# Patient Record
Sex: FEMALE | Race: WHITE | Employment: FULL TIME | URBAN - NONMETROPOLITAN AREA
[De-identification: names, ages, dates, MRNs, and addresses within clinical notes are randomized per-mention and may not be internally consistent; named-entity substitution may affect disease eponyms.]

---

## 2018-05-22 ENCOUNTER — HOSPITAL ENCOUNTER (EMERGENCY)
Age: 48
Discharge: HOME | End: 2018-05-22
Payer: COMMERCIAL

## 2018-05-22 VITALS
DIASTOLIC BLOOD PRESSURE: 79 MMHG | TEMPERATURE: 98.9 F | WEIGHT: 258 LBS | HEART RATE: 86 BPM | RESPIRATION RATE: 16 BRPM | HEIGHT: 64 IN | OXYGEN SATURATION: 100 % | SYSTOLIC BLOOD PRESSURE: 139 MMHG | BODY MASS INDEX: 44.05 KG/M2

## 2018-05-22 DIAGNOSIS — R42 DIZZINESS: ICD-10-CM

## 2018-05-22 DIAGNOSIS — H61.21 HEARING LOSS OF RIGHT EAR DUE TO CERUMEN IMPACTION: Primary | ICD-10-CM

## 2018-05-22 PROCEDURE — 69209 REMOVE IMPACTED EAR WAX UNI: CPT

## 2018-05-22 PROCEDURE — 99213 OFFICE O/P EST LOW 20 MIN: CPT | Performed by: NURSE PRACTITIONER

## 2018-05-22 PROCEDURE — 99213 OFFICE O/P EST LOW 20 MIN: CPT

## 2018-05-22 RX ORDER — MECLIZINE HCL 12.5 MG/1
12.5 TABLET ORAL 3 TIMES DAILY PRN
Qty: 12 TABLET | Refills: 0 | Status: SHIPPED | OUTPATIENT
Start: 2018-05-22 | End: 2018-06-01

## 2018-05-22 ASSESSMENT — ENCOUNTER SYMPTOMS
COLOR CHANGE: 0
SINUS PRESSURE: 0
SINUS PAIN: 0
SORE THROAT: 0

## 2018-05-22 ASSESSMENT — PAIN SCALES - GENERAL: PAINLEVEL_OUTOF10: 4

## 2018-05-22 ASSESSMENT — PAIN DESCRIPTION - LOCATION: LOCATION: EAR

## 2018-05-22 ASSESSMENT — PAIN DESCRIPTION - ORIENTATION: ORIENTATION: RIGHT

## 2018-05-22 ASSESSMENT — PAIN DESCRIPTION - DESCRIPTORS: DESCRIPTORS: DISCOMFORT

## 2018-05-22 ASSESSMENT — PAIN DESCRIPTION - PAIN TYPE: TYPE: ACUTE PAIN

## 2021-01-03 ENCOUNTER — HOSPITAL ENCOUNTER (INPATIENT)
Age: 51
LOS: 1 days | Discharge: HOME | DRG: 392 | End: 2021-01-09
Attending: EMERGENCY MEDICINE | Admitting: INTERNAL MEDICINE
Payer: COMMERCIAL

## 2021-01-03 ENCOUNTER — APPOINTMENT (OUTPATIENT)
Dept: CT IMAGING | Age: 51
DRG: 392 | End: 2021-01-03
Payer: COMMERCIAL

## 2021-01-03 DIAGNOSIS — E87.6 HYPOKALEMIA: ICD-10-CM

## 2021-01-03 DIAGNOSIS — E83.42 HYPOMAGNESEMIA: ICD-10-CM

## 2021-01-03 DIAGNOSIS — R19.7 DIARRHEA, UNSPECIFIED TYPE: Primary | ICD-10-CM

## 2021-01-03 LAB
ALBUMIN SERPL-MCNC: 4.3 G/DL (ref 3.5–5.1)
ALP BLD-CCNC: 85 U/L (ref 38–126)
ALT SERPL-CCNC: 25 U/L (ref 11–66)
ANION GAP SERPL CALCULATED.3IONS-SCNC: 14 MEQ/L (ref 8–16)
AST SERPL-CCNC: 20 U/L (ref 5–40)
BASOPHILS # BLD: 0.2 %
BASOPHILS ABSOLUTE: 0 THOU/MM3 (ref 0–0.1)
BILIRUB SERPL-MCNC: 0.4 MG/DL (ref 0.3–1.2)
BILIRUBIN URINE: NEGATIVE
BLOOD, URINE: NEGATIVE
BUN BLDV-MCNC: 32 MG/DL (ref 7–22)
C-REACTIVE PROTEIN: 0.65 MG/DL (ref 0–1)
CALCIUM SERPL-MCNC: 11 MG/DL (ref 8.5–10.5)
CHARACTER, URINE: CLEAR
CHLORIDE BLD-SCNC: 98 MEQ/L (ref 98–111)
CO2: 28 MEQ/L (ref 23–33)
COLOR: YELLOW
CREAT SERPL-MCNC: 1.5 MG/DL (ref 0.4–1.2)
EKG ATRIAL RATE: 94 BPM
EKG P AXIS: 45 DEGREES
EKG P-R INTERVAL: 166 MS
EKG Q-T INTERVAL: 376 MS
EKG QRS DURATION: 90 MS
EKG QTC CALCULATION (BAZETT): 470 MS
EKG R AXIS: 4 DEGREES
EKG T AXIS: 7 DEGREES
EKG VENTRICULAR RATE: 94 BPM
EOSINOPHIL # BLD: 0.2 %
EOSINOPHILS ABSOLUTE: 0 THOU/MM3 (ref 0–0.4)
ERYTHROCYTE [DISTWIDTH] IN BLOOD BY AUTOMATED COUNT: 13.2 % (ref 11.5–14.5)
ERYTHROCYTE [DISTWIDTH] IN BLOOD BY AUTOMATED COUNT: 41.3 FL (ref 35–45)
GFR SERPL CREATININE-BSD FRML MDRD: 37 ML/MIN/1.73M2
GLUCOSE BLD-MCNC: 121 MG/DL (ref 70–108)
GLUCOSE URINE: NEGATIVE MG/DL
HCT VFR BLD CALC: 45.6 % (ref 37–47)
HEMOGLOBIN: 15.5 GM/DL (ref 12–16)
IMMATURE GRANS (ABS): 0.06 THOU/MM3 (ref 0–0.07)
IMMATURE GRANULOCYTES: 0.4 %
INR BLD: 1.01 (ref 0.85–1.13)
KETONES, URINE: NEGATIVE
LEUKOCYTE ESTERASE, URINE: NEGATIVE
LIPASE: 60.7 U/L (ref 5.6–51.3)
LYMPHOCYTES # BLD: 10.4 %
LYMPHOCYTES ABSOLUTE: 1.7 THOU/MM3 (ref 1–4.8)
MAGNESIUM: 1.4 MG/DL (ref 1.6–2.4)
MCH RBC QN AUTO: 29.5 PG (ref 26–33)
MCHC RBC AUTO-ENTMCNC: 34 GM/DL (ref 32.2–35.5)
MCV RBC AUTO: 86.9 FL (ref 81–99)
MONOCYTES # BLD: 8.3 %
MONOCYTES ABSOLUTE: 1.3 THOU/MM3 (ref 0.4–1.3)
NITRITE, URINE: NEGATIVE
NUCLEATED RED BLOOD CELLS: 0 /100 WBC
OSMOLALITY CALCULATION: 287.6 MOSMOL/KG (ref 275–300)
PH UA: 5.5 (ref 5–9)
PLATELET # BLD: 429 THOU/MM3 (ref 130–400)
PMV BLD AUTO: 10.5 FL (ref 9.4–12.4)
POTASSIUM REFLEX MAGNESIUM: 2.5 MEQ/L (ref 3.5–5.2)
PROTEIN UA: NEGATIVE
RBC # BLD: 5.25 MILL/MM3 (ref 4.2–5.4)
SARS-COV-2, NAAT: NOT DETECTED
SEG NEUTROPHILS: 80.5 %
SEGMENTED NEUTROPHILS ABSOLUTE COUNT: 13 THOU/MM3 (ref 1.8–7.7)
SODIUM BLD-SCNC: 140 MEQ/L (ref 135–145)
SPECIFIC GRAVITY, URINE: > 1.03 (ref 1–1.03)
TOTAL PROTEIN: 7.5 G/DL (ref 6.1–8)
UROBILINOGEN, URINE: 0.2 EU/DL (ref 0–1)
WBC # BLD: 16.2 THOU/MM3 (ref 4.8–10.8)

## 2021-01-03 PROCEDURE — 2500000003 HC RX 250 WO HCPCS: Performed by: EMERGENCY MEDICINE

## 2021-01-03 PROCEDURE — 85610 PROTHROMBIN TIME: CPT

## 2021-01-03 PROCEDURE — 86140 C-REACTIVE PROTEIN: CPT

## 2021-01-03 PROCEDURE — 96376 TX/PRO/DX INJ SAME DRUG ADON: CPT

## 2021-01-03 PROCEDURE — 81003 URINALYSIS AUTO W/O SCOPE: CPT

## 2021-01-03 PROCEDURE — 2580000003 HC RX 258: Performed by: EMERGENCY MEDICINE

## 2021-01-03 PROCEDURE — 36415 COLL VENOUS BLD VENIPUNCTURE: CPT

## 2021-01-03 PROCEDURE — 6360000004 HC RX CONTRAST MEDICATION: Performed by: EMERGENCY MEDICINE

## 2021-01-03 PROCEDURE — 99220 PR INITIAL OBSERVATION CARE/DAY 70 MINUTES: CPT | Performed by: HOSPITALIST

## 2021-01-03 PROCEDURE — 6360000002 HC RX W HCPCS: Performed by: EMERGENCY MEDICINE

## 2021-01-03 PROCEDURE — 74177 CT ABD & PELVIS W/CONTRAST: CPT

## 2021-01-03 PROCEDURE — 96375 TX/PRO/DX INJ NEW DRUG ADDON: CPT

## 2021-01-03 PROCEDURE — G0378 HOSPITAL OBSERVATION PER HR: HCPCS

## 2021-01-03 PROCEDURE — 96366 THER/PROPH/DIAG IV INF ADDON: CPT

## 2021-01-03 PROCEDURE — 96365 THER/PROPH/DIAG IV INF INIT: CPT

## 2021-01-03 PROCEDURE — 96361 HYDRATE IV INFUSION ADD-ON: CPT

## 2021-01-03 PROCEDURE — 85025 COMPLETE CBC W/AUTO DIFF WBC: CPT

## 2021-01-03 PROCEDURE — 99284 EMERGENCY DEPT VISIT MOD MDM: CPT

## 2021-01-03 PROCEDURE — 83735 ASSAY OF MAGNESIUM: CPT

## 2021-01-03 PROCEDURE — U0002 COVID-19 LAB TEST NON-CDC: HCPCS

## 2021-01-03 PROCEDURE — 83690 ASSAY OF LIPASE: CPT

## 2021-01-03 PROCEDURE — 6370000000 HC RX 637 (ALT 250 FOR IP): Performed by: EMERGENCY MEDICINE

## 2021-01-03 PROCEDURE — 93005 ELECTROCARDIOGRAM TRACING: CPT | Performed by: EMERGENCY MEDICINE

## 2021-01-03 PROCEDURE — 80053 COMPREHEN METABOLIC PANEL: CPT

## 2021-01-03 RX ORDER — METRONIDAZOLE 500 MG/1
500 TABLET ORAL 2 TIMES DAILY
Status: ON HOLD | COMMUNITY
End: 2021-01-09 | Stop reason: HOSPADM

## 2021-01-03 RX ORDER — 0.9 % SODIUM CHLORIDE 0.9 %
1000 INTRAVENOUS SOLUTION INTRAVENOUS ONCE
Status: COMPLETED | OUTPATIENT
Start: 2021-01-03 | End: 2021-01-04

## 2021-01-03 RX ORDER — POTASSIUM CHLORIDE 7.45 MG/ML
10 INJECTION INTRAVENOUS
Status: DISPENSED | OUTPATIENT
Start: 2021-01-03 | End: 2021-01-03

## 2021-01-03 RX ORDER — MORPHINE SULFATE 4 MG/ML
4 INJECTION, SOLUTION INTRAMUSCULAR; INTRAVENOUS ONCE
Status: COMPLETED | OUTPATIENT
Start: 2021-01-03 | End: 2021-01-03

## 2021-01-03 RX ORDER — CIPROFLOXACIN 500 MG/1
500 TABLET, FILM COATED ORAL 2 TIMES DAILY
Status: ON HOLD | COMMUNITY
End: 2021-01-09 | Stop reason: HOSPADM

## 2021-01-03 RX ORDER — ONDANSETRON 2 MG/ML
4 INJECTION INTRAMUSCULAR; INTRAVENOUS ONCE
Status: COMPLETED | OUTPATIENT
Start: 2021-01-03 | End: 2021-01-03

## 2021-01-03 RX ORDER — POTASSIUM CHLORIDE 20 MEQ/1
40 TABLET, EXTENDED RELEASE ORAL 2 TIMES DAILY WITH MEALS
Status: DISCONTINUED | OUTPATIENT
Start: 2021-01-03 | End: 2021-01-09 | Stop reason: HOSPADM

## 2021-01-03 RX ORDER — MAGNESIUM SULFATE IN WATER 40 MG/ML
4 INJECTION, SOLUTION INTRAVENOUS ONCE
Status: COMPLETED | OUTPATIENT
Start: 2021-01-03 | End: 2021-01-03

## 2021-01-03 RX ADMIN — ONDANSETRON 4 MG: 2 INJECTION INTRAMUSCULAR; INTRAVENOUS at 16:52

## 2021-01-03 RX ADMIN — MORPHINE SULFATE 4 MG: 4 INJECTION, SOLUTION INTRAMUSCULAR; INTRAVENOUS at 19:54

## 2021-01-03 RX ADMIN — MAGNESIUM SULFATE HEPTAHYDRATE 4 G: 40 INJECTION, SOLUTION INTRAVENOUS at 19:15

## 2021-01-03 RX ADMIN — MORPHINE SULFATE 4 MG: 4 INJECTION, SOLUTION INTRAMUSCULAR; INTRAVENOUS at 16:52

## 2021-01-03 RX ADMIN — FAMOTIDINE 20 MG: 10 INJECTION INTRAVENOUS at 16:52

## 2021-01-03 RX ADMIN — POTASSIUM CHLORIDE 40 MEQ: 1500 TABLET, EXTENDED RELEASE ORAL at 17:48

## 2021-01-03 RX ADMIN — SODIUM CHLORIDE 1000 ML: 9 INJECTION, SOLUTION INTRAVENOUS at 16:53

## 2021-01-03 RX ADMIN — IOPAMIDOL 80 ML: 755 INJECTION, SOLUTION INTRAVENOUS at 20:17

## 2021-01-03 ASSESSMENT — PAIN DESCRIPTION - DESCRIPTORS: DESCRIPTORS: DISCOMFORT

## 2021-01-03 ASSESSMENT — ENCOUNTER SYMPTOMS
VOMITING: 0
EYE REDNESS: 0
NAUSEA: 0
ABDOMINAL PAIN: 1
COUGH: 0
BACK PAIN: 0
SHORTNESS OF BREATH: 0
TROUBLE SWALLOWING: 0
DIARRHEA: 1

## 2021-01-03 ASSESSMENT — PAIN DESCRIPTION - FREQUENCY: FREQUENCY: CONTINUOUS

## 2021-01-03 ASSESSMENT — PAIN DESCRIPTION - ONSET: ONSET: GRADUAL

## 2021-01-03 ASSESSMENT — PAIN SCALES - GENERAL: PAINLEVEL_OUTOF10: 8

## 2021-01-03 ASSESSMENT — PAIN DESCRIPTION - LOCATION: LOCATION: ABDOMEN

## 2021-01-03 NOTE — ED NOTES
Pt up in bed with blankets over bottom half. Patient updated on plan of care at this time and expresses no concerns regarding treatment. Patient VSS. Respirations are regular and unlabored, patient is alert and oriented x4. Patient bed rails up x2 and call light within reach. Will continue to monitor.        Usama Newton RN  01/03/21 [id number]

## 2021-01-03 NOTE — ED PROVIDER NOTES
[address] [facility]     Pt Name: Misha Gallegos  MRN: [id number]  [place] 1970  Date of evaluation: 1/3/2021  Provider: Lovely Torres MD,     [address] [facility]       Chief Complaint   Patient presents with    Abdominal Pain    Diarrhea       HISTORY OF PRESENT Leigh Sheppard is a pleasant 48 y.o. female who presents to the emergency department from home for diarrhea and abdominal pain. She states that this has been going on for 3 weeks. She was seen by her primary care doctor who did a stool culture was negative. They gave her Cipro and Flagyl for presumed intra-abdominal infection however she is continued to have diarrhea and abdominal pain. She describes as a discomfort in the epigastric and right upper quadrant. She denies any fever, nausea, or vomiting. She is not had any cough, congestion or chest pain. She has a previous surgical history of cholecystectomy,  section, hysterectomy, and ovarian cysts. Triage notes and Nursing notes were reviewed by myself. Any discrepancies are addressed above.     PAST MEDICAL HISTORY     Past Medical History:   Diagnosis Date    Hx of blood clots     during preg    Hypertension     Kidney stone     Nausea & vomiting     Thyroid disease        SURGICAL HISTORY       Past Surgical History:   Procedure Laterality Date    BACK SURGERY  ???    fusion and disk removed     SECTION       SECTION      CHOLECYSTECTOMY  's    FOOT SURGERY      bilateral feet    FOOT SURGERY Left 10/30/14    synovectomy peroneal tendon, repair peroneal tendon, exc ganglion cyst ankle    HYSTERECTOMY      TONSILLECTOMY      as a child       CURRENT MEDICATIONS       Current Discharge Medication List      CONTINUE these medications which have NOT CHANGED    Details   metroNIDAZOLE (FLAGYL) 500 MG tablet Take 500 mg by mouth 2 times daily      ciprofloxacin (CIPRO) 500 MG tablet Take 500 mg by mouth 2 times daily      levothyroxine (SYNTHROID) 150 MCG tablet Take 150 mcg by mouth Daily. olmesartan-hydrochlorothiazide (BENICAR HCT) 40-25 MG per tablet Take 1 tablet by mouth daily. acetaminophen (TYLENOL) 500 MG tablet Take 500 mg by mouth every 6 hours as needed for Pain      ibuprofen (ADVIL;MOTRIN) 200 MG tablet Take 600 mg by mouth every 6 hours as needed for Pain             ALLERGIES     Pcn [penicillins]    FAMILY HISTORY       Family History   Problem Relation Age of Onset    Cancer Mother         SOCIAL HISTORY       Social History     Socioeconomic History    Marital status:      Spouse name: None    Number of children: None    Years of education: None    Highest education level: None   Occupational History    None   Social Needs    Financial resource strain: None    Food insecurity     Worry: None     Inability: None    Transportation needs     Medical: None     Non-medical: None   Tobacco Use    Smoking status: Never Smoker    Smokeless tobacco: Never Used   Substance and Sexual Activity    Alcohol use: Yes     Comment: occasionally     Drug use: No    Sexual activity: None   Lifestyle    Physical activity     Days per week: None     Minutes per session: None    Stress: None   Relationships    Social connections     Talks on phone: None     Gets together: None     Attends Congregational service: None     Active member of club or organization: None     Attends meetings of clubs or organizations: None     Relationship status: None    Intimate partner violence     Fear of current or ex partner: None     Emotionally abused: None     Physically abused: None     Forced sexual activity: None   Other Topics Concern    None   Social History Narrative    None       REVIEW OF SYSTEMS     Review of Systems   Constitutional: Negative for fatigue and fever. HENT: Negative for congestion and trouble swallowing. Eyes: Negative for redness.    Respiratory: Negative for cough and shortness of breath. Cardiovascular: Negative for chest pain. Gastrointestinal: Positive for abdominal pain and diarrhea. Negative for nausea and vomiting. Genitourinary: Positive for decreased urine volume. Negative for dysuria. Musculoskeletal: Negative for back pain. Skin: Negative for rash. Allergic/Immunologic: Negative for immunocompromised state. Neurological: Negative for light-headedness. Hematological: Does not bruise/bleed easily. Except as noted above the remainder of the review of systems was reviewed and is. PHYSICAL EXAM    (up to 7 for level 4, 8 or more for level 5)     ED Triage Vitals [01/03/21 1531]   BP Temp Temp Source Pulse Resp SpO2 Height Weight   114/71 98.3 °F (36.8 °C) Oral 93 18 96 % 5' 4\" (1.626 m) 260 lb (117.9 kg)       Physical Exam  Vitals signs and nursing note reviewed. Exam conducted with a chaperone present. Constitutional:       General: She is not in acute distress. Appearance: She is obese. She is not ill-appearing. HENT:      Head: Normocephalic and atraumatic. Nose: Nose normal. No congestion. Mouth/Throat:      Mouth: Mucous membranes are moist.      Pharynx: Oropharynx is clear. No oropharyngeal exudate or posterior oropharyngeal erythema. Eyes:      Extraocular Movements: Extraocular movements intact. Pupils: Pupils are equal, round, and reactive to light. Cardiovascular:      Rate and Rhythm: Normal rate and regular rhythm. Pulses: Normal pulses. Heart sounds: Normal heart sounds. No murmur. No friction rub. Pulmonary:      Effort: Pulmonary effort is normal. No respiratory distress. Breath sounds: Normal breath sounds. No wheezing. Abdominal:      General: Abdomen is flat and protuberant. A surgical scar is present. There is no distension. Palpations: Abdomen is soft. Tenderness: There is abdominal tenderness in the right upper quadrant and epigastric area.  There is no guarding or rebound. Positive signs include 's sign. Negative signs include Rovsing's sign and McBurney's sign. Musculoskeletal: Normal range of motion. Skin:     General: Skin is warm and dry. Capillary Refill: Capillary refill takes less than 2 seconds. Neurological:      General: No focal deficit present. Mental Status: She is alert and oriented to person, place, and time. DIAGNOSTIC RESULTS     EKG:(none if blank)  All EKG's are [facility] who either signs or Co-signs this chart in the absence of a cardiologist.        RADIOLOGY: (none if blank)   Interpretation per the Radiologistbelow, if available at the time of this note:    CT ABDOMEN PELVIS W IV CONTRAST Additional Contrast? None   Final Result   Impression:      No significant abnormalities. This document has been electronically signed by: Sadiq Hunt MD on    01/03/2021 09:00 PM      All CT scans at this facility use dose modulation, iterative    reconstruction, and/or weight-based   dosing when appropriate to reduce radiation dose to as low as reasonably    achievable.              LABS:  Labs Reviewed   LIPASE - Abnormal; Notable for the following components:       Result Value    Lipase 60.7 (*)     All other components within normal limits   COMPREHENSIVE METABOLIC PANEL W/ REFLEX TO MG FOR LOW K - Abnormal; Notable for the following components:    Glucose 121 (*)     CREATININE 1.5 (*)     BUN 32 (*)     Potassium reflex Magnesium 2.5 (*)     Calcium 11.0 (*)     All other components within normal limits   CBC WITH AUTO DIFFERENTIAL - Abnormal; Notable for the following components:    WBC 16.2 (*)     Platelets 758 (*)     Segs Absolute 13.0 (*)     All other components within normal limits   URINE RT REFLEX TO CULTURE - Abnormal; Notable for the following components:    Specific Gravity, Urine > 1.030 (*)     All other components within normal limits   MAGNESIUM - Abnormal; Notable for the following components:    Magnesium 1.4 (*)     All other components within normal limits   GLOMERULAR FILTRATION RATE, ESTIMATED - Abnormal; Notable for the following components:    Est, Glom Filt Rate 37 (*)     All other components within normal limits   BASIC METABOLIC PANEL W/ REFLEX TO MG FOR LOW K - Abnormal; Notable for the following components:    Potassium reflex Magnesium 3.1 (*)     Glucose 119 (*)     BUN 24 (*)     All other components within normal limits   GLOMERULAR FILTRATION RATE, ESTIMATED - Abnormal; Notable for the following components:    Est, Glom Filt Rate 59 (*)     All other components within normal limits   GLOMERULAR FILTRATION RATE, ESTIMATED - Abnormal; Notable for the following components:    Est, Glom Filt Rate 76 (*)     All other components within normal limits   FECAL LEUKOCYTES    Narrative:     Source: feces       Site:           Current Antibiotics:   GASTROINTESTINAL PANEL, MOLECULAR   OVA AND PARASITE SCREEN   PROTIME-INR   C-REACTIVE PROTEIN   ANION GAP   OSMOLALITY   COVID-19   HEPATIC FUNCTION PANEL   LACTIC ACID, PLASMA   BLOOD OCCULT STOOL SCREEN #1   BASIC METABOLIC PANEL   ANION GAP   MAGNESIUM   OSMOLALITY   ANION GAP   OSMOLALITY   FECAL LACTOFERRIN   BASIC METABOLIC PANEL       All other labs were within normal range or not returned as of this dictation. Please note, any cultures that may have been sent were not resulted at the time of this patient visit. EMERGENCY DEPARTMENT COURSE andMedical Decision Making:     MDM  Number of Diagnoses or Management Options  Diarrhea, unspecified type  Hypokalemia  Hypomagnesemia  Diagnosis management comments: 51-year-old female presents emergency room for 3-week history of diarrhea. Will evaluate with CBC, CMP, lipase, UA, CRP, PT/INR and CT abdomen pelvis. She did have stool cultures done by her primary care physician which were negative.   She denies any history of foul-smelling diarrhea, recent antibiotic use, or Intravenous Stopped 1/4/21 0104)   morphine injection 4 mg (4 mg Intravenous Given 1/3/21 1652)   ondansetron (ZOFRAN) injection 4 mg (4 mg Intravenous Given 1/3/21 1652)   famotidine (PEPCID) injection 20 mg (20 mg Intravenous Given 1/3/21 1652)   magnesium sulfate 4 g in 100 mL IVPB premix (0 g Intravenous Stopped 1/3/21 2344)   morphine injection 4 mg (4 mg Intravenous Given 1/3/21 1954)   iopamidol (ISOVUE-370) 76 % injection 80 mL (80 mLs Intravenous Given 1/3/21 2017)         Procedures: (None if blank)       CLINICAL       1. Diarrhea, unspecified type    2. Hypokalemia    3. Hypomagnesemia          DISPOSITION/PLAN   DISPOSITION Admitted 01/03/2021 11:24:44 PM      PATIENT REFERRED TO:  No follow-up provider specified.     DISCHARGE MEDICATIONS:  Current Discharge Medication List                 (Please note that portions of this note were completed with a voice recognition program.  Efforts were made to edit the dictations but occasionallywords are mis-transcribed.)      Electronically signed by Natty Rod MD on 1/3/21 at 6:06 PM EST    Attending Physician, Emergency Department       Margaret Hills MD  01/04/21 [id number] [phone]

## 2021-01-03 NOTE — ED NOTES
Patient lying in bed with blankets watching tv at this time. Patient respirations are regular and unlabored. Patient appears to be in no current distress. Patient VSS. Call light is within reach. Patient expresses no needs at this time.        Mal Zacarias RN  01/03/21 9094

## 2021-01-04 LAB
ADENOVIRUS F 40 41 PCR: NOT DETECTED
ALBUMIN SERPL-MCNC: 3.6 G/DL (ref 3.5–5.1)
ALP BLD-CCNC: 68 U/L (ref 38–126)
ALT SERPL-CCNC: 20 U/L (ref 11–66)
ANION GAP SERPL CALCULATED.3IONS-SCNC: 10 MEQ/L (ref 8–16)
ANION GAP SERPL CALCULATED.3IONS-SCNC: 8 MEQ/L (ref 8–16)
AST SERPL-CCNC: 17 U/L (ref 5–40)
ASTROVIRUS PCR: NOT DETECTED
BILIRUB SERPL-MCNC: 0.4 MG/DL (ref 0.3–1.2)
BILIRUBIN DIRECT: < 0.2 MG/DL (ref 0–0.3)
BUN BLDV-MCNC: 17 MG/DL (ref 7–22)
BUN BLDV-MCNC: 24 MG/DL (ref 7–22)
CALCIUM SERPL-MCNC: 8.8 MG/DL (ref 8.5–10.5)
CALCIUM SERPL-MCNC: 9.2 MG/DL (ref 8.5–10.5)
CAMPYLOBACTER PCR: NOT DETECTED
CHLORIDE BLD-SCNC: 102 MEQ/L (ref 98–111)
CHLORIDE BLD-SCNC: 104 MEQ/L (ref 98–111)
CLOSTRIDIUM DIFFICILE, PCR: NOT DETECTED
CO2: 25 MEQ/L (ref 23–33)
CO2: 26 MEQ/L (ref 23–33)
CREAT SERPL-MCNC: 0.8 MG/DL (ref 0.4–1.2)
CREAT SERPL-MCNC: 1 MG/DL (ref 0.4–1.2)
CRYPTOSPORIDIUM PCR: NOT DETECTED
CYCLOSPORA CAYETANENSIS PCR: NOT DETECTED
E COLI 0157 PCR: NORMAL
E COLI ENTEROAGGREGATIVE PCR: NOT DETECTED
E COLI ENTEROPATHOGENIC PCR: NOT DETECTED
E COLI ENTEROTOXIGENIC PCR: NOT DETECTED
E COLI SHIGA LIKE TOXIN PCR: NOT DETECTED
E COLI SHIGELLA/ENTEROINVASIVE PCR: NOT DETECTED
E HISTOLYTICA GI FILM ARRAY: NOT DETECTED
FECAL LEUKOCYTES: NORMAL
GFR SERPL CREATININE-BSD FRML MDRD: 59 ML/MIN/1.73M2
GFR SERPL CREATININE-BSD FRML MDRD: 76 ML/MIN/1.73M2
GIARDIA LAMBLIA PCR: NOT DETECTED
GLUCOSE BLD-MCNC: 100 MG/DL (ref 70–108)
GLUCOSE BLD-MCNC: 119 MG/DL (ref 70–108)
HEMOCCULT STL QL: POSITIVE
LACTIC ACID: 1.9 MMOL/L (ref 0.5–2.2)
MAGNESIUM: 2.3 MG/DL (ref 1.6–2.4)
NOROVIRUS GI GII PCR: NOT DETECTED
OSMOLALITY CALCULATION: 277.3 MOSMOL/KG (ref 275–300)
OSMOLALITY CALCULATION: 279 MOSMOL/KG (ref 275–300)
PLESIOMONAS SHIGELLOIDES PCR: NOT DETECTED
POTASSIUM REFLEX MAGNESIUM: 3.1 MEQ/L (ref 3.5–5.2)
POTASSIUM SERPL-SCNC: 3.5 MEQ/L (ref 3.5–5.2)
REASON FOR REJECTION: NORMAL
REJECTED TEST: NORMAL
ROTAVIRUS A PCR: NOT DETECTED
SALMONELLA PCR: NOT DETECTED
SAPOVIRUS PCR: NOT DETECTED
SODIUM BLD-SCNC: 137 MEQ/L (ref 135–145)
SODIUM BLD-SCNC: 138 MEQ/L (ref 135–145)
TOTAL PROTEIN: 6.3 G/DL (ref 6.1–8)
VIBRIO CHOLERAE PCR: NOT DETECTED
VIBRIO PCR: NOT DETECTED
YERSINIA ENTEROCOLITICA PCR: NOT DETECTED

## 2021-01-04 PROCEDURE — 6360000002 HC RX W HCPCS: Performed by: PHYSICIAN ASSISTANT

## 2021-01-04 PROCEDURE — 82248 BILIRUBIN DIRECT: CPT

## 2021-01-04 PROCEDURE — 6370000000 HC RX 637 (ALT 250 FOR IP): Performed by: EMERGENCY MEDICINE

## 2021-01-04 PROCEDURE — 96367 TX/PROPH/DG ADDL SEQ IV INF: CPT

## 2021-01-04 PROCEDURE — G0378 HOSPITAL OBSERVATION PER HR: HCPCS

## 2021-01-04 PROCEDURE — 6360000002 HC RX W HCPCS: Performed by: HOSPITALIST

## 2021-01-04 PROCEDURE — 2580000003 HC RX 258: Performed by: HOSPITALIST

## 2021-01-04 PROCEDURE — 96366 THER/PROPH/DIAG IV INF ADDON: CPT

## 2021-01-04 PROCEDURE — 36415 COLL VENOUS BLD VENIPUNCTURE: CPT

## 2021-01-04 PROCEDURE — 99225 PR SBSQ OBSERVATION CARE/DAY 25 MINUTES: CPT | Performed by: INTERNAL MEDICINE

## 2021-01-04 PROCEDURE — 89055 LEUKOCYTE ASSESSMENT FECAL: CPT

## 2021-01-04 PROCEDURE — 80053 COMPREHEN METABOLIC PANEL: CPT

## 2021-01-04 PROCEDURE — 0097U HC GI PTHGN MULT REV TRANS & AMP PRB TECH 22 TRGT: CPT

## 2021-01-04 PROCEDURE — 83735 ASSAY OF MAGNESIUM: CPT

## 2021-01-04 PROCEDURE — 82272 OCCULT BLD FECES 1-3 TESTS: CPT

## 2021-01-04 PROCEDURE — 6370000000 HC RX 637 (ALT 250 FOR IP): Performed by: INTERNAL MEDICINE

## 2021-01-04 PROCEDURE — 83605 ASSAY OF LACTIC ACID: CPT

## 2021-01-04 PROCEDURE — 96376 TX/PRO/DX INJ SAME DRUG ADON: CPT

## 2021-01-04 PROCEDURE — 6360000002 HC RX W HCPCS: Performed by: EMERGENCY MEDICINE

## 2021-01-04 PROCEDURE — 83630 LACTOFERRIN FECAL (QUAL): CPT

## 2021-01-04 RX ORDER — 0.9 % SODIUM CHLORIDE 0.9 %
30 INTRAVENOUS SOLUTION INTRAVENOUS ONCE
Status: DISCONTINUED | OUTPATIENT
Start: 2021-01-04 | End: 2021-01-09 | Stop reason: HOSPADM

## 2021-01-04 RX ORDER — SODIUM CHLORIDE 0.9 % (FLUSH) 0.9 %
3 SYRINGE (ML) INJECTION PRN
Status: DISCONTINUED | OUTPATIENT
Start: 2021-01-04 | End: 2021-01-09 | Stop reason: HOSPADM

## 2021-01-04 RX ORDER — POTASSIUM CHLORIDE 20 MEQ/1
40 TABLET, EXTENDED RELEASE ORAL ONCE
Status: DISCONTINUED | OUTPATIENT
Start: 2021-01-04 | End: 2021-01-09 | Stop reason: HOSPADM

## 2021-01-04 RX ORDER — FENTANYL CITRATE 50 UG/ML
25 INJECTION, SOLUTION INTRAMUSCULAR; INTRAVENOUS EVERY 4 HOURS PRN
Status: DISCONTINUED | OUTPATIENT
Start: 2021-01-04 | End: 2021-01-09 | Stop reason: HOSPADM

## 2021-01-04 RX ORDER — SODIUM CHLORIDE 9 MG/ML
INJECTION, SOLUTION INTRAVENOUS CONTINUOUS
Status: DISCONTINUED | OUTPATIENT
Start: 2021-01-04 | End: 2021-01-06

## 2021-01-04 RX ORDER — ONDANSETRON 2 MG/ML
4 INJECTION INTRAMUSCULAR; INTRAVENOUS EVERY 6 HOURS PRN
Status: DISCONTINUED | OUTPATIENT
Start: 2021-01-04 | End: 2021-01-09 | Stop reason: HOSPADM

## 2021-01-04 RX ORDER — LOPERAMIDE HYDROCHLORIDE 2 MG/1
2 CAPSULE ORAL 4 TIMES DAILY PRN
Status: DISCONTINUED | OUTPATIENT
Start: 2021-01-04 | End: 2021-01-08

## 2021-01-04 RX ORDER — POTASSIUM CHLORIDE 7.45 MG/ML
10 INJECTION INTRAVENOUS
Status: DISPENSED | OUTPATIENT
Start: 2021-01-04 | End: 2021-01-04

## 2021-01-04 RX ORDER — ACETAMINOPHEN 325 MG/1
650 TABLET ORAL EVERY 4 HOURS PRN
Status: DISCONTINUED | OUTPATIENT
Start: 2021-01-04 | End: 2021-01-09 | Stop reason: HOSPADM

## 2021-01-04 RX ORDER — LIDOCAINE 40 MG/G
CREAM TOPICAL EVERY 30 MIN PRN
Status: DISCONTINUED | OUTPATIENT
Start: 2021-01-04 | End: 2021-01-09 | Stop reason: HOSPADM

## 2021-01-04 RX ADMIN — POTASSIUM CHLORIDE 10 MEQ: 7.46 INJECTION, SOLUTION INTRAVENOUS at 00:28

## 2021-01-04 RX ADMIN — LOPERAMIDE HYDROCHLORIDE 2 MG: 2 CAPSULE ORAL at 16:51

## 2021-01-04 RX ADMIN — POTASSIUM CHLORIDE 40 MEQ: 1500 TABLET, EXTENDED RELEASE ORAL at 16:51

## 2021-01-04 RX ADMIN — POTASSIUM CHLORIDE 10 MEQ: 7.46 INJECTION, SOLUTION INTRAVENOUS at 01:32

## 2021-01-04 RX ADMIN — SODIUM CHLORIDE: 9 INJECTION, SOLUTION INTRAVENOUS at 19:23

## 2021-01-04 RX ADMIN — SODIUM CHLORIDE: 9 INJECTION, SOLUTION INTRAVENOUS at 05:56

## 2021-01-04 RX ADMIN — SODIUM CHLORIDE: 9 INJECTION, SOLUTION INTRAVENOUS at 03:31

## 2021-01-04 RX ADMIN — ONDANSETRON 4 MG: 2 INJECTION INTRAMUSCULAR; INTRAVENOUS at 03:42

## 2021-01-04 RX ADMIN — POTASSIUM CHLORIDE 10 MEQ: 7.46 INJECTION, SOLUTION INTRAVENOUS at 03:32

## 2021-01-04 RX ADMIN — ONDANSETRON 4 MG: 2 INJECTION INTRAMUSCULAR; INTRAVENOUS at 20:06

## 2021-01-04 RX ADMIN — ONDANSETRON 4 MG: 2 INJECTION INTRAMUSCULAR; INTRAVENOUS at 14:08

## 2021-01-04 RX ADMIN — POTASSIUM CHLORIDE 40 MEQ: 1500 TABLET, EXTENDED RELEASE ORAL at 08:57

## 2021-01-04 ASSESSMENT — PAIN DESCRIPTION - PAIN TYPE: TYPE: ACUTE PAIN

## 2021-01-04 ASSESSMENT — PAIN DESCRIPTION - FREQUENCY: FREQUENCY: CONTINUOUS

## 2021-01-04 ASSESSMENT — PAIN DESCRIPTION - DESCRIPTORS: DESCRIPTORS: DISCOMFORT

## 2021-01-04 ASSESSMENT — PAIN DESCRIPTION - ORIENTATION: ORIENTATION: RIGHT;LOWER

## 2021-01-04 ASSESSMENT — PAIN SCALES - GENERAL: PAINLEVEL_OUTOF10: 6

## 2021-01-04 ASSESSMENT — PAIN DESCRIPTION - LOCATION: LOCATION: ABDOMEN

## 2021-01-04 NOTE — PROGRESS NOTES
Hospitalist Progress Note      Patient:  Watson Raymundo    Unit/Bed:6E-66/066-A  YOB: 1970  MRN: [id number]   Acct: [de-identified]   PCP: Fatuma Hernandez MD  Date of Admission: 1/3/2021    Assessment/Plan:    1. Diarrhea: testing does not demonstrate infectious etiology  1/4/21: start imodium prn diarrhea since not infectious; if not improved in the AM consider GI consultation  2. SAJI: due to dehydration from above  1/4/21: C/w IVF and recheck BMP  3. Hypokalemia: due to GI losses  1/4/21: replace and recheck  4. Hypomagnesemia: due to GI losses  1/4/21: replace and recheck        Expected discharge date:  1/5-6? Disposition:    [x] Home       [] TCU       [] Rehab       [] Psych       [] SNF       [] [place]       [] Other-    Chief Complaint: Diarrhea    Hospital Treatment Course: (Prior to today) \"female with history of HTN and hypothyroid is admitted for diarrhea. She has diarrhea for the past few weeks. However, in the past few days, she noted worsening of her diarrhea. Nothing improved or worsened her diarrhea. She also has worsening nausea in the past 24-48 hours. She has decrease PO intake. She has no abdominal pain but she has abdominal discomfort. Her discomfort is constant and mild. She has 5-6 loose bowel movements / day. No fever or chill. Her PCP started her on cipro and flagyl. Her stool cultures were also negative. \"    1/4/21: Testing not indicative of infectious etiology, start imodium and monitor     Subjective (past 24 hours): continues to have diarrhea, yellow to green in color, with some abdominal cramping       Past medical history, family history, social history and allergies reviewed again and is unchanged since admission. ROS (12 point review of systems completed. Pertinent positives noted.  Otherwise ROS is negative)     Medications:  Reviewed    Infusion Medications    sodium chloride 150 mL/hr at 01/04/21 0556     Scheduled Medications    sodium chloride  30 mL/kg Intravenous Once    potassium chloride  40 mEq Oral Once    potassium chloride  40 mEq Oral BID WC     PRN Meds: lidocaine, sodium chloride flush, acetaminophen, fentanNYL, ondansetron, loperamide      Intake/Output Summary (Last 24 hours) at 1/4/2021 1745  Last data filed at 1/4/2021 0445  Gross per 24 hour   Intake 200 ml   Output --   Net 200 ml       Diet:  DIET PEDS GASTROENTERITIS; Low Sodium (2 GM)    Exam:  /65   Pulse 65   Temp 98.5 °F (36.9 °C) (Oral)   Resp 16   Ht 5' 4\" (1.626 m)   Wt 260 lb (117.9 kg)   SpO2 98%   BMI 44.63 kg/m²   General appearance:  No apparent distress, appears stated age and cooperative. HEENT: Pupils equal, round, and reactive to light. Conjunctivae/corneas clear. Neck: Supple, with full range of motion. No jugular venous distention. Trachea midline. Respiratory:  Normal respiratory effort. Clear to auscultation, bilaterally without Rales/Wheezes/Rhonchi. Cardiovascular: Regular rate and rhythm with normal S1/S2 without murmurs, rubs or gallops. Abdomen: Soft, non-tender, non-distended with normal bowel sounds. Musculoskeletal: passive and active ROM x 4 extremities. Skin: Skin color, texture, turgor normal.  No rashes or lesions. Neurologic:  Neurovascularly intact without any focal sensory/motor deficits.  Cranial nerves: II-XII intact, grossly non-focal.  Psychiatric: Alert and oriented, thought content appropriate, normal insight  Capillary Refill: Brisk,< 3 seconds   Peripheral Pulses: +2 palpable, equal bilaterally     Labs:   Recent Labs     01/03/21  1601   WBC 16.2*   HGB 15.5   HCT 45.6   *     Recent Labs     01/03/21  1601 01/04/21  0516 01/04/21  1241    137 138   K 2.5* 3.1* 3.5   CL 98 102 104   CO2 28 25 26   BUN 32* 24* 17   CREATININE 1.5* 1.0 0.8   CALCIUM 11.0* 9.2 8.8     Recent Labs     01/03/21  1601 01/04/21  0516   AST 20 17   ALT 25 20   BILIDIR  -- <0. 2   BILITOT 0.4 0.4   ALKPHOS 85 68     Recent Labs     01/03/21  1601   INR 1.01     No results for input(s): CKTOTAL, TROPONINI in the last 72 hours. Microbiology:    Blood culture #1: No results found for: [facility]    Blood culture #2:No results found for: Rima Hubert    Organism:No results found for: [facility]      Lab Results   Component Value Date    LABGRAM  07/29/2014     No segmented neutrophils observed. Moderate epithelial cells observed. Many large gram positive bacilli. Few gram negative bacilli. Smear consistent with Normal Vaginal Anastasia. MRSA culture only:No results found for: [address]     Urine culture: No results found for: LABURIN    Respiratory culture: No results found for: CULTRESP    Aerobic and Anaerobic :  No results found for: LABAERO  No results found for: LABANAE    Urinalysis:      Lab Results   Component Value Date    NITRU NEGATIVE 01/03/2021    BLOODU NEGATIVE 01/03/2021    SPECGRAV <=1.005 10/02/2015    GLUCOSEU NEGATIVE 01/03/2021       Radiology:  CT ABDOMEN PELVIS W IV CONTRAST Additional Contrast? None   Final Result   Impression:      No significant abnormalities. This document has been electronically signed by: Lory Jimenez MD on    01/03/2021 09:00 PM      All CT scans at this facility use dose modulation, iterative    reconstruction, and/or weight-based   dosing when appropriate to reduce radiation dose to as low as reasonably    achievable. Ct Abdomen Pelvis W Iv Contrast Additional Contrast? None    Result Date: 1/3/2021  CT abdomen and pelvis with contrast Comparison: 10/2/2015 Findings: Lung bases are clear. No acute bony abnormalities. Posterior fusion L5-S1 with laminectomy. Liver and spleen are unremarkable. Cholecystectomy. Pancreas, adrenal glands and kidneys demonstrate no acute process. Nonobstructing renal stones. Left cyst.  No hydronephrosis. No aortic aneurysm or dissection noted.  No significant free fluid or adenopathy noted in the pelvis. No evidence for diverticulitis. Appendix is unremarkable. Impression: No significant abnormalities. This document has been electronically signed by: Lory Jimenez MD on 01/03/2021 09:00 PM All CT scans at this facility use dose modulation, iterative reconstruction, and/or weight-based dosing when appropriate to reduce radiation dose to as low as reasonably achievable.        Support Devices (date placed):  [] ETT []Oral / [] Nasal  [] Gastric Tube [] OG / [] NG    [] Central Venous Line (Specify Site):  [] Urinary Catheter  [] Arterial Line (Specify Site)  [] Peripheral IV access  [] Other:    DVT prophylaxis: [] Lovenox                                 [] SCDs                                 [] SQ Heparin                                 [] Encourage ambulation           [] Already on Anticoagulation     Code Status: Full Code    Tele:   [] yes             [] no    Electronically signed by Forrest Flores DO on 1/4/2021 at 5:45 PM

## 2021-01-04 NOTE — ED NOTES
ED nurse-to-nurse bedside report    Chief Complaint   Patient presents with    Abdominal Pain    Diarrhea      LOC: alert and orientated to name, place, date  Vital signs   Vitals:    01/03/21 2156 01/03/21 2309 01/04/21 0000 01/04/21 0100   BP: 104/67 107/60 110/61 (!) 107/59   Pulse: 63 62 61 59   Resp: 14 11 15 15   Temp:       TempSrc:       SpO2: 97% 95% 99% 96%   Weight:       Height:          Pain:    Pain Interventions: Morphine  Pain Goal: 3  Oxygen: No    Current needs required RA   Telemetry: Yes  LDAs:   Peripheral IV 01/03/21 Right Forearm (Active)   Site Assessment Clean;Dry; Intact 01/03/21 1540   Line Status Blood return noted;Normal saline locked; Flushed 01/03/21 1540   Dressing Status Clean;Dry; Intact 01/03/21 1540     Continuous Infusions:    sodium chloride       Mobility: Independent  Aldana Fall Risk Score: No flowsheet data found.   Fall Interventions: Call light and side rails  Report given to: Michelle Baeza RN  01/04/21 6143

## 2021-01-04 NOTE — H&P
History & Physical        Patient:  Ya Joseph  YOB: 1970    MRN: [id number]     Acct: [de-identified]    PCP: Manuel Lopez MD    Date of Admission: 1/3/2021      Chief Complaint:  Diarrhea      History Of Present Illness:    28year old female with history of HTN and hypothyroid is admitted for diarrhea. She has diarrhea for the past few weeks. However, in the past few days, she noted worsening of her diarrhea. Nothing improved or worsened her diarrhea. She also has worsening nausea in the past 24-48 hours. She has decrease PO intake. She has no abdominal pain but she has abdominal discomfort. Her discomfort is constant and mild. She has 5-6 loose bowel movements / day. No fever or chill. Her PCP started her on cipro and flagyl. Her stool cultures were also negative. Past Medical History:          Diagnosis Date    Hx of blood clots     during preg    Hypertension     Kidney stone     Nausea & vomiting     Thyroid disease        Past Surgical History:          Procedure Laterality Date    BACK SURGERY  ???    fusion and disk removed     SECTION       SECTION      CHOLECYSTECTOMY  's    FOOT SURGERY      bilateral feet    FOOT SURGERY Left 10/30/14    synovectomy peroneal tendon, repair peroneal tendon, exc ganglion cyst ankle    HYSTERECTOMY      TONSILLECTOMY      as a child       Medications Prior to Admission:      Prior to Admission medications    Medication Sig Start Date End Date Taking?  Authorizing Provider   metroNIDAZOLE (FLAGYL) 500 MG tablet Take 500 mg by mouth 2 times daily   Yes Historical Provider, MD   ciprofloxacin (CIPRO) 500 MG tablet Take 500 mg by mouth 2 times daily   Yes Historical Provider, MD   acetaminophen (TYLENOL) 500 MG tablet Take 500 mg by mouth every 6 hours as needed for Pain    Historical Provider, MD   ibuprofen (ADVIL;MOTRIN) 200 MG tablet Take 600 mg by mouth every 6 hours as needed for Pain

## 2021-01-04 NOTE — ED NOTES
Pt resting in cot at this time on her left side. Pt reporting pain. This RN offers pain medications, but pt states she is \"going to tough it out\". Pt states pain does not go away. Pt was able to drink approx 4 oz of ginger ale and tolerate eating a few crackers. VS updated. New bag of continuous fluids hung at this time. Infusing without complications. Will continue to monitor.       Rose Munoz RN  01/04/21 0600

## 2021-01-04 NOTE — ED NOTES
Patient lying in bed with blankets watching tv at this time. Patient respirations are regular and unlabored. Patient appears to be in no current distress. Patient VSS. Call light is within reach. Patient expresses no needs at this time.        Dawna Mora RN  01/04/21 0691

## 2021-01-04 NOTE — ED NOTES
Patient lying in bed with blankets watching tv at this time. Patient respirations are regular and unlabored. Patient appears to be in no current distress. Patient VSS. Call light is within reach. Patient expresses no needs at this time.        Edmund Mckeon RN  01/03/21 8953

## 2021-01-04 NOTE — ED PROVIDER NOTES
[facility]   Emergency Department  Emergency Medicine Attending Sign-out     Care of Stephanie Monroy was assumed from Dr. Maninder Huff  and is being seen for Abdominal Pain and Diarrhea  . The patient's initial evaluation and plan have been discussed with the prior provider who initially evaluated the patient. BRIEF PATIENT SUMMARY/MDM COURSE PER INITIAL PROVIDER:   RECENT VITALS:     Temp: 98.3 °F (36.8 °C),  Pulse: 74, Resp: 12, BP: 132/68, SpO2: 97 %    This patient is a 48 y.o. Female with diarrhea for 3 weeks. Hypokalemia and hypomagnesemia. Awaiting CT abdomen pelvis. Plan for likely admission.     DIAGNOSTICS/MEDICATIONS:     MEDICATIONS GIVEN:  ED Medication Orders (From admission, onward)    Start Ordered     Status Ordering Provider    01/03/21 1945 01/03/21 1920  morphine injection 4 mg  ONCE      Acknowledged Ricco CALLAWAY    01/03/21 1800 01/03/21 1739  magnesium sulfate 4 g in 100 mL IVPB premix  ONCE      Last MAR action: New Bag - by Sandhya Sites on 01/03/21 at 1650 [facility]    01/03/21 1800 01/03/21 1739  potassium chloride 10 mEq/100 mL IVPB (Peripheral Line)  EVERY HOUR      Last MAR action: New Bag - by Sandhya Sites on 01/03/21 at 1843 Ricco CALLAWAY    01/03/21 1800 01/03/21 1739  potassium chloride (KLOR-CON M) extended release tablet 40 mEq  2 TIMES DAILY WITH MEALS      Last MAR action: Given - by Sandhya Sites on 01/03/21 at 1748 Ricco CALLAWAY    01/03/21 1715 01/03/21 1649  0.9 % sodium chloride bolus  ONCE      Last MAR action: New Bag - [facility]    01/03/21 1715 01/03/21 1649  morphine injection 4 mg  ONCE      Last MAR action: Given - by Sandhya Sites on 01/03/21 at 332 [facility]    01/03/21 1715 01/03/21 1649  ondansetron (ZOFRAN) injection 4 mg  ONCE      Last MAR action: Given - by Sandhya Sites on 01/03/21 at [address] [place] [facility]    01/03/21 1715 01/03/21 1649  famotidine (PEPCID) Admission -     []  Against Medical Advice   []  Desiree Gaines MD  Emergency Medicine Attending       Yenifer Villanueva MD  01/04/21 4288

## 2021-01-04 NOTE — CARE COORDINATION
DISASTER CHARTING    1/4/21, 3:43 PM EST    DISCHARGE ONGOING EVALUATION:     DYANA Goodmanira 1 day: 0  Location: [address] [place] [address]-A Reason for admit: Diarrhea [R19.7]   Barriers to Discharge: Patient presents with abdominal pain and diarrhea. IV fluids, BMP q 12 hr., up as tolerated. PCP: Manuel Lopez MD  Patient Goals/Plan/Treatment Preferences: Met with Nikki and her  present at bedside. Nikki verifies her insurance and PCP. She can afford her medications and denies a need for DME. Her  will transport her home at discharge. Nikki is independent managing her health care needs and declines HH.

## 2021-01-04 NOTE — ED NOTES
ED to inpatient nurses report    Chief Complaint   Patient presents with    Abdominal Pain    Diarrhea      Present to ED from home  LOC: alert and orientated to name, place, date  Vital signs   Vitals:    01/04/21 0348 01/04/21 0517 01/04/21 0557 01/04/21 0659   BP: 108/67 101/63 97/71 89/64   Pulse: 64 58 61 58   Resp: 16 16 18 18   Temp:       TempSrc:       SpO2: 95% 97% 96% 96%   Weight:       Height:          Oxygen Baseline RA    Current needs required none Bipap/Cpap No  LDAs:   Peripheral IV 01/04/21 Left Hand (Active)   Site Assessment Clean;Dry; Intact 01/04/21 0700   Line Status Normal saline locked 01/04/21 0700   Dressing Status Clean;Dry; Intact 01/04/21 0700   Dressing Intervention New 01/04/21 0255       Peripheral IV 01/04/21 Right Forearm (Active)   Site Assessment Clean;Dry; Intact 01/04/21 0700   Line Status Normal saline locked; Infusing 01/04/21 0700   Dressing Status Clean;Dry; Intact 01/04/21 0700   Dressing Intervention New 01/04/21 0513     Mobility: Independent  Pending ED orders: None  Present condition: pt resting in cot. Pt able to have Fentanyl for pain. Pt denies pain medication. Pt independent. Pt alert and oriented. Pt breathing easy and unlabored.    Person of contract, phone number   Our promise was given to patient    Electronically signed by Selene Tolentino RN on 1/4/2021 at 7:10 AM       Selene Tolentino RN  01/04/21 3985

## 2021-01-04 NOTE — ED NOTES
Pt ambulates to restroom without complications. Pt breathing easy and unlabored. Pt denies further needs.       Aixa Santamaria RN  01/04/21 0611

## 2021-01-04 NOTE — ED NOTES
Patient lying in bed with blankets watching tv at this time. Patient respirations are regular and unlabored. Patient appears to be in no current distress. Patient VSS. Call light is within reach. Patient expresses no needs at this time.        Sebastian Salinas RN  01/03/21 2044

## 2021-01-04 NOTE — ED NOTES
ED to inpatient nurses report    Chief Complaint   Patient presents with    Abdominal Pain    Diarrhea      Present to ED from home  LOC: alert and orientated to name, place, date  Vital signs   Vitals:    01/04/21 0659 01/04/21 1032 01/04/21 1210 01/04/21 1357   BP: 89/64 107/66 106/70 112/72   Pulse: 58 62 68 70   Resp: 18 12 14 14   Temp:       TempSrc:       SpO2: 96% 98% 97% 98%   Weight:       Height:          Oxygen Baseline Room Air    Current needs required Room Air  LDAs:   Peripheral IV 01/04/21 Left Hand (Active)   Site Assessment Clean;Dry; Intact 01/04/21 0700   Line Status Normal saline locked 01/04/21 0700   Dressing Status Clean;Dry; Intact 01/04/21 0700   Dressing Intervention New 01/04/21 0255       Peripheral IV 01/04/21 Right Forearm (Active)   Site Assessment Clean;Dry; Intact 01/04/21 0700   Line Status Normal saline locked; Infusing 01/04/21 0700   Dressing Status Clean;Dry; Intact 01/04/21 0700   Dressing Intervention New 01/04/21 0513     Mobility: Independent  Pending ED orders: None  Present condition: Stable      Electronically signed by Wilfrid Biswas RN on 1/4/2021 at 2:11 PM     Wilfrid Biswas RN  01/04/21 8406

## 2021-01-04 NOTE — ED NOTES
Patient lying in bed with blankets watching tv at this time. Patient respirations are regular and unlabored. Patient appears to be in no current distress. Patient VSS. Call light is within reach. Patient expresses no needs at this time.        Sebastian Salinas RN  01/03/21 8415

## 2021-01-04 NOTE — ED NOTES
Patient lying in bed with blankets watching tv at this time. Patient respirations are regular and unlabored. Patient appears to be in no current distress. Patient VSS. Call light is within reach. Patient expresses no needs at this time.        Dawna Mora RN  01/03/21 0007

## 2021-01-04 NOTE — ED NOTES
This RN called Ismael Louder at this time in regards to pt care. This RN was given verbal order with readback to administer only 4 potassium IV as well as only continuous NS and to DC bolus. This RN discussed pt pain level with provider as well. This RN was given verbal order with readback to administer fentanyl 25mcg IV Q4 PRN for abdominal pain as well as zofran 4mg IV Q6 fPRN for nausea.       Sherren Aw, RN  01/04/21 0619

## 2021-01-04 NOTE — ED NOTES
Final bag of potassium infusing at this time without complications. Pt provided crackers and gingerale per request. Will continue to monitor pt. Pt ambulated to restroom without complications and provided stool sample. Sent to lab. VS updated.       Sharif Treviño RN  01/04/21 4188

## 2021-01-04 NOTE — ED NOTES
Upon first contact with patient this RN receives bedside shift report Adam Martinez RN.        Hadley Chávez RN  01/04/21 9830

## 2021-01-05 LAB
ANION GAP SERPL CALCULATED.3IONS-SCNC: 10 MEQ/L (ref 8–16)
ANION GAP SERPL CALCULATED.3IONS-SCNC: 10 MEQ/L (ref 8–16)
ANION GAP SERPL CALCULATED.3IONS-SCNC: 6 MEQ/L (ref 8–16)
BUN BLDV-MCNC: 11 MG/DL (ref 7–22)
BUN BLDV-MCNC: 13 MG/DL (ref 7–22)
BUN BLDV-MCNC: 8 MG/DL (ref 7–22)
CALCIUM SERPL-MCNC: 8.7 MG/DL (ref 8.5–10.5)
CALCIUM SERPL-MCNC: 8.8 MG/DL (ref 8.5–10.5)
CALCIUM SERPL-MCNC: 8.8 MG/DL (ref 8.5–10.5)
CHLORIDE BLD-SCNC: 107 MEQ/L (ref 98–111)
CHLORIDE BLD-SCNC: 110 MEQ/L (ref 98–111)
CHLORIDE BLD-SCNC: 113 MEQ/L (ref 98–111)
CO2: 19 MEQ/L (ref 23–33)
CO2: 23 MEQ/L (ref 23–33)
CO2: 27 MEQ/L (ref 23–33)
CREAT SERPL-MCNC: 0.5 MG/DL (ref 0.4–1.2)
CREAT SERPL-MCNC: 0.7 MG/DL (ref 0.4–1.2)
CREAT SERPL-MCNC: 0.7 MG/DL (ref 0.4–1.2)
ERYTHROCYTE [DISTWIDTH] IN BLOOD BY AUTOMATED COUNT: 13.2 % (ref 11.5–14.5)
ERYTHROCYTE [DISTWIDTH] IN BLOOD BY AUTOMATED COUNT: 45.3 FL (ref 35–45)
GFR SERPL CREATININE-BSD FRML MDRD: 89 ML/MIN/1.73M2
GFR SERPL CREATININE-BSD FRML MDRD: 89 ML/MIN/1.73M2
GFR SERPL CREATININE-BSD FRML MDRD: > 90 ML/MIN/1.73M2
GLUCOSE BLD-MCNC: 115 MG/DL (ref 70–108)
GLUCOSE BLD-MCNC: 154 MG/DL (ref 70–108)
GLUCOSE BLD-MCNC: 98 MG/DL (ref 70–108)
HCT VFR BLD CALC: 36.7 % (ref 37–47)
HEMOGLOBIN: 11.8 GM/DL (ref 12–16)
MCH RBC QN AUTO: 29.9 PG (ref 26–33)
MCHC RBC AUTO-ENTMCNC: 32.2 GM/DL (ref 32.2–35.5)
MCV RBC AUTO: 93.1 FL (ref 81–99)
PLATELET # BLD: 288 THOU/MM3 (ref 130–400)
PMV BLD AUTO: 10.4 FL (ref 9.4–12.4)
POTASSIUM SERPL-SCNC: 3.5 MEQ/L (ref 3.5–5.2)
POTASSIUM SERPL-SCNC: 4.6 MEQ/L (ref 3.5–5.2)
POTASSIUM SERPL-SCNC: 5.7 MEQ/L (ref 3.5–5.2)
RBC # BLD: 3.94 MILL/MM3 (ref 4.2–5.4)
SODIUM BLD-SCNC: 139 MEQ/L (ref 135–145)
SODIUM BLD-SCNC: 140 MEQ/L (ref 135–145)
SODIUM BLD-SCNC: 146 MEQ/L (ref 135–145)
WBC # BLD: 8.1 THOU/MM3 (ref 4.8–10.8)

## 2021-01-05 PROCEDURE — 6370000000 HC RX 637 (ALT 250 FOR IP): Performed by: INTERNAL MEDICINE

## 2021-01-05 PROCEDURE — 2580000003 HC RX 258: Performed by: INTERNAL MEDICINE

## 2021-01-05 PROCEDURE — 80048 BASIC METABOLIC PNL TOTAL CA: CPT

## 2021-01-05 PROCEDURE — G0378 HOSPITAL OBSERVATION PER HR: HCPCS

## 2021-01-05 PROCEDURE — 36415 COLL VENOUS BLD VENIPUNCTURE: CPT

## 2021-01-05 PROCEDURE — 99225 PR SBSQ OBSERVATION CARE/DAY 25 MINUTES: CPT | Performed by: INTERNAL MEDICINE

## 2021-01-05 PROCEDURE — 85027 COMPLETE CBC AUTOMATED: CPT

## 2021-01-05 PROCEDURE — 6370000000 HC RX 637 (ALT 250 FOR IP): Performed by: EMERGENCY MEDICINE

## 2021-01-05 PROCEDURE — 2580000003 HC RX 258: Performed by: HOSPITALIST

## 2021-01-05 PROCEDURE — 96376 TX/PRO/DX INJ SAME DRUG ADON: CPT

## 2021-01-05 PROCEDURE — 6360000002 HC RX W HCPCS: Performed by: PHYSICIAN ASSISTANT

## 2021-01-05 RX ORDER — SIMETHICONE 80 MG
80 TABLET,CHEWABLE ORAL 4 TIMES DAILY
Status: DISCONTINUED | OUTPATIENT
Start: 2021-01-05 | End: 2021-01-09 | Stop reason: HOSPADM

## 2021-01-05 RX ADMIN — ONDANSETRON 4 MG: 2 INJECTION INTRAMUSCULAR; INTRAVENOUS at 09:11

## 2021-01-05 RX ADMIN — SODIUM CHLORIDE: 9 INJECTION, SOLUTION INTRAVENOUS at 23:28

## 2021-01-05 RX ADMIN — SIMETHICONE 80 MG: 80 TABLET, CHEWABLE ORAL at 20:36

## 2021-01-05 RX ADMIN — SODIUM CHLORIDE: 9 INJECTION, SOLUTION INTRAVENOUS at 07:35

## 2021-01-05 RX ADMIN — SODIUM CHLORIDE: 9 INJECTION, SOLUTION INTRAVENOUS at 14:22

## 2021-01-05 RX ADMIN — LOPERAMIDE HYDROCHLORIDE 2 MG: 2 CAPSULE ORAL at 09:11

## 2021-01-05 RX ADMIN — SIMETHICONE 80 MG: 80 TABLET, CHEWABLE ORAL at 23:28

## 2021-01-05 RX ADMIN — SODIUM CHLORIDE: 9 INJECTION, SOLUTION INTRAVENOUS at 02:04

## 2021-01-05 RX ADMIN — POTASSIUM CHLORIDE 40 MEQ: 1500 TABLET, EXTENDED RELEASE ORAL at 07:35

## 2021-01-05 RX ADMIN — POTASSIUM CHLORIDE 40 MEQ: 1500 TABLET, EXTENDED RELEASE ORAL at 16:41

## 2021-01-05 NOTE — PROGRESS NOTES
[place] [address] PROGRESS NOTE      Patient: Dwaine Tobias  Room #: 6E-66/066-A            YOB: 1970  Age: 48 y.o. Gender: female            Admit Date & Time: 1/3/2021  3:22 PM    Assessment:  Pt, a 54year old female was in bed as her  was visiting. She was dealing with diarrhea uut was in good spirits. She will be seeing the GI doctor some time today. She and her  faced time her children for me to speak to them. Interventions: Active listenng, compassion, and the anointing of the sick. Outcomes:  Pt expressed satisfaction and gratitude     Plan:  Continued spiritual support.      Electronically signed by Kathy Ramírez, on 1/5/2021 at 2:56 PM.  [address] [facility]  [phone]

## 2021-01-05 NOTE — PROGRESS NOTES
since admission. ROS (12 point review of systems completed. Pertinent positives noted. Otherwise ROS is negative)     Medications:  Reviewed    Infusion Medications    sodium chloride 150 mL/hr at 01/05/21 1422     Scheduled Medications    simethicone  80 mg Oral 4x Daily    sodium chloride  30 mL/kg Intravenous Once    potassium chloride  40 mEq Oral Once    potassium chloride  40 mEq Oral BID WC     PRN Meds: lidocaine, sodium chloride flush, acetaminophen, fentanNYL, ondansetron, loperamide      Intake/Output Summary (Last 24 hours) at 1/5/2021 1728  Last data filed at 1/5/2021 1542  Gross per 24 hour   Intake 4258.14 ml   Output 1150 ml   Net 3108.14 ml       Diet:  DIET PEDS GASTROENTERITIS; Low Sodium (2 GM)    Exam:  /77   Pulse 70   Temp 97.8 °F (36.6 °C) (Oral)   Resp 18   Ht 5' 4\" (1.626 m)   Wt 260 lb (117.9 kg)   SpO2 99%   BMI 44.63 kg/m²   General appearance:  No apparent distress, appears stated age and cooperative. HEENT: Pupils equal, round, and reactive to light. Conjunctivae/corneas clear. Neck: Supple, with full range of motion. No jugular venous distention. Trachea midline. Respiratory:  Normal respiratory effort. Clear to auscultation, bilaterally without Rales/Wheezes/Rhonchi. Cardiovascular: Regular rate and rhythm with normal S1/S2 without murmurs, rubs or gallops. Abdomen: Soft, non-tender, non-distended with normal bowel sounds. Musculoskeletal: passive and active ROM x 4 extremities. Skin: Skin color, texture, turgor normal.  No rashes or lesions. Neurologic:  Neurovascularly intact without any focal sensory/motor deficits.  Cranial nerves: II-XII intact, grossly non-focal.  Psychiatric: Alert and oriented, thought content appropriate, normal insight  Capillary Refill: Brisk,< 3 seconds   Peripheral Pulses: +2 palpable, equal bilaterally     Labs:   Recent Labs     01/03/21  1601   WBC 16.2*   HGB 15.5   HCT 45.6   *     Recent Labs     01/04/21  1241 01/05/21  0028 01/05/21  1100    140 146*   K 3.5 3.5 5.7*    107 113*   CO2 26 27 23   BUN 17 13 11   CREATININE 0.8 0.7 0.5   CALCIUM 8.8 8.8 8.7     Recent Labs     01/03/21  1601 01/04/21  0516   AST 20 17   ALT 25 20   BILIDIR  --  <0.2   BILITOT 0.4 0.4   ALKPHOS 85 68     Recent Labs     01/03/21  1601   INR 1.01     No results for input(s): Govind Sawant in the last 72 hours. Microbiology:    Blood culture #1: No results found for: [facility]    Blood culture #2:No results found for: Catalina Duke    Organism:No results found for: [facility]      Lab Results   Component Value Date    LABGRAM  07/29/2014     No segmented neutrophils observed. Moderate epithelial cells observed. Many large gram positive bacilli. Few gram negative bacilli. Smear consistent with Normal Vaginal Anastasia. MRSA culture only:No results found for: [facility]    Urine culture: No results found for: LABURIN    Respiratory culture: No results found for: CULTRESP    Aerobic and Anaerobic :  No results found for: LABAERO  No results found for: LABANAE    Urinalysis:      Lab Results   Component Value Date    NITRU NEGATIVE 01/03/2021    BLOODU NEGATIVE 01/03/2021    SPECGRAV <=1.005 10/02/2015    GLUCOSEU NEGATIVE 01/03/2021       Radiology:  CT ABDOMEN PELVIS W IV CONTRAST Additional Contrast? None   Final Result   Impression:      No significant abnormalities. This document has been electronically signed by: Carlos Presley MD on    01/03/2021 09:00 PM      All CT scans at this facility use dose modulation, iterative    reconstruction, and/or weight-based   dosing when appropriate to reduce radiation dose to as low as reasonably    achievable. Ct Abdomen Pelvis W Iv Contrast Additional Contrast? None    Result Date: 1/3/2021  CT abdomen and pelvis with contrast Comparison: 10/2/2015 Findings: Lung bases are clear. No acute bony abnormalities. Posterior fusion L5-S1 with laminectomy.  Liver and spleen are

## 2021-01-05 NOTE — CARE COORDINATION
DISASTER CHARTING    1/5/21, 10:10 AM EST    DISCHARGE ONGOING EVALUATION:     DYANA Luke 1 day: 0  Location: [facility] Reason for admit: Diarrhea [R19.7]   Barriers to Discharge: IVF. Imodium prn. Zofran prn. Nursing reports that pt still very nauseous and having diarrhea. Consulting GI. PCP: Mitchell Beck MD  Patient Goals/Plan/Treatment Preferences: Plans home with spouse. No needs.

## 2021-01-06 ENCOUNTER — ANESTHESIA EVENT (OUTPATIENT)
Dept: ENDOSCOPY | Age: 51
DRG: 392 | End: 2021-01-06
Payer: COMMERCIAL

## 2021-01-06 ENCOUNTER — ANESTHESIA (OUTPATIENT)
Dept: ENDOSCOPY | Age: 51
DRG: 392 | End: 2021-01-06
Payer: COMMERCIAL

## 2021-01-06 VITALS
SYSTOLIC BLOOD PRESSURE: 135 MMHG | RESPIRATION RATE: 10 BRPM | OXYGEN SATURATION: 97 % | DIASTOLIC BLOOD PRESSURE: 75 MMHG

## 2021-01-06 LAB
ANION GAP SERPL CALCULATED.3IONS-SCNC: 9 MEQ/L (ref 8–16)
BUN BLDV-MCNC: 7 MG/DL (ref 7–22)
CALCIUM SERPL-MCNC: 8.9 MG/DL (ref 8.5–10.5)
CHLORIDE BLD-SCNC: 109 MEQ/L (ref 98–111)
CO2: 24 MEQ/L (ref 23–33)
CREAT SERPL-MCNC: 0.5 MG/DL (ref 0.4–1.2)
GFR SERPL CREATININE-BSD FRML MDRD: > 90 ML/MIN/1.73M2
GLUCOSE BLD-MCNC: 99 MG/DL (ref 70–108)
LACTOFERRIN, FECAL: NORMAL
POTASSIUM SERPL-SCNC: 4 MEQ/L (ref 3.5–5.2)
SARS-COV-2, NAAT: NOT DETECTED
SODIUM BLD-SCNC: 142 MEQ/L (ref 135–145)

## 2021-01-06 PROCEDURE — 2500000003 HC RX 250 WO HCPCS: Performed by: NURSE ANESTHETIST, CERTIFIED REGISTERED

## 2021-01-06 PROCEDURE — 6370000000 HC RX 637 (ALT 250 FOR IP): Performed by: EMERGENCY MEDICINE

## 2021-01-06 PROCEDURE — 6370000000 HC RX 637 (ALT 250 FOR IP): Performed by: INTERNAL MEDICINE

## 2021-01-06 PROCEDURE — 2580000003 HC RX 258: Performed by: NURSE ANESTHETIST, CERTIFIED REGISTERED

## 2021-01-06 PROCEDURE — 88305 TISSUE EXAM BY PATHOLOGIST: CPT

## 2021-01-06 PROCEDURE — 3700000000 HC ANESTHESIA ATTENDED CARE: Performed by: INTERNAL MEDICINE

## 2021-01-06 PROCEDURE — 3609012400 HC EGD TRANSORAL BIOPSY SINGLE/MULTIPLE: Performed by: INTERNAL MEDICINE

## 2021-01-06 PROCEDURE — 99225 PR SBSQ OBSERVATION CARE/DAY 25 MINUTES: CPT | Performed by: INTERNAL MEDICINE

## 2021-01-06 PROCEDURE — 80048 BASIC METABOLIC PNL TOTAL CA: CPT

## 2021-01-06 PROCEDURE — 0DB78ZX EXCISION OF STOMACH, PYLORUS, VIA NATURAL OR ARTIFICIAL OPENING ENDOSCOPIC, DIAGNOSTIC: ICD-10-PCS | Performed by: INTERNAL MEDICINE

## 2021-01-06 PROCEDURE — 36415 COLL VENOUS BLD VENIPUNCTURE: CPT

## 2021-01-06 PROCEDURE — G0378 HOSPITAL OBSERVATION PER HR: HCPCS

## 2021-01-06 PROCEDURE — 2709999900 HC NON-CHARGEABLE SUPPLY: Performed by: INTERNAL MEDICINE

## 2021-01-06 PROCEDURE — 6360000002 HC RX W HCPCS: Performed by: NURSE ANESTHETIST, CERTIFIED REGISTERED

## 2021-01-06 PROCEDURE — 7100000010 HC PHASE II RECOVERY - FIRST 15 MIN: Performed by: INTERNAL MEDICINE

## 2021-01-06 PROCEDURE — U0002 COVID-19 LAB TEST NON-CDC: HCPCS

## 2021-01-06 PROCEDURE — 3700000001 HC ADD 15 MINUTES (ANESTHESIA): Performed by: INTERNAL MEDICINE

## 2021-01-06 RX ORDER — HYDROCHLOROTHIAZIDE 25 MG/1
25 TABLET ORAL DAILY
Status: DISCONTINUED | OUTPATIENT
Start: 2021-01-06 | End: 2021-01-07 | Stop reason: CLARIF

## 2021-01-06 RX ORDER — SENNA PLUS 8.6 MG/1
15 TABLET ORAL ONCE
Status: COMPLETED | OUTPATIENT
Start: 2021-01-06 | End: 2021-01-06

## 2021-01-06 RX ORDER — PROPOFOL 10 MG/ML
INJECTION, EMULSION INTRAVENOUS PRN
Status: DISCONTINUED | OUTPATIENT
Start: 2021-01-06 | End: 2021-01-06 | Stop reason: SDUPTHER

## 2021-01-06 RX ORDER — LIDOCAINE HYDROCHLORIDE 20 MG/ML
INJECTION, SOLUTION INFILTRATION; PERINEURAL PRN
Status: DISCONTINUED | OUTPATIENT
Start: 2021-01-06 | End: 2021-01-06 | Stop reason: SDUPTHER

## 2021-01-06 RX ORDER — LOSARTAN POTASSIUM 100 MG/1
100 TABLET ORAL DAILY
Status: DISCONTINUED | OUTPATIENT
Start: 2021-01-06 | End: 2021-01-07 | Stop reason: CLARIF

## 2021-01-06 RX ORDER — POLYETHYLENE GLYCOL 3350 17 G/17G
238 POWDER, FOR SOLUTION ORAL ONCE
Status: COMPLETED | OUTPATIENT
Start: 2021-01-06 | End: 2021-01-06

## 2021-01-06 RX ORDER — SODIUM CHLORIDE 9 MG/ML
INJECTION, SOLUTION INTRAVENOUS CONTINUOUS PRN
Status: DISCONTINUED | OUTPATIENT
Start: 2021-01-06 | End: 2021-01-06 | Stop reason: SDUPTHER

## 2021-01-06 RX ADMIN — PROPOFOL 50 MG: 10 INJECTION, EMULSION INTRAVENOUS at 08:27

## 2021-01-06 RX ADMIN — SODIUM CHLORIDE: 9 INJECTION, SOLUTION INTRAVENOUS at 08:24

## 2021-01-06 RX ADMIN — SIMETHICONE 80 MG: 80 TABLET, CHEWABLE ORAL at 11:42

## 2021-01-06 RX ADMIN — SENNOSIDES 129 MG: 8.6 TABLET, FILM COATED ORAL at 17:24

## 2021-01-06 RX ADMIN — PROPOFOL 50 MG: 10 INJECTION, EMULSION INTRAVENOUS at 08:30

## 2021-01-06 RX ADMIN — POLYETHYLENE GLYCOL 3350 238 G: 17 POWDER, FOR SOLUTION ORAL at 17:27

## 2021-01-06 RX ADMIN — LIDOCAINE HYDROCHLORIDE 140 MG: 20 INJECTION, SOLUTION INFILTRATION; PERINEURAL at 08:25

## 2021-01-06 RX ADMIN — POTASSIUM CHLORIDE 40 MEQ: 1500 TABLET, EXTENDED RELEASE ORAL at 09:50

## 2021-01-06 RX ADMIN — HYDROCHLOROTHIAZIDE 25 MG: 25 TABLET ORAL at 11:41

## 2021-01-06 RX ADMIN — SIMETHICONE 80 MG: 80 TABLET, CHEWABLE ORAL at 17:23

## 2021-01-06 RX ADMIN — POTASSIUM CHLORIDE 40 MEQ: 1500 TABLET, EXTENDED RELEASE ORAL at 17:23

## 2021-01-06 RX ADMIN — LOSARTAN POTASSIUM 100 MG: 100 TABLET, FILM COATED ORAL at 11:41

## 2021-01-06 ASSESSMENT — PAIN SCALES - GENERAL
PAINLEVEL_OUTOF10: 0
PAINLEVEL_OUTOF10: 2
PAINLEVEL_OUTOF10: 0

## 2021-01-06 ASSESSMENT — PAIN DESCRIPTION - ONSET: ONSET: ON-GOING

## 2021-01-06 ASSESSMENT — PAIN DESCRIPTION - PROGRESSION
CLINICAL_PROGRESSION: NOT CHANGED

## 2021-01-06 ASSESSMENT — PAIN DESCRIPTION - LOCATION
LOCATION: ABDOMEN
LOCATION: ABDOMEN

## 2021-01-06 ASSESSMENT — PAIN DESCRIPTION - PAIN TYPE: TYPE: ACUTE PAIN

## 2021-01-06 ASSESSMENT — PAIN DESCRIPTION - FREQUENCY
FREQUENCY: INTERMITTENT
FREQUENCY: INTERMITTENT

## 2021-01-06 ASSESSMENT — PAIN DESCRIPTION - ORIENTATION: ORIENTATION: MID

## 2021-01-06 ASSESSMENT — PAIN DESCRIPTION - DESCRIPTORS: DESCRIPTORS: DISCOMFORT

## 2021-01-06 NOTE — ANESTHESIA POSTPROCEDURE EVALUATION
Department of Anesthesiology  Postprocedure Note    Patient: Calvin Uribe  MRN: [id number]  YOB: 1970  Date of evaluation: 1/6/2021  Time:  8:40 AM     Procedure Summary     Date: 01/06/21 Room / Location: [address] [place] [address]    Anesthesia Start: 9667 Anesthesia Stop: 0840    Procedure: EGD BIOPSY (N/A ) Diagnosis: (nausea)    Surgeons: Lawanda Bills MD Responsible Provider: Allison Concepcion MD    Anesthesia Type: MAC ASA Status: 2          Anesthesia Type: MAC    Brant Phase I: Brant Score: 10    Brant Phase II:      Last vitals: Reviewed and per EMR flowsheets.        Anesthesia Post Evaluation    Patient location during evaluation: bedside  Patient participation: complete - patient participated  Level of consciousness: awake and alert  Pain score: 0  Airway patency: patent  Nausea & Vomiting: no nausea  Complications: no  Cardiovascular status: hemodynamically stable  Respiratory status: acceptable  Hydration status: stable

## 2021-01-06 NOTE — CARE COORDINATION
DISASTER CHARTING    1/6/21, 3:26 PM EST    DISCHARGE ONGOING EVALUATION:     DYANA Luke 1 day: 0  Location: [facility] Reason for admit: Diarrhea [R19.7]   Barriers to Discharge: for EGD today with Bx by Dr Lisa Arroyo, showed gastritis, c/o abdominal cramping today, IVF, Mylicon, on clear liquids, I/O.  PCP: Gabriella Lucia MD  Patient Goals/Plan/Treatment Preferences: planning home with spouse; no needs.

## 2021-01-06 NOTE — BRIEF OP NOTE
Brief Postoperative Note      Patient: Robel Stanford  YOB: 1970  MRN: [id number]    Date of Procedure: 1/6/2021    Pre-Op Diagnosis: nausea, GERD and diarrhea     Post-Op Diagnosis: Gastritis , duodenal Bx due to diarrhea        Procedure(s):  EGD BIOPSY    Surgeon(s):  Dahiana Ward MD    Assistant:  * No surgical staff found *    Anesthesia: Monitor Anesthesia Care    Estimated Blood Loss (mL): none    Complications: None    Specimens:   ID Type Source Tests Collected by Time Destination   A : duodenal biopsy for hx of diarrhea  Tissue Duodenum SURGICAL PATHOLOGY Dahiana Ward MD 1/6/2021 3437    B : gastric antrum for gastritis Tissue Stomach SURGICAL PATHOLOGY Dahiana Ward MD 1/6/2021 0830        Implants:  * No implants in log *      Drains: * No LDAs found *    Findings:    Gastritis , duodenal Bx due to diarrhea     Electronically signed by Dahiana Ward MD on 1/6/2021 at 8:36 AM

## 2021-01-06 NOTE — OP NOTE
[address] [place] [address]                                OPERATIVE REPORT    PATIENT NAME: JEFE Nice                    :        1970  MED REC NO:   [id number]                           ROOM:       5350  ACCOUNT NO:   [de-identified]                           ADMIT DATE: 2021  PROVIDER:     PHOENIX Sahaent Cody OF PROCEDURE:  2021    INDICATIONS:  The patient with nausea, vomit, abdominal distention,  gastric reflux, as well as chronic diarrhea. Plan today for EGD to  evaluate. SURGEON:  Yazan Rodriguez MD    ASA CLASSIFICATION:  II.    ESTIMATED BLOOD LOSS:  None. DESCRIPTION OF PROCEDURE:  The patient was brought to the GI lab. Consent was obtained. Risks involved with the procedure were explained  to the patient. Informed consent was obtained. The patient was  monitored during the procedure with pulse oximetry, blood pressure  monitoring, and oxygen by nasal cannula. Sedation by incremental doses  of IV propofol given by the Anesthesia service to achieve total IV  anesthesia. For ASA classification and medication given during the  procedure, please see anesthesia note. PROCEDURE PERFORMED:  EGD with biopsy. A standard video upper scope advanced under direct vision from the oral  cavity up to the duodenum. Esophagus appears normal.  No erosion or  ulcerations seen. The gastroesophageal junction was at 40 cm from the  incisors. Scope was advanced into the stomach. Retroflex examination  of the cardia revealed normal cardia with no evidence of hiatus hernia. Patchy mild gastritis in the antrum. Biopsies done to evaluate. The  duodenum appears normal.  I elected to take a biopsy of the duodenum due  to history of diarrhea, to evaluate. The procedure was terminated with  no immediate complication. IMPRESSION:  1. Gastritis. 2.  Duodenal biopsy obtained due to history of diarrhea. PLAN:  1. Clear liquid diet for today. 2.  Colonoscopy is scheduled tomorrow to complete GI evaluation.         Marlo Giron M.D.    D: 01/06/2021 8:53:30       T: 01/06/2021 9:52:56     [email or web address]  Job#: [id number]     Doc#: [id number]    CC:  Goldy Guthrie M.D.

## 2021-01-06 NOTE — ANESTHESIA PRE PROCEDURE
Department of Anesthesiology  Preprocedure Note       Name:  Helen Barajas   Age:  48 y.o.  :  1970                                          MRN:  [id number]         Date:  2021      Surgeon: Jarocho Albert):  Vibha Bills MD    Procedure: Procedure(s):  EGD DIAGNOSTIC ONLY    Medications prior to admission:   Prior to Admission medications    Medication Sig Start Date End Date Taking? Authorizing Provider   metroNIDAZOLE (FLAGYL) 500 MG tablet Take 500 mg by mouth 2 times daily   Yes Historical Provider, MD   ciprofloxacin (CIPRO) 500 MG tablet Take 500 mg by mouth 2 times daily   Yes Historical Provider, MD   levothyroxine (SYNTHROID) 150 MCG tablet Take 150 mcg by mouth Daily. Yes Historical Provider, MD   olmesartan-hydrochlorothiazide (BENICAR HCT) 40-25 MG per tablet Take 1 tablet by mouth daily.    Yes Historical Provider, MD   acetaminophen (TYLENOL) 500 MG tablet Take 500 mg by mouth every 6 hours as needed for Pain    Historical Provider, MD   ibuprofen (ADVIL;MOTRIN) 200 MG tablet Take 600 mg by mouth every 6 hours as needed for Pain    Historical Provider, MD       Current medications:    Current Facility-Administered Medications   Medication Dose Route Frequency Provider Last Rate Last Admin    simethicone (MYLICON) chewable tablet 80 mg  80 mg Oral 4x Daily Buzz Spittle, DO   80 mg at 21 2328    lidocaine (LMX) 4 % cream   Topical Q30 Min PRN Rohan Poole MD        sodium chloride flush 0.9 % injection 3 mL  3 mL Intravenous PRN Rohan Poole MD        acetaminophen (TYLENOL) tablet 650 mg  650 mg Oral Q4H PRN Rohan Poole MD        0.9 % sodium chloride infusion   Intravenous Continuous Buzz Spittle, DO 75 mL/hr at 21 2328 New Bag at 21 2328    0.9 % sodium chloride bolus  30 mL/kg Intravenous Once Cricket Kelley MD   Stopped at 21 1421    fentaNYL (SUBLIMAZE) injection 25 mcg  25 mcg Intravenous Q4H PRN Isma Goel PA-C  ondansetron (ZOFRAN) injection 4 mg  4 mg Intravenous Q6H PRN Radha Kruse PA-C   4 mg at 21 0735    loperamide (IMODIUM) capsule 2 mg  2 mg Oral 4x Daily PRN Eligirish Coppersmith, DO   2 mg at 21 2736    potassium chloride (KLOR-CON M) extended release tablet 40 mEq  40 mEq Oral Once Elihu Coppersmith, DO        potassium chloride (KLOR-CON M) extended release tablet 40 mEq  40 mEq Oral BID  Elberta Bence, MD   40 mEq at 21 1641       Allergies:     Allergies   Allergen Reactions    Pcn [Penicillins] Rash       Problem List:    Patient Active Problem List   Diagnosis Code    Abdominal pain, right lower quadrant R10.31    Puncture wound of heel without complication S96.449G    Diarrhea R19.7       Past Medical History:        Diagnosis Date    Hx of blood clots     during preg    Hypertension     Kidney stone     Nausea & vomiting     Thyroid disease        Past Surgical History:        Procedure Laterality Date   [address]    fusion and disk removed     SECTION       SECTION      CHOLECYSTECTOMY  's    FOOT SURGERY      bilateral feet    FOOT SURGERY Left 10/30/14    synovectomy peroneal tendon, repair peroneal tendon, exc ganglion cyst ankle    HYSTERECTOMY      TONSILLECTOMY      as a child       Social History:    Social History     Tobacco Use    Smoking status: Never Smoker    Smokeless tobacco: Never Used   Substance Use Topics    Alcohol use: Yes     Comment: occasionally                                 Counseling given: Not Answered      Vital Signs (Current):   Vitals:    21 1149 21 1542 21 1944 21 0324   BP: 117/68 121/77 (!) 148/87 115/65   Pulse: 69 70 69 69   Resp: 18 18 18 18   Temp: 36.8 °C (98.2 °F) 36.6 °C (97.8 °F) 36.7 °C (98 °F) 36.7 °C (98 °F)   TempSrc: Oral Oral Oral Oral   SpO2: 98% 99% 98% 94%   Weight:       Height: BP Readings from Last 3 Encounters:   01/06/21 115/65   05/22/18 139/79   03/02/16 124/84       NPO Status:                                                                                 BMI:   Wt Readings from Last 3 Encounters:   01/03/21 260 lb (117.9 kg)   05/22/18 258 lb (117 kg)   10/30/14 259 lb 6.4 oz (117.7 kg)     Body mass index is 44.63 kg/m². CBC:   Lab Results   Component Value Date    WBC 8.1 01/05/2021    RBC 3.94 01/05/2021    RBC 4.77 10/24/2011    HGB 11.8 01/05/2021    HCT 36.7 01/05/2021    MCV 93.1 01/05/2021    RDW 13.4 07/29/2014     01/05/2021       CMP:   Lab Results   Component Value Date     01/05/2021    K 4.6 01/05/2021    K 3.1 01/04/2021     01/05/2021    CO2 19 01/05/2021    BUN 8 01/05/2021    CREATININE 0.7 01/05/2021    LABGLOM 89 01/05/2021    GLUCOSE 154 01/05/2021    GLUCOSE 97 10/24/2011    PROT 6.3 01/04/2021    CALCIUM 8.8 01/05/2021    BILITOT 0.4 01/04/2021    ALKPHOS 68 01/04/2021    AST 17 01/04/2021    ALT 20 01/04/2021       POC Tests: No results for input(s): POCGLU, POCNA, POCK, POCCL, POCBUN, POCHEMO, POCHCT in the last 72 hours.     Coags:   Lab Results   Component Value Date    INR 1.01 01/03/2021       HCG (If Applicable):   Lab Results   Component Value Date    PREGSERUM NEGATIVE 07/03/2013        ABGs: No results found for: PHART, PO2ART, GVH6QVK, RGV2RWK, BEART, T1FTGSEZ     Type & Screen (If Applicable):  No results found for: LABABO, LABRH    Drug/Infectious Status (If Applicable):  No results found for: HIV, HEPCAB    COVID-19 Screening (If Applicable):   Lab Results   Component Value Date    COVID19 NOT DETECTED 01/06/2021         Anesthesia Evaluation  Patient summary reviewed  Airway: Mallampati: II  TM distance: <3 FB   Neck ROM: full  Mouth opening: > = 3 FB Dental: normal exam         Pulmonary:Negative Pulmonary ROS breath sounds clear to auscultation                             Cardiovascular: Exercise tolerance: good (>4 METS),   (+) hypertension: moderate,         Rhythm: regular  Rate: normal                    Neuro/Psych:   Negative Neuro/Psych ROS              GI/Hepatic/Renal:   (+) GERD: poorly controlled,           Endo/Other: Negative Endo/Other ROS                    Abdominal:   (+) obese,     Abdomen: soft. Vascular: negative vascular ROS. Anesthesia Plan      MAC     ASA 2       Induction: intravenous. Anesthetic plan and risks discussed with patient. Plan discussed with CRNA.                   Juan F Jiménez, APRN - CRNA   1/6/2021

## 2021-01-06 NOTE — PROGRESS NOTES
Hospitalist Progress Note      Patient:  Rosanna Moraes    Unit/Bed:6E-66/066-A  YOB: 1970  MRN: [id number]   Acct: [de-identified]   PCP: Karime Schneider MD  Date of Admission: 1/3/2021    Assessment/Plan:    1. Diarrhea: testing does not demonstrate infectious etiology  1/4/21: start imodium prn diarrhea since not infectious; if not improved in the AM consider GI consultation  1/5/21: Abdominal cramping is worse today, consult to GI; pt cites sensation of early satiety  1/6/21: EGD today showing gastritis. No other findings. Plan for colonoscopy tomorrow. 2. SAJI: due to dehydration from above  1/4/21: C/w IVF and recheck BMP  1/5/21: resolved  3. Hypokalemia: due to GI losses  1/4/21: replace and recheck  1/5/21: recheck shows hyPERkalemia, suspect this is lab error, recheck BMP  4. Hypomagnesemia: due to GI losses  1/4/21: replace and recheck        Expected discharge date: Tomorrow? Disposition:    [x] Home       [] TCU       [] Rehab       [] Psych       [] SNF       [] [facility]       [] Other-    Chief Complaint: Diarrhea    Hospital Treatment Course: (Prior to today) \"female with history of HTN and hypothyroid is admitted for diarrhea. She has diarrhea for the past few weeks. However, in the past few days, she noted worsening of her diarrhea. Nothing improved or worsened her diarrhea. She also has worsening nausea in the past 24-48 hours. She has decrease PO intake. She has no abdominal pain but she has abdominal discomfort. Her discomfort is constant and mild. She has 5-6 loose bowel movements / day. No fever or chill. Her PCP started her on cipro and flagyl. Her stool cultures were also negative.  \"    1/4/21: Testing not indicative of infectious etiology, start imodium and monitor   1/5/21: symptoms persist, consult to GI  1/6/21: complains of abdominal cramping and early satiety sensation    Subjective (past 24 hours): no acute events overnight. Improvement in abdominal pain cramping. Tolerating CLD. No nausea or vomiting. Had two diarrhea episodes today. Plan for scope tomorrow. Past medical history, family history, social history and allergies reviewed again and is unchanged since admission. ROS (12 point review of systems completed. Pertinent positives noted. Otherwise ROS is negative)     Medications:  Reviewed    Infusion Medications     Scheduled Medications    senna  15 tablet Oral Once    polyethylene glycol  238 g Oral Once    losartan  100 mg Oral Daily    hydroCHLOROthiazide  25 mg Oral Daily    simethicone  80 mg Oral 4x Daily    sodium chloride  30 mL/kg Intravenous Once    potassium chloride  40 mEq Oral Once    potassium chloride  40 mEq Oral BID      PRN Meds: lidocaine, sodium chloride flush, acetaminophen, fentanNYL, ondansetron, loperamide      Intake/Output Summary (Last 24 hours) at 1/6/2021 1249  Last data filed at 1/6/2021 0930  Gross per 24 hour   Intake 4336 ml   Output 1800 ml   Net 2536 ml       Diet:  DIET CLEAR LIQUID; Low Sodium (2 GM)  Diet NPO, After Midnight    Exam:  BP (!) 142/81   Pulse 68   Temp 98.3 °F (36.8 °C) (Oral)   Resp 16   Ht 5' 4\" (1.626 m)   Wt 260 lb (117.9 kg)   SpO2 96%   BMI 44.63 kg/m²   General appearance:  No apparent distress, appears stated age and cooperative. HEENT: Pupils equal, round, and reactive to light. Conjunctivae/corneas clear. Neck: Supple, with full range of motion. No jugular venous distention. Trachea midline. Respiratory:  Normal respiratory effort. Clear to auscultation, bilaterally without Rales/Wheezes/Rhonchi. Cardiovascular: Regular rate and rhythm with normal S1/S2 without murmurs, rubs or gallops. Abdomen: Soft, non-tender, non-distended with normal bowel sounds. Musculoskeletal: passive and active ROM x 4 extremities. Skin: Skin color, texture, turgor normal.  No rashes or lesions.   Neurologic:  Neurovascularly intact iterative    reconstruction, and/or weight-based   dosing when appropriate to reduce radiation dose to as low as reasonably    achievable. Ct Abdomen Pelvis W Iv Contrast Additional Contrast? None    Result Date: 1/3/2021  CT abdomen and pelvis with contrast Comparison: 10/2/2015 Findings: Lung bases are clear. No acute bony abnormalities. Posterior fusion L5-S1 with laminectomy. Liver and spleen are unremarkable. Cholecystectomy. Pancreas, adrenal glands and kidneys demonstrate no acute process. Nonobstructing renal stones. Left cyst.  No hydronephrosis. No aortic aneurysm or dissection noted. No significant free fluid or adenopathy noted in the pelvis. No evidence for diverticulitis. Appendix is unremarkable. Impression: No significant abnormalities. This document has been electronically signed by: Wali Green MD on 01/03/2021 09:00 PM All CT scans at this facility use dose modulation, iterative reconstruction, and/or weight-based dosing when appropriate to reduce radiation dose to as low as reasonably achievable.        Support Devices (date placed):  [] ETT []Oral / [] Nasal  [] Gastric Tube [] OG / [] NG    [] Central Venous Line (Specify Site):  [] Urinary Catheter  [] Arterial Line (Specify Site)  [] Peripheral IV access  [] Other:    DVT prophylaxis: [] Lovenox                                 [] SCDs                                 [] SQ Heparin                                 [] Encourage ambulation           [] Already on Anticoagulation     Code Status: Full Code    Tele:   [] yes             [] no    Electronically signed by Jesse Neely MD on 1/6/2021 at 12:49 PM

## 2021-01-06 NOTE — CONSULTS
[address] [place] [address]                                  CONSULTATION    PATIENT NAME: JEFE Babb                    :        1970  MED REC NO:   [id number]                           ROOM:       8861  ACCOUNT NO:   [de-identified]                           ADMIT DATE: 2021  PROVIDER:     PHOENIX Mendoza DATE:  2021    HISTORY OF PRESENT ILLNESS:  The patient is a 59-year-old female,  admitted with nausea, vomit, abdominal discomfort as well as diarrhea. Became sick for over the last two weeks. Now with abdominal discomfort. She said she used to pass a lot of gas, which  became more severe. She  said her diarrhea was dark yellow. She was taking tumeric before that  started. She is not on any antibiotic before that. No exposure to any  patient with gastroenteritis. No one sick at home. Since admission,  the stool is still liquidy; however, dark green like [place] tree  according to her now. She has nausea, vomit. She has no dysphagia  or odynophagia. She has no regurgitation. She has early satiety. She  Seen her family physician prescribed antibiotic . She states symptoms  exacerbated with spicy food, fatty food. She has no fever or chills. She is not on any antibiotics. No jaundice. No change in urine color. Stool color appeared to be very yellow to very green. PAST MEDICAL HISTORY:  Significant for nausea in the past, hypertension,  kidney stone, thyroid disease. PAST SURGICAL HISTORY:  Significant for back surgery,  x1,  cholecystectomy, foot surgery. MEDICATIONS:  Cipro and Flagyl before admission, Tylenol, Motrin as  needed, Synthroid, and Benicar. ALLERGIES:  PENICILLIN. SOCIAL HISTORY:  No smoking, no drug abuse. FAMILY HISTORY:  Mother with history of cancer. Diabetes, CVA, CAD in  family. REVIEW OF SYSTEMS:  She is having no fever or chills. No dysuria or  polyuria. Not short of breath. PHYSICAL EXAMINATION:  GENERAL:  Healthy, pleasant, comfortable, very pleasant. VITAL SIGNS:  Her weight is 260. Her temperature 98, blood pressure is  148/87. HEENT:  Her head is atraumatic. Sclerae anicteric. Her pupils are  round, reactive to light and accommodation with normal extraocular  muscular movement. Conjunctivae normal.  Oral cavity, no lesions seen. NECK:  Supple. CHEST:  Normal.  CARDIOVASCULAR:  Normal.  ABDOMEN:  Slight tenderness. Not very severe. Nonsignificant. No  organomegaly. No stigmata of liver disease. EXTREMITIES:  No clubbing, no cyanosis. MUSCULOSKELETAL:  Grossly normal.  PSYCHOLOGICAL:  Grossly normal.    LABORATORY DATA:  She has a stool study done before she came, outside  facility in [facility] which was negative for bacteria. Her white blood cell is 8, her H and H are 11.8 and 32.7. Her sodium  and potassium are normal.  Her BUN and creatinine are normal.  ALT and  AST are normal.    A stool study done at [facility]. [facility] showed negative for bacteria; however,  stool positive for blood. Blood was observed in the stool. IMAGING STUDIES:  CT scan of the abdomen and pelvis was not diagnostic. ASSESSMENT:  1.  R/o peptic ulcer disease. 2.  Rule out acute gastroenteritis with diarrhea. 3.  GI bleeding and diarrhea  . PLAN:  1. Upper endoscopy to be done tomorrow, duodenal biopsy will be  obtained. 2.  Colonoscopy to be done a day after. 3.  NSAIDs. 4.  Continue antibiotic. 5.  More recommendation after endoscopy.         Tory Doty M.D.    D: 01/06/2021 1:25:45       T: 01/06/2021 3:28:16     [email or web address]  Job#: [id number]     Doc#: [id number]

## 2021-01-07 ENCOUNTER — ANESTHESIA (OUTPATIENT)
Dept: ENDOSCOPY | Age: 51
DRG: 392 | End: 2021-01-07
Payer: COMMERCIAL

## 2021-01-07 ENCOUNTER — ANESTHESIA EVENT (OUTPATIENT)
Dept: ENDOSCOPY | Age: 51
DRG: 392 | End: 2021-01-07
Payer: COMMERCIAL

## 2021-01-07 VITALS
RESPIRATION RATE: 15 BRPM | SYSTOLIC BLOOD PRESSURE: 122 MMHG | DIASTOLIC BLOOD PRESSURE: 79 MMHG | TEMPERATURE: 97.9 F | OXYGEN SATURATION: 96 %

## 2021-01-07 LAB
ANION GAP SERPL CALCULATED.3IONS-SCNC: 10 MEQ/L (ref 8–16)
BASOPHILS # BLD: 0.4 %
BASOPHILS ABSOLUTE: 0 THOU/MM3 (ref 0–0.1)
BUN BLDV-MCNC: 5 MG/DL (ref 7–22)
CALCIUM SERPL-MCNC: 9.1 MG/DL (ref 8.5–10.5)
CHLORIDE BLD-SCNC: 107 MEQ/L (ref 98–111)
CO2: 25 MEQ/L (ref 23–33)
CREAT SERPL-MCNC: 0.5 MG/DL (ref 0.4–1.2)
EOSINOPHIL # BLD: 1.6 %
EOSINOPHILS ABSOLUTE: 0.1 THOU/MM3 (ref 0–0.4)
ERYTHROCYTE [DISTWIDTH] IN BLOOD BY AUTOMATED COUNT: 13.2 % (ref 11.5–14.5)
ERYTHROCYTE [DISTWIDTH] IN BLOOD BY AUTOMATED COUNT: 45.8 FL (ref 35–45)
GFR SERPL CREATININE-BSD FRML MDRD: > 90 ML/MIN/1.73M2
GLUCOSE BLD-MCNC: 100 MG/DL (ref 70–108)
HCT VFR BLD CALC: 38.6 % (ref 37–47)
HEMOGLOBIN: 12.1 GM/DL (ref 12–16)
IMMATURE GRANS (ABS): 0.03 THOU/MM3 (ref 0–0.07)
IMMATURE GRANULOCYTES: 0.4 %
LYMPHOCYTES # BLD: 26 %
LYMPHOCYTES ABSOLUTE: 2.2 THOU/MM3 (ref 1–4.8)
MAGNESIUM: 1.3 MG/DL (ref 1.6–2.4)
MCH RBC QN AUTO: 29.7 PG (ref 26–33)
MCHC RBC AUTO-ENTMCNC: 31.3 GM/DL (ref 32.2–35.5)
MCV RBC AUTO: 94.6 FL (ref 81–99)
MONOCYTES # BLD: 6 %
MONOCYTES ABSOLUTE: 0.5 THOU/MM3 (ref 0.4–1.3)
NUCLEATED RED BLOOD CELLS: 0 /100 WBC
PHOSPHORUS: 2.9 MG/DL (ref 2.4–4.7)
PLATELET # BLD: 271 THOU/MM3 (ref 130–400)
PMV BLD AUTO: 10.2 FL (ref 9.4–12.4)
POTASSIUM SERPL-SCNC: 3.9 MEQ/L (ref 3.5–5.2)
RBC # BLD: 4.08 MILL/MM3 (ref 4.2–5.4)
SEG NEUTROPHILS: 65.6 %
SEGMENTED NEUTROPHILS ABSOLUTE COUNT: 5.6 THOU/MM3 (ref 1.8–7.7)
SODIUM BLD-SCNC: 142 MEQ/L (ref 135–145)
WBC # BLD: 8.5 THOU/MM3 (ref 4.8–10.8)

## 2021-01-07 PROCEDURE — 2580000003 HC RX 258: Performed by: NURSE ANESTHETIST, CERTIFIED REGISTERED

## 2021-01-07 PROCEDURE — 2580000003 HC RX 258: Performed by: INTERNAL MEDICINE

## 2021-01-07 PROCEDURE — 99225 PR SBSQ OBSERVATION CARE/DAY 25 MINUTES: CPT | Performed by: INTERNAL MEDICINE

## 2021-01-07 PROCEDURE — 84100 ASSAY OF PHOSPHORUS: CPT

## 2021-01-07 PROCEDURE — 6360000002 HC RX W HCPCS: Performed by: PHYSICIAN ASSISTANT

## 2021-01-07 PROCEDURE — 83735 ASSAY OF MAGNESIUM: CPT

## 2021-01-07 PROCEDURE — 0DBE8ZZ EXCISION OF LARGE INTESTINE, VIA NATURAL OR ARTIFICIAL OPENING ENDOSCOPIC: ICD-10-PCS | Performed by: INTERNAL MEDICINE

## 2021-01-07 PROCEDURE — 6370000000 HC RX 637 (ALT 250 FOR IP): Performed by: INTERNAL MEDICINE

## 2021-01-07 PROCEDURE — 2709999900 HC NON-CHARGEABLE SUPPLY: Performed by: INTERNAL MEDICINE

## 2021-01-07 PROCEDURE — 6360000002 HC RX W HCPCS: Performed by: NURSE ANESTHETIST, CERTIFIED REGISTERED

## 2021-01-07 PROCEDURE — 6370000000 HC RX 637 (ALT 250 FOR IP): Performed by: EMERGENCY MEDICINE

## 2021-01-07 PROCEDURE — 80048 BASIC METABOLIC PNL TOTAL CA: CPT

## 2021-01-07 PROCEDURE — G0378 HOSPITAL OBSERVATION PER HR: HCPCS

## 2021-01-07 PROCEDURE — 7100000010 HC PHASE II RECOVERY - FIRST 15 MIN: Performed by: INTERNAL MEDICINE

## 2021-01-07 PROCEDURE — 2580000003 HC RX 258: Performed by: HOSPITALIST

## 2021-01-07 PROCEDURE — 3609010300 HC COLONOSCOPY W/BIOPSY SINGLE/MULTIPLE: Performed by: INTERNAL MEDICINE

## 2021-01-07 PROCEDURE — 3609010400 HC COLONOSCOPY POLYPECTOMY HOT BIOPSY: Performed by: INTERNAL MEDICINE

## 2021-01-07 PROCEDURE — 85025 COMPLETE CBC W/AUTO DIFF WBC: CPT

## 2021-01-07 PROCEDURE — 6360000002 HC RX W HCPCS: Performed by: INTERNAL MEDICINE

## 2021-01-07 PROCEDURE — 2500000003 HC RX 250 WO HCPCS: Performed by: NURSE ANESTHETIST, CERTIFIED REGISTERED

## 2021-01-07 PROCEDURE — 3700000001 HC ADD 15 MINUTES (ANESTHESIA): Performed by: INTERNAL MEDICINE

## 2021-01-07 PROCEDURE — 36415 COLL VENOUS BLD VENIPUNCTURE: CPT

## 2021-01-07 PROCEDURE — 3700000000 HC ANESTHESIA ATTENDED CARE: Performed by: INTERNAL MEDICINE

## 2021-01-07 PROCEDURE — 88305 TISSUE EXAM BY PATHOLOGIST: CPT

## 2021-01-07 RX ORDER — OLMESARTAN MEDOXOMIL AND HYDROCHLOROTHIAZIDE 40/25 40; 25 MG/1; MG/1
1 TABLET ORAL DAILY
Status: DISCONTINUED | OUTPATIENT
Start: 2021-01-07 | End: 2021-01-09 | Stop reason: HOSPADM

## 2021-01-07 RX ORDER — LIDOCAINE HYDROCHLORIDE 20 MG/ML
INJECTION, SOLUTION EPIDURAL; INFILTRATION; INTRACAUDAL; PERINEURAL PRN
Status: DISCONTINUED | OUTPATIENT
Start: 2021-01-07 | End: 2021-01-07 | Stop reason: SDUPTHER

## 2021-01-07 RX ORDER — SODIUM CHLORIDE 450 MG/100ML
INJECTION, SOLUTION INTRAVENOUS CONTINUOUS PRN
Status: DISCONTINUED | OUTPATIENT
Start: 2021-01-07 | End: 2021-01-07 | Stop reason: SDUPTHER

## 2021-01-07 RX ORDER — MAGNESIUM SULFATE IN WATER 40 MG/ML
4 INJECTION, SOLUTION INTRAVENOUS ONCE
Status: COMPLETED | OUTPATIENT
Start: 2021-01-07 | End: 2021-01-07

## 2021-01-07 RX ORDER — SODIUM CHLORIDE 9 MG/ML
INJECTION, SOLUTION INTRAVENOUS CONTINUOUS
Status: DISCONTINUED | OUTPATIENT
Start: 2021-01-07 | End: 2021-01-09

## 2021-01-07 RX ORDER — PROPOFOL 10 MG/ML
INJECTION, EMULSION INTRAVENOUS PRN
Status: DISCONTINUED | OUTPATIENT
Start: 2021-01-07 | End: 2021-01-07 | Stop reason: SDUPTHER

## 2021-01-07 RX ADMIN — MAGNESIUM SULFATE HEPTAHYDRATE 4 G: 40 INJECTION, SOLUTION INTRAVENOUS at 10:56

## 2021-01-07 RX ADMIN — PROPOFOL 50 MG: 10 INJECTION, EMULSION INTRAVENOUS at 09:00

## 2021-01-07 RX ADMIN — PROPOFOL 75 MG: 10 INJECTION, EMULSION INTRAVENOUS at 09:09

## 2021-01-07 RX ADMIN — PROPOFOL 100 MG: 10 INJECTION, EMULSION INTRAVENOUS at 08:54

## 2021-01-07 RX ADMIN — LOPERAMIDE HYDROCHLORIDE 2 MG: 2 CAPSULE ORAL at 14:58

## 2021-01-07 RX ADMIN — PROPOFOL 75 MG: 10 INJECTION, EMULSION INTRAVENOUS at 09:10

## 2021-01-07 RX ADMIN — LIDOCAINE HYDROCHLORIDE 1 MG: 20 INJECTION, SOLUTION EPIDURAL; INFILTRATION; INTRACAUDAL; PERINEURAL at 09:06

## 2021-01-07 RX ADMIN — Medication 3 ML: at 07:45

## 2021-01-07 RX ADMIN — LIDOCAINE HYDROCHLORIDE 99 MG: 20 INJECTION, SOLUTION EPIDURAL; INFILTRATION; INTRACAUDAL; PERINEURAL at 08:54

## 2021-01-07 RX ADMIN — SODIUM CHLORIDE: 9 INJECTION, SOLUTION INTRAVENOUS at 22:24

## 2021-01-07 RX ADMIN — PROPOFOL 75 MG: 10 INJECTION, EMULSION INTRAVENOUS at 08:59

## 2021-01-07 RX ADMIN — OLMESARTAN MEDOXOMIL AND HYDROCHLOROTHIAZIDE 40/25 1 TABLET: 40; 25 TABLET ORAL at 10:10

## 2021-01-07 RX ADMIN — SIMETHICONE 80 MG: 80 TABLET, CHEWABLE ORAL at 17:56

## 2021-01-07 RX ADMIN — POTASSIUM CHLORIDE 40 MEQ: 1500 TABLET, EXTENDED RELEASE ORAL at 09:58

## 2021-01-07 RX ADMIN — SIMETHICONE 80 MG: 80 TABLET, CHEWABLE ORAL at 12:13

## 2021-01-07 RX ADMIN — PROPOFOL 75 MG: 10 INJECTION, EMULSION INTRAVENOUS at 09:05

## 2021-01-07 RX ADMIN — ONDANSETRON 4 MG: 2 INJECTION INTRAMUSCULAR; INTRAVENOUS at 12:13

## 2021-01-07 RX ADMIN — POTASSIUM CHLORIDE 40 MEQ: 1500 TABLET, EXTENDED RELEASE ORAL at 17:55

## 2021-01-07 RX ADMIN — SODIUM CHLORIDE: 4.5 INJECTION, SOLUTION INTRAVENOUS at 08:48

## 2021-01-07 ASSESSMENT — PAIN DESCRIPTION - PROGRESSION: CLINICAL_PROGRESSION: NOT CHANGED

## 2021-01-07 ASSESSMENT — PAIN SCALES - GENERAL
PAINLEVEL_OUTOF10: 0

## 2021-01-07 ASSESSMENT — PAIN - FUNCTIONAL ASSESSMENT: PAIN_FUNCTIONAL_ASSESSMENT: 0-10

## 2021-01-07 NOTE — PROGRESS NOTES
Patient returned to unit following colonoscopy. Awake alert and orienting and ambulating in room. Denies pain. Provided with ice water. New orders reviewed.

## 2021-01-07 NOTE — PROGRESS NOTES
Hospitalist Progress Note      Patient:  Tristan Castano    Unit/Bed:6E-66/066-A  YOB: 1970  MRN: [id number]   Acct: [de-identified]   PCP: Natalee Abarca MD  Date of Admission: 1/3/2021    Assessment/Plan:    1. Nausea, Vomiting and Diarrhea: testing does not demonstrate infectious etiology  1/4/21: start imodium prn diarrhea since not infectious; if not improved in the AM consider GI consultation  1/5/21: Abdominal cramping is worse today, consult to GI; pt cites sensation of early satiety  1/6/21: EGD today showing gastritis. No other findings. Plan for colonoscopy tomorrow. 1/7/21: Colonoscopy today showing diverticulosis only. Still having diarrhea episodes today (had 4). Pt had large episode of nausea after eating today. Will change to full liquid diet. Let GI know. 2. SAJI: due to dehydration from above  1/4/21: C/w IVF and recheck BMP  1/5/21: resolved  3. Hypokalemia: due to GI losses  1/4/21: replace and recheck  1/5/21: recheck shows hyPERkalemia, suspect this is lab error, recheck BMP  4. Hypomagnesemia: due to GI losses  1/4/21: replace and recheck   1/7/21: low today, will replace         Expected discharge date: Tomorrow? Disposition:    [x] Home       [] TCU       [] Rehab       [] Psych       [] SNF       [] [facility]       [] Other-    Chief Complaint: Diarrhea    Hospital Treatment Course: (Prior to today) \"female with history of HTN and hypothyroid is admitted for diarrhea. She has diarrhea for the past few weeks. However, in the past few days, she noted worsening of her diarrhea. Nothing improved or worsened her diarrhea. She also has worsening nausea in the past 24-48 hours. She has decrease PO intake. She has no abdominal pain but she has abdominal discomfort. Her discomfort is constant and mild. She has 5-6 loose bowel movements / day. No fever or chill. Her PCP started her on cipro and flagyl.  Her stool cultures were also negative. \"    1/4/21: Testing not indicative of infectious etiology, start imodium and monitor   1/5/21: symptoms persist, consult to GI  1/6/21: complains of abdominal cramping and early satiety sensation    Subjective (past 24 hours): no acute events overnight. Still having water diarrhea stools, pt had large episode of nausea and vomiting after eating today. No abdominal pain. No fevers or chills. No chest pain or sob. Past medical history, family history, social history and allergies reviewed again and is unchanged since admission. ROS (12 point review of systems completed. Pertinent positives noted. Otherwise ROS is negative)     Medications:  Reviewed    Infusion Medications     Scheduled Medications    magnesium replacement protocol   Other RX Placeholder    magnesium sulfate  4 g Intravenous Once    olmesartan-hydroCHLOROthiazide  1 tablet Oral Daily    simethicone  80 mg Oral 4x Daily    sodium chloride  30 mL/kg Intravenous Once    potassium chloride  40 mEq Oral Once    potassium chloride  40 mEq Oral BID WC     PRN Meds: lidocaine, sodium chloride flush, acetaminophen, fentanNYL, ondansetron, loperamide      Intake/Output Summary (Last 24 hours) at 1/7/2021 1326  Last data filed at 1/7/2021 1213  Gross per 24 hour   Intake 1050 ml   Output 2000 ml   Net -950 ml       Diet:  DIET FULL LIQUID; Low Sodium (2 GM)    Exam:  /89   Pulse 73   Temp 96.1 °F (35.6 °C)   Resp 16   Ht 5' 4\" (1.626 m)   Wt 260 lb (117.9 kg)   SpO2 98%   BMI 44.63 kg/m²   General appearance:  No apparent distress, appears stated age and cooperative. HEENT: Pupils equal, round, and reactive to light. Conjunctivae/corneas clear. Neck: Supple, with full range of motion. No jugular venous distention. Trachea midline. Respiratory:  Normal respiratory effort. Clear to auscultation, bilaterally without Rales/Wheezes/Rhonchi.   Cardiovascular: Regular rate and rhythm with normal S1/S2 without murmurs, rubs or gallops. Abdomen: Soft, non-tender, non-distended with normal bowel sounds. Musculoskeletal: passive and active ROM x 4 extremities. Skin: Skin color, texture, turgor normal.  No rashes or lesions. Neurologic:  Neurovascularly intact without any focal sensory/motor deficits. Cranial nerves: II-XII intact, grossly non-focal.  Psychiatric: Alert and oriented, thought content appropriate, normal insight  Capillary Refill: Brisk,< 3 seconds   Peripheral Pulses: +2 palpable, equal bilaterally     Labs:   Recent Labs     01/05/21  1926 01/07/21  0458   WBC 8.1 8.5   HGB 11.8* 12.1   HCT 36.7* 38.6    271     Recent Labs     01/05/21  1854 01/06/21  0702 01/07/21  0458    142 142   K 4.6 4.0 3.9    109 107   CO2 19* 24 25   BUN 8 7 5*   CREATININE 0.7 0.5 0.5   CALCIUM 8.8 8.9 9.1   PHOS  --   --  2.9     No results for input(s): AST, ALT, BILIDIR, BILITOT, ALKPHOS in the last 72 hours. No results for input(s): INR in the last 72 hours. No results for input(s): Farheen Hunter in the last 72 hours. Microbiology:    Blood culture #1: No results found for: [facility]    Blood culture #2:No results found for: Judye Kawasaki    Organism:No results found for: [facility]      Lab Results   Component Value Date    LABGRAM  07/29/2014     No segmented neutrophils observed. Moderate epithelial cells observed. Many large gram positive bacilli. Few gram negative bacilli. Smear consistent with Normal Vaginal Anastasia.          MRSA culture only:No results found for: [address] [place] [address]    Urine culture: No results found for: LABURIN    Respiratory culture: No results found for: CULTRESP    Aerobic and Anaerobic :  No results found for: LABAERO  No results found for: LABANAE    Urinalysis:      Lab Results   Component Value Date    NITRU NEGATIVE 01/03/2021    BLOODU NEGATIVE 01/03/2021    SPECGRAV <=1.005 10/02/2015    GLUCOSEU NEGATIVE 01/03/2021       Radiology:  CT ABDOMEN PELVIS W IV CONTRAST Additional Contrast? None   Final Result   Impression:      No significant abnormalities. This document has been electronically signed by: Lio Bardales MD on    01/03/2021 09:00 PM      All CT scans at this facility use dose modulation, iterative    reconstruction, and/or weight-based   dosing when appropriate to reduce radiation dose to as low as reasonably    achievable. Ct Abdomen Pelvis W Iv Contrast Additional Contrast? None    Result Date: 1/3/2021  CT abdomen and pelvis with contrast Comparison: 10/2/2015 Findings: Lung bases are clear. No acute bony abnormalities. Posterior fusion L5-S1 with laminectomy. Liver and spleen are unremarkable. Cholecystectomy. Pancreas, adrenal glands and kidneys demonstrate no acute process. Nonobstructing renal stones. Left cyst.  No hydronephrosis. No aortic aneurysm or dissection noted. No significant free fluid or adenopathy noted in the pelvis. No evidence for diverticulitis. Appendix is unremarkable. Impression: No significant abnormalities. This document has been electronically signed by: Lio Bardales MD on 01/03/2021 09:00 PM All CT scans at this facility use dose modulation, iterative reconstruction, and/or weight-based dosing when appropriate to reduce radiation dose to as low as reasonably achievable.        Support Devices (date placed):  [] ETT []Oral / [] Nasal  [] Gastric Tube [] OG / [] NG    [] Central Venous Line (Specify Site):  [] Urinary Catheter  [] Arterial Line (Specify Site)  [] Peripheral IV access  [] Other:    DVT prophylaxis: [] Lovenox                                 [] SCDs                                 [] SQ Heparin                                 [] Encourage ambulation           [] Already on Anticoagulation     Code Status: Full Code    Tele:   [] yes             [] no    Electronically signed by Pamela Yeboah MD on 1/7/2021 at 1:26 PM

## 2021-01-07 NOTE — ANESTHESIA POSTPROCEDURE EVALUATION
Department of Anesthesiology  Postprocedure Note    Patient: Lauren Medrano  MRN: [id number]  YOB: 1970  Date of evaluation: 1/7/2021  Time:  9:18 AM     Procedure Summary     Date: 01/07/21 Room / Location: [address] [place] [address] K[place]oleman Yeimi / Cathy Heena    Anesthesia Start: 3419 Anesthesia Stop: 0915    Procedures:       COLONOSCOPY POLYPECTOMY HOT BIOPSY (Left )      COLONOSCOPY WITH BIOPSY Diagnosis: (DIARRHEA)    Surgeons: Karan Elmore MD Responsible Provider: Adrienne Peterson DO    Anesthesia Type: Not recorded ASA Status: Not recorded          Anesthesia Type: No value filed. Brant Phase I: Brant Score: 10    Brant Phase II: Brant Score: 10    Last vitals: Reviewed and per EMR flowsheets.        Anesthesia Post Evaluation    Patient location during evaluation: bedside  Patient participation: complete - patient participated  Level of consciousness: awake and alert  Pain score: 0  Airway patency: patent  Nausea & Vomiting: no nausea and no vomiting  Complications: no  Cardiovascular status: blood pressure returned to baseline  Respiratory status: acceptable  Hydration status: euvolemic

## 2021-01-07 NOTE — PROGRESS NOTES
Nikki is here for colonoscopy. Scope used . Pictures taken. Hot snare polypectomy placed in 1 jar. Cold forceps biopsies taken and placed in 2 jars. Procedure completed and she tolerated well.

## 2021-01-07 NOTE — BRIEF OP NOTE
Brief Postoperative Note      Patient: Mayra Melendez  YOB: 1970  MRN: [id number]    Date of Procedure: 1/7/2021    Pre-Op Diagnosis: DIARRHEA    Post-Op Diagnosis: Polyp , diverticulosis and history of diarrhea. Procedure(s):  COLONOSCOPY POLYPECTOMY HOT BIOPSY  COLONOSCOPY WITH BIOPSY    Surgeon(s):  Cherise Chang MD    Assistant:  * No surgical staff found *    Anesthesia: Monitor Anesthesia Care    Estimated Blood Loss (mL): none    Complications: None    Specimens:   ID Type Source Tests Collected by Time Destination   A : ascending colon polyp  Tissue Colon SURGICAL PATHOLOGY Cherise Chang MD 1/7/2021 0901    B : random biopsies, ascending and transverse colon, hx of dirrhea  Tissue Colon SURGICAL PATHOLOGY Cherise Chang MD 1/7/2021 0906    C : random biopsies, descending transverse colon, hx of diarrhea  Tissue Colon SURGICAL PATHOLOGY Cherise Chang MD 1/7/2021 5965        Implants:  * No implants in log *      Drains: * No LDAs found *    Findings:  Polyp , diverticulosis and history of diarrhea.      Electronically signed by Cherise Chang MD on 1/7/2021 at 9:15 AM

## 2021-01-07 NOTE — ANESTHESIA PRE PROCEDURE
Department of Anesthesiology  Preprocedure Note       Name:  Sabina Carroll   Age:  48 y.o.  :  1970                                          MRN:  [id number]         Date:  2021      Surgeon: Espinoza Kwon):  Trudy Park MD    Procedure: Procedure(s):  COLONOSCOPY POLYPECTOMY HOT BIOPSY  COLONOSCOPY WITH BIOPSY    Medications prior to admission:   Prior to Admission medications    Medication Sig Start Date End Date Taking? Authorizing Provider   metroNIDAZOLE (FLAGYL) 500 MG tablet Take 500 mg by mouth 2 times daily   Yes Historical Provider, MD   ciprofloxacin (CIPRO) 500 MG tablet Take 500 mg by mouth 2 times daily   Yes Historical Provider, MD   levothyroxine (SYNTHROID) 150 MCG tablet Take 150 mcg by mouth Daily. Yes Historical Provider, MD   olmesartan-hydrochlorothiazide (BENICAR HCT) 40-25 MG per tablet Take 1 tablet by mouth daily.    Yes Historical Provider, MD   acetaminophen (TYLENOL) 500 MG tablet Take 500 mg by mouth every 6 hours as needed for Pain    Historical Provider, MD   ibuprofen (ADVIL;MOTRIN) 200 MG tablet Take 600 mg by mouth every 6 hours as needed for Pain    Historical Provider, MD       Current medications:    Current Facility-Administered Medications   Medication Dose Route Frequency Provider Last Rate Last Admin    magnesium replacement protocol   Other RX Placeholder Fatoumata Godwin MD        losartan (COZAAR) tablet 100 mg  100 mg Oral Daily Fatoumata Godwin MD   100 mg at 21 1141    hydroCHLOROthiazide (HYDRODIURIL) tablet 25 mg  25 mg Oral Daily Fatoumata Godwin MD   25 mg at 21 1141    simethicone (MYLICON) chewable tablet 80 mg  80 mg Oral 4x Daily Jensen Holliday DO   80 mg at 21 1723    lidocaine (LMX) 4 % cream   Topical Q30 Min PRN Rohan Poole MD        sodium chloride flush 0.9 % injection 3 mL  3 mL Intravenous PRN Rohan Poole MD   3 mL at 21 0745    acetaminophen (TYLENOL) tablet 650 mg  650 mg Oral Q4H PRN Morena Daniels MD  0.9 % sodium chloride bolus  30 mL/kg Intravenous Once Rohan Poole MD   Stopped at 21 1421    fentaNYL (SUBLIMAZE) injection 25 mcg  25 mcg Intravenous Q4H PRN [employer] Pet[employer]MARIANA        ondansetron (ZOFRAN) injection 4 mg  4 mg Intravenous Q6H PRN [employer]IANA   4 mg at 21 9159    loperamide (IMODIUM) capsule 2 mg  2 mg Oral 4x Daily PRN Lenton Feng, DO   2 mg at 21 6292    potassium chloride (KLOR-CON M) extended release tablet 40 mEq  40 mEq Oral Once Lenton Feng, DO        potassium chloride (KLOR-CON M) extended release tablet 40 mEq  40 mEq Oral BID  Elier Lambert MD   40 mEq at 21 1723       Allergies:     Allergies   Allergen Reactions    Pcn [Penicillins] Rash       Problem List:    Patient Active Problem List   Diagnosis Code    Abdominal pain, right lower quadrant R10.31    Puncture wound of heel without complication L96.271C    Diarrhea R19.7       Past Medical History:        Diagnosis Date    Hx of blood clots     during preg    Hypertension     Kidney stone     Nausea & vomiting     Thyroid disease        Past Surgical History:        Procedure Laterality Date   [address]    fusion and disk removed     SECTION       SECTION      CHOLECYSTECTOMY  's    FOOT SURGERY      bilateral feet    FOOT SURGERY Left 10/30/14    synovectomy peroneal tendon, repair peroneal tendon, exc ganglion cyst ankle    HYSTERECTOMY      TONSILLECTOMY      as a child       Social History:    Social History     Tobacco Use    Smoking status: Never Smoker    Smokeless tobacco: Never Used   Substance Use Topics    Alcohol use: Yes     Comment: occasionally                                 Counseling given: Not Answered      Vital Signs (Current):   Vitals:    21 2236 21 0400 21 0742 21 0817   BP: 131/71 126/86 (!) 154/78 (!) 152/88   Pulse: 81 85 75 80   Resp: 18 16 16 16 Temp: 36.9 °C (98.4 °F) 37.2 °C (98.9 °F) 36.9 °C (98.5 °F)    TempSrc: Oral Oral Oral    SpO2: 99% 97% 97% 98%   Weight:       Height:                                                  BP Readings from Last 3 Encounters:   01/07/21 (!) 152/88   01/07/21 122/79   01/06/21 135/75       NPO Status: Time of last liquid consumption: 2330                        Time of last solid consumption: 1700                        Date of last liquid consumption: 01/06/21                        Date of last solid food consumption: 01/05/21    BMI:   Wt Readings from Last 3 Encounters:   01/03/21 260 lb (117.9 kg)   05/22/18 258 lb (117 kg)   10/30/14 259 lb 6.4 oz (117.7 kg)     Body mass index is 44.63 kg/m². CBC:   Lab Results   Component Value Date    WBC 8.5 01/07/2021    RBC 4.08 01/07/2021    RBC 4.77 10/24/2011    HGB 12.1 01/07/2021    HCT 38.6 01/07/2021    MCV 94.6 01/07/2021    RDW 13.4 07/29/2014     01/07/2021       CMP:   Lab Results   Component Value Date     01/07/2021    K 3.9 01/07/2021    K 3.1 01/04/2021     01/07/2021    CO2 25 01/07/2021    BUN 5 01/07/2021    CREATININE 0.5 01/07/2021    LABGLOM >90 01/07/2021    GLUCOSE 100 01/07/2021    GLUCOSE 97 10/24/2011    PROT 6.3 01/04/2021    CALCIUM 9.1 01/07/2021    BILITOT 0.4 01/04/2021    ALKPHOS 68 01/04/2021    AST 17 01/04/2021    ALT 20 01/04/2021       POC Tests: No results for input(s): POCGLU, POCNA, POCK, POCCL, POCBUN, POCHEMO, POCHCT in the last 72 hours.     Coags:   Lab Results   Component Value Date    INR 1.01 01/03/2021       HCG (If Applicable):   Lab Results   Component Value Date    PREGSERUM NEGATIVE 07/03/2013        ABGs: No results found for: PHART, PO2ART, SSN7DAG, KDS6EYW, BEART, U6KTAKZK     Type & Screen (If Applicable):  No results found for: LABABO, LABRH    Drug/Infectious Status (If Applicable):  No results found for: HIV, HEPCAB    COVID-19 Screening (If Applicable):   Lab Results   Component Value Date COVID19 NOT DETECTED 01/06/2021         Anesthesia Evaluation  Patient summary reviewed  Airway:         Dental:          Pulmonary:Negative Pulmonary ROS             Patient did not smoke on day of surgery. Cardiovascular:  Exercise tolerance: good (>4 METS),         NYHA Classification: II                 Beta Blocker:  Dose within 24 Hrs         Neuro/Psych:   Negative Neuro/Psych ROS              GI/Hepatic/Renal:   (+) bowel prep,           Endo/Other:    (+) hypothyroidism::., .                 Abdominal:           Vascular: negative vascular ROS. Anesthesia Plan      MAC     ASA 2       Induction: intravenous. Anesthetic plan and risks discussed with patient. Plan discussed with CRNA.                   FREDRICK Workman - IRIS   1/7/2021

## 2021-01-07 NOTE — ANESTHESIA PRE PROCEDURE
Department of Anesthesiology  Preprocedure Note       Name:  Magda Ferrer   Age:  48 y.o.  :  1970                                          MRN:  [id number]         Date:  2021      Surgeon: Juan Diego Rincon):  Fady Aldrich MD    Procedure: Procedure(s):  COLONOSCOPY POLYPECTOMY HOT BIOPSY  COLONOSCOPY WITH BIOPSY    Medications prior to admission:   Prior to Admission medications    Medication Sig Start Date End Date Taking? Authorizing Provider   metroNIDAZOLE (FLAGYL) 500 MG tablet Take 500 mg by mouth 2 times daily   Yes Historical Provider, MD   ciprofloxacin (CIPRO) 500 MG tablet Take 500 mg by mouth 2 times daily   Yes Historical Provider, MD   levothyroxine (SYNTHROID) 150 MCG tablet Take 150 mcg by mouth Daily. Yes Historical Provider, MD   olmesartan-hydrochlorothiazide (BENICAR HCT) 40-25 MG per tablet Take 1 tablet by mouth daily.    Yes Historical Provider, MD   acetaminophen (TYLENOL) 500 MG tablet Take 500 mg by mouth every 6 hours as needed for Pain    Historical Provider, MD   ibuprofen (ADVIL;MOTRIN) 200 MG tablet Take 600 mg by mouth every 6 hours as needed for Pain    Historical Provider, MD       Current medications:    Current Facility-Administered Medications   Medication Dose Route Frequency Provider Last Rate Last Admin    magnesium replacement protocol   Other RX Placeholder Trav Graf MD        losartan (COZAAR) tablet 100 mg  100 mg Oral Daily Trav Graf MD   100 mg at 21 1141    hydroCHLOROthiazide (HYDRODIURIL) tablet 25 mg  25 mg Oral Daily Trav Graf MD   25 mg at 21 1141    simethicone (MYLICON) chewable tablet 80 mg  80 mg Oral 4x Daily Oksana Matamoros DO   80 mg at 21 1723    lidocaine (LMX) 4 % cream   Topical Q30 Min PRN Rohan Poole MD        sodium chloride flush 0.9 % injection 3 mL  3 mL Intravenous PRN Rohan Poole MD   3 mL at 21 0745    acetaminophen (TYLENOL) tablet 650 mg  650 mg Oral Q4H PRN Priscila Bennett MD  0.9 % sodium chloride bolus  30 mL/kg Intravenous Once Rohan Poole MD   Stopped at 21 1421    fentaNYL (SUBLIMAZE) injection 25 mcg  25 mcg Intravenous Q4H PRN Shannon Hopkins PA-C        ondansetron (ZOFRAN) injection 4 mg  4 mg Intravenous Q6H PRN Shannon Hopkins PA-C   4 mg at 21 3577    loperamide (IMODIUM) capsule 2 mg  2 mg Oral 4x Daily PRN Sushant Hanson DO   2 mg at 21 7416    potassium chloride (KLOR-CON M) extended release tablet 40 mEq  40 mEq Oral Once Canchola Valentin, DO        potassium chloride (KLOR-CON M) extended release tablet 40 mEq  40 mEq Oral BID  Damien Welch MD   40 mEq at 21 1723       Allergies:     Allergies   Allergen Reactions    Pcn [Penicillins] Rash       Problem List:    Patient Active Problem List   Diagnosis Code    Abdominal pain, right lower quadrant R10.31    Puncture wound of heel without complication L90.138Q    Diarrhea R19.7       Past Medical History:        Diagnosis Date    Hx of blood clots     during preg    Hypertension     Kidney stone     Nausea & vomiting     Thyroid disease        Past Surgical History:        Procedure Laterality Date   [address]    fusion and disk removed     SECTION       SECTION      CHOLECYSTECTOMY  's    FOOT SURGERY      bilateral feet    FOOT SURGERY Left 10/30/14    synovectomy peroneal tendon, repair peroneal tendon, exc ganglion cyst ankle    HYSTERECTOMY      TONSILLECTOMY      as a child       Social History:    Social History     Tobacco Use    Smoking status: Never Smoker    Smokeless tobacco: Never Used   Substance Use Topics    Alcohol use: Yes     Comment: occasionally                                 Counseling given: Not Answered      Vital Signs (Current):   Vitals:    21 2236 21 0400 21 0742 21 0817   BP: 131/71 126/86 (!) 154/78 (!) 152/88   Pulse: 81 85 75 80   Resp: 18 16 16 16 Temp: 36.9 °C (98.4 °F) 37.2 °C (98.9 °F) 36.9 °C (98.5 °F)    TempSrc: Oral Oral Oral    SpO2: 99% 97% 97% 98%   Weight:       Height:                                                  BP Readings from Last 3 Encounters:   01/07/21 (!) 152/88   01/07/21 122/79   01/06/21 135/75       NPO Status: Time of last liquid consumption: 2330                        Time of last solid consumption: 1700                        Date of last liquid consumption: 01/06/21                        Date of last solid food consumption: 01/05/21    BMI:   Wt Readings from Last 3 Encounters:   01/03/21 260 lb (117.9 kg)   05/22/18 258 lb (117 kg)   10/30/14 259 lb 6.4 oz (117.7 kg)     Body mass index is 44.63 kg/m². CBC:   Lab Results   Component Value Date    WBC 8.5 01/07/2021    RBC 4.08 01/07/2021    RBC 4.77 10/24/2011    HGB 12.1 01/07/2021    HCT 38.6 01/07/2021    MCV 94.6 01/07/2021    RDW 13.4 07/29/2014     01/07/2021       CMP:   Lab Results   Component Value Date     01/07/2021    K 3.9 01/07/2021    K 3.1 01/04/2021     01/07/2021    CO2 25 01/07/2021    BUN 5 01/07/2021    CREATININE 0.5 01/07/2021    LABGLOM >90 01/07/2021    GLUCOSE 100 01/07/2021    GLUCOSE 97 10/24/2011    PROT 6.3 01/04/2021    CALCIUM 9.1 01/07/2021    BILITOT 0.4 01/04/2021    ALKPHOS 68 01/04/2021    AST 17 01/04/2021    ALT 20 01/04/2021       POC Tests: No results for input(s): POCGLU, POCNA, POCK, POCCL, POCBUN, POCHEMO, POCHCT in the last 72 hours.     Coags:   Lab Results   Component Value Date    INR 1.01 01/03/2021       HCG (If Applicable):   Lab Results   Component Value Date    PREGSERUM NEGATIVE 07/03/2013        ABGs: No results found for: PHART, PO2ART, BUR8LRI, IFC9PPS, BEART, Z6DRHOKG     Type & Screen (If Applicable):  No results found for: LABABO, LABRH    Drug/Infectious Status (If Applicable):  No results found for: HIV, HEPCAB    COVID-19 Screening (If Applicable):   Lab Results   Component Value Date COVID19 NOT DETECTED 01/06/2021         Anesthesia Evaluation    Airway: Mallampati: II        Dental:          Pulmonary:                              Cardiovascular:                      Neuro/Psych:               GI/Hepatic/Renal:             Endo/Other:                     Abdominal:       Abdomen: soft. Vascular:                                        Anesthesia Plan      MAC     ASA 2       Induction: intravenous. Anesthetic plan and risks discussed with patient. Plan discussed with CRNA.                   FREDRICK Ceballos - CRNA   1/7/2021

## 2021-01-07 NOTE — FLOWSHEET NOTE
01/07/21 1242   Provider Notification   Reason for Communication Evaluate   Provider Name Anitra Barr   Provider Notification Physician   Method of Communication Secure Message   Response Waiting for response   Notification Time [phone]     Patient had piece of toast and 2 bites of omelet post colonoscopy and had 400 ml emesis, 3 diarrhea stools.

## 2021-01-07 NOTE — PRE SEDATION
01/06/21 1723    lidocaine (LMX) 4 % cream, , Topical, Q30 Min PRN, Rohan Poole MD    sodium chloride flush 0.9 % injection 3 mL, 3 mL, Intravenous, PRN, Rohan Poole MD, 3 mL at 01/07/21 0745    acetaminophen (TYLENOL) tablet 650 mg, 650 mg, Oral, Q4H PRN, Rohan Poole MD    0.9 % sodium chloride bolus, 30 mL/kg, Intravenous, Once, Rohan Poole MD, Stopped at 01/05/21 1421    fentaNYL (SUBLIMAZE) injection 25 mcg, 25 mcg, Intravenous, Q4H PRN, [employer], PA-C    ondansetron (ZOFRAN) injection 4 mg, 4 mg, Intravenous, Q6H PRN, [employer], PA-C, 4 mg at 01/05/21 0911    loperamide (IMODIUM) capsule 2 mg, 2 mg, Oral, 4x Daily PRN, Lauren Bell DO, 2 mg at 01/05/21 0911    potassium chloride (KLOR-CON M) extended release tablet 40 mEq, 40 mEq, Oral, Once, Lauren Bell DO    potassium chloride (KLOR-CON M) extended release tablet 40 mEq, 40 mEq, Oral, BID WC, Erick Lamar MD, 40 mEq at 01/06/21 1723  Prior to Admission medications    Medication Sig Start Date End Date Taking? Authorizing Provider   metroNIDAZOLE (FLAGYL) 500 MG tablet Take 500 mg by mouth 2 times daily   Yes Historical Provider, MD   ciprofloxacin (CIPRO) 500 MG tablet Take 500 mg by mouth 2 times daily   Yes Historical Provider, MD   levothyroxine (SYNTHROID) 150 MCG tablet Take 150 mcg by mouth Daily. Yes Historical Provider, MD   olmesartan-hydrochlorothiazide (BENICAR HCT) 40-25 MG per tablet Take 1 tablet by mouth daily.    Yes Historical Provider, MD   acetaminophen (TYLENOL) 500 MG tablet Take 500 mg by mouth every 6 hours as needed for Pain    Historical Provider, MD   ibuprofen (ADVIL;MOTRIN) 200 MG tablet Take 600 mg by mouth every 6 hours as needed for Pain    Historical Provider, MD     Additional information:       PHYSICAL:   Heart:  [x]Regular rate and rhythm  []Other:    Lungs:  [x]Clear    []Other:    Abdomen: [x]Soft    []Other:    Mental Status: [x]Alert & Oriented  []Other:        PLANNED PROCEDURE   []EGD  [x]Colonoscopy []Flex Sigmoid     Consent: I have discussed with the patient and/or the patient representative the indication, alternatives, and the possible risks and/or complications of the planned procedure and the anesthesia methods. The patient and/or patient representative appear to understand and agree to proceed. SEDATION  Please see anesthesia note. The medication Planned :  Planned agent:[x]Midazolam []Meperidine [x]Sublimaze []Morphine  []Diazepam  [x]Propofol     Airway Assessment:   See anesthesia no please     Monitoring and Safety: The patient will be placed on a cardiac monitor and vital signs, pulse oximetry and level of consciousness will be continuously evaluated throughout the procedure. The patient will be closely monitored until recovery from the medications is complete and the patient has returned to baseline status. Respiratory therapy will be on standby during the procedure. [x]Pre-procedure diagnostic studies complete and results available. Comment:    [x]Previous sedation/anesthesia experiences assessed. Comment:    [x]The patient is an appropriate candidate to undergo the planned procedure sedation and anesthesia. (Refer to nursing sedation/analgesia documentation record)  [x]Formulation and discussion of sedation/procedure plan, risks, and expectations with patient and/or responsible adult completed. [x]Patient examined immediately prior to the procedure.  (Refer to nursing sedation/analgesia documentation record)    Mario Herndon MD   Electronically signed 1/7/2021 at 8:42 AM

## 2021-01-07 NOTE — PROGRESS NOTES
Dr. Kalyani Urena discussed findings and plan of care with pt and her daughter. Report called [id number].

## 2021-01-08 LAB
ANION GAP SERPL CALCULATED.3IONS-SCNC: 12 MEQ/L (ref 8–16)
BUN BLDV-MCNC: 5 MG/DL (ref 7–22)
CALCIUM SERPL-MCNC: 9.1 MG/DL (ref 8.5–10.5)
CHLORIDE BLD-SCNC: 104 MEQ/L (ref 98–111)
CO2: 23 MEQ/L (ref 23–33)
CREAT SERPL-MCNC: 0.6 MG/DL (ref 0.4–1.2)
GFR SERPL CREATININE-BSD FRML MDRD: > 90 ML/MIN/1.73M2
GLUCOSE BLD-MCNC: 98 MG/DL (ref 70–108)
MAGNESIUM: 1.9 MG/DL (ref 1.6–2.4)
PHOSPHORUS: 3.2 MG/DL (ref 2.4–4.7)
POTASSIUM REFLEX MAGNESIUM: 3.9 MEQ/L (ref 3.5–5.2)
SODIUM BLD-SCNC: 139 MEQ/L (ref 135–145)

## 2021-01-08 PROCEDURE — 6370000000 HC RX 637 (ALT 250 FOR IP): Performed by: EMERGENCY MEDICINE

## 2021-01-08 PROCEDURE — 2580000003 HC RX 258: Performed by: INTERNAL MEDICINE

## 2021-01-08 PROCEDURE — 6370000000 HC RX 637 (ALT 250 FOR IP): Performed by: NURSE PRACTITIONER

## 2021-01-08 PROCEDURE — 83516 IMMUNOASSAY NONANTIBODY: CPT

## 2021-01-08 PROCEDURE — 80048 BASIC METABOLIC PNL TOTAL CA: CPT

## 2021-01-08 PROCEDURE — 36415 COLL VENOUS BLD VENIPUNCTURE: CPT

## 2021-01-08 PROCEDURE — 6360000002 HC RX W HCPCS: Performed by: PHYSICIAN ASSISTANT

## 2021-01-08 PROCEDURE — 96376 TX/PRO/DX INJ SAME DRUG ADON: CPT

## 2021-01-08 PROCEDURE — 82784 ASSAY IGA/IGD/IGG/IGM EACH: CPT

## 2021-01-08 PROCEDURE — G0378 HOSPITAL OBSERVATION PER HR: HCPCS

## 2021-01-08 PROCEDURE — 6370000000 HC RX 637 (ALT 250 FOR IP): Performed by: INTERNAL MEDICINE

## 2021-01-08 PROCEDURE — 84100 ASSAY OF PHOSPHORUS: CPT

## 2021-01-08 PROCEDURE — 99226 PR SBSQ OBSERVATION CARE/DAY 35 MINUTES: CPT | Performed by: INTERNAL MEDICINE

## 2021-01-08 PROCEDURE — 83735 ASSAY OF MAGNESIUM: CPT

## 2021-01-08 RX ORDER — PANTOPRAZOLE SODIUM 40 MG/1
40 TABLET, DELAYED RELEASE ORAL
Status: DISCONTINUED | OUTPATIENT
Start: 2021-01-08 | End: 2021-01-09 | Stop reason: HOSPADM

## 2021-01-08 RX ORDER — LOPERAMIDE HYDROCHLORIDE 2 MG/1
2 CAPSULE ORAL
Status: DISCONTINUED | OUTPATIENT
Start: 2021-01-08 | End: 2021-01-09 | Stop reason: HOSPADM

## 2021-01-08 RX ORDER — SUCRALFATE 1 G/1
1 TABLET ORAL EVERY 6 HOURS SCHEDULED
Status: DISCONTINUED | OUTPATIENT
Start: 2021-01-08 | End: 2021-01-09 | Stop reason: HOSPADM

## 2021-01-08 RX ADMIN — POTASSIUM CHLORIDE 40 MEQ: 1500 TABLET, EXTENDED RELEASE ORAL at 17:13

## 2021-01-08 RX ADMIN — SUCRALFATE 1 G: 1 TABLET ORAL at 16:37

## 2021-01-08 RX ADMIN — SUCRALFATE 1 G: 1 TABLET ORAL at 23:23

## 2021-01-08 RX ADMIN — SIMETHICONE 80 MG: 80 TABLET, CHEWABLE ORAL at 00:24

## 2021-01-08 RX ADMIN — SIMETHICONE 80 MG: 80 TABLET, CHEWABLE ORAL at 11:25

## 2021-01-08 RX ADMIN — LOPERAMIDE HYDROCHLORIDE 2 MG: 2 CAPSULE ORAL at 00:26

## 2021-01-08 RX ADMIN — PANTOPRAZOLE SODIUM 40 MG: 40 TABLET, DELAYED RELEASE ORAL at 16:37

## 2021-01-08 RX ADMIN — LOPERAMIDE HYDROCHLORIDE 2 MG: 2 CAPSULE ORAL at 09:30

## 2021-01-08 RX ADMIN — SIMETHICONE 80 MG: 80 TABLET, CHEWABLE ORAL at 17:13

## 2021-01-08 RX ADMIN — LOPERAMIDE HYDROCHLORIDE 2 MG: 2 CAPSULE ORAL at 15:28

## 2021-01-08 RX ADMIN — SIMETHICONE 80 MG: 80 TABLET, CHEWABLE ORAL at 23:23

## 2021-01-08 RX ADMIN — SODIUM CHLORIDE: 9 INJECTION, SOLUTION INTRAVENOUS at 11:30

## 2021-01-08 RX ADMIN — ONDANSETRON 4 MG: 2 INJECTION INTRAMUSCULAR; INTRAVENOUS at 09:30

## 2021-01-08 RX ADMIN — SIMETHICONE 80 MG: 80 TABLET, CHEWABLE ORAL at 06:15

## 2021-01-08 RX ADMIN — SODIUM CHLORIDE: 9 INJECTION, SOLUTION INTRAVENOUS at 23:24

## 2021-01-08 RX ADMIN — OLMESARTAN MEDOXOMIL AND HYDROCHLOROTHIAZIDE 40/25 1 TABLET: 40; 25 TABLET ORAL at 08:17

## 2021-01-08 RX ADMIN — SUCRALFATE 1 G: 1 TABLET ORAL at 11:25

## 2021-01-08 RX ADMIN — LOPERAMIDE HYDROCHLORIDE 2 MG: 2 CAPSULE ORAL at 23:23

## 2021-01-08 RX ADMIN — POTASSIUM CHLORIDE 40 MEQ: 1500 TABLET, EXTENDED RELEASE ORAL at 08:21

## 2021-01-08 ASSESSMENT — PAIN SCALES - GENERAL: PAINLEVEL_OUTOF10: 0

## 2021-01-08 NOTE — CARE COORDINATION
1/8/21, 1:46 PM EST    Patient goals/plan/ treatment preferences discussed by  and . Patient goals/plan/ treatment preferences reviewed with patient/ family. Patient/ family verbalize understanding of discharge plan and are in agreement with goal/plan/treatment preferences. Understanding was demonstrated using the teach back method. AVS provided by RN at time of discharge, which includes all necessary medical information pertaining to the patients current course of illness, treatment, post-discharge goals of care, and treatment preferences. Planning discharge to home tomorrow.  currently a patient here also - he may be ready for discharge to home tomorrow. New Celiac disease. OP followup.

## 2021-01-08 NOTE — PROGRESS NOTES
Gastroenterology  Progress Note    1/8/2021 2:59 PM  Subjective:   Admit Date: 1/3/2021    Interval History:   1/8:  Labs reviewed, stable. Reviewed EGD and colonoscopy findings and pathology results with pt and her daughter. Educational handouts given to the patient re: celiac disease and lymphocytic colitis. Discussed mgt of these disorders. Pt denies abdominal pain - c/o \"tightness\" and gas/bloating. Continues to have diarrhea - Imodium helping some, not resolved completely. No melena or hematochezia. Denies N/V    Diet: DIET GENERAL; Gluten Free    Patient Active Problem List:     Abdominal pain, right lower quadrant     Puncture wound of heel without complication     Diarrhea    Medications:   Scheduled Meds:   pantoprazole  40 mg Oral BID AC    sucralfate  1 g Oral 4 times per day    magnesium replacement protocol   Other RX Placeholder    olmesartan-hydroCHLOROthiazide  1 tablet Oral Daily    simethicone  80 mg Oral 4x Daily    sodium chloride  30 mL/kg Intravenous Once    potassium chloride  40 mEq Oral Once    potassium chloride  40 mEq Oral BID WC     Continuous Infusions:   sodium chloride 75 mL/hr at 01/08/21 1130     CBC:   Recent Labs     01/05/21  1926 01/07/21  0458   WBC 8.1 8.5   HGB 11.8* 12.1    271     BMP:    Recent Labs     01/06/21  0702 01/07/21  0458 01/08/21  0717    142 139   K 4.0 3.9 3.9    107 104   CO2 24 25 23   BUN 7 5* 5*   CREATININE 0.5 0.5 0.6   GLUCOSE 99 100 98     Hepatic: No results for input(s): AST, ALT, ALB, BILITOT, ALKPHOS in the last 72 hours. INR: No results for input(s): INR in the last 72 hours. Xray:   CT abdomen and pelvis with contrast       Comparison: 10/2/2015       Findings:       Lung bases are clear.  No acute bony abnormalities. Posterior fusion L5-S1 with laminectomy.       Liver and spleen are unremarkable.  Cholecystectomy. Pancreas, adrenal glands and kidneys demonstrate no acute process.    Nonobstructing renal stones.  Left cyst.  No hydronephrosis.       No aortic aneurysm or dissection noted.       No significant free fluid or adenopathy noted in the pelvis. No evidence for diverticulitis.  Appendix is unremarkable.           Impression   Impression:       No significant abnormalities.       This document has been electronically signed by: Rosita Sepulveda MD on    2021 09:00 PM       All CT scans at this facility use dose modulation, iterative    reconstruction, and/or weight-based   dosing when appropriate to reduce radiation dose to as low as reasonably    achievable.           Endoscopy Finding:    PATIENT NAME: Mckayla Khan                    :        1970  MED REC NO:   [id number]                           ROOM:       0066  ACCOUNT NO:   [de-identified]                           ADMIT DATE: 2021  PROVIDER:     Cherise Chang M.D.     DATE OF PROCEDURE:  2021     INDICATION:  The patient with diarrhea, abdominal discomfort. Upper  endoscopy was not diagnostic. Random biopsy obtained. Plan today for  colonoscopy to evaluate.     SURGEON:  Cherise Chang MD     ASA CLASSIFICATION:  Please see Anesthesia note.     ESTIMATED BLOOD LOSS:  None.     DESCRIPTION OF PROCEDURE:  The patient was brought to the GI lab. Consent was obtained. Risks involved with the procedure were explained  to the patient. Informed consent was obtained. The patient was  monitored during the procedure with pulse oximetry, blood pressure  monitoring, and oxygen by nasal cannula. Sedation by incremental doses  of IV propofol given by the anesthesia service to achieve total IV  anesthesia. For ASA classification and medication given during the  procedure, please see Anesthesia note.     PROCEDURE PERFORMED:  Colonoscopy with polypectomy using snare and  biopsy.     Digital examination revealed normal rectum. Standard colonoscope was  advanced under direct vision from the rectum up to the cecum.   Prep was  good and the patient tolerated the procedure well. Cecum intubation  confirmed by appendiceal orifice. Scope was withdrawn. Ascending polyp  measured 0.3 x 2 cm, excised with a snare, tissue retrieved. Mild  diverticulosis on withdrawing the scope, not clinically significant. Due to the history of diarrhea, random biopsy in the ascending,  transverse in next jar,with normal-appearing mucosa. Then, biopsy  obtained from descending, sigmoid in third jar and the procedure  terminated with no immediate complications. No active colitis seen. Scope was withdrawn with no immediate complications.     IMPRESSION:  1. Very mild diverticulosis. 2.  One ascending polyp, excised with a snare. 3.  Random biopsy obtained from different locations due to history of  diarrhea.     PLAN:  Follow up with biopsy results in the GI Clinic for evaluation. More recommendation after reviewing the biopsy results. The patient  from a GI point of view is stable to be discharged home, will follow up  with GI clinic as an outpatient.  -----------------------------------------------------------------------------------------------  PATIENT NAME: Birdie Salinas                    :        1970  MED REC NO:   [id number]                           ROOM:       [address] [place] [address]  ACCOUNT NO:   [de-identified]                           ADMIT DATE: 2021  PROVIDER:     Mario Herndon M.D.     DATE OF PROCEDURE:  2021     INDICATIONS:  The patient with nausea, vomit, abdominal distention,  gastric reflux, as well as chronic diarrhea. Plan today for EGD to  evaluate.     SURGEON:  Mario Herndon MD     ASA CLASSIFICATION:  II.     ESTIMATED BLOOD LOSS:  None.     DESCRIPTION OF PROCEDURE:  The patient was brought to the GI lab. Consent was obtained. Risks involved with the procedure were explained  to the patient. Informed consent was obtained.   The patient was  monitored during the procedure with pulse oximetry, blood pressure  monitoring, and oxygen by nasal cannula. Sedation by incremental doses  of IV propofol given by the Anesthesia service to achieve total IV  anesthesia. For ASA classification and medication given during the  procedure, please see anesthesia note.     PROCEDURE PERFORMED:  EGD with biopsy.     A standard video upper scope advanced under direct vision from the oral  cavity up to the duodenum. Esophagus appears normal.  No erosion or  ulcerations seen. The gastroesophageal junction was at 40 cm from the  incisors. Scope was advanced into the stomach. Retroflex examination  of the cardia revealed normal cardia with no evidence of hiatus hernia. Patchy mild gastritis in the antrum. Biopsies done to evaluate. The  duodenum appears normal.  I elected to take a biopsy of the duodenum due  to history of diarrhea, to evaluate. The procedure was terminated with  no immediate complication.     IMPRESSION:  1. Gastritis. 2.  Duodenal biopsy obtained due to history of diarrhea.     PLAN:  1. Clear liquid diet for today.   2.  Colonoscopy is scheduled tomorrow to complete GI evaluation.     Pathology:  BRIANNA CASILLAS II.[facility]      [place], [place]                  [id number]   Assoc.                                              Page 1 of 5 [address], [place] [address]                                                       PROC: 2021   [facility]                                    RECV: 2021   [address] [facility]                                    RPTD: 2021   BRIANNA POEENERICKEY II.[place], [place] [address]                       MRN:  [id number]     LOC: 6E                       ACCT: [id number]  SEX: F                       : 1970  AGE: 50 Y                          PATHOLOGY REPORT                       ATTN: JAZMINE OZUNA                       REQ: Newton Cagle       Copies To:   JAZMINE OZUNA; LAURA LO       Clinical Information: DIARRHEA     FINAL DIAGNOSIS:   A.  Colon, ascending, biopsy:  Inflammatory-type polyp. B-C.  Colon, ascending, transverse and descending, biopsies:    Features of microscopic (lymphocytic) colitis. Specimen:   A) BIOPSY OF ASCENDING COLON, POLYP   B) BIOPSY OF ASCENDING COLON, /TRANSVERSE, RANDOM, HX DIARRHEA   C) BIOPSY OF DESCENDING COLON, /TRANSVERSE, RANDOM, HX DIARRHEA       Gross Examination:   A - The container is labeled Nikki Wang, ascending colon polyp. Received in formalin is a single 3 mm bit of tan tissue.  1 ns. B - The container is labeled Nikki Lopez, random biopsies, ascending   and transverse colon, history of diarrhea.  Received in formalin are   three bits of tan tissue varying in size from 2 mm up to 4 mm.  1 ns. C - The container is labeled Nikki Wang, random biopsies, descending   and transverse colon, history of diarrhea.  Received in formalin are   multiple bits of pink-tan tissue aggregating to 1 x 0.5 x 0.2 cm.  1   ns.  MTK/DKR:[id number]     Microscopic Examination:   A.  Sections demonstrate a fragment of colonic mucosa involved by an   inflammatory-type polyp. Tylor Ades is architectural distortion with mixed   inflammation and mucosal erosion.  There is no evidence of dysplasia or   malignancy. B-C.  Sections demonstrate portions of colonic mucosa involved by   microscopic (lymphocytic) colitis. Tylor Ades are increased lymphocytes   within the surface epithelium.  There is no significant architectural   distortion. Tylor Ades is no evidence of dysplasia or malignancy.      [id number]                                                     <Sign Out Dr. Etta Alberts, JAY.ELODIA., F.C.ALindsayP.   -------------------------------------------------------------------------------------------------  [address] [place] [address] Pathology      WANGNIKKI                  [id number]   Assoc.                                              Page 1 of 1   [address]    [address] [place], [place] [address]                                        metaplasia,   dysplasia, and malignancy. [id number]                                                     <Sign Out Dr. Ronal Stern M.D., F.C.A.P. Objective:   Vitals: /87   Pulse 61   Temp 98.6 °F (37 °C) (Oral)   Resp 16   Ht 5' 4\" (1.626 m)   Wt 260 lb (117.9 kg)   SpO2 97%   BMI 44.63 kg/m²     Intake/Output Summary (Last 24 hours) at 1/8/2021 1459  Last data filed at 1/7/2021 1945  Gross per 24 hour   Intake 742.86 ml   Output --   Net 742.86 ml     Weight:  Wt Readings from Last 3 Encounters:   01/03/21 260 lb (117.9 kg)   05/22/18 258 lb (117 kg)   10/30/14 259 lb 6.4 oz (117.7 kg)     General appearance: alert and cooperative with exam.  Well groomed, well nourished obese  female  Lungs: clear to auscultation bilaterally  Heart: regular rate and rhythm, S1, S2 normal, no murmur, click, rub or gallop  Abdomen: soft, non-tender; bowel sounds normal; no masses,  no organomegaly  Extremities: extremities normal, atraumatic, no cyanosis or edema    Assessment and Plan:   1. Diarrhea - secondary to Celiac disease and lymphocytic colitis. Continue Imodium - can have up to 8 tabs per day if needed. Gluten free diet. Discussed budesonide vs 5-ASA product if Imodium does not control symptoms  2. Gas/bloating - due to #1. Continue simethicone prn. Gluten free diet  3. Gastritis per EGD - continue PPI/Carafate. 4. From GI standpoint, can be discharged at Attending's discretion of diarrhea manageable and pt tolerating diet. Follow up in GI Clinic after discharge in 2 week(s)      FREDRICK Trinidad - CNP  1/8/2021  2:59 PM     Patient chart reviewed independently from the nurse practitioner and all the pertinent data assessment and plans were reviewed by myself. Laboratory data, Radiology results, medications all are reviewed by myself and care is discussed extensively with nurse practitioner and I agree with plan.   In addition, see orders and plans    Electronically signed by Ankit Hess MD

## 2021-01-08 NOTE — OP NOTE
[address] [place] [address]                                OPERATIVE REPORT    PATIENT NAME: JEFE Gabriel                    :        1970  MED REC NO:   [id number]                           ROOM:       8277  ACCOUNT NO:   [de-identified]                           ADMIT DATE: 2021  PROVIDER:     PHOENIX Artis Fruit:  2021    INDICATION:  The patient with diarrhea, abdominal discomfort. Upper  endoscopy was not diagnostic. Random biopsy obtained. Plan today for  colonoscopy to evaluate. SURGEON:  Rock Juan Antonio MD    ASA CLASSIFICATION:  Please see Anesthesia note. ESTIMATED BLOOD LOSS:  None. DESCRIPTION OF PROCEDURE:  The patient was brought to the GI lab. Consent was obtained. Risks involved with the procedure were explained  to the patient. Informed consent was obtained. The patient was  monitored during the procedure with pulse oximetry, blood pressure  monitoring, and oxygen by nasal cannula. Sedation by incremental doses  of IV propofol given by the anesthesia service to achieve total IV  anesthesia. For ASA classification and medication given during the  procedure, please see Anesthesia note. PROCEDURE PERFORMED:  Colonoscopy with polypectomy using snare and  biopsy. Digital examination revealed normal rectum. Standard colonoscope was  advanced under direct vision from the rectum up to the cecum. Prep was  good and the patient tolerated the procedure well. Cecum intubation  confirmed by appendiceal orifice. Scope was withdrawn. Ascending polyp  measured 0.3 x 2 cm, excised with a snare, tissue retrieved. Mild  diverticulosis on withdrawing the scope, not clinically significant. Due to the history of diarrhea, random biopsy in the ascending,  transverse in next jar,with normal-appearing mucosa.   Then, biopsy obtained from descending, sigmoid in third jar and the procedure  terminated with no immediate complications. No active colitis seen. Scope was withdrawn with no immediate complications. IMPRESSION:  1. Very mild diverticulosis. 2.  One ascending polyp, excised with a snare. 3.  Random biopsy obtained from different locations due to history of  diarrhea. PLAN:  Follow up with biopsy results in the GI Clinic for evaluation. More recommendation after reviewing the biopsy results. The patient  from a GI point of view is stable to be discharged home, will follow up  with GI clinic as an outpatient.         Ronald Thakur M.D.    D: 01/07/2021 9:30:05       T: 01/07/2021 10:11:58     [id number]  Job#: [id number]     Doc#: [id number]    CC:  Isabella Fischer M.D.

## 2021-01-08 NOTE — PROGRESS NOTES
Hospitalist Progress Note      Patient:  Mariluz Del Real    Unit/Bed:6E-66/066-A  YOB: 1970  MRN: [id number]   Acct: [de-identified]   PCP: Lianet Sesay MD  Date of Admission: 1/3/2021    Assessment/Plan:    1. Nausea, Vomiting and Diarrhea 2/2 Celiac Disease? testing does not demonstrate infectious etiology  1/4/21: start imodium prn diarrhea since not infectious; if not improved in the AM consider GI consultation  1/5/21: Abdominal cramping is worse today, consult to GI; pt cites sensation of early satiety  1/6/21: EGD today showing gastritis. No other findings. Plan for colonoscopy tomorrow. 1/7/21: Colonoscopy today showing diverticulosis only. Still having diarrhea episodes today (had 4). Pt had large episode of nausea after eating today. Will change to full liquid diet. Let GI know. 1/8/21: still having nausea, bloating and multiple diarrhea episodes. Biopsy came back + Celiac disease. Spoke with GI, will start Protonix 40 BID, Carafate, consult Dietician to educate on diet. 2. SAJI: due to dehydration from above  1/4/21: C/w IVF and recheck BMP  1/5/21: resolved  3. Hypokalemia: due to GI losses  1/4/21: replace and recheck  1/5/21: recheck shows hyPERkalemia, suspect this is lab error, recheck BMP  4. Hypomagnesemia: due to GI losses  1/4/21: replace and recheck   1/7/21: low today, will replace         Expected discharge date:     Disposition:    [x] Home       [] TCU       [] Rehab       [] Psych       [] SNF       [] [facility]       [] Other-    Chief Complaint: Diarrhea    Hospital Treatment Course: (Prior to today) \"female with history of HTN and hypothyroid is admitted for diarrhea. She has diarrhea for the past few weeks. However, in the past few days, she noted worsening of her diarrhea. Nothing improved or worsened her diarrhea. She also has worsening nausea in the past 24-48 hours. She has decrease PO intake.  She Pupils equal, round, and reactive to light. Conjunctivae/corneas clear. Neck: Supple, with full range of motion. No jugular venous distention. Trachea midline. Respiratory:  Normal respiratory effort. Clear to auscultation, bilaterally without Rales/Wheezes/Rhonchi. Cardiovascular: Regular rate and rhythm with normal S1/S2 without murmurs, rubs or gallops. Abdomen: Soft, non-tender, non-distended with normal bowel sounds. Musculoskeletal: passive and active ROM x 4 extremities. Skin: Skin color, texture, turgor normal.  No rashes or lesions. Neurologic:  Neurovascularly intact without any focal sensory/motor deficits. Cranial nerves: II-XII intact, grossly non-focal.  Psychiatric: Alert and oriented, thought content appropriate, normal insight  Capillary Refill: Brisk,< 3 seconds   Peripheral Pulses: +2 palpable, equal bilaterally     Labs:   Recent Labs     01/05/21  1926 01/07/21  0458   WBC 8.1 8.5   HGB 11.8* 12.1   HCT 36.7* 38.6    271     Recent Labs     01/06/21  0702 01/07/21  0458 01/08/21  0717    142 139   K 4.0 3.9 3.9    107 104   CO2 24 25 23   BUN 7 5* 5*   CREATININE 0.5 0.5 0.6   CALCIUM 8.9 9.1 9.1   PHOS  --  2.9 3.2     No results for input(s): AST, ALT, BILIDIR, BILITOT, ALKPHOS in the last 72 hours. No results for input(s): INR in the last 72 hours. No results for input(s): Demetra Gaster in the last 72 hours. Microbiology:    Blood culture #1: No results found for: [facility]    Blood culture #2:No results found for: [facility]    Organism:No results found for: [facility]      Lab Results   Component Value Date    LABGRAM  07/29/2014     No segmented neutrophils observed. Moderate epithelial cells observed. Many large gram positive bacilli. Few gram negative bacilli. Smear consistent with Normal Vaginal Anastasia.          MRSA culture only:No results found for: [facility]    Urine culture: No results found for: LABURIN    Respiratory culture: No results found for: CULTRESP    Aerobic and Anaerobic :  No results found for: LABAERO  No results found for: LABANAE    Urinalysis:      Lab Results   Component Value Date    NITRU NEGATIVE 01/03/2021    BLOODU NEGATIVE 01/03/2021    SPECGRAV <=1.005 10/02/2015    GLUCOSEU NEGATIVE 01/03/2021       Radiology:  CT ABDOMEN PELVIS W IV CONTRAST Additional Contrast? None   Final Result   Impression:      No significant abnormalities. This document has been electronically signed by: Salome Fraire MD on    01/03/2021 09:00 PM      All CT scans at this facility use dose modulation, iterative    reconstruction, and/or weight-based   dosing when appropriate to reduce radiation dose to as low as reasonably    achievable. Ct Abdomen Pelvis W Iv Contrast Additional Contrast? None    Result Date: 1/3/2021  CT abdomen and pelvis with contrast Comparison: 10/2/2015 Findings: Lung bases are clear. No acute bony abnormalities. Posterior fusion L5-S1 with laminectomy. Liver and spleen are unremarkable. Cholecystectomy. Pancreas, adrenal glands and kidneys demonstrate no acute process. Nonobstructing renal stones. Left cyst.  No hydronephrosis. No aortic aneurysm or dissection noted. No significant free fluid or adenopathy noted in the pelvis. No evidence for diverticulitis. Appendix is unremarkable. Impression: No significant abnormalities. This document has been electronically signed by: Salome Fraire MD on 01/03/2021 09:00 PM All CT scans at this facility use dose modulation, iterative reconstruction, and/or weight-based dosing when appropriate to reduce radiation dose to as low as reasonably achievable.        Support Devices (date placed):  [] ETT []Oral / [] Nasal  [] Gastric Tube [] OG / [] NG    [] Central Venous Line (Specify Site):  [] Urinary Catheter  [] Arterial Line (Specify Site)  [] Peripheral IV access  [] Other:    DVT prophylaxis: [] Lovenox                                 [] SCDs

## 2021-01-09 VITALS
OXYGEN SATURATION: 100 % | WEIGHT: 260 LBS | RESPIRATION RATE: 16 BRPM | SYSTOLIC BLOOD PRESSURE: 133 MMHG | TEMPERATURE: 98 F | DIASTOLIC BLOOD PRESSURE: 74 MMHG | HEIGHT: 64 IN | HEART RATE: 68 BPM | BODY MASS INDEX: 44.39 KG/M2

## 2021-01-09 PROBLEM — R11.2 INTRACTABLE NAUSEA AND VOMITING: Status: ACTIVE | Noted: 2021-01-09

## 2021-01-09 LAB
ANION GAP SERPL CALCULATED.3IONS-SCNC: 9 MEQ/L (ref 8–16)
BASOPHILS # BLD: 0.3 %
BASOPHILS ABSOLUTE: 0 THOU/MM3 (ref 0–0.1)
BUN BLDV-MCNC: 6 MG/DL (ref 7–22)
CALCIUM SERPL-MCNC: 9.1 MG/DL (ref 8.5–10.5)
CHLORIDE BLD-SCNC: 108 MEQ/L (ref 98–111)
CO2: 23 MEQ/L (ref 23–33)
CREAT SERPL-MCNC: 0.7 MG/DL (ref 0.4–1.2)
EOSINOPHIL # BLD: 2.7 %
EOSINOPHILS ABSOLUTE: 0.2 THOU/MM3 (ref 0–0.4)
ERYTHROCYTE [DISTWIDTH] IN BLOOD BY AUTOMATED COUNT: 13.3 % (ref 11.5–14.5)
ERYTHROCYTE [DISTWIDTH] IN BLOOD BY AUTOMATED COUNT: 45.2 FL (ref 35–45)
GFR SERPL CREATININE-BSD FRML MDRD: 89 ML/MIN/1.73M2
GLUCOSE BLD-MCNC: 90 MG/DL (ref 70–108)
HCT VFR BLD CALC: 40.2 % (ref 37–47)
HEMOGLOBIN: 12.7 GM/DL (ref 12–16)
IMMATURE GRANS (ABS): 0.01 THOU/MM3 (ref 0–0.07)
IMMATURE GRANULOCYTES: 0.2 %
LYMPHOCYTES # BLD: 34.1 %
LYMPHOCYTES ABSOLUTE: 2 THOU/MM3 (ref 1–4.8)
MAGNESIUM: 1.7 MG/DL (ref 1.6–2.4)
MCH RBC QN AUTO: 29.4 PG (ref 26–33)
MCHC RBC AUTO-ENTMCNC: 31.6 GM/DL (ref 32.2–35.5)
MCV RBC AUTO: 93.1 FL (ref 81–99)
MONOCYTES # BLD: 9.4 %
MONOCYTES ABSOLUTE: 0.5 THOU/MM3 (ref 0.4–1.3)
NUCLEATED RED BLOOD CELLS: 0 /100 WBC
PHOSPHORUS: 2.9 MG/DL (ref 2.4–4.7)
PLATELET # BLD: 322 THOU/MM3 (ref 130–400)
PMV BLD AUTO: 10.5 FL (ref 9.4–12.4)
POTASSIUM SERPL-SCNC: 4.6 MEQ/L (ref 3.5–5.2)
RBC # BLD: 4.32 MILL/MM3 (ref 4.2–5.4)
SEG NEUTROPHILS: 53.3 %
SEGMENTED NEUTROPHILS ABSOLUTE COUNT: 3.1 THOU/MM3 (ref 1.8–7.7)
SODIUM BLD-SCNC: 140 MEQ/L (ref 135–145)
WBC # BLD: 5.8 THOU/MM3 (ref 4.8–10.8)

## 2021-01-09 PROCEDURE — 84100 ASSAY OF PHOSPHORUS: CPT

## 2021-01-09 PROCEDURE — 83735 ASSAY OF MAGNESIUM: CPT

## 2021-01-09 PROCEDURE — 1200000000 HC SEMI PRIVATE

## 2021-01-09 PROCEDURE — 36415 COLL VENOUS BLD VENIPUNCTURE: CPT

## 2021-01-09 PROCEDURE — G0378 HOSPITAL OBSERVATION PER HR: HCPCS

## 2021-01-09 PROCEDURE — 6370000000 HC RX 637 (ALT 250 FOR IP): Performed by: EMERGENCY MEDICINE

## 2021-01-09 PROCEDURE — 99239 HOSP IP/OBS DSCHRG MGMT >30: CPT | Performed by: INTERNAL MEDICINE

## 2021-01-09 PROCEDURE — 6370000000 HC RX 637 (ALT 250 FOR IP): Performed by: INTERNAL MEDICINE

## 2021-01-09 PROCEDURE — 85025 COMPLETE CBC W/AUTO DIFF WBC: CPT

## 2021-01-09 PROCEDURE — 80048 BASIC METABOLIC PNL TOTAL CA: CPT

## 2021-01-09 RX ORDER — SIMETHICONE 80 MG
80 TABLET,CHEWABLE ORAL EVERY 6 HOURS PRN
Qty: 90 TABLET | Refills: 0 | Status: ON HOLD | OUTPATIENT
Start: 2021-01-09 | End: 2021-08-03

## 2021-01-09 RX ORDER — SUCRALFATE 1 G/1
1 TABLET ORAL 4 TIMES DAILY
Qty: 120 TABLET | Refills: 0 | Status: ON HOLD | OUTPATIENT
Start: 2021-01-09 | End: 2021-08-03

## 2021-01-09 RX ORDER — LOPERAMIDE HYDROCHLORIDE 2 MG/1
2 CAPSULE ORAL 4 TIMES DAILY PRN
Qty: 60 CAPSULE | Refills: 0 | Status: ON HOLD | OUTPATIENT
Start: 2021-01-09 | End: 2021-08-03

## 2021-01-09 RX ORDER — PANTOPRAZOLE SODIUM 40 MG/1
40 TABLET, DELAYED RELEASE ORAL
Qty: 60 TABLET | Refills: 0 | Status: ON HOLD | OUTPATIENT
Start: 2021-01-09 | End: 2021-08-03

## 2021-01-09 RX ADMIN — PANTOPRAZOLE SODIUM 40 MG: 40 TABLET, DELAYED RELEASE ORAL at 06:06

## 2021-01-09 RX ADMIN — POTASSIUM CHLORIDE 40 MEQ: 1500 TABLET, EXTENDED RELEASE ORAL at 07:44

## 2021-01-09 RX ADMIN — OLMESARTAN MEDOXOMIL AND HYDROCHLOROTHIAZIDE 40/25 1 TABLET: 40; 25 TABLET ORAL at 07:45

## 2021-01-09 RX ADMIN — SUCRALFATE 1 G: 1 TABLET ORAL at 06:06

## 2021-01-09 RX ADMIN — SIMETHICONE 80 MG: 80 TABLET, CHEWABLE ORAL at 06:06

## 2021-01-09 NOTE — DISCHARGE INSTR - DIET

## 2021-01-09 NOTE — CARE COORDINATION
1/9/21, 9:59 AM EST    Home with spouse; no needs. Patient goals/plan/ treatment preferences discussed by  and . Patient goals/plan/ treatment preferences reviewed with patient/ family. Patient/ family verbalize understanding of discharge plan and are in agreement with goal/plan/treatment preferences. Understanding was demonstrated using the teach back method. AVS provided by RN at time of discharge, which includes all necessary medical information pertaining to the patients current course of illness, treatment, post-discharge goals of care, and treatment preferences.

## 2021-01-09 NOTE — PROGRESS NOTES
Nutrition Education    Newly diagnosed celiac disease, lymphocytic colitis. Reports still having loose watery stools, using imodium to slow them down. Discussed home ONS options and use as needed as well. Planning discharge home later today. · Verbally reviewed information with Patient  · Educated on gluten free diet and diarrhea nutrition therapy. · Written educational materials provided. · Contact name and number provided. · Refer to Patient Education activity for more details.     Electronically signed by Lydia Pink RD, LD on 1/9/21 at 10:07 AM EST    Contact: [phone]

## 2021-01-09 NOTE — PROGRESS NOTES
Dietician saw and instructed pt on Gluten Free Diet, handouts also given. Home instructions reviewed with pt and her . Pt verbalized understanding. Discharged in w/c to . [facility] assisted in discharge.

## 2021-01-09 NOTE — DISCHARGE SUMMARY
Hospital Medicine Discharge Summary      Patient Identification:   Alejandra Whalen   : 1970  MRN: [id number]   Account: [de-identified]      Patient's PCP: Les Hassan MD    Admit Date: 1/3/2021     Discharge Date:   21    Admitting Physician: Olivia Rios MD     Discharge Physician: Grayson Olivier MD     Discharge Diagnoses: Intractable Nausea, Vomiting and Diarrhea 2/2 Newly Diagnosed Celiac Disease and Lymphocytic Colitis; SAJI (resolved)    Active Hospital Problems    Diagnosis Date Noted    Diarrhea [R19.7]        The patient was seen and examined on day of discharge and this discharge summary is in conjunction with any daily progress note from day of discharge. Hospital Course:   Alejandra Whalen is a 48 y.o. female admitted to [address] [place] [address] on 1/3/2021 for nausea, vomiting, bloating and diarrhea. She has diarrhea for the past few weeks. However, in the past few days, she noted worsening of her diarrhea. Nothing improved or worsened her diarrhea. She has decrease PO intake. She has no abdominal pain but she has abdominal discomfort. Her discomfort is constant and mild. She has 5-6 loose bowel movements / day. No fever or chills. No bloody stools. No joint pains or mouth ulcers. GI stools PCR negative. GI consulted, EGD showing gastritis, colonoscopy just showing diverticulosis however biopsy positive for Celiac Disease. Pt started on IVF, Protonix 40 BID, Carafate, and Imodium. Symptoms slowly improved, tolerating diet. Diarrhea episodes down to 1-2 a day with imodium. GI ok for discharge with close follow-up with them (educated on Gluten free diet and GI discussed budesonide vs 5-ASA product if Imodium does not control symptoms).        Exam:     Vitals:  Vitals:    21 1522 21 1930 21 2321 21 0730   BP: 136/77 136/84 114/65 133/74   Pulse: 68 69 76 68   Resp: 16 16 16 16   Temp: 98.1 °F (36.7 °C) 98.6 °F (37 °C) 98 °F (36.7 °C) 98 °F (36.7 °C) TempSrc: Oral Oral Oral Oral   SpO2: 98% 98% 97% 100%   Weight:       Height:         Weight: Weight: 260 lb (117.9 kg)     24 hour intake/output:    Intake/Output Summary (Last 24 hours) at 1/9/2021 0851  Last data filed at 1/9/2021 0609  Gross per 24 hour   Intake 3648.22 ml   Output --   Net 3648.22 ml         Labs: For convenience and continuity at follow-up the following most recent labs are provided:      CBC:    Lab Results   Component Value Date    WBC 5.8 01/09/2021    HGB 12.7 01/09/2021    HCT 40.2 01/09/2021     01/09/2021       Renal:    Lab Results   Component Value Date     01/09/2021    K 4.6 01/09/2021    K 3.9 01/08/2021     01/09/2021    CO2 23 01/09/2021    BUN 6 01/09/2021    CREATININE 0.7 01/09/2021    CALCIUM 9.1 01/09/2021    PHOS 2.9 01/09/2021         Significant Diagnostic Studies    Radiology:   CT ABDOMEN PELVIS W IV CONTRAST Additional Contrast? None   Final Result   Impression:      No significant abnormalities. This document has been electronically signed by: Jyoti Duckworth MD on    01/03/2021 09:00 PM      All CT scans at this facility use dose modulation, iterative    reconstruction, and/or weight-based   dosing when appropriate to reduce radiation dose to as low as reasonably    achievable. Consults:     IP CONSULT TO GI  IP CONSULT TO DIETITIAN    Disposition:    [x] Home       [] TCU       [] Rehab       [] Psych       [] SNF       [] [facility]       [] Other-    Condition at Discharge: Stable    Code Status:  Full Code     Patient Instructions: Activity: activity as tolerated  Diet: DIET GENERAL; Gluten Free      Follow-up visits:   No follow-up provider specified.        Discharge Medications:      Oneyda Ruvalcaba   Home Medication Instructions YOS:[id number]    Printed on:01/09/21 2232   Medication Information                      acetaminophen (TYLENOL) 500 MG tablet  Take 500 mg by mouth every 6 hours as needed for Pain             levothyroxine (SYNTHROID) 150 MCG tablet  Take 150 mcg by mouth Daily. loperamide (IMODIUM) 2 MG capsule  Take 1 capsule by mouth 4 times daily as needed for Diarrhea             olmesartan-hydrochlorothiazide (BENICAR HCT) 40-25 MG per tablet  Take 1 tablet by mouth daily. pantoprazole (PROTONIX) 40 MG tablet  Take 1 tablet by mouth 2 times daily (before meals)             simethicone (MYLICON) 80 MG chewable tablet  Take 1 tablet by mouth every 6 hours as needed for Flatulence             sucralfate (CARAFATE) 1 GM tablet  Take 1 tablet by mouth 4 times daily                 Time Spent on discharge is more than 30 minutes in the examination, evaluation, counseling and review of medications and discharge plan. Signed: Thank you Fatuma Hernandez MD for the opportunity to be involved in this patient's care.     Electronically signed by Brad Pastor MD on 1/9/2021 at 8:51 AM

## 2021-01-09 NOTE — DISCHARGE INSTR - COC
Continuity of Care Form    Patient Name: Nhung Celeste   :  1970  MRN:  [id number]    Admit date:  1/3/2021  Discharge date:  ***    Code Status Order: Full Code   Advance Directives:   Advance Care Flowsheet Documentation     Date/Time Healthcare Directive Type of Healthcare Directive Copy in [address] Agent's Name Healthcare Agent's Phone Number    21 1440  No, patient does not have an advance directive for healthcare treatment -- -- -- -- --          Admitting Physician:  Anabel Rowley MD  PCP: Nichole Cristina MD    Discharging Nurse: [facility] Unit/Room#: 6E-66/066-A  Discharging Unit Phone Number: ***    Emergency Contact:   Extended Emergency Contact Information  Primary Emergency Contact: Amrik Rodríguez of [address]  Phone: [phone]  Relation: Parent  Secondary Emergency Contact: Julien Quiroga  Address: [address] [place] [address] [address] [facility] Phone: [phone]  Relation: Spouse    Past Surgical History:  Past Surgical History:   Procedure Laterality Date   [address]    fusion and disk removed   [address]  's    COLONOSCOPY Left 2021    COLONOSCOPY POLYPECTOMY HOT BIOPSY performed by Anastacio John MD at [facility]    COLONOSCOPY  2021    COLONOSCOPY WITH BIOPSY performed by Anastacio John MD at [facility]      bilateral feet    FOOT SURGERY Left 10/30/14    synovectomy peroneal tendon, repair peroneal tendon, exc ganglion cyst ankle    HYSTERECTOMY      TONSILLECTOMY      as a child    UPPER GASTROINTESTINAL ENDOSCOPY N/A 2021    EGD BIOPSY performed by Anastacio John MD at [facility]       Immunization History:   Immunization History   Administered Date(s) Administered    Tdap (Boostrix, Adacel) 2014       Active Problems:  Patient Active Problem List   Diagnosis Code    Abdominal pain, right lower quadrant R10.31    Puncture wound of heel without complication C28.215R    Diarrhea R19.7    Intractable nausea and vomiting R11.2       Isolation/Infection:   Isolation          No Isolation        Patient Infection Status     Infection Onset Added Last Indicated Last Indicated By Review Planned Expiration Resolved Resolved By    None active    Resolved    COVID-19 Rule Out 21 COVID-19 (Ordered)   21 Rule-Out Test Resulted    C-diff Rule Out 21 Gastrointestinal Panel, Molecular (Ordered)   21 Rule-Out Test Resulted    COVID-19 Rule Out 21 COVID-19 (Ordered)   21 Rule-Out Test Resulted          Nurse Assessment:  Last Vital Signs: /74   Pulse 68   Temp 98 °F (36.7 °C) (Oral)   Resp 16   Ht 5' 4\" (1.626 m)   Wt 260 lb (117.9 kg)   SpO2 100%   BMI 44.63 kg/m²     Last documented pain score (0-10 scale): Pain Level: 0  Last Weight:   Wt Readings from Last 1 Encounters:   21 260 lb (117.9 kg)     Mental Status:  {IP PT MENTAL STATUS:06040}    IV Access:  { FUAD IV ACCESS:476778851}    Nursing Mobility/ADLs:  Walking   {[facility] DME AARW:955162888}  Transfer  {[facility] DME ESOC:782981350}  Bathing  {[facility] DME EWW}  Dressing  {[facility] DME FVAQ:987604369}  Toileting  {[facility] DME BFDP:223797265}  Feeding  {[facility] DME UVLY:516127983}  Med Admin  {[facility] DME EMFS:546831781}  Med Delivery   {[facility] MED Delivery:647483589}    Wound Care Documentation and Therapy:  Incision 10/30/14 Foot Left (Active)   Number of days: 2262        Elimination:  Continence:   · Bowel: {YES / NW:74176}  · Bladder: {YES / HP:85212}  Urinary Catheter: {Urinary Catheter:033196224}   Colostomy/Ileostomy/Ileal Conduit: {YES / HW:52590}       Date of Last BM: ***    Intake/Output Summary (Last 24 hours) at 2021 0920  Last data filed at 2021 0609  Gross per 24 hour   Intake 3648.22 treatment of the diagnosis listed and that she requires {Admit to Appropriate Level of Care:75155} for {GREATER/LESS:760053930} 30 days.      Update Admission H&P: {CHP DME Changes in OCFDB:479728152}    PHYSICIAN SIGNATURE:  {Esignature:145551617}

## 2021-01-10 LAB — CELIAC SEROLOGY: NORMAL

## 2021-01-11 LAB — TISSUE TRANSGLUTAMINASE IGA: 0.4 U/ML

## 2021-02-02 ENCOUNTER — OFFICE VISIT (OUTPATIENT)
Dept: INTERNAL MEDICINE CLINIC | Age: 51
End: 2021-02-02
Payer: COMMERCIAL

## 2021-02-02 VITALS — BODY MASS INDEX: 44.56 KG/M2 | HEIGHT: 64 IN | TEMPERATURE: 97.3 F | WEIGHT: 261 LBS

## 2021-02-02 DIAGNOSIS — K90.0 CELIAC DISEASE: ICD-10-CM

## 2021-02-02 PROCEDURE — 97802 MEDICAL NUTRITION INDIV IN: CPT | Performed by: DIETITIAN, REGISTERED

## 2021-02-02 RX ORDER — ACETAMINOPHEN 500 MG
500 TABLET ORAL EVERY 6 HOURS PRN
COMMUNITY
End: 2021-08-23

## 2021-02-02 NOTE — PROGRESS NOTES
[address] [place] [address]. [address] [place] [address] Fareed., [facility], [address]  [phone] (phone)  [phone] (fax)    Patient Name: Tristan Castano. Date of Birth: [birth year]. MRN: [id number]      Assessment: Patient is a 48 y.o. female seen for Initial MNT visit for Celiac dx.     -Nutritionally relevant labs:   Lab Results   Component Value Date/Time    GLUCOSE 90 01/09/2021 06:51 AM    GLUCOSE 98 01/08/2021 07:17 AM    GLUCOSE 97 10/24/2011 09:30 AM     Pt was in the hospital this past month in January and dx with celiac dx. Pt states of seeing her today for gluten free diet for celiac dx. Pt states also dx of colitis. Pt c/o of the following symptoms:  Diarrhea (4-5x/day), being gasey. Prior to dx attributed some of symptoms to getting older - Getting more bloated and has reflux. Pt c/o weight gain. Held weight ~ 245# with 278# being her highest.  She has been trying to follow [employer] weight loss meal replacement program - pt c/o her hair started falling out (but may be d/t celiac dx). Has stopped following this since has been in the hospital.    Red meat - may set her off more than other protein foods. She is to avoid milk and follow Gluten Free (GF). -Food recall:   Breakfast: coffee and creamer. Pt states not much of a brkf eater. 2 sl \"Schar\" gf toast with butter and applesauce cup, V8 OR rice cake and coffee. Snack - apple, rice cakes  Lunch: [employer] GF meal (does not tolerate these meals very well, got sick) OR GF wrap - does ok with this. Dinner: Chicken,  vegetable, sweet potato OR GF pizza OR Cottage cheese, 2 eggs and watermelon OR Bone chix broth and rice and veggies. 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 Avoiding milk. -Impression of Dietary Intake: on average, 2 meals per day, on average, 2 servings fruit per day, on average, 1-2 servings vegetables per day, trying to follow Gluten Free guidelines. [facility] 3. )  Good job selecting GF alternatives from the store. 4.)  To help with weight loss efforts - cut back on added fats and choosing low fat menu options. Bring a 1 week food log to next dietitian appt. Comprehension verified using teachback method. Monitoring/Evaluation:   -Followup visit: 6 weeks with dietitian.   -Receptiveness to education/goals: Agreeable.  -Evaluation of education: Indicates understanding.  -Readiness to change: action - ready to set action plan and implement following GF guidelines. -Expected compliance: good. Thank you for your referral of this patient. Total time involved in direct patient education: 45 minutes for initial MNT visit.

## 2021-02-02 NOTE — PATIENT INSTRUCTIONS
1.)  You may do ok with cheese in following lactose free guidelines - avoiding main lactose containing -milk, ice cream, yogurt and cottage cheese and pudding.  - Try soy yogurt    2.)  Choose low fiber vegetables, fruit and grains/starches to help with diarrhea. 3.)  Good job selecting GF alternatives from the store. 4.)  To help with weight loss efforts - cut back on added fats and choosing low fat menu options. Bring a 1 week food log to next dietitian appt.

## 2021-08-03 ENCOUNTER — HOSPITAL ENCOUNTER (INPATIENT)
Age: 51
LOS: 9 days | Discharge: HOME | DRG: 441 | End: 2021-08-12
Attending: HOSPITALIST | Admitting: INTERNAL MEDICINE
Payer: COMMERCIAL

## 2021-08-03 ENCOUNTER — APPOINTMENT (OUTPATIENT)
Dept: CT IMAGING | Age: 51
DRG: 441 | End: 2021-08-03
Payer: COMMERCIAL

## 2021-08-03 DIAGNOSIS — R74.01 TRANSAMINITIS: Primary | ICD-10-CM

## 2021-08-03 LAB
ACETAMINOPHEN LEVEL: < 5 UG/ML (ref 0–20)
ALBUMIN SERPL-MCNC: 4.2 G/DL (ref 3.5–5.1)
ALP BLD-CCNC: 389 U/L (ref 38–126)
ALT SERPL-CCNC: 1567 U/L (ref 11–66)
AMORPHOUS: ABNORMAL
ANION GAP SERPL CALCULATED.3IONS-SCNC: 13 MEQ/L (ref 8–16)
APTT: 37.6 SECONDS (ref 22–38)
AST SERPL-CCNC: 912 U/L (ref 5–40)
BACTERIA: ABNORMAL /HPF
BASOPHILS # BLD: 0.8 %
BASOPHILS ABSOLUTE: 0.1 THOU/MM3 (ref 0–0.1)
BILIRUB SERPL-MCNC: 5.2 MG/DL (ref 0.3–1.2)
BILIRUBIN URINE: ABNORMAL
BLOOD, URINE: ABNORMAL
BUN BLDV-MCNC: 8 MG/DL (ref 7–22)
C-REACTIVE PROTEIN: 2.16 MG/DL (ref 0–1)
CALCIUM SERPL-MCNC: 10.1 MG/DL (ref 8.5–10.5)
CASTS 2: ABNORMAL /LPF
CASTS UA: ABNORMAL /LPF
CHARACTER, URINE: CLEAR
CHLORIDE BLD-SCNC: 100 MEQ/L (ref 98–111)
CO2: 26 MEQ/L (ref 23–33)
COLOR: ABNORMAL
CREAT SERPL-MCNC: 0.4 MG/DL (ref 0.4–1.2)
CRYSTALS, UA: ABNORMAL
EOSINOPHIL # BLD: 3 %
EOSINOPHILS ABSOLUTE: 0.2 THOU/MM3 (ref 0–0.4)
EPITHELIAL CELLS, UA: ABNORMAL /HPF
ERYTHROCYTE [DISTWIDTH] IN BLOOD BY AUTOMATED COUNT: 17.5 % (ref 11.5–14.5)
ERYTHROCYTE [DISTWIDTH] IN BLOOD BY AUTOMATED COUNT: 56 FL (ref 35–45)
FERRITIN: 2422 NG/ML (ref 10–291)
GFR SERPL CREATININE-BSD FRML MDRD: > 90 ML/MIN/1.73M2
GLUCOSE BLD-MCNC: 104 MG/DL (ref 70–108)
GLUCOSE URINE: NEGATIVE MG/DL
HAV IGM SER IA-ACNC: NEGATIVE
HCT VFR BLD CALC: 49.5 % (ref 37–47)
HEMOGLOBIN: 15.9 GM/DL (ref 12–16)
HEPATITIS B CORE IGM ANTIBODY: NEGATIVE
HEPATITIS B SURFACE ANTIGEN: NEGATIVE
HEPATITIS C ANTIBODY: NEGATIVE
ICTOTEST: POSITIVE
IMMATURE GRANS (ABS): 0.02 THOU/MM3 (ref 0–0.07)
IMMATURE GRANULOCYTES: 0.3 %
INR BLD: 1.07 (ref 0.85–1.13)
KETONES, URINE: NEGATIVE
LEUKOCYTE ESTERASE, URINE: NEGATIVE
LIPASE: 22.4 U/L (ref 5.6–51.3)
LYMPHOCYTES # BLD: 29 %
LYMPHOCYTES ABSOLUTE: 1.9 THOU/MM3 (ref 1–4.8)
MCH RBC QN AUTO: 29.2 PG (ref 26–33)
MCHC RBC AUTO-ENTMCNC: 32.1 GM/DL (ref 32.2–35.5)
MCV RBC AUTO: 91 FL (ref 81–99)
MISCELLANEOUS 2: ABNORMAL
MONOCYTES # BLD: 7.5 %
MONOCYTES ABSOLUTE: 0.5 THOU/MM3 (ref 0.4–1.3)
NITRITE, URINE: NEGATIVE
NUCLEATED RED BLOOD CELLS: 0 /100 WBC
OSMOLALITY CALCULATION: 276.2 MOSMOL/KG (ref 275–300)
PH UA: 5.5 (ref 5–9)
PLATELET # BLD: 294 THOU/MM3 (ref 130–400)
PMV BLD AUTO: 9.9 FL (ref 9.4–12.4)
POTASSIUM REFLEX MAGNESIUM: 4.5 MEQ/L (ref 3.5–5.2)
PROTEIN UA: NEGATIVE
RBC # BLD: 5.44 MILL/MM3 (ref 4.2–5.4)
RBC URINE: ABNORMAL /HPF
RENAL EPITHELIAL, UA: ABNORMAL
SEDIMENTATION RATE, ERYTHROCYTE: 4 MM/HR (ref 0–20)
SEG NEUTROPHILS: 59.4 %
SEGMENTED NEUTROPHILS ABSOLUTE COUNT: 3.8 THOU/MM3 (ref 1.8–7.7)
SODIUM BLD-SCNC: 139 MEQ/L (ref 135–145)
SPECIFIC GRAVITY, URINE: 1.01 (ref 1–1.03)
TOTAL PROTEIN: 7.5 G/DL (ref 6.1–8)
UROBILINOGEN, URINE: 1 EU/DL (ref 0–1)
WBC # BLD: 6.4 THOU/MM3 (ref 4.8–10.8)
WBC UA: ABNORMAL /HPF
YEAST: ABNORMAL

## 2021-08-03 PROCEDURE — 80074 ACUTE HEPATITIS PANEL: CPT

## 2021-08-03 PROCEDURE — 85730 THROMBOPLASTIN TIME PARTIAL: CPT

## 2021-08-03 PROCEDURE — 99223 1ST HOSP IP/OBS HIGH 75: CPT | Performed by: INTERNAL MEDICINE

## 2021-08-03 PROCEDURE — 85651 RBC SED RATE NONAUTOMATED: CPT

## 2021-08-03 PROCEDURE — 96374 THER/PROPH/DIAG INJ IV PUSH: CPT

## 2021-08-03 PROCEDURE — 96375 TX/PRO/DX INJ NEW DRUG ADDON: CPT

## 2021-08-03 PROCEDURE — 96376 TX/PRO/DX INJ SAME DRUG ADON: CPT

## 2021-08-03 PROCEDURE — 6370000000 HC RX 637 (ALT 250 FOR IP): Performed by: INTERNAL MEDICINE

## 2021-08-03 PROCEDURE — 2580000003 HC RX 258: Performed by: INTERNAL MEDICINE

## 2021-08-03 PROCEDURE — 6360000002 HC RX W HCPCS: Performed by: PHYSICIAN ASSISTANT

## 2021-08-03 PROCEDURE — 2580000003 HC RX 258: Performed by: PHYSICIAN ASSISTANT

## 2021-08-03 PROCEDURE — 85025 COMPLETE CBC W/AUTO DIFF WBC: CPT

## 2021-08-03 PROCEDURE — 74177 CT ABD & PELVIS W/CONTRAST: CPT

## 2021-08-03 PROCEDURE — 82728 ASSAY OF FERRITIN: CPT

## 2021-08-03 PROCEDURE — 99285 EMERGENCY DEPT VISIT HI MDM: CPT

## 2021-08-03 PROCEDURE — 36415 COLL VENOUS BLD VENIPUNCTURE: CPT

## 2021-08-03 PROCEDURE — 80143 DRUG ASSAY ACETAMINOPHEN: CPT

## 2021-08-03 PROCEDURE — 80053 COMPREHEN METABOLIC PANEL: CPT

## 2021-08-03 PROCEDURE — 6360000004 HC RX CONTRAST MEDICATION: Performed by: PHYSICIAN ASSISTANT

## 2021-08-03 PROCEDURE — 83690 ASSAY OF LIPASE: CPT

## 2021-08-03 PROCEDURE — 81001 URINALYSIS AUTO W/SCOPE: CPT

## 2021-08-03 PROCEDURE — 6360000002 HC RX W HCPCS: Performed by: INTERNAL MEDICINE

## 2021-08-03 PROCEDURE — 86140 C-REACTIVE PROTEIN: CPT

## 2021-08-03 PROCEDURE — 1200000003 HC TELEMETRY R&B

## 2021-08-03 PROCEDURE — 85610 PROTHROMBIN TIME: CPT

## 2021-08-03 RX ORDER — OXYCODONE HYDROCHLORIDE 5 MG/1
10 TABLET ORAL EVERY 4 HOURS PRN
Status: DISCONTINUED | OUTPATIENT
Start: 2021-08-03 | End: 2021-08-12 | Stop reason: HOSPADM

## 2021-08-03 RX ORDER — VIT C/B6/B5/MAGNESIUM/HERB 173 50-5-6-5MG
1500 CAPSULE ORAL DAILY
COMMUNITY
End: 2021-08-23

## 2021-08-03 RX ORDER — KETOROLAC TROMETHAMINE 30 MG/ML
30 INJECTION, SOLUTION INTRAMUSCULAR; INTRAVENOUS ONCE
Status: COMPLETED | OUTPATIENT
Start: 2021-08-03 | End: 2021-08-03

## 2021-08-03 RX ORDER — ONDANSETRON 2 MG/ML
4 INJECTION INTRAMUSCULAR; INTRAVENOUS ONCE
Status: COMPLETED | OUTPATIENT
Start: 2021-08-03 | End: 2021-08-03

## 2021-08-03 RX ORDER — SODIUM CHLORIDE, SODIUM LACTATE, POTASSIUM CHLORIDE, CALCIUM CHLORIDE 600; 310; 30; 20 MG/100ML; MG/100ML; MG/100ML; MG/100ML
INJECTION, SOLUTION INTRAVENOUS CONTINUOUS
Status: DISCONTINUED | OUTPATIENT
Start: 2021-08-03 | End: 2021-08-07

## 2021-08-03 RX ORDER — OXYCODONE HYDROCHLORIDE 5 MG/1
5 TABLET ORAL EVERY 4 HOURS PRN
Status: DISCONTINUED | OUTPATIENT
Start: 2021-08-03 | End: 2021-08-12 | Stop reason: HOSPADM

## 2021-08-03 RX ORDER — DOCUSATE SODIUM 100 MG/1
100 CAPSULE, LIQUID FILLED ORAL DAILY
COMMUNITY

## 2021-08-03 RX ORDER — SODIUM CHLORIDE 0.9 % (FLUSH) 0.9 %
5-40 SYRINGE (ML) INJECTION PRN
Status: DISCONTINUED | OUTPATIENT
Start: 2021-08-03 | End: 2021-08-12 | Stop reason: HOSPADM

## 2021-08-03 RX ORDER — OLMESARTAN MEDOXOMIL AND HYDROCHLOROTHIAZIDE 40/25 40; 25 MG/1; MG/1
1 TABLET ORAL DAILY
Status: DISCONTINUED | OUTPATIENT
Start: 2021-08-03 | End: 2021-08-03 | Stop reason: CLARIF

## 2021-08-03 RX ORDER — ONDANSETRON 2 MG/ML
4 INJECTION INTRAMUSCULAR; INTRAVENOUS EVERY 6 HOURS PRN
Status: DISCONTINUED | OUTPATIENT
Start: 2021-08-03 | End: 2021-08-12 | Stop reason: HOSPADM

## 2021-08-03 RX ORDER — HYDROCHLOROTHIAZIDE 25 MG/1
25 TABLET ORAL DAILY
Status: DISCONTINUED | OUTPATIENT
Start: 2021-08-03 | End: 2021-08-12 | Stop reason: HOSPADM

## 2021-08-03 RX ORDER — ONDANSETRON 4 MG/1
4 TABLET, ORALLY DISINTEGRATING ORAL EVERY 8 HOURS PRN
Status: DISCONTINUED | OUTPATIENT
Start: 2021-08-03 | End: 2021-08-12 | Stop reason: HOSPADM

## 2021-08-03 RX ORDER — MORPHINE SULFATE 4 MG/ML
4 INJECTION, SOLUTION INTRAMUSCULAR; INTRAVENOUS ONCE
Status: COMPLETED | OUTPATIENT
Start: 2021-08-03 | End: 2021-08-03

## 2021-08-03 RX ORDER — M-VIT,TX,IRON,MINS/CALC/FOLIC 27MG-0.4MG
1 TABLET ORAL DAILY
COMMUNITY

## 2021-08-03 RX ORDER — SODIUM CHLORIDE 9 MG/ML
25 INJECTION, SOLUTION INTRAVENOUS PRN
Status: DISCONTINUED | OUTPATIENT
Start: 2021-08-03 | End: 2021-08-12 | Stop reason: HOSPADM

## 2021-08-03 RX ORDER — LOSARTAN POTASSIUM 100 MG/1
100 TABLET ORAL DAILY
Status: DISCONTINUED | OUTPATIENT
Start: 2021-08-03 | End: 2021-08-12 | Stop reason: HOSPADM

## 2021-08-03 RX ORDER — 0.9 % SODIUM CHLORIDE 0.9 %
1000 INTRAVENOUS SOLUTION INTRAVENOUS ONCE
Status: COMPLETED | OUTPATIENT
Start: 2021-08-03 | End: 2021-08-03

## 2021-08-03 RX ORDER — SODIUM CHLORIDE 0.9 % (FLUSH) 0.9 %
5-40 SYRINGE (ML) INJECTION EVERY 12 HOURS SCHEDULED
Status: DISCONTINUED | OUTPATIENT
Start: 2021-08-03 | End: 2021-08-12 | Stop reason: HOSPADM

## 2021-08-03 RX ORDER — LOPERAMIDE HYDROCHLORIDE 2 MG/1
2 CAPSULE ORAL 4 TIMES DAILY PRN
Status: DISCONTINUED | OUTPATIENT
Start: 2021-08-03 | End: 2021-08-12

## 2021-08-03 RX ORDER — POLYETHYLENE GLYCOL 3350 17 G/17G
17 POWDER, FOR SOLUTION ORAL DAILY PRN
Status: DISCONTINUED | OUTPATIENT
Start: 2021-08-03 | End: 2021-08-12 | Stop reason: HOSPADM

## 2021-08-03 RX ORDER — LEVOTHYROXINE SODIUM 0.15 MG/1
150 TABLET ORAL DAILY
Status: DISCONTINUED | OUTPATIENT
Start: 2021-08-03 | End: 2021-08-12 | Stop reason: HOSPADM

## 2021-08-03 RX ADMIN — SODIUM CHLORIDE, POTASSIUM CHLORIDE, SODIUM LACTATE AND CALCIUM CHLORIDE: 600; 310; 30; 20 INJECTION, SOLUTION INTRAVENOUS at 17:13

## 2021-08-03 RX ADMIN — KETOROLAC TROMETHAMINE 30 MG: 30 INJECTION, SOLUTION INTRAMUSCULAR; INTRAVENOUS at 10:10

## 2021-08-03 RX ADMIN — ONDANSETRON 4 MG: 2 INJECTION INTRAMUSCULAR; INTRAVENOUS at 23:18

## 2021-08-03 RX ADMIN — SODIUM CHLORIDE 1000 ML: 9 INJECTION, SOLUTION INTRAVENOUS at 09:48

## 2021-08-03 RX ADMIN — ONDANSETRON 4 MG: 2 INJECTION INTRAMUSCULAR; INTRAVENOUS at 09:48

## 2021-08-03 RX ADMIN — MORPHINE SULFATE 4 MG: 4 INJECTION, SOLUTION INTRAMUSCULAR; INTRAVENOUS at 11:22

## 2021-08-03 RX ADMIN — IOPAMIDOL 80 ML: 755 INJECTION, SOLUTION INTRAVENOUS at 11:29

## 2021-08-03 RX ADMIN — OXYCODONE 10 MG: 5 TABLET ORAL at 18:25

## 2021-08-03 ASSESSMENT — ENCOUNTER SYMPTOMS
VOMITING: 1
NAUSEA: 1
CONSTIPATION: 1
ABDOMINAL PAIN: 1

## 2021-08-03 ASSESSMENT — PAIN DESCRIPTION - PROGRESSION
CLINICAL_PROGRESSION: NOT CHANGED
CLINICAL_PROGRESSION: NOT CHANGED
CLINICAL_PROGRESSION: GRADUALLY WORSENING

## 2021-08-03 ASSESSMENT — PAIN DESCRIPTION - PAIN TYPE
TYPE: ACUTE PAIN

## 2021-08-03 ASSESSMENT — PAIN - FUNCTIONAL ASSESSMENT
PAIN_FUNCTIONAL_ASSESSMENT: ACTIVITIES ARE NOT PREVENTED

## 2021-08-03 ASSESSMENT — PAIN SCALES - GENERAL
PAINLEVEL_OUTOF10: 8
PAINLEVEL_OUTOF10: 8
PAINLEVEL_OUTOF10: 7
PAINLEVEL_OUTOF10: 7
PAINLEVEL_OUTOF10: 9
PAINLEVEL_OUTOF10: 7
PAINLEVEL_OUTOF10: 8
PAINLEVEL_OUTOF10: 5
PAINLEVEL_OUTOF10: 9

## 2021-08-03 ASSESSMENT — PAIN DESCRIPTION - DESCRIPTORS
DESCRIPTORS: ACHING
DESCRIPTORS: SHARP
DESCRIPTORS: SHARP
DESCRIPTORS: ACHING

## 2021-08-03 ASSESSMENT — PAIN DESCRIPTION - LOCATION
LOCATION: ABDOMEN

## 2021-08-03 ASSESSMENT — PAIN DESCRIPTION - FREQUENCY
FREQUENCY: CONTINUOUS

## 2021-08-03 ASSESSMENT — PAIN DESCRIPTION - ONSET
ONSET: ON-GOING

## 2021-08-03 ASSESSMENT — PAIN DESCRIPTION - ORIENTATION
ORIENTATION: RIGHT
ORIENTATION: RIGHT
ORIENTATION: RIGHT;LEFT;OTHER (COMMENT)
ORIENTATION: RIGHT;LOWER

## 2021-08-03 NOTE — ED NOTES
Presents to ER with complaints of intermittent lower abdominal pain. Pt states she has a history of colitis, but feels this pain is different. Pt states the pain has been persistent since yesterday. She states she has been seen at [facility] where she was told her liver enzymes were elevated. Pt states she has not been taking anything for the pain due to not wanting to make her liver enzymes worse. Pt rates pain a 8 out of 10 in severity at this time.      Fabian Sanchezh Janice Conti RN  08/03/21 7100

## 2021-08-03 NOTE — ED NOTES
Pt resting in bed, respirations easy and unlabored. Call light within reach.      Steph Loja) WILLIE Conti RN  08/03/21 9751

## 2021-08-03 NOTE — H&P
Assessment and Plan:        1. Abdominal pain w/ transaminitis: chronic undifferentiated abdominal pain and transaminitis  >1 year. GI consulted. Negative hepatitis panel. Denies acetaminophen useANA, ANCA, F-actin (smooth muscle) Ab, Ferritin, IgG, mitochondrial Ab M2 IG, smooth muscle antibody  2. 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 Juan King note as seen on colon biopsy. 3. HTN: Benicar 40-25mg daily  4. Hypothyroidism: Synthroid 150mcg      CC:  Abdominal pain; transaminitis  HPI: 50yoF with  hypothyroidism, and HTN and PSHx of cholecystectomy and hysterectomy presents for evaluation of abdominal pain. Patient reports intermittent episodes of periumbilical and RUQ abdominal pain over the past year. Pain ranges from 5-6 to 7-8 in severity and is associated with nausea and dry heaving. Episodes typically last for hours and occur 3-5 times/week (though she has symptom free days). Patient denies exacerbating or alleviating factors. Denies fevers/chills, chest pain, pale stools, melena, dysuria, hematuria. Reports 3 episodes of watery diarrhea yesterday as well as darker urine over past week. Patient presented to [facility] ED this morning due to worsening pain. Initial HFT reveales ALT 1567, , Alk Phos 384. Viral hepatitis panel negative. INR and aPTT WNL. CT abdomen pelvis showed mild diffuse fatty replacement of liver. Patient's was first evaluated for abdominal pain last January. She was worked up for celiac disease but had normal TGG. However, biopsy showed features of Celiac disease and lymphocytic colitis. She has since followed a strict gluten free diet. In June, she presented to an ED in [facility] for a similar episode of abdominal pain but her transaminases were in the 200s. Patient recently returned from vacation near [facility]. She states that she cooked her own food while on vacation.  She also began seeing a chiropractor 4wks ago and was started on several supplements including \"beta plus,\" mimosa, moringa, oregano oil, and wormwood TID but has not taken any 1.5wks. Patient's mother and nephew have history of autoimmune disease. Patient does not recall specifics but reports that her mother \"needs to have her esophagus dilated\" as a result and that her nephew's symptoms are primarily GI.  ROS: yellow sclera, abdominal pain, nausea, diarrhea (12 point review of systems completed. Pertinent positives noted. Otherwise ROS is negative)  PMH:  Per HPI  SHX:  Occasional ETOH use  Social History     Tobacco Use    Smoking status: Never Smoker    Smokeless tobacco: Never Used   Substance Use Topics    Alcohol use: Yes     Comment: occasionally     Drug use: No     FHX:   Family History   Problem Relation Age of Onset    Cancer Mother     Colon Cancer Neg Hx     Esophageal Cancer Neg Hx     Liver Cancer Neg Hx     Rectal Cancer Neg Hx     Stomach Cancer Neg Hx      Allergies: Allergies   Allergen Reactions    Pcn [Penicillins] Rash     Medications:          Vital Signs:   /65   Pulse 62   Temp 97.9 °F (36.6 °C) (Oral)   Resp 17   Ht 5' 3\" (1.6 m)   Wt 262 lb (118.8 kg)   SpO2 99%   BMI 46.41 kg/m²    No intake or output data in the 24 hours ending 08/03/21 1352     General:   Well appearing, scleral icterus present  HEENT:  normocephalic and atraumatic. No scleral icterus. PERR. Neck: supple. No JVD. No thyromegaly. Lungs: clear to auscultation. No retractions  Cardiac: RRR without murmur. Abdomen: soft. Tenderness to palpation in RUQ and periumbilical.  Bowel sounds positive. No guarding. Rovsing sign (-)  Extremities:  No clubbing, cyanosis, or edema x 4. Vasculature: capillary refill < 3 seconds. Palpable LE pulses bilaterally. Skin:  warm and dry. Psych:  Alert and oriented x3. Affect appropriate  Lymph:  No supraclavicular adenopathy. Neurologic:  No focal deficit. No seizures.     Data: (All radiographs, tracings, PFTs, and imaging are personally viewed and interpreted unless otherwise noted).    Recent Results (from the past 24 hour(s))   CBC Auto Differential    Collection Time: 08/03/21  9:30 AM   Result Value Ref Range    WBC 6.4 4.8 - 10.8 thou/mm3    RBC 5.44 (H) 4.20 - 5.40 mill/mm3    Hemoglobin 15.9 12.0 - 16.0 gm/dl    Hematocrit 49.5 (H) 37.0 - 47.0 %    MCV 91.0 81.0 - 99.0 fL    MCH 29.2 26.0 - 33.0 pg    MCHC 32.1 (L) 32.2 - 35.5 gm/dl    RDW-CV 17.5 (H) 11.5 - 14.5 %    RDW-SD 56.0 (H) 35.0 - 45.0 fL    Platelets 869 929 - 344 thou/mm3    MPV 9.9 9.4 - 12.4 fL    Seg Neutrophils 59.4 %    Lymphocytes 29.0 %    Monocytes 7.5 %    Eosinophils 3.0 %    Basophils 0.8 %    Immature Granulocytes 0.3 %    Segs Absolute 3.8 1 - 7 thou/mm3    Lymphocytes Absolute 1.9 1.0 - 4.8 thou/mm3    Monocytes Absolute 0.5 0.4 - 1.3 thou/mm3    Eosinophils Absolute 0.2 0.0 - 0.4 thou/mm3    Basophils Absolute 0.1 0.0 - 0.1 thou/mm3    Immature Grans (Abs) 0.02 0.00 - 0.07 thou/mm3    nRBC 0 /100 wbc   Comprehensive Metabolic Panel w/ Reflex to MG    Collection Time: 08/03/21  9:30 AM   Result Value Ref Range    Glucose 104 70 - 108 mg/dL    CREATININE 0.4 0.4 - 1.2 mg/dL    BUN 8 7 - 22 mg/dL    Sodium 139 135 - 145 meq/L    Potassium reflex Magnesium 4.5 3.5 - 5.2 meq/L    Chloride 100 98 - 111 meq/L    CO2 26 23 - 33 meq/L    Calcium 10.1 8.5 - 10.5 mg/dL     (H) 5 - 40 U/L    Alkaline Phosphatase 389 (H) 38 - 126 U/L    Total Protein 7.5 6.1 - 8.0 g/dL    Albumin 4.2 3.5 - 5.1 g/dL    Total Bilirubin 5.2 (H) 0.3 - 1.2 mg/dL    ALT 1,567 (H) 11 - 66 U/L   Lipase    Collection Time: 08/03/21  9:30 AM   Result Value Ref Range    Lipase 22.4 5.6 - 51.3 U/L   C-reactive protein    Collection Time: 08/03/21  9:30 AM   Result Value Ref Range    CRP 2.16 (H) 0.00 - 1.00 mg/dl   Bile Acids, Total    Collection Time: 08/03/21  9:30 AM   Result Value Ref Range    Ictotest POSITIVE (A) NEGATIVE   Urine with Reflexed Micro    Collection Time: 08/03/21  9:30 AM   Result Value Ref Range    Glucose, Ur NEGATIVE NEGATIVE mg/dl    Bilirubin Urine SMALL (A) NEGATIVE    Ketones, Urine NEGATIVE NEGATIVE    Specific Gravity, Urine 1.008 1.002 - 1.030    Blood, Urine SMALL (A) NEGATIVE    pH, UA 5.5 5.0 - 9.0    Protein, UA NEGATIVE NEGATIVE    Urobilinogen, Urine 1.0 0.0 - 1.0 eu/dl    Nitrite, Urine NEGATIVE NEGATIVE    Leukocyte Esterase, Urine NEGATIVE NEGATIVE    Color, UA DK YELLOW (A) STRAW-YELLOW    Character, Urine CLEAR CLEAR-SL CLOUD    RBC, UA 0-2 0-2/hpf /hpf    WBC, UA 2-4 0-4/hpf /hpf    Epithelial Cells, UA 0-2 3-5/hpf /hpf    Amorphous, UA URATES NONE SEEN    Bacteria, UA FEW FEW/NONE SEEN /hpf    Casts UA NONE SEEN NONE SEEN /lpf    Crystals, UA NONE SEEN NONE SEEN    Renal Epithelial, UA NONE SEEN NONE SEEN    Yeast, UA NONE SEEN NONE SEEN    CASTS 2 NONE SEEN NONE SEEN /lpf    MISCELLANEOUS 2 NONE SEEN    Anion Gap    Collection Time: 08/03/21  9:30 AM   Result Value Ref Range    Anion Gap 13.0 8.0 - 16.0 meq/L   Glomerular Filtration Rate, Estimated    Collection Time: 08/03/21  9:30 AM   Result Value Ref Range    Est, Glom Filt Rate >90 ml/min/1.73m2   Osmolality    Collection Time: 08/03/21  9:30 AM   Result Value Ref Range    Osmolality Calc 276.2 275.0 - 300.0 mOsmol/kg   Hepatitis Panel, Acute    Collection Time: 08/03/21 11:56 AM   Result Value Ref Range    Hepatitis B Surface Ag Negative     Hep A IgM Negative     Hep B Core Ab, IgM Negative     Hepatitis C Ab Negative    Protime-INR    Collection Time: 08/03/21 11:56 AM   Result Value Ref Range    INR 1.07 0.85 - 1.13   APTT    Collection Time: 08/03/21 11:56 AM   Result Value Ref Range    aPTT 37.6 22.0 - 38.0 seconds   Acetaminophen level    Collection Time: 08/03/21 11:56 AM   Result Value Ref Range    Acetaminophen Level < 5.0 0.0 - 20.0 ug/mL       CT ABDOMEN PELVIS W IV CONTRAST Additional Contrast? None   Final Result      1.  Stable CT scan of the abdomen and pelvis, no interval change since previous study dated 1 January 2021.   2. Mild diffuse fatty replacement liver. 3. Status post cholecystectomy and hysterectomy. 4. Punctate nonobstructing stone in the lower pole of the right kidney. Small left renal cyst.   5. Hiatal hernia. 6. Small mesenteric and retroperitoneal lymph nodes. 7. Postoperative changes in the lower lumbar spine. 8. Otherwise negative CT scan of the abdomen and pelvis. **This report has been created using voice recognition software. It may contain minor errors which are inherent in voice recognition technology. **      Final report electronically signed by DR Patricia Spivey on 8/3/2021 11:54 AM             Electronically signed by Elida Dejesus MD on 8/3/2021 at 1:52 PM

## 2021-08-03 NOTE — ED NOTES
Pt resting in bed, respirations unlabored. Call light in reach. Will continue to monitor.      Steph [facility] EastRobert Conti RN  08/03/21 6550

## 2021-08-03 NOTE — ED PROVIDER NOTES
[facility]  eMERGENCY dEPARTMENT eNCOUnter          CHIEF COMPLAINT       Chief Complaint   Patient presents with    Abdominal Pain    Nausea       Nurses Notes reviewed and I agree except as noted inthe HPI. HISTORY OF PRESENT ILLNESS    Nikki Conti is a 48 y.o. female who presents to the Emergency Department for the evaluation of abdominal pain. Patient reports she has had intermittent episodes of abdominal pain over the past couple of months. She has followed with GI and reports she was diagnosed with colitis and celiac disease. She has not been started on any treatment though she notes her liver enzymes have been elevated with negative hepatitis panel. She states that her current episode of abdominal pain began 6 days ago. It began initially in the epigastric region, radiating to the right upper quadrant and right lower quadrant. She reports associated numbness as well as 1 episode of vomiting the day the pain began. She states that feels worse and higher up in her abdomen than her previous episode of pain in June that required hospitalization. She states it worsens with deep breaths and sneezing. No associated fevers, chills, urinary changes, bloody emesis or stool with her current episode. She did have small amount of blood 1 month ago with a bowel movement. She had another episode similar 2 weeks ago that was treated with ice and heat. She denies any treatment for her current episode secondary to the elevated liver enzymes. Reports occasional alcohol use, 3 drinks per day on weekends only with last use of alcohol 4 days ago. The HPI was provided by the patient. REVIEW OF SYSTEMS     Review of Systems   Gastrointestinal: Positive for abdominal pain, constipation, nausea and vomiting. All other systems reviewed and are negative.       PAST MEDICAL HISTORY    has a past medical history of Hx of blood clots, Hypertension, Kidney stone, Nausea & vomiting, and Thyroid disease. SURGICAL HISTORY      has a past surgical history that includes Hysterectomy ();  section;  section (); Foot surgery; Cholecystectomy (); Tonsillectomy; back surgery (???); Foot surgery (Left, 10/30/14); Upper gastrointestinal endoscopy (N/A, 2021); Colonoscopy (Left, 2021); Colonoscopy (2021); and EGD (). CURRENT MEDICATIONS       Current Discharge Medication List      CONTINUE these medications which have NOT CHANGED    Details   docusate sodium (COLACE) 100 MG capsule Take 100 mg by mouth daily      Turmeric (QC TUMERIC COMPLEX) 500 MG CAPS Take 1,500 tablets by mouth daily      Multiple Vitamins-Minerals (THERAPEUTIC MULTIVITAMIN-MINERALS) tablet Take 1 tablet by mouth daily      cyanocobalamin 1000 MCG tablet Take 1,000 mcg by mouth daily      Magnesium 100 MG CAPS Take 250 mg by mouth daily      levothyroxine (SYNTHROID) 150 MCG tablet Take 150 mcg by mouth Daily. olmesartan-hydrochlorothiazide (BENICAR HCT) 40-25 MG per tablet Take 1 tablet by mouth daily. acetaminophen (TYLENOL) 500 MG tablet Take 500 mg by mouth every 6 hours as needed for Pain             ALLERGIES     is allergic to pcn [penicillins]. FAMILY HISTORY     She indicated that her mother is . She indicated that her father is . She indicated that the status of her neg hx is unknown.   family history includes Cancer in her mother. SOCIAL HISTORY      reports that she has never smoked. She has never used smokeless tobacco. She reports current alcohol use. She reports that she does not use drugs. PHYSICAL EXAM     INITIAL VITALS:  height is 5' 3\" (1.6 m) and weight is 262 lb (118.8 kg). Her oral temperature is 98.1 °F (36.7 °C). Her blood pressure is 119/65 and her pulse is 95. Her respiration is 16 and oxygen saturation is 97%. Physical Exam  Vitals and nursing note reviewed. Constitutional:       Appearance: She is well-developed. HENT:      Head: Normocephalic and atraumatic. Cardiovascular:      Rate and Rhythm: Normal rate. Pulmonary:      Effort: Pulmonary effort is normal. No respiratory distress. Abdominal:      Palpations: Abdomen is soft. Tenderness: There is abdominal tenderness in the right upper quadrant, right lower quadrant and epigastric area. There is guarding. Negative signs include 's sign and Rovsing's sign. Skin:     General: Skin is warm. Neurological:      General: No focal deficit present. Mental Status: She is alert. Psychiatric:         Mood and Affect: Mood normal.         Behavior: Behavior normal.         DIFFERENTIAL DIAGNOSIS:   Differential diagnoses are discussed    DIAGNOSTIC RESULTS     EKG: All EKG's are interpreted by the Emergency Department Physician who either signs or Co-signsthis chart in the absence of a cardiologist.          RADIOLOGY: non-plain film images(s) such as CT, Ultrasound and MRI are read by the radiologist.    CT ABDOMEN PELVIS W IV CONTRAST Additional Contrast? None   Final Result      1. Stable CT scan of the abdomen and pelvis, no interval change since previous study dated 3 January 2021.   2. Mild diffuse fatty replacement liver. 3. Status post cholecystectomy and hysterectomy. 4. Punctate nonobstructing stone in the lower pole of the right kidney. Small left renal cyst.   5. Hiatal hernia. 6. Small mesenteric and retroperitoneal lymph nodes. 7. Postoperative changes in the lower lumbar spine. 8. Otherwise negative CT scan of the abdomen and pelvis. **This report has been created using voice recognition software. It may contain minor errors which are inherent in voice recognition technology. **      Final report electronically signed by DR Joselito Burrows on 8/3/2021 11:54 AM          LABS:      Labs Reviewed   CBC WITH AUTO DIFFERENTIAL - Abnormal; Notable for the following components:       Result Value    RBC 5.44 (*)     Hematocrit 49.5 (*)     MCHC 32.1 (*)     RDW-CV 17.5 (*)     RDW-SD 56.0 (*)     All other components within normal limits   COMPREHENSIVE METABOLIC PANEL W/ REFLEX TO MG FOR LOW K - Abnormal; Notable for the following components:     (*)     Alkaline Phosphatase 389 (*)     Total Bilirubin 5.2 (*)     ALT 1,567 (*)     All other components within normal limits   C-REACTIVE PROTEIN - Abnormal; Notable for the following components:    CRP 2.16 (*)     All other components within normal limits   BILE ACIDS, TOTAL - Abnormal; Notable for the following components:    Ictotest POSITIVE (*)     All other components within normal limits   URINE WITH REFLEXED MICRO - Abnormal; Notable for the following components:    Bilirubin Urine SMALL (*)     Blood, Urine SMALL (*)     Color, UA DK YELLOW (*)     All other components within normal limits   FERRITIN - Abnormal; Notable for the following components:    Ferritin 2,422 (*)     All other components within normal limits   LIPASE   ANION GAP   GLOMERULAR FILTRATION RATE, ESTIMATED   OSMOLALITY   HEPATITIS PANEL, ACUTE   PROTIME-INR   APTT   ACETAMINOPHEN LEVEL   MITOCHONDRIAL ANTIBODIES, M2, IGG   EMILY SCREEN WITH REFLEX   IGG   F-ACTIN (SMOOTH MUSCLE) ANTIBODY W/ REFLEX TO TITER   SMOOTH MUSCLE ANTIBODY TITER IGG   ANTI-NEUTROPHILIC CYTOPLASMIC ANTIBODY   COMPREHENSIVE METABOLIC PANEL W/ REFLEX TO MG FOR LOW K   CBC   PROTIME-INR   SEDIMENTATION RATE       EMERGENCY DEPARTMENT COURSE:   Vitals:    Vitals:    08/03/21 1218 08/03/21 1356 08/03/21 1400 08/03/21 1930   BP: 124/65  (!) 147/73 119/65   Pulse: 62 64 68 95   Resp: 17 16 16 16   Temp:   97.5 °F (36.4 °C) 98.1 °F (36.7 °C)   TempSrc:   Oral Oral   SpO2: 99% 100% 100% 97%   Weight:       Height:          9:54 AM EDT: The patient was seen and evaluated. Patient presents for complaints of right-sided abdominal pain with known history of recent transaminitis diagnosis as well as celiac disease and colitis.   Reports she is not on any treatment for this but has avoided acetaminophen, continued with recreational use of alcohol, reporting 3 seltzers on the weekends. Last alcohol use was 4 days. She presents with reassuring vital signs. Notable right sided abdominal tenderness without palpable hepatomegaly. She initially denied any narcotic pain medication, was agreeable with Toradol and Zofran which were provided with minimal relief in her pain and she was subsequently agreeable with receiving a dose of morphine. Laboratory results were overall reassuring, white blood cell count, lipase, renal function all within normal limits. However, she has upward trending liver enzymes with , alkaline phosphate 389, total bilirubin 5.2 and ALT 1567. No change in her abdominal CT in comparison to previous from January 2021. She has had prior cholecystectomy. Review of records revealed that on 6/25/2021, she had  and  with alkaline phosphate and bilirubin within normal limits. Acute hepatitis panel negative today. Discussed results with Dr. Jasper Young GI, who has followed the patient who feels comfortable with continued work-up locally at our facility. Patient was agreeable to admission and hospitalist service will admit the patient for further care. Discussed above plan with attending provider who was agreeable. CRITICAL CARE:   None    CONSULTS:  Hospitalist  Gastroenterology    PROCEDURES:  None    FINAL IMPRESSION      1. Transaminitis          DISPOSITION/PLAN   Admit    PATIENT REFERRED TO:  No follow-up provider specified.     DISCHARGEMEDICATIONS:  Current Discharge Medication List          (Please note that portions of this note were completedwith a voice recognition program.  Efforts were made to edit the dictations but occasionally words are mis-transcribed.)        Oitlia Jiang PA-C  08/03/21 [id number]

## 2021-08-03 NOTE — FLOWSHEET NOTE
Pt admitted to  8AB7 via from ED from ED. Complaints: Transaminitis. IV none infusing into the antecubital left, condition patent and no redness at a rate of 0 mls/ hour. IV site free of s/s of infection or infiltration. Vital signs obtained. Assessment and data collection initiated. Two nurse skin assessment performed by Dajuan Segal and Antonio SALINAS. Oriented to room. Policies and procedures for 8AB explained. All questions answered with no further questions at this time. Fall prevention and safety brochure discussed with patient. Bed alarm on. Call light in reach. Oriented to room. Lamont Escalante, RN, RN 8/3/2021 4:20 PM     Explained patients right to have family, representative or physician notified of their admission. Patient has Declined for physician to be notified. Patient has Declined for family/representative to be notified. Patient would like family notified once per shift?  No

## 2021-08-03 NOTE — ED NOTES
Pt transported to [facility] on cart in stable condition. Floor contacted before transport. Spoke with Charles Ely.      Chauncey Gutierres  08/03/21 4391

## 2021-08-03 NOTE — ED NOTES
ED to inpatient nurses report    Chief Complaint   Patient presents with    Abdominal Pain    Nausea      Present to ED from home  LOC: alert and orientated to name, place, date  Vital signs   Vitals:    08/03/21 0928 08/03/21 1023 08/03/21 1122 08/03/21 1218   BP: (!) 168/79 126/66 131/74 124/65   Pulse: 80 76 59 62   Resp: 17 16 16 17   Temp: 97.9 °F (36.6 °C)      TempSrc: Oral      SpO2: 97% 98% 99% 99%   Weight: 262 lb (118.8 kg)      Height: 5' 3\" (1.6 m)         Oxygen Baseline room air    Current needs required none Bipap/Cpap No  LDAs:   Peripheral IV 08/03/21 Left Forearm (Active)   Site Assessment Clean;Dry; Intact 08/03/21 0937   Line Status Normal saline locked 08/03/21 0937   Dressing Status Clean;Dry; Intact 08/03/21 8394     Mobility: Independent  Pending ED orders: none  Present condition: stable      Electronically signed by Mayuri Conti, BRAD on 8/3/2021 at 1:08 PM     Steph Conti RN  08/03/21 4860

## 2021-08-04 ENCOUNTER — APPOINTMENT (OUTPATIENT)
Dept: MRI IMAGING | Age: 51
DRG: 441 | End: 2021-08-04
Payer: COMMERCIAL

## 2021-08-04 LAB
ALBUMIN SERPL-MCNC: 2.9 G/DL (ref 3.5–5.1)
ALP BLD-CCNC: 328 U/L (ref 38–126)
ALT SERPL-CCNC: 1106 U/L (ref 11–66)
ANION GAP SERPL CALCULATED.3IONS-SCNC: 10 MEQ/L (ref 8–16)
AST SERPL-CCNC: 631 U/L (ref 5–40)
BILIRUB SERPL-MCNC: 4.6 MG/DL (ref 0.3–1.2)
BUN BLDV-MCNC: 13 MG/DL (ref 7–22)
CALCIUM SERPL-MCNC: 8.6 MG/DL (ref 8.5–10.5)
CHLORIDE BLD-SCNC: 103 MEQ/L (ref 98–111)
CO2: 26 MEQ/L (ref 23–33)
CREAT SERPL-MCNC: 0.4 MG/DL (ref 0.4–1.2)
ERYTHROCYTE [DISTWIDTH] IN BLOOD BY AUTOMATED COUNT: 17.2 % (ref 11.5–14.5)
ERYTHROCYTE [DISTWIDTH] IN BLOOD BY AUTOMATED COUNT: 58.4 FL (ref 35–45)
GFR SERPL CREATININE-BSD FRML MDRD: > 90 ML/MIN/1.73M2
GLUCOSE BLD-MCNC: 97 MG/DL (ref 70–108)
HCT VFR BLD CALC: 40.7 % (ref 37–47)
HEMOGLOBIN: 12.8 GM/DL (ref 12–16)
IGG: 965 MG/DL (ref 700–1600)
INR BLD: 1.09 (ref 0.85–1.13)
MCH RBC QN AUTO: 29.2 PG (ref 26–33)
MCHC RBC AUTO-ENTMCNC: 31.4 GM/DL (ref 32.2–35.5)
MCV RBC AUTO: 92.9 FL (ref 81–99)
PLATELET # BLD: 259 THOU/MM3 (ref 130–400)
PMV BLD AUTO: 10.4 FL (ref 9.4–12.4)
POTASSIUM REFLEX MAGNESIUM: 4.1 MEQ/L (ref 3.5–5.2)
RBC # BLD: 4.38 MILL/MM3 (ref 4.2–5.4)
SODIUM BLD-SCNC: 139 MEQ/L (ref 135–145)
TOTAL PROTEIN: 5.5 G/DL (ref 6.1–8)
WBC # BLD: 5.5 THOU/MM3 (ref 4.8–10.8)

## 2021-08-04 PROCEDURE — 1200000000 HC SEMI PRIVATE

## 2021-08-04 PROCEDURE — 86663 EPSTEIN-BARR ANTIBODY: CPT

## 2021-08-04 PROCEDURE — 86665 EPSTEIN-BARR CAPSID VCA: CPT

## 2021-08-04 PROCEDURE — 74183 MRI ABD W/O CNTR FLWD CNTR: CPT

## 2021-08-04 PROCEDURE — 86255 FLUORESCENT ANTIBODY SCREEN: CPT

## 2021-08-04 PROCEDURE — 83516 IMMUNOASSAY NONANTIBODY: CPT

## 2021-08-04 PROCEDURE — 80053 COMPREHEN METABOLIC PANEL: CPT

## 2021-08-04 PROCEDURE — 6370000000 HC RX 637 (ALT 250 FOR IP): Performed by: INTERNAL MEDICINE

## 2021-08-04 PROCEDURE — 86644 CMV ANTIBODY: CPT

## 2021-08-04 PROCEDURE — 2580000003 HC RX 258: Performed by: INTERNAL MEDICINE

## 2021-08-04 PROCEDURE — 86645 CMV ANTIBODY IGM: CPT

## 2021-08-04 PROCEDURE — 99232 SBSQ HOSP IP/OBS MODERATE 35: CPT | Performed by: INTERNAL MEDICINE

## 2021-08-04 PROCEDURE — 86694 HERPES SIMPLEX NES ANTBDY: CPT

## 2021-08-04 PROCEDURE — A9579 GAD-BASE MR CONTRAST NOS,1ML: HCPCS | Performed by: INTERNAL MEDICINE

## 2021-08-04 PROCEDURE — 6360000002 HC RX W HCPCS: Performed by: INTERNAL MEDICINE

## 2021-08-04 PROCEDURE — 6360000004 HC RX CONTRAST MEDICATION: Performed by: INTERNAL MEDICINE

## 2021-08-04 PROCEDURE — 86256 FLUORESCENT ANTIBODY TITER: CPT

## 2021-08-04 PROCEDURE — 85027 COMPLETE CBC AUTOMATED: CPT

## 2021-08-04 PROCEDURE — 86038 ANTINUCLEAR ANTIBODIES: CPT

## 2021-08-04 PROCEDURE — 82784 ASSAY IGA/IGD/IGG/IGM EACH: CPT

## 2021-08-04 PROCEDURE — 36415 COLL VENOUS BLD VENIPUNCTURE: CPT

## 2021-08-04 PROCEDURE — 85610 PROTHROMBIN TIME: CPT

## 2021-08-04 PROCEDURE — 86664 EPSTEIN-BARR NUCLEAR ANTIGEN: CPT

## 2021-08-04 RX ADMIN — OXYCODONE 10 MG: 5 TABLET ORAL at 00:47

## 2021-08-04 RX ADMIN — LOSARTAN POTASSIUM 100 MG: 100 TABLET, FILM COATED ORAL at 12:31

## 2021-08-04 RX ADMIN — LEVOTHYROXINE SODIUM 150 MCG: 0.15 TABLET ORAL at 05:47

## 2021-08-04 RX ADMIN — SODIUM CHLORIDE, POTASSIUM CHLORIDE, SODIUM LACTATE AND CALCIUM CHLORIDE: 600; 310; 30; 20 INJECTION, SOLUTION INTRAVENOUS at 14:37

## 2021-08-04 RX ADMIN — HYDROCHLOROTHIAZIDE 25 MG: 25 TABLET ORAL at 12:31

## 2021-08-04 RX ADMIN — GADOTERIDOL 20 ML: 279.3 INJECTION, SOLUTION INTRAVENOUS at 09:53

## 2021-08-04 RX ADMIN — ENOXAPARIN SODIUM 40 MG: 40 INJECTION, SOLUTION INTRAVENOUS; SUBCUTANEOUS at 17:40

## 2021-08-04 RX ADMIN — SODIUM CHLORIDE, POTASSIUM CHLORIDE, SODIUM LACTATE AND CALCIUM CHLORIDE: 600; 310; 30; 20 INJECTION, SOLUTION INTRAVENOUS at 02:55

## 2021-08-04 RX ADMIN — ONDANSETRON 4 MG: 2 INJECTION INTRAMUSCULAR; INTRAVENOUS at 20:06

## 2021-08-04 RX ADMIN — OXYCODONE 5 MG: 5 TABLET ORAL at 14:54

## 2021-08-04 ASSESSMENT — PAIN DESCRIPTION - FREQUENCY
FREQUENCY: CONTINUOUS

## 2021-08-04 ASSESSMENT — PAIN SCALES - GENERAL
PAINLEVEL_OUTOF10: 6
PAINLEVEL_OUTOF10: 6
PAINLEVEL_OUTOF10: 4
PAINLEVEL_OUTOF10: 5
PAINLEVEL_OUTOF10: 6
PAINLEVEL_OUTOF10: 7

## 2021-08-04 ASSESSMENT — PAIN DESCRIPTION - ORIENTATION
ORIENTATION: RIGHT

## 2021-08-04 ASSESSMENT — PAIN DESCRIPTION - PROGRESSION
CLINICAL_PROGRESSION: NOT CHANGED
CLINICAL_PROGRESSION: GRADUALLY IMPROVING
CLINICAL_PROGRESSION: GRADUALLY WORSENING
CLINICAL_PROGRESSION: NOT CHANGED

## 2021-08-04 ASSESSMENT — PAIN - FUNCTIONAL ASSESSMENT
PAIN_FUNCTIONAL_ASSESSMENT: ACTIVITIES ARE NOT PREVENTED

## 2021-08-04 ASSESSMENT — PAIN DESCRIPTION - ONSET
ONSET: ON-GOING

## 2021-08-04 ASSESSMENT — PAIN DESCRIPTION - PAIN TYPE
TYPE: ACUTE PAIN

## 2021-08-04 ASSESSMENT — PAIN DESCRIPTION - LOCATION
LOCATION: ABDOMEN

## 2021-08-04 ASSESSMENT — PAIN DESCRIPTION - DESCRIPTORS
DESCRIPTORS: SHARP
DESCRIPTORS: ACHING
DESCRIPTORS: SHARP
DESCRIPTORS: DISCOMFORT
DESCRIPTORS: ACHING;DISCOMFORT

## 2021-08-04 NOTE — CONSULTS
[address] [place] [address]                                  CONSULTATION    PATIENT NAME: JEFE Ervin                    :        1970  MED REC NO:   [id number]                           ROOM:       0007  ACCOUNT NO:   [de-identified]                           ADMIT DATE: 2021  PROVIDER:     [place] Gross File:  2021    HISTORY OF PRESENT ILLNESS:  The patient is known to the practice, seen  in 2021, due to abdominal discomfort. She presents at this time with  recurrent abdominal discomfort, episodic, lasts hours two to three,  sometimes more. Intermittent over the last two weeks, became worse  lately. Today, the pain is very severe. Described the pain in the  midepigastric area, periumbilical area, as well as right upper quadrant,  sometimes radiating to the back, but not really a lot. Associated with  dry heaves and nausea with no vomiting and no hematemesis. Few days  with no pain, but did happen again. It became more frequently lately. Bowel movement has not changed earlier, but yesterday, had loose bowel  movements. The stool color was normal in color, not light in color. She has no associated fever or chills. She has no aggravating symptoms  clearly all the time, seemed to be the pain happens more after eating. Sometimes not related to the meal.  Her urine also stated to be dark in  color than usual.  She has not noticed any change in her eye color  herself. She has been evaluated for abdominal discomfort in the past by  me. She also has a feature most suggestive of this by the endoscopy. She is on gluten-free diet. That led to go with her loose bowel  movement, less distention, but did not really change the acid reflux  symptom at all on her. She was also found to have lymphocytic colitis  with bleeding controlled once her diet became a gluten-free diet.     She had several episodes in the past.  She does not remember that at the  time when she had her cholecystectomy, her symptoms are related to that  or not. She never had any ostomy during that time with her  cholecystectomy. Cholecystectomy by the surgeon at [facility].  She has presented to the emergency room at [facility]  in the past.  Her thyroid values were high, but not as high at this  time. She did vacation in [place], [place]. She said she ate  nothing that seemed to be contaminated. PAST MEDICAL HISTORY:  Significant for celiac disease, potentially  confirmed by the biopsy, hypertension, kidney stone, nausea and  vomiting, thyroid disease. PAST SURGICAL HISTORY:  Significant for back surgery,  more  than one time, cholecystectomy, foot surgery, hysterectomy,  tonsillectomy, upper endoscopy with duodenal biopsy. SOCIAL HISTORY:  No smoking. No smokeless tobacco.  Currently uses  alcohol, but not heavy, socially. No drug abuse. FAMILY HISTORY:  Cancer in her mother. ALLERGIES:  To PENICILLIN. MEDICATIONS:  She is on Synthroid, Imodium as needed, Lovenox at this  time, Zofran as needed, losartan, pain medications. PHYSICAL EXAMINATION:  GENERAL:  Appears to be healthy, comfortable, not short of breath, not  using accessory muscles. VITAL SIGNS:  Her weight 261, which is stable. Her blood pressure  119/65, respirations 16, pulse 95. HEENT:  Her head is atraumatic. Sclerae are slightly icteric. Pupils  are reactive to light and accommodation. Oral cavity, normal with no  lesions. NECK:  Supple. CHEST:  Normal.  CARDIOVASCULAR:  Normal.  ABDOMEN:  Slight epigastric tenderness, right lower quadrant tenderness. No tenderness in the left side of the abdomen. Bowel sounds  normoactive. No organomegaly. No stigmata of liver disease. EXTREMITIES:  No clubbing, no cyanosis.   MUSCULOSKELETAL:  Grossly normal.  PSYCHOLOGICAL:  Grossly normal.    LABORATORY DATA:  Her sodium and potassium are normal.  BUN and  creatinine are normal.  Her white blood cell is 6.4. H and H 15.9 and  49.5. MCV is 91. Her glucose is normal.  Her albumin is 4.2. Her AST  is 912, ALT 1568. Lipase is normal.  Her ferritin is 2422. INR is  normal.  PTT is normal.  Acute hepatitis profile for A, B, and C  serology is negative. Urine showed dark urine with urobilinogen  positive. IMAGING STUDY:  CT scan of the abdomen and pelvis compared to her old  showed mild diffuse fatty infiltration of the liver, status post  cholecystectomy, hysterectomy, nonobstructive stone in the lower lobe of  the right kidney, small left renal cyst, bilateral hiatal hernia, ____  lymphadenopathy, postoperative change of the lower lumbar spine and no  evidence of biliary dilation. IMPRESSION:  1. Abnormal liver function tests, high. Lipase and amylase are normal,  not respect to cause of pancreatitis; however, common bile duct stone  cannot be ruled out. Now, the patient had cholecystectomy done in the  past.  2.  Also cannot rule out acute hepatitis source other than A, B, and C  serology including herpes virus, CMV, and EB virus, which is less  likely, but just cannot rule out completely. 3.  Question of celiac disease with biopsy. 4.  History of lymphocytic colitis, diarrhea intermittent. 5.  Hypothyroidism. PLAN:  1. Repeat lab. Order serology for CMV, EB virus, and herpes virus. 2.  MRCP recommended to order that tomorrow to be done to evaluate  common bile duct stone. 3.  Repeat liver function tests as part of abdominal surgery. ERCP  might be indicated.   Might consider a HIDA scan before that to see if  there is any free flow of the bile duct in the duodenum in the patient  with history of cholecystectomy in the past.  4.  Future plan also to obtain a biopsy from the duodenum as well as  large bowel to evaluate to confirms celiac disease as well as  lymphocytic colitis. Thank you for allowing me to participate in this patient's care.         Jennifer Castelan M.D.    D: 08/03/2021 20:01:57       T: 08/03/2021 21:51:24     [email or web address]  Job#: [id number]     Doc#: [id number]    CC:  Virginia Man M.D.

## 2021-08-04 NOTE — CARE COORDINATION
8/4/21, 7:37 AM EDT  DISCHARGE PLANNING EVALUATION:    Nikki Longoria Getting       Admitted: 8/3/2021/ Bryn 8057 day: 1   Location: 8A-07/007-A Reason for admit: Transaminitis [R74.01]   PMH:  has a past medical history of Hx of blood clots, Hypertension, Kidney stone, Nausea & vomiting, and Thyroid disease. Procedure: No.  Barriers to Discharge: To ER with abd pain. Hx Celiac disease and colitis. , Alk Phos 389, total bili 5.2 and ALT 1567. CT unchanged from Jan. Hep panal neg. GI consulted. NPO. LR at 100/hr. PCP: Ever Dunlap MD  Readmission Risk Score: 8%    Patient Goals/Plan/Treatment Preferences: Met with pt daughter Cindy Contreras today as pt is off unit for testing. 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 Pt is from home with spouse and they are self sufficient. Pt works, drives and has no services. She recently started wearing a C-Pap and I have encouraged Cindy Contreras to have this brought in for her mother to use while here. Pt has a PCP and no issues getting medications. No discharge needs recognized at this time. Transportation/Food Security/Housekeeping Addressed:  No issues identified.

## 2021-08-04 NOTE — FLOWSHEET NOTE
Pt was anointed     08/04/21 1811   Encounter Summary   Services provided to: Patient and family together   Referral/Consult From: [address] [facility] Visiting Yes  (8/4)   Complexity of Encounter Moderate   Length of Encounter 15 minutes   Spiritual/Uatsdin   Type Spiritual support   Assessment Approachable;Calm   Intervention Prayer;Nurtured hope   Sacraments   Sacrament of Sick-Anointing Anointed

## 2021-08-04 NOTE — PLAN OF CARE
Problem: Pain:  Goal: Pain level will decrease  Description: Pain level will decrease  Outcome: Ongoing  Note: Patient voicing 6/10 pain in her RUQ abdomen. Patient repositioning self. Pain medications given per MAR. Problem: Pain:  Goal: Control of acute pain  Description: Control of acute pain  Outcome: Ongoing  Note: Patient voicing 6/10 pain in her RUQ abdomen. Patient repositioning self. Pain medications given per MAR. Problem: Pain:  Goal: Control of chronic pain  Description: Control of chronic pain  Outcome: Ongoing  Note: Patient voicing 6/10 pain in her RUQ abdomen. Patient repositioning self. Pain medications given per MAR. Problem: Discharge Planning:  Goal: Participates in care planning  Description: Participates in care planning  Outcome: Ongoing  Note: Patient participates in care planning. Problem: Cardiac Output - Decreased:  Goal: Hemodynamic stability will improve  Description: Hemodynamic stability will improve  Outcome: Ongoing  Note: Vitals monitored. Problem: Falls - Risk of:  Goal: Will remain free from falls  Description: Will remain free from falls  Outcome: Ongoing  Note: Free of falls this shift. Call light within reach. Bed alarm on. Falling star program in place. Care plans reviewed with patient using teachback methods. No concerns voiced at this time.

## 2021-08-04 NOTE — PROGRESS NOTES
Assessment and Plan:        1. Abdominal pain w/ transaminitis: chronic undifferentiated abdominal pain and transaminitis  >1 year. GI consulted. Negative hepatitis panel. Denies acetaminophen useANA, ANCA, F-actin (smooth muscle) Ab, Ferritin, IgG, mitochondrial Ab M2 IG, smooth muscle antibody. Further viral serologies pending. Patient's LFTs slightly trended down today. MRCP obtained as well and rather unremarkable. Patient still having pain. 2. 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 Huston Saint note as seen on colon biopsy. 3. HTN: Benicar 40-25mg daily  4. Hypothyroidism: Synthroid 150mcg  5. Dehydration, POA: Patient was concentrated with hemoglobin near 16 and endorsed being very dry. Started IV fluids. With improvement noted in her hemoglobin concentration. We will continue IV fluids for now. CC:  Abdominal pain; transaminitis  Hospital course: 50yoF with  hypothyroidism, and HTN and PSHx of cholecystectomy and hysterectomy presents for evaluation of abdominal pain. Patient reports intermittent episodes of periumbilical and RUQ abdominal pain over the past year. Pain ranges from 5-6 to 7-8 in severity and is associated with nausea and dry heaving. Episodes typically last for hours and occur 3-5 times/week (though she has symptom free days). Patient denies exacerbating or alleviating factors. Denies fevers/chills, chest pain, pale stools, melena, dysuria, hematuria. Reports 3 episodes of watery diarrhea yesterday as well as darker urine over past week. Patient [facility] ED this morning due to worsening pain. Initial HFT reveales ALT 1567, , Alk Phos 384. Viral hepatitis panel negative. INR and aPTT WNL. CT abdomen pelvis showed mild diffuse fatty replacement of liver. Patient's was first evaluated for abdominal pain last January. She was worked up for celiac disease but had normal TGG. However, biopsy showed features of Celiac disease and lymphocytic colitis.  She has since followed a strict gluten free diet. In June, she presented to an ED in [facility] for a similar episode of abdominal pain but her transaminases were in the 200s. Patient recently returned from vacation near [place], [place]. She states that she cooked her own food while on vacation. She also began seeing a chiropractor 4wks ago and was started on several supplements including \"beta plus,\" mimosa, moringa, oregano oil, and wormwood TID but has not taken any 1.5wks. Patient's mother and nephew have history of autoimmune disease. Patient does not recall specifics but reports that her mother \"needs to have her esophagus dilated\" as a result and that her nephew's symptoms are primarily GI. Subjective: Patient symptoms are rather unchanged. Still having right upper quadrant abdominal pain some nausea. No new rashes. No confusion. Does have a headache today though however. No acute issues overnight. Patient seen and examined with daughter at bedside. All questions answered    ROS: yellow sclera, abdominal pain, nausea, diarrhea (12 point review of systems completed. Pertinent positives noted. Otherwise ROS is negative)  PMH:  Per HPI  SHX:  Occasional ETOH use  Social History     Tobacco Use    Smoking status: Never Smoker    Smokeless tobacco: Never Used   Substance Use Topics    Alcohol use: Yes     Comment: occasionally     Drug use: No     FHX:   Family History   Problem Relation Age of Onset    Cancer Mother     Colon Cancer Neg Hx     Esophageal Cancer Neg Hx     Liver Cancer Neg Hx     Rectal Cancer Neg Hx     Stomach Cancer Neg Hx      Allergies:    Allergies   Allergen Reactions    Pcn [Penicillins] Rash     Medications:          Vital Signs:   /82   Pulse 65   Temp 98 °F (36.7 °C) (Oral)   Resp 16   Ht 5' 3\" (1.6 m)   Wt 269 lb (122 kg)   SpO2 96%   BMI 47.65 kg/m²      Intake/Output Summary (Last 24 hours) at 8/4/2021 7489  Last data filed at 8/4/2021 6188  Gross per 24 hour   Intake 1243.91 ml   Output --   Net 1243.91 ml        General:   Well appearing, scleral icterus present  HEENT:  normocephalic and atraumatic. No scleral icterus. PERR. Neck: supple. No JVD. No thyromegaly. Lungs: clear to auscultation. No retractions  Cardiac: RRR without murmur. Abdomen: soft. Tenderness to palpation in RUQ and periumbilical.  Bowel sounds positive. No guarding. Rovsing sign (-)  Extremities:  No clubbing, cyanosis, or edema x 4. Vasculature: capillary refill < 3 seconds. Palpable LE pulses bilaterally. Skin:  warm and dry. Psych:  Alert and oriented x3. Affect appropriate  Lymph:  No supraclavicular adenopathy. Neurologic:  No focal deficit. No seizures. No asterixis    Data: (All radiographs, tracings, PFTs, and imaging are personally viewed and interpreted unless otherwise noted).    Recent Results (from the past 24 hour(s))   Sedimentation Rate    Collection Time: 08/03/21  9:16 PM   Result Value Ref Range    Sed Rate 4 0 - 20 mm/hr   Comprehensive Metabolic Panel w/ Reflex to MG    Collection Time: 08/04/21  5:47 AM   Result Value Ref Range    Glucose 97 70 - 108 mg/dL    CREATININE 0.4 0.4 - 1.2 mg/dL    BUN 13 7 - 22 mg/dL    Sodium 139 135 - 145 meq/L    Potassium reflex Magnesium 4.1 3.5 - 5.2 meq/L    Chloride 103 98 - 111 meq/L    CO2 26 23 - 33 meq/L    Calcium 8.6 8.5 - 10.5 mg/dL     (H) 5 - 40 U/L    Alkaline Phosphatase 328 (H) 38 - 126 U/L    Total Protein 5.5 (L) 6.1 - 8.0 g/dL    Albumin 2.9 (L) 3.5 - 5.1 g/dL    Total Bilirubin 4.6 (H) 0.3 - 1.2 mg/dL    ALT 1,106 (H) 11 - 66 U/L   Anion Gap    Collection Time: 08/04/21  5:47 AM   Result Value Ref Range    Anion Gap 10.0 8.0 - 16.0 meq/L   Glomerular Filtration Rate, Estimated    Collection Time: 08/04/21  5:47 AM   Result Value Ref Range    Est, Glom Filt Rate >90 ml/min/1.73m2   CBC    Collection Time: 08/04/21  5:48 AM   Result Value Ref Range    WBC 5.5 4.8 - 10.8 thou/mm3    RBC 4.38 4.20 - 5.40 mill/mm3    Hemoglobin 12.8 12.0 - 16.0 gm/dl    Hematocrit 40.7 37.0 - 47.0 %    MCV 92.9 81.0 - 99.0 fL    MCH 29.2 26.0 - 33.0 pg    MCHC 31.4 (L) 32.2 - 35.5 gm/dl    RDW-CV 17.2 (H) 11.5 - 14.5 %    RDW-SD 58.4 (H) 35.0 - 45.0 fL    Platelets 376 689 - 084 thou/mm3    MPV 10.4 9.4 - 12.4 fL   Protime-INR    Collection Time: 08/04/21  5:48 AM   Result Value Ref Range    INR 1.09 0.85 - 1.13       MRI ABDOMEN W WO CONTRAST   Final Result   1. No common bile duct stone. 2. No intrahepatic ductal dilatation. Pancreatic duct is unremarkable. 3. Gallbladder removed. 4. No liver lesions. **This report has been created using voice recognition software. It may contain minor errors which are inherent in voice recognition technology. **      Final report electronically signed by Dr. Winter Batres on 8/4/2021 10:03 AM      CT ABDOMEN PELVIS W IV CONTRAST Additional Contrast? None   Final Result      1. Stable CT scan of the abdomen and pelvis, no interval change since previous study dated 3 January 2021.   2. Mild diffuse fatty replacement liver. 3. Status post cholecystectomy and hysterectomy. 4. Punctate nonobstructing stone in the lower pole of the right kidney. Small left renal cyst.   5. Hiatal hernia. 6. Small mesenteric and retroperitoneal lymph nodes. 7. Postoperative changes in the lower lumbar spine. 8. Otherwise negative CT scan of the abdomen and pelvis. **This report has been created using voice recognition software. It may contain minor errors which are inherent in voice recognition technology. **      Final report electronically signed by DR Geena Chapin on 8/3/2021 11:54 AM            Electronically signed by Meenakshi Romero MD on 8/4/2021 at 4:27 PM

## 2021-08-04 NOTE — PROGRESS NOTES
Gastroenterology  Progress Note    8/4/2021 6:13 PM  Subjective:   Admit Date: 8/3/2021    Interval History: Patient with history of recurrent abdominal discomfort intermittent typical of biliary origin associated with abnormality to function test intermittent at this time is high ALT AST however the imaging study with MRCP did not confirm biliary obstructions or stone. Sludge due to the problem not seen with MRCP cannot rule out. Acute hepatitis profile for ABC serologies so far been negative other work-up still pending. No clear indication for ERCP at this time however can be considered patient clinically not improve after discussion with the patient with possible complications which try to happen will repeat a function test and watch closely. Diet: ADULT DIET; Regular; Gluten Free    Medications:   Scheduled Meds:    levothyroxine  150 mcg Oral Daily    sodium chloride flush  5-40 mL Intravenous 2 times per day    enoxaparin  40 mg Subcutaneous Daily    losartan  100 mg Oral Daily    And    hydroCHLOROthiazide  25 mg Oral Daily     Continuous Infusions:    sodium chloride      lactated ringers 100 mL/hr at 08/04/21 1437       CBC:   Recent Labs     08/03/21  0930 08/04/21  0548   WBC 6.4 5.5   HGB 15.9 12.8    259     BMP:    Recent Labs     08/03/21  0930 08/04/21  0547    139   K 4.5 4.1    103   CO2 26 26   BUN 8 13   CREATININE 0.4 0.4   GLUCOSE 104 97     Hepatic:   Recent Labs     08/03/21  0930 08/04/21  0547   * 631*   ALT 1,567* 1,106*   BILITOT 5.2* 4.6*   ALKPHOS 389* 328*     INR:   Recent Labs     08/03/21  1156 08/04/21  0548   INR 1.07 1.09       Imaging:  No results found for this or any previous visit. Results for orders placed during the hospital encounter of 08/03/21    CT ABDOMEN PELVIS W IV CONTRAST Additional Contrast? None    Narrative  PROCEDURE: CT ABDOMEN PELVIS W IV CONTRAST    CLINICAL INFORMATION: right sided abdominal pain; .     COMPARISON: CT scan of the abdomen and pelvis dated 3 January 2021. .    TECHNIQUE: Axial 5 mm CT images were obtained through the abdomen and pelvis after the administration of intravenous contrast. Coronal and sagittal reconstructions were obtained. All CT scans at this facility use dose modulation, iterative reconstruction, and/or weight-based dosing when appropriate to reduce radiation dose to as low as reasonably achievable. FINDINGS:      The visualized aspects of the lung bases are clear. The base of the heart is within appropriate limits. There is mild diffuse fatty replacement in the liver. The spleen is normal. The adrenals and pancreas are normal. The patient is status post cholecystectomy. There is is a 7.8 cm probable left renal cyst. There is a 2.1 mm stone in the lower pole of  right kidney with no associated hydronephrosis. There is a moderate-sized hiatal hernia    No abnormalities of the small bowel loops are noted. There is atherosclerotic calcification in the abdominal aorta and iliac arteries. There are small mesenteric and retroperitoneal lymph nodes present. The urinary bladder is normal. The patient is status post hysterectomy. There is no pelvic free fluid. The colon is within normal limits. . There are postoperative changes in the lower lumbar spine. Impression  1. Stable CT scan of the abdomen and pelvis, no interval change since previous study dated 3 January 2021.  2. Mild diffuse fatty replacement liver. 3. Status post cholecystectomy and hysterectomy. 4. Punctate nonobstructing stone in the lower pole of the right kidney. Small left renal cyst.  5. Hiatal hernia. 6. Small mesenteric and retroperitoneal lymph nodes. 7. Postoperative changes in the lower lumbar spine. 8. Otherwise negative CT scan of the abdomen and pelvis. **This report has been created using voice recognition software.  It may contain minor errors which are inherent in voice recognition

## 2021-08-04 NOTE — PLAN OF CARE
Problem: Pain:  Goal: Pain level will decrease  Description: Pain level will decrease  8/4/2021 1108 by Conchis Fletcher RN  Outcome: Ongoing  Note: PRN oxy     Problem: Pain:  Goal: Control of acute pain  Description: Control of acute pain  8/4/2021 1108 by Conchis Fletcher RN  Outcome: Ongoing  Note: PRN oxy      Problem: Pain:  Goal: Control of chronic pain  Description: Control of chronic pain  8/4/2021 1108 by Conchis Fletcher RN  Outcome: Ongoing  Note: Denies chronic pain      Problem: Discharge Planning:  Goal: Participates in care planning  Description: Participates in care planning  8/4/2021 1108 by Conchis Fletcher RN  Outcome: Ongoing  Note: Participating in plan of care. Problem: Cardiac Output - Decreased:  Goal: Hemodynamic stability will improve  Description: Hemodynamic stability will improve  8/4/2021 1108 by Conchis Fletcher RN  Outcome: Ongoing  Note: See assessment     Problem: Falls - Risk of:  Goal: Will remain free from falls  Description: Will remain free from falls  8/4/2021 1108 by Conchis Fletcher RN  Outcome: Ongoing  Note: Free from falls. Care plan reviewed with patient. Patient verbalize understanding of the plan of care and contribute to goal setting.

## 2021-08-04 NOTE — PROGRESS NOTES
Hourly rounding on patient. Patient complaining of pain. This nurse offered available PRN pain medication. Patient refused the medication and other comfort measures such as heating pad, tylenol, warm blanket, and repositioning. Patient declining.

## 2021-08-05 LAB
ALBUMIN SERPL-MCNC: 3.7 G/DL (ref 3.5–5.1)
ALP BLD-CCNC: 396 U/L (ref 38–126)
ALPHA-1 ANTITRYPSIN: 182 MG/DL (ref 90–200)
ALT SERPL-CCNC: 1281 U/L (ref 11–66)
AMMONIA: 61 UMOL/L (ref 11–60)
ANION GAP SERPL CALCULATED.3IONS-SCNC: 9 MEQ/L (ref 8–16)
AST SERPL-CCNC: 814 U/L (ref 5–40)
BILIRUB SERPL-MCNC: 5.2 MG/DL (ref 0.3–1.2)
BUN BLDV-MCNC: 8 MG/DL (ref 7–22)
CALCIUM SERPL-MCNC: 9.4 MG/DL (ref 8.5–10.5)
CHLORIDE BLD-SCNC: 102 MEQ/L (ref 98–111)
CO2: 27 MEQ/L (ref 23–33)
CREAT SERPL-MCNC: 0.6 MG/DL (ref 0.4–1.2)
ERYTHROCYTE [DISTWIDTH] IN BLOOD BY AUTOMATED COUNT: 17.7 % (ref 11.5–14.5)
ERYTHROCYTE [DISTWIDTH] IN BLOOD BY AUTOMATED COUNT: 60.5 FL (ref 35–45)
FERRITIN: 1855 NG/ML (ref 10–291)
GAMMA GLUTAMYL TRANSFERASE: 520 U/L (ref 8–69)
GFR SERPL CREATININE-BSD FRML MDRD: > 90 ML/MIN/1.73M2
GLUCOSE BLD-MCNC: 115 MG/DL (ref 70–108)
HCT VFR BLD CALC: 43.6 % (ref 37–47)
HEMOGLOBIN: 13.7 GM/DL (ref 12–16)
INR BLD: 1.06 (ref 0.85–1.13)
IRON: 160 UG/DL (ref 50–170)
MCH RBC QN AUTO: 29.3 PG (ref 26–33)
MCHC RBC AUTO-ENTMCNC: 31.4 GM/DL (ref 32.2–35.5)
MCV RBC AUTO: 93.4 FL (ref 81–99)
PLATELET # BLD: 287 THOU/MM3 (ref 130–400)
PMV BLD AUTO: 9.9 FL (ref 9.4–12.4)
POTASSIUM REFLEX MAGNESIUM: 4.1 MEQ/L (ref 3.5–5.2)
POTASSIUM SERPL-SCNC: 4.1 MEQ/L (ref 3.5–5.2)
RBC # BLD: 4.67 MILL/MM3 (ref 4.2–5.4)
SODIUM BLD-SCNC: 138 MEQ/L (ref 135–145)
TOTAL PROTEIN: 6.1 G/DL (ref 6.1–8)
WBC # BLD: 5.3 THOU/MM3 (ref 4.8–10.8)

## 2021-08-05 PROCEDURE — 82103 ALPHA-1-ANTITRYPSIN TOTAL: CPT

## 2021-08-05 PROCEDURE — 82390 ASSAY OF CERULOPLASMIN: CPT

## 2021-08-05 PROCEDURE — 2580000003 HC RX 258: Performed by: INTERNAL MEDICINE

## 2021-08-05 PROCEDURE — 83516 IMMUNOASSAY NONANTIBODY: CPT

## 2021-08-05 PROCEDURE — 82977 ASSAY OF GGT: CPT

## 2021-08-05 PROCEDURE — 99233 SBSQ HOSP IP/OBS HIGH 50: CPT | Performed by: PHYSICIAN ASSISTANT

## 2021-08-05 PROCEDURE — 6370000000 HC RX 637 (ALT 250 FOR IP): Performed by: INTERNAL MEDICINE

## 2021-08-05 PROCEDURE — 85610 PROTHROMBIN TIME: CPT

## 2021-08-05 PROCEDURE — 86038 ANTINUCLEAR ANTIBODIES: CPT

## 2021-08-05 PROCEDURE — 80053 COMPREHEN METABOLIC PANEL: CPT

## 2021-08-05 PROCEDURE — 82105 ALPHA-FETOPROTEIN SERUM: CPT

## 2021-08-05 PROCEDURE — 86255 FLUORESCENT ANTIBODY SCREEN: CPT

## 2021-08-05 PROCEDURE — 83540 ASSAY OF IRON: CPT

## 2021-08-05 PROCEDURE — 85027 COMPLETE CBC AUTOMATED: CPT

## 2021-08-05 PROCEDURE — 82728 ASSAY OF FERRITIN: CPT

## 2021-08-05 PROCEDURE — 1200000000 HC SEMI PRIVATE

## 2021-08-05 PROCEDURE — 6360000002 HC RX W HCPCS: Performed by: INTERNAL MEDICINE

## 2021-08-05 PROCEDURE — 86256 FLUORESCENT ANTIBODY TITER: CPT

## 2021-08-05 PROCEDURE — 36415 COLL VENOUS BLD VENIPUNCTURE: CPT

## 2021-08-05 PROCEDURE — 82140 ASSAY OF AMMONIA: CPT

## 2021-08-05 RX ADMIN — LOSARTAN POTASSIUM 100 MG: 100 TABLET, FILM COATED ORAL at 08:22

## 2021-08-05 RX ADMIN — HYDROCHLOROTHIAZIDE 25 MG: 25 TABLET ORAL at 08:23

## 2021-08-05 RX ADMIN — OXYCODONE 10 MG: 5 TABLET ORAL at 10:22

## 2021-08-05 RX ADMIN — ENOXAPARIN SODIUM 40 MG: 40 INJECTION, SOLUTION INTRAVENOUS; SUBCUTANEOUS at 16:10

## 2021-08-05 RX ADMIN — LEVOTHYROXINE SODIUM 150 MCG: 0.15 TABLET ORAL at 07:36

## 2021-08-05 RX ADMIN — SODIUM CHLORIDE, POTASSIUM CHLORIDE, SODIUM LACTATE AND CALCIUM CHLORIDE: 600; 310; 30; 20 INJECTION, SOLUTION INTRAVENOUS at 12:42

## 2021-08-05 ASSESSMENT — PAIN DESCRIPTION - FREQUENCY
FREQUENCY: CONTINUOUS

## 2021-08-05 ASSESSMENT — PAIN DESCRIPTION - DIRECTION
RADIATING_TOWARDS: RLQ

## 2021-08-05 ASSESSMENT — PAIN DESCRIPTION - ONSET
ONSET: ON-GOING

## 2021-08-05 ASSESSMENT — PAIN - FUNCTIONAL ASSESSMENT
PAIN_FUNCTIONAL_ASSESSMENT: ACTIVITIES ARE NOT PREVENTED

## 2021-08-05 ASSESSMENT — PAIN DESCRIPTION - LOCATION
LOCATION: ABDOMEN

## 2021-08-05 ASSESSMENT — PAIN DESCRIPTION - PROGRESSION
CLINICAL_PROGRESSION: GRADUALLY IMPROVING
CLINICAL_PROGRESSION: GRADUALLY WORSENING
CLINICAL_PROGRESSION: GRADUALLY WORSENING

## 2021-08-05 ASSESSMENT — PAIN DESCRIPTION - PAIN TYPE
TYPE: ACUTE PAIN

## 2021-08-05 ASSESSMENT — PAIN DESCRIPTION - ORIENTATION
ORIENTATION: RIGHT

## 2021-08-05 ASSESSMENT — PAIN DESCRIPTION - DESCRIPTORS
DESCRIPTORS: ACHING
DESCRIPTORS: DISCOMFORT
DESCRIPTORS: ACHING;DISCOMFORT
DESCRIPTORS: DISCOMFORT
DESCRIPTORS: ACHING

## 2021-08-05 ASSESSMENT — PAIN SCALES - GENERAL
PAINLEVEL_OUTOF10: 5
PAINLEVEL_OUTOF10: 4
PAINLEVEL_OUTOF10: 7
PAINLEVEL_OUTOF10: 7
PAINLEVEL_OUTOF10: 4

## 2021-08-05 NOTE — PROGRESS NOTES
replacement of liver. Patient's was first evaluated for abdominal pain last January. She was worked up for celiac disease but had normal TGG. However, biopsy showed features of Celiac disease and lymphocytic colitis. She has since followed a strict gluten free diet. In June, she presented to an ED in [facility] for a similar episode of abdominal pain but her transaminases were in the 200s. Patient recently returned from vacation near [facility]. She states that she cooked her own food while on vacation. She also began seeing a chiropractor 4wks ago and was started on several supplements including \"beta plus,\" mimosa, moringa, oregano oil, and wormwood TID but has not taken any 1.5wks. Patient's mother and nephew have history of autoimmune disease. Patient does not recall specifics but reports that her mother \"needs to have her esophagus dilated\" as a result and that her nephew's symptoms are primarily GI. Subjective:  8/5-> Patient symptoms unchanged. Still having both right upper and lower quadrant abdominal pain and intermittent nausea without emesis. New rash noted on R buttock. No confusion. Denies CP.     ROS: abdominal pain, nausea, diarrhea (12 point review of systems completed. Pertinent positives noted. Otherwise ROS is negative)  PMH:  Per HPI  SHX:  Occasional ETOH use  Social History     Tobacco Use    Smoking status: Never Smoker    Smokeless tobacco: Never Used   Substance Use Topics    Alcohol use: Yes     Comment: occasionally     Drug use: No     FHX:   Family History   Problem Relation Age of Onset    Cancer Mother     Colon Cancer Neg Hx     Esophageal Cancer Neg Hx     Liver Cancer Neg Hx     Rectal Cancer Neg Hx     Stomach Cancer Neg Hx      Allergies:    Allergies   Allergen Reactions    Pcn [Penicillins] Rash     Medications:          Vital Signs:   /86   Pulse 60   Temp 98.4 °F (36.9 °C) (Oral)   Resp 16   Ht 5' 3\" (1.6 m)   Wt 269 lb (122 kg)   SpO2 98%   BMI 47.65 kg/m²      Intake/Output Summary (Last 24 hours) at 8/5/2021 1336  Last data filed at 8/5/2021 1327  Gross per 24 hour   Intake 200 ml   Output --   Net 200 ml        General:   Well appearing, NAD  HEENT:  normocephalic and atraumatic. No scleral icterus. PERR. Neck: supple. No JVD. No thyromegaly. Lungs: clear to auscultation. No retractions  Cardiac: RRR without murmur. Abdomen: soft. Tenderness to palpation in RUQ and RLQ and periumbilical.  Bowel sounds positive. No guarding. Rovsing sign (-)  Extremities:  No clubbing, cyanosis, or edema x 4. Vasculature: capillary refill < 3 seconds. Palpable LE pulses bilaterally. Skin:  warm and dry. Psych:  Alert and oriented x4. Affect appropriate  Lymph:  No supraclavicular adenopathy. Neurologic:  No focal deficit. No seizures. No asterixis    Data: (All radiographs, tracings, PFTs, and imaging are personally viewed and interpreted unless otherwise noted).    Recent Results (from the past 24 hour(s))   Comprehensive Metabolic Panel w/ Reflex to MG    Collection Time: 08/05/21  5:41 AM   Result Value Ref Range    Glucose 115 (H) 70 - 108 mg/dL    CREATININE 0.6 0.4 - 1.2 mg/dL    BUN 8 7 - 22 mg/dL    Sodium 138 135 - 145 meq/L    Potassium reflex Magnesium 4.1 3.5 - 5.2 meq/L    Chloride 102 98 - 111 meq/L    CO2 27 23 - 33 meq/L    Calcium 9.4 8.5 - 10.5 mg/dL     (H) 5 - 40 U/L    Alkaline Phosphatase 396 (H) 38 - 126 U/L    Total Protein 6.1 6.1 - 8.0 g/dL    Albumin 3.7 3.5 - 5.1 g/dL    Total Bilirubin 5.2 (H) 0.3 - 1.2 mg/dL    ALT 1,281 (H) 11 - 66 U/L   CBC    Collection Time: 08/05/21  5:41 AM   Result Value Ref Range    WBC 5.3 4.8 - 10.8 thou/mm3    RBC 4.67 4.20 - 5.40 mill/mm3    Hemoglobin 13.7 12.0 - 16.0 gm/dl    Hematocrit 43.6 37.0 - 47.0 %    MCV 93.4 81.0 - 99.0 fL    MCH 29.3 26.0 - 33.0 pg    MCHC 31.4 (L) 32.2 - 35.5 gm/dl    RDW-CV 17.7 (H) 11.5 - 14.5 %    RDW-SD 60.5 (H) 35.0 - 45.0 fL    Platelets 129 030 - 400 thou/mm3    MPV 9.9 9.4 - 12.4 fL   Protime-INR    Collection Time: 08/05/21  5:41 AM   Result Value Ref Range    INR 1.06 0.85 - 1.13   Comprehensive Metabolic Panel    Collection Time: 08/05/21  5:41 AM   Result Value Ref Range    Potassium 4.1 3.5 - 5.2 meq/L   Anion Gap    Collection Time: 08/05/21  5:41 AM   Result Value Ref Range    Anion Gap 9.0 8.0 - 16.0 meq/L   Glomerular Filtration Rate, Estimated    Collection Time: 08/05/21  5:41 AM   Result Value Ref Range    Est, Glom Filt Rate >90 ml/min/1.73m2   Ammonia    Collection Time: 08/05/21 10:33 AM   Result Value Ref Range    Ammonia 61 (H) 11 - 60 umol/L   Iron    Collection Time: 08/05/21 10:33 AM   Result Value Ref Range    Iron 160 50 - 170 ug/dL   Ferritin    Collection Time: 08/05/21 10:33 AM   Result Value Ref Range    Ferritin 1,855 (H) 10 - 291 ng/mL   Gamma GT    Collection Time: 08/05/21 10:33 AM   Result Value Ref Range     (H) 8 - 69 U/L       MRI ABDOMEN W WO CONTRAST   Final Result   1. No common bile duct stone. 2. No intrahepatic ductal dilatation. Pancreatic duct is unremarkable. 3. Gallbladder removed. 4. No liver lesions. **This report has been created using voice recognition software. It may contain minor errors which are inherent in voice recognition technology. **      Final report electronically signed by Dr. Zhang Crum on 8/4/2021 10:03 AM      CT ABDOMEN PELVIS W IV CONTRAST Additional Contrast? None   Final Result      1. Stable CT scan of the abdomen and pelvis, no interval change since previous study dated 3 January 2021.   2. Mild diffuse fatty replacement liver. 3. Status post cholecystectomy and hysterectomy. 4. Punctate nonobstructing stone in the lower pole of the right kidney. Small left renal cyst.   5. Hiatal hernia. 6. Small mesenteric and retroperitoneal lymph nodes. 7. Postoperative changes in the lower lumbar spine. 8. Otherwise negative CT scan of the abdomen and pelvis. **This report has been created using voice recognition software. It may contain minor errors which are inherent in voice recognition technology. **      Final report electronically signed by DR Dustin Carnes on 8/3/2021 11:54 AM            Electronically signed by Mary Mcleod PA-C on 8/5/2021 at 1:36 PM

## 2021-08-05 NOTE — PROGRESS NOTES
Gastroenterology  Progress Note    8/5/2021 5:07 PM  Subjective:   Admit Date: 8/3/2021    Interval History: Patient with history of recurrent abdominal discomfort intermittent typical of biliary origin associated with abnormality to function test intermittent at this time is high ALT AST however the imaging study with MRCP did not confirm biliary obstructions or stone. Sludge due to the problem not seen with MRCP cannot rule out. Acute hepatitis profile for ABC serologies so far been negative other work-up still pending. No clear indication for ERCP at this time however can be considered patient clinically not improve after discussion with the patient with possible complications which try to happen will repeat a function test and watch closely. Plan for ERCP tomorrow   Diet: ADULT DIET; Full Liquid    Medications:   Scheduled Meds:    levothyroxine  150 mcg Oral Daily    sodium chloride flush  5-40 mL Intravenous 2 times per day    enoxaparin  40 mg Subcutaneous Daily    losartan  100 mg Oral Daily    And    hydroCHLOROthiazide  25 mg Oral Daily     Continuous Infusions:    sodium chloride      lactated ringers 100 mL/hr at 08/05/21 1242       CBC:   Recent Labs     08/03/21  0930 08/04/21  0548 08/05/21  0541   WBC 6.4 5.5 5.3   HGB 15.9 12.8 13.7    259 287     BMP:    Recent Labs     08/03/21  0930 08/03/21  0930 08/04/21  0547 08/05/21  0541     --  139 138   K 4.5   < > 4.1 4.1  4.1     --  103 102   CO2 26  --  26 27   BUN 8  --  13 8   CREATININE 0.4  --  0.4 0.6   GLUCOSE 104  --  97 115*    < > = values in this interval not displayed. Hepatic:   Recent Labs     08/03/21  0930 08/04/21  0547 08/05/21  0541   * 631* 814*   ALT 1,567* 1,106* 1,281*   BILITOT 5.2* 4.6* 5.2*   ALKPHOS 389* 328* 396*     INR:   Recent Labs     08/03/21  1156 08/04/21  0548 08/05/21  0541   INR 1.07 1.09 1.06       Imaging:  No results found for this or any previous visit.     Results for orders placed during the hospital encounter of 08/03/21    CT ABDOMEN PELVIS W IV CONTRAST Additional Contrast? None    Narrative  PROCEDURE: CT ABDOMEN PELVIS W IV CONTRAST    CLINICAL INFORMATION: right sided abdominal pain; . COMPARISON: CT scan of the abdomen and pelvis dated 3 January 2021. .    TECHNIQUE: Axial 5 mm CT images were obtained through the abdomen and pelvis after the administration of intravenous contrast. Coronal and sagittal reconstructions were obtained. All CT scans at this facility use dose modulation, iterative reconstruction, and/or weight-based dosing when appropriate to reduce radiation dose to as low as reasonably achievable. FINDINGS:      The visualized aspects of the lung bases are clear. The base of the heart is within appropriate limits. There is mild diffuse fatty replacement in the liver. The spleen is normal. The adrenals and pancreas are normal. The patient is status post cholecystectomy. There is is a 7.8 cm probable left renal cyst. There is a 2.1 mm stone in the lower pole of  right kidney with no associated hydronephrosis. There is a moderate-sized hiatal hernia    No abnormalities of the small bowel loops are noted. There is atherosclerotic calcification in the abdominal aorta and iliac arteries. There are small mesenteric and retroperitoneal lymph nodes present. The urinary bladder is normal. The patient is status post hysterectomy. There is no pelvic free fluid. The colon is within normal limits. . There are postoperative changes in the lower lumbar spine. Impression  1. Stable CT scan of the abdomen and pelvis, no interval change since previous study dated 3 January 2021.  2. Mild diffuse fatty replacement liver. 3. Status post cholecystectomy and hysterectomy. 4. Punctate nonobstructing stone in the lower pole of the right kidney. Small left renal cyst.  5. Hiatal hernia. 6. Small mesenteric and retroperitoneal lymph nodes.   7. Postoperative changes in the lower lumbar spine. 8. Otherwise negative CT scan of the abdomen and pelvis. **This report has been created using voice recognition software. It may contain minor errors which are inherent in voice recognition technology. **    Final report electronically signed by DR Eboni Garcia on 8/3/2021 11:54 AM    No results found for this or any previous visit. No results found for this or any previous visit. Endoscopy Finding:      Objective:   Vitals: BP (!) 102/54   Pulse 67   Temp 98.2 °F (36.8 °C) (Oral)   Resp 16   Ht 5' 3\" (1.6 m)   Wt 269 lb (122 kg)   SpO2 98%   BMI 47.65 kg/m²     Intake/Output Summary (Last 24 hours) at 8/5/2021 1707  Last data filed at 8/5/2021 1416  Gross per 24 hour   Intake 820 ml   Output --   Net 820 ml     General appearance: alert and cooperative with exam  Lungs: clear to auscultation bilaterally  Heart: regular rate and rhythm, S1, S2 normal, no murmur, click, rub or gallop  Abdomen: soft, non-tender; bowel sounds normal; no masses,  no organomegaly  Extremities: extremities normal, atraumatic, no cyanosis or edema    Assessment and Plan:   1. Abnormal function test typical biliary colic patient status post cholecystectomy but indicate biliary stone or sludge however imaging studies so far did not confirm it  2. MRCP reviewed with the patient did not confirm biliary dilation of the stone  3. Plan for ERCP tomorrow, I  discuss again with the patient the possible complication.       Follow up in GI Clinic after discharge in 1 to 2 weeks week(s)    Patient Active Problem List:     Abdominal pain, right lower quadrant     Puncture wound of heel without complication     Diarrhea     Intractable nausea and vomiting     Transaminitis      Electronically signed by Lilo Mcnally MD on 8/5/2021 at 5:07 PM

## 2021-08-05 NOTE — CARE COORDINATION
8/5/21, 2:56 PM EDT    DISCHARGE ON GOING EVALUATION    [facility] day: 2  Location: -25/025-A Reason for admit: Transaminitis [R74.01]   Procedure: Planning ERCP tomorrow. Barriers to Discharge: ERCP planned for tomorrow since LFT's trended up yesterday and continued pain. AST, ALT and Alk Phos up yet today. LR continues at 100/hr. PCP: Maren Burgos MD  Readmission Risk Score: 8%  Patient Goals/Plan/Treatment Preferences: Plans return home with spouse.

## 2021-08-05 NOTE — PLAN OF CARE
Problem: Pain:  Goal: Pain level will decrease  Description: Pain level will decrease  Outcome: Ongoing  Note: Patient states she has pain 7/10. PRN oxycodone on. Offered heat, repositioning, and heat. Patient denies all measures at this time. Continue to monitor pain. Problem: Pain:  Goal: Control of acute pain  Description: Control of acute pain  Outcome: Ongoing  Note: Patient states she has pain 7/10. PRN oxycodone on. Offered heat, repositioning, and heat. Patient denies all measures at this time. Continue to monitor pain. Problem: Pain:  Goal: Control of chronic pain  Description: Control of chronic pain  Outcome: Ongoing  Note: Patient states she has pain 7/10. PRN oxycodone on. Offered heat, repositioning, and heat. Patient denies all measures at this time. Continue to monitor pain. Problem: Discharge Planning:  Goal: Participates in care planning  Description: Participates in care planning  Outcome: Ongoing  Note: Bedside report done this morning at shift change. Patient anxious and frustrated at this time due to nothing being found and only receiving IV fluids. Talked to hospitalist,  and she spoke with patient and is going to talk to Dr. Cathy Brock about plan for today. No other concerns voiced by patient at this time. Patient active in plan of care so far. Problem: Cardiac Output - Decreased:  Goal: Hemodynamic stability will improve  Description: Hemodynamic stability will improve  Outcome: Ongoing  Note: Vitals stable. Problem: Falls - Risk of:  Goal: Will remain free from falls  Description: Will remain free from falls  Outcome: Ongoing  Note: Free from falls at this time. Non-skid socks on when ambulating. Call light within reach and bed in lowest position. Care plan reviewed with patient. Patient verbalize understanding of the plan of care and contribute to goal setting.

## 2021-08-06 ENCOUNTER — ANESTHESIA EVENT (OUTPATIENT)
Dept: ENDOSCOPY | Age: 51
DRG: 441 | End: 2021-08-06
Payer: COMMERCIAL

## 2021-08-06 ENCOUNTER — ANESTHESIA (OUTPATIENT)
Dept: ENDOSCOPY | Age: 51
DRG: 441 | End: 2021-08-06
Payer: COMMERCIAL

## 2021-08-06 ENCOUNTER — APPOINTMENT (OUTPATIENT)
Dept: GENERAL RADIOLOGY | Age: 51
DRG: 441 | End: 2021-08-06
Payer: COMMERCIAL

## 2021-08-06 VITALS — OXYGEN SATURATION: 100 % | SYSTOLIC BLOOD PRESSURE: 120 MMHG | DIASTOLIC BLOOD PRESSURE: 58 MMHG

## 2021-08-06 LAB
AFP-TUMOR MARKER: 3.4 UG/L
ALBUMIN SERPL-MCNC: 3.4 G/DL (ref 3.5–5.1)
ALP BLD-CCNC: 421 U/L (ref 38–126)
ALT SERPL-CCNC: 1322 U/L (ref 11–66)
ANA SCREEN: DETECTED
ANION GAP SERPL CALCULATED.3IONS-SCNC: 9 MEQ/L (ref 8–16)
AST SERPL-CCNC: 889 U/L (ref 5–40)
BILIRUB SERPL-MCNC: 6.5 MG/DL (ref 0.3–1.2)
BUN BLDV-MCNC: 7 MG/DL (ref 7–22)
CALCIUM SERPL-MCNC: 9.6 MG/DL (ref 8.5–10.5)
CHLORIDE BLD-SCNC: 101 MEQ/L (ref 98–111)
CMV IGM: 12.6 AU/ML
CO2: 29 MEQ/L (ref 23–33)
CREAT SERPL-MCNC: 0.5 MG/DL (ref 0.4–1.2)
CYTOMEGALOVIRUS IGG ANTIBODY: < 0.2 U/ML
EPSTEIN-BARR VIRUS ANTIBODIES: NORMAL
ERYTHROCYTE [DISTWIDTH] IN BLOOD BY AUTOMATED COUNT: 17.9 % (ref 11.5–14.5)
ERYTHROCYTE [DISTWIDTH] IN BLOOD BY AUTOMATED COUNT: 59 FL (ref 35–45)
F-ACTIN AB IGG: 24 UNITS (ref 0–19)
GFR SERPL CREATININE-BSD FRML MDRD: > 90 ML/MIN/1.73M2
GLUCOSE BLD-MCNC: 95 MG/DL (ref 70–108)
HCT VFR BLD CALC: 42.3 % (ref 37–47)
HEMOGLOBIN: 13.7 GM/DL (ref 12–16)
HERPES TYPE 1/2 IGM COMBINED: 0.58 IV
HERPES TYPE I/II IGG COMBINED: 4.43 IV
INR BLD: 1.04 (ref 0.85–1.13)
MCH RBC QN AUTO: 29.5 PG (ref 26–33)
MCHC RBC AUTO-ENTMCNC: 32.4 GM/DL (ref 32.2–35.5)
MCV RBC AUTO: 91.2 FL (ref 81–99)
MITOCHONDRIAL M2 AB, IGG: 1.1 U/ML (ref 0–4)
PLATELET # BLD: 322 THOU/MM3 (ref 130–400)
PMV BLD AUTO: 10.2 FL (ref 9.4–12.4)
POTASSIUM REFLEX MAGNESIUM: 4 MEQ/L (ref 3.5–5.2)
POTASSIUM SERPL-SCNC: 4 MEQ/L (ref 3.5–5.2)
RBC # BLD: 4.64 MILL/MM3 (ref 4.2–5.4)
SMOOTH MUSCLE AB IGG TITER: NORMAL
SODIUM BLD-SCNC: 139 MEQ/L (ref 135–145)
TOTAL PROTEIN: 6 G/DL (ref 6.1–8)
WBC # BLD: 5.8 THOU/MM3 (ref 4.8–10.8)

## 2021-08-06 PROCEDURE — 3609014900 HC ERCP W/SPHINCTEROTOMY &/OR PAPILLOTOMY: Performed by: INTERNAL MEDICINE

## 2021-08-06 PROCEDURE — 6360000002 HC RX W HCPCS: Performed by: ANESTHESIOLOGY

## 2021-08-06 PROCEDURE — 3700000000 HC ANESTHESIA ATTENDED CARE: Performed by: INTERNAL MEDICINE

## 2021-08-06 PROCEDURE — C9113 INJ PANTOPRAZOLE SODIUM, VIA: HCPCS | Performed by: INTERNAL MEDICINE

## 2021-08-06 PROCEDURE — 2580000003 HC RX 258: Performed by: NURSE ANESTHETIST, CERTIFIED REGISTERED

## 2021-08-06 PROCEDURE — BF141ZZ FLUOROSCOPY OF GALLBLADDER, BILE DUCTS AND PANCREATIC DUCTS USING LOW OSMOLAR CONTRAST: ICD-10-PCS | Performed by: INTERNAL MEDICINE

## 2021-08-06 PROCEDURE — 2709999900 HC NON-CHARGEABLE SUPPLY: Performed by: INTERNAL MEDICINE

## 2021-08-06 PROCEDURE — 0F798ZZ DILATION OF COMMON BILE DUCT, VIA NATURAL OR ARTIFICIAL OPENING ENDOSCOPIC: ICD-10-PCS | Performed by: INTERNAL MEDICINE

## 2021-08-06 PROCEDURE — 74330 X-RAY BILE/PANC ENDOSCOPY: CPT

## 2021-08-06 PROCEDURE — 2580000003 HC RX 258: Performed by: INTERNAL MEDICINE

## 2021-08-06 PROCEDURE — 6370000000 HC RX 637 (ALT 250 FOR IP): Performed by: PHYSICIAN ASSISTANT

## 2021-08-06 PROCEDURE — C1769 GUIDE WIRE: HCPCS | Performed by: INTERNAL MEDICINE

## 2021-08-06 PROCEDURE — 6360000002 HC RX W HCPCS: Performed by: INTERNAL MEDICINE

## 2021-08-06 PROCEDURE — 85610 PROTHROMBIN TIME: CPT

## 2021-08-06 PROCEDURE — 80053 COMPREHEN METABOLIC PANEL: CPT

## 2021-08-06 PROCEDURE — 3700000001 HC ADD 15 MINUTES (ANESTHESIA): Performed by: INTERNAL MEDICINE

## 2021-08-06 PROCEDURE — 6360000002 HC RX W HCPCS: Performed by: NURSE ANESTHETIST, CERTIFIED REGISTERED

## 2021-08-06 PROCEDURE — 1200000003 HC TELEMETRY R&B

## 2021-08-06 PROCEDURE — 7100000000 HC PACU RECOVERY - FIRST 15 MIN: Performed by: INTERNAL MEDICINE

## 2021-08-06 PROCEDURE — 85027 COMPLETE CBC AUTOMATED: CPT

## 2021-08-06 PROCEDURE — 36415 COLL VENOUS BLD VENIPUNCTURE: CPT

## 2021-08-06 PROCEDURE — 99233 SBSQ HOSP IP/OBS HIGH 50: CPT | Performed by: PHYSICIAN ASSISTANT

## 2021-08-06 PROCEDURE — 7100000001 HC PACU RECOVERY - ADDTL 15 MIN: Performed by: INTERNAL MEDICINE

## 2021-08-06 PROCEDURE — 6370000000 HC RX 637 (ALT 250 FOR IP): Performed by: NURSE ANESTHETIST, CERTIFIED REGISTERED

## 2021-08-06 PROCEDURE — 6370000000 HC RX 637 (ALT 250 FOR IP): Performed by: INTERNAL MEDICINE

## 2021-08-06 PROCEDURE — 2720000010 HC SURG SUPPLY STERILE: Performed by: INTERNAL MEDICINE

## 2021-08-06 PROCEDURE — 2500000003 HC RX 250 WO HCPCS: Performed by: NURSE ANESTHETIST, CERTIFIED REGISTERED

## 2021-08-06 RX ORDER — DOCUSATE SODIUM 100 MG/1
100 CAPSULE, LIQUID FILLED ORAL DAILY
Status: DISCONTINUED | OUTPATIENT
Start: 2021-08-06 | End: 2021-08-12

## 2021-08-06 RX ORDER — FENTANYL CITRATE 50 UG/ML
INJECTION, SOLUTION INTRAMUSCULAR; INTRAVENOUS PRN
Status: DISCONTINUED | OUTPATIENT
Start: 2021-08-06 | End: 2021-08-06 | Stop reason: SDUPTHER

## 2021-08-06 RX ORDER — CIPROFLOXACIN 2 MG/ML
INJECTION, SOLUTION INTRAVENOUS
Status: COMPLETED
Start: 2021-08-06 | End: 2021-08-06

## 2021-08-06 RX ORDER — SCOLOPAMINE TRANSDERMAL SYSTEM 1 MG/1
PATCH, EXTENDED RELEASE TRANSDERMAL PRN
Status: DISCONTINUED | OUTPATIENT
Start: 2021-08-06 | End: 2021-08-06 | Stop reason: SDUPTHER

## 2021-08-06 RX ORDER — SODIUM CHLORIDE 9 MG/ML
10 INJECTION INTRAVENOUS DAILY
Status: DISCONTINUED | OUTPATIENT
Start: 2021-08-06 | End: 2021-08-12 | Stop reason: HOSPADM

## 2021-08-06 RX ORDER — PROMETHAZINE HYDROCHLORIDE 25 MG/ML
12.5 INJECTION, SOLUTION INTRAMUSCULAR; INTRAVENOUS
Status: DISCONTINUED | OUTPATIENT
Start: 2021-08-06 | End: 2021-08-06 | Stop reason: HOSPADM

## 2021-08-06 RX ORDER — PANTOPRAZOLE SODIUM 40 MG/10ML
40 INJECTION, POWDER, LYOPHILIZED, FOR SOLUTION INTRAVENOUS 2 TIMES DAILY
Status: COMPLETED | OUTPATIENT
Start: 2021-08-06 | End: 2021-08-07

## 2021-08-06 RX ORDER — SODIUM CHLORIDE 450 MG/100ML
INJECTION, SOLUTION INTRAVENOUS CONTINUOUS PRN
Status: DISCONTINUED | OUTPATIENT
Start: 2021-08-06 | End: 2021-08-06 | Stop reason: SDUPTHER

## 2021-08-06 RX ORDER — FENTANYL CITRATE 50 UG/ML
25 INJECTION, SOLUTION INTRAMUSCULAR; INTRAVENOUS EVERY 5 MIN PRN
Status: DISCONTINUED | OUTPATIENT
Start: 2021-08-06 | End: 2021-08-06 | Stop reason: HOSPADM

## 2021-08-06 RX ORDER — LABETALOL 20 MG/4 ML (5 MG/ML) INTRAVENOUS SYRINGE
5 EVERY 10 MIN PRN
Status: DISCONTINUED | OUTPATIENT
Start: 2021-08-06 | End: 2021-08-06 | Stop reason: HOSPADM

## 2021-08-06 RX ORDER — DEXAMETHASONE SODIUM PHOSPHATE 4 MG/ML
INJECTION, SOLUTION INTRA-ARTICULAR; INTRALESIONAL; INTRAMUSCULAR; INTRAVENOUS; SOFT TISSUE PRN
Status: DISCONTINUED | OUTPATIENT
Start: 2021-08-06 | End: 2021-08-06 | Stop reason: SDUPTHER

## 2021-08-06 RX ORDER — MEPERIDINE HYDROCHLORIDE 25 MG/ML
12.5 INJECTION INTRAMUSCULAR; INTRAVENOUS; SUBCUTANEOUS EVERY 5 MIN PRN
Status: DISCONTINUED | OUTPATIENT
Start: 2021-08-06 | End: 2021-08-06 | Stop reason: HOSPADM

## 2021-08-06 RX ORDER — SODIUM CHLORIDE 450 MG/100ML
INJECTION, SOLUTION INTRAVENOUS CONTINUOUS
Status: CANCELLED | OUTPATIENT
Start: 2021-08-06

## 2021-08-06 RX ORDER — PROPOFOL 10 MG/ML
INJECTION, EMULSION INTRAVENOUS PRN
Status: DISCONTINUED | OUTPATIENT
Start: 2021-08-06 | End: 2021-08-06 | Stop reason: SDUPTHER

## 2021-08-06 RX ORDER — ONDANSETRON 2 MG/ML
INJECTION INTRAMUSCULAR; INTRAVENOUS PRN
Status: DISCONTINUED | OUTPATIENT
Start: 2021-08-06 | End: 2021-08-06 | Stop reason: SDUPTHER

## 2021-08-06 RX ORDER — SUCCINYLCHOLINE/SOD CL,ISO/PF 200MG/10ML
SYRINGE (ML) INTRAVENOUS PRN
Status: DISCONTINUED | OUTPATIENT
Start: 2021-08-06 | End: 2021-08-06 | Stop reason: SDUPTHER

## 2021-08-06 RX ORDER — FENTANYL CITRATE 50 UG/ML
50 INJECTION, SOLUTION INTRAMUSCULAR; INTRAVENOUS EVERY 5 MIN PRN
Status: DISCONTINUED | OUTPATIENT
Start: 2021-08-06 | End: 2021-08-06 | Stop reason: HOSPADM

## 2021-08-06 RX ORDER — CIPROFLOXACIN 2 MG/ML
400 INJECTION, SOLUTION INTRAVENOUS ONCE
Status: COMPLETED | OUTPATIENT
Start: 2021-08-06 | End: 2021-08-06

## 2021-08-06 RX ADMIN — Medication 140 MG: at 15:11

## 2021-08-06 RX ADMIN — PROPOFOL 150 MG: 10 INJECTION, EMULSION INTRAVENOUS at 15:11

## 2021-08-06 RX ADMIN — LOSARTAN POTASSIUM 100 MG: 100 TABLET, FILM COATED ORAL at 09:46

## 2021-08-06 RX ADMIN — ENOXAPARIN SODIUM 40 MG: 40 INJECTION, SOLUTION INTRAVENOUS; SUBCUTANEOUS at 18:19

## 2021-08-06 RX ADMIN — FENTANYL CITRATE 100 MCG: 50 INJECTION, SOLUTION INTRAMUSCULAR; INTRAVENOUS at 15:11

## 2021-08-06 RX ADMIN — OXYCODONE 10 MG: 5 TABLET ORAL at 20:22

## 2021-08-06 RX ADMIN — HYDROCHLOROTHIAZIDE 25 MG: 25 TABLET ORAL at 09:46

## 2021-08-06 RX ADMIN — SCOPALAMINE 1 PATCH: 1 PATCH, EXTENDED RELEASE TRANSDERMAL at 15:05

## 2021-08-06 RX ADMIN — CIPROFLOXACIN 400 MG: 2 INJECTION, SOLUTION INTRAVENOUS at 15:05

## 2021-08-06 RX ADMIN — SODIUM CHLORIDE: 4.5 INJECTION, SOLUTION INTRAVENOUS at 15:05

## 2021-08-06 RX ADMIN — INDOMETHACIN 100 MG: 50 SUPPOSITORY RECTAL at 15:43

## 2021-08-06 RX ADMIN — DEXAMETHASONE SODIUM PHOSPHATE 10 MG: 4 INJECTION, SOLUTION INTRAMUSCULAR; INTRAVENOUS at 15:24

## 2021-08-06 RX ADMIN — ONDANSETRON HYDROCHLORIDE 4 MG: 4 INJECTION, SOLUTION INTRAMUSCULAR; INTRAVENOUS at 15:24

## 2021-08-06 RX ADMIN — SODIUM CHLORIDE, POTASSIUM CHLORIDE, SODIUM LACTATE AND CALCIUM CHLORIDE: 600; 310; 30; 20 INJECTION, SOLUTION INTRAVENOUS at 00:25

## 2021-08-06 RX ADMIN — LEVOTHYROXINE SODIUM 150 MCG: 0.15 TABLET ORAL at 06:26

## 2021-08-06 RX ADMIN — OXYCODONE 5 MG: 5 TABLET ORAL at 09:49

## 2021-08-06 RX ADMIN — SODIUM CHLORIDE, PRESERVATIVE FREE 10 ML: 5 INJECTION INTRAVENOUS at 18:16

## 2021-08-06 RX ADMIN — PANTOPRAZOLE SODIUM 40 MG: 40 INJECTION, POWDER, FOR SOLUTION INTRAVENOUS at 18:16

## 2021-08-06 RX ADMIN — ONDANSETRON 4 MG: 2 INJECTION INTRAMUSCULAR; INTRAVENOUS at 16:55

## 2021-08-06 RX ADMIN — FENTANYL CITRATE 50 MCG: 50 INJECTION, SOLUTION INTRAMUSCULAR; INTRAVENOUS at 16:10

## 2021-08-06 RX ADMIN — DOCUSATE SODIUM 100 MG: 100 CAPSULE ORAL at 09:46

## 2021-08-06 ASSESSMENT — PAIN DESCRIPTION - ORIENTATION
ORIENTATION: RIGHT

## 2021-08-06 ASSESSMENT — PULMONARY FUNCTION TESTS
PIF_VALUE: 20
PIF_VALUE: 18
PIF_VALUE: 21
PIF_VALUE: 2
PIF_VALUE: 1
PIF_VALUE: 4
PIF_VALUE: 6
PIF_VALUE: 22
PIF_VALUE: 22
PIF_VALUE: 29
PIF_VALUE: 20
PIF_VALUE: 36
PIF_VALUE: 17
PIF_VALUE: 22
PIF_VALUE: 21
PIF_VALUE: 3
PIF_VALUE: 1
PIF_VALUE: 2
PIF_VALUE: 1
PIF_VALUE: 23
PIF_VALUE: 23
PIF_VALUE: 21
PIF_VALUE: 1
PIF_VALUE: 24
PIF_VALUE: 19
PIF_VALUE: 21
PIF_VALUE: 22
PIF_VALUE: 23
PIF_VALUE: 21
PIF_VALUE: 2
PIF_VALUE: 20
PIF_VALUE: 1
PIF_VALUE: 1
PIF_VALUE: 20
PIF_VALUE: 20
PIF_VALUE: 24
PIF_VALUE: 19
PIF_VALUE: 11
PIF_VALUE: 1
PIF_VALUE: 19
PIF_VALUE: 22
PIF_VALUE: 19
PIF_VALUE: 23
PIF_VALUE: 20
PIF_VALUE: 18
PIF_VALUE: 20
PIF_VALUE: 24
PIF_VALUE: 21

## 2021-08-06 ASSESSMENT — PAIN DESCRIPTION - PROGRESSION
CLINICAL_PROGRESSION: NOT CHANGED
CLINICAL_PROGRESSION: GRADUALLY IMPROVING

## 2021-08-06 ASSESSMENT — PAIN SCALES - GENERAL
PAINLEVEL_OUTOF10: 8
PAINLEVEL_OUTOF10: 7
PAINLEVEL_OUTOF10: 0
PAINLEVEL_OUTOF10: 9
PAINLEVEL_OUTOF10: 7
PAINLEVEL_OUTOF10: 9
PAINLEVEL_OUTOF10: 8
PAINLEVEL_OUTOF10: 0
PAINLEVEL_OUTOF10: 7

## 2021-08-06 ASSESSMENT — PAIN - FUNCTIONAL ASSESSMENT: PAIN_FUNCTIONAL_ASSESSMENT: ACTIVITIES ARE NOT PREVENTED

## 2021-08-06 ASSESSMENT — PAIN DESCRIPTION - FREQUENCY: FREQUENCY: CONTINUOUS

## 2021-08-06 ASSESSMENT — PAIN DESCRIPTION - PAIN TYPE
TYPE: ACUTE PAIN

## 2021-08-06 ASSESSMENT — PAIN DESCRIPTION - LOCATION
LOCATION: ABDOMEN

## 2021-08-06 ASSESSMENT — PAIN DESCRIPTION - DESCRIPTORS
DESCRIPTORS: ACHING
DESCRIPTORS: DISCOMFORT
DESCRIPTORS: ACHING;SHARP
DESCRIPTORS: ACHING;SHARP

## 2021-08-06 ASSESSMENT — PAIN DESCRIPTION - DIRECTION
RADIATING_TOWARDS: RLQ
RADIATING_TOWARDS: RLQ

## 2021-08-06 ASSESSMENT — PAIN DESCRIPTION - ONSET: ONSET: ON-GOING

## 2021-08-06 NOTE — ANESTHESIA POSTPROCEDURE EVALUATION
Department of Anesthesiology  Postprocedure Note    Patient: Patricia Del Real  MRN: [id number]  YOB: 1970  Date of evaluation: 8/6/2021  Time:  4:14 PM     Procedure Summary     Date: 08/06/21 Room / Location: [address] [place] [address] / [address] [place] [address]    Anesthesia Start: 3484 Anesthesia Stop: 6044    Procedure: ERCP SPHINCTER/PAPILLOTOMY (N/A ) Diagnosis: (Abnormal Liver Function Test and Jaundice)    Surgeons: Macy Mesa MD Responsible Provider: Moi Estrada DO    Anesthesia Type: general ASA Status: 2          Anesthesia Type: general    Brant Phase I: Brant Score: 10    Brant Phase II:      Last vitals: Reviewed and per EMR flowsheets.        Anesthesia Post Evaluation    Patient location during evaluation: PACU  Patient participation: complete - patient participated  Level of consciousness: awake  Airway patency: patent  Complications: no  Cardiovascular status: hemodynamically stable  Respiratory status: acceptable  Hydration status: stable

## 2021-08-06 NOTE — PROGRESS NOTES
1557 Awake and oriented on arrival to PACU , HOB elevated , denies any pain   1610 states pain a # 9 , medicated with Fentanyl 50 mcg IV   1615 pain unchanged  HOB lowered , medicated with Fentanyl 50 mcg IV   1620 states pain tolerable   1630 pt states pain tolerable , denies any needs  1634 meets criteria for discharge , transported to

## 2021-08-06 NOTE — PROGRESS NOTES
ERCP completed. Tolerated well. Photos taken. No biopsies. Sphincterotomy and 9-12mm balloon sweep. Conray dye: lot# [id number]- 08/2022 10 mls used and 40 mls wasted. Scope [id number].

## 2021-08-06 NOTE — PROGRESS NOTES
Assessment and Plan:        1. Abdominal pain w/ transaminitis: chronic undifferentiated abdominal pain and transaminitis  >1 year. GI consulted. Negative hepatitis panel. Denies acetaminophen useANA, ANCA, F-actin (smooth muscle) Ab, Ferritin, IgG, mitochondrial Ab M2 IG, smooth muscle antibody. Further viral serologies pending. Patient's LFTs slightly trended down today. MRCP obtained as well and unremarkable. Patient still having pain. 8/6-> LFTs trending up. Plan for ERCP today. Pending course may consider transfer to location with hepatobiliary specialty. Discussed case with GI.  2. 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 Mert Weir note as seen on colon biopsy. 3. Essential HTN: Cozaar and Hydrodiuril, stable continue to monitor  4. Hypothyroidism: Synthroid 150mcg daily  5. Dehydration, POA: Patient was concentrated with hemoglobin near 16 and endorsed being very dry. Started IV fluids. With improvement noted in her hemoglobin concentration. We will continue IV fluids for now. 6. Severe obesity: BMI 47.65      CC:  Abdominal pain; transaminitis  Hospital course: 50yoF with  hypothyroidism, and HTN and PSHx of cholecystectomy and hysterectomy presents for evaluation of abdominal pain. Patient reports intermittent episodes of periumbilical and RUQ abdominal pain over the past year. Pain ranges from 5-6 to 7-8 in severity and is associated with nausea and dry heaving. Episodes typically last for hours and occur 3-5 times/week (though she has symptom free days). Patient denies exacerbating or alleviating factors. Denies fevers/chills, chest pain, pale stools, melena, dysuria, hematuria. Reports 3 episodes of watery diarrhea yesterday as well as darker urine over past week. Patient presented to [facility] ED this morning due to worsening pain. Initial HFT reveales ALT 1567, , Alk Phos 384. Viral hepatitis panel negative. INR and aPTT WNL.  CT abdomen pelvis showed mild diffuse fatty replacement of liver. Patient's was first evaluated for abdominal pain last January. She was worked up for celiac disease but had normal TGG. However, biopsy showed features of Celiac disease and lymphocytic colitis. She has since followed a strict gluten free diet. In June, she presented to an ED in [facility] for a similar episode of abdominal pain but her transaminases were in the 200s. Patient recently returned from vacation near [place], [place]. She states that she cooked her own food while on vacation. She also began seeing a chiropractor 4wks ago and was started on several supplements including \"beta plus,\" mimosa, moringa, oregano oil, and wormwood TID but has not taken any 1.5wks. Patient's mother and nephew have history of autoimmune disease. Patient does not recall specifics but reports that her mother \"needs to have her esophagus dilated\" as a result and that her nephew's symptoms are primarily GI.    8/5-> Patient symptoms unchanged. Still having both right upper and lower quadrant abdominal pain and intermittent nausea without emesis. New rash noted on R buttock. No confusion. Denies CP. Subjective:  8/6-> LFTS continue to trend up. Pt reports continued abd pain 7/10 in R side of abd. Admits to intermittent nausea and \"watery tongue\" without emesis. Denies fever/chills. No CP/SOB. Pt reports she is anxious about taking pain medicine causes she does not want to get constipated. Educated regarded stool softeners. ERCP planned for 1500 today. ROS: abdominal pain, nausea, diarrhea (12 point review of systems completed. Pertinent positives noted.  Otherwise ROS is negative)  PMH:  Per HPI  SHX:  Occasional ETOH use  Social History     Tobacco Use    Smoking status: Never Smoker    Smokeless tobacco: Never Used   Substance Use Topics    Alcohol use: Yes     Comment: occasionally     Drug use: No     FHX:   Family History   Problem Relation Age of Onset    Cancer Mother     Colon Cancer Neg Hx Result Value Ref Range    AFP-Tumor Marker 3.4 <8.4 ug/L   Comprehensive Metabolic Panel w/ Reflex to MG    Collection Time: 08/06/21  6:39 AM   Result Value Ref Range    Glucose 95 70 - 108 mg/dL    CREATININE 0.5 0.4 - 1.2 mg/dL    BUN 7 7 - 22 mg/dL    Sodium 139 135 - 145 meq/L    Potassium reflex Magnesium 4.0 3.5 - 5.2 meq/L    Chloride 101 98 - 111 meq/L    CO2 29 23 - 33 meq/L    Calcium 9.6 8.5 - 10.5 mg/dL     (H) 5 - 40 U/L    Alkaline Phosphatase 421 (H) 38 - 126 U/L    Total Protein 6.0 (L) 6.1 - 8.0 g/dL    Albumin 3.4 (L) 3.5 - 5.1 g/dL    Total Bilirubin 6.5 (H) 0.3 - 1.2 mg/dL    ALT 1,322 (H) 11 - 66 U/L   CBC    Collection Time: 08/06/21  6:39 AM   Result Value Ref Range    WBC 5.8 4.8 - 10.8 thou/mm3    RBC 4.64 4.20 - 5.40 mill/mm3    Hemoglobin 13.7 12.0 - 16.0 gm/dl    Hematocrit 42.3 37.0 - 47.0 %    MCV 91.2 81.0 - 99.0 fL    MCH 29.5 26.0 - 33.0 pg    MCHC 32.4 32.2 - 35.5 gm/dl    RDW-CV 17.9 (H) 11.5 - 14.5 %    RDW-SD 59.0 (H) 35.0 - 45.0 fL    Platelets 425 747 - 284 thou/mm3    MPV 10.2 9.4 - 12.4 fL   Protime-INR    Collection Time: 08/06/21  6:39 AM   Result Value Ref Range    INR 1.04 0.85 - 1.13   Anion Gap    Collection Time: 08/06/21  6:39 AM   Result Value Ref Range    Anion Gap 9.0 8.0 - 16.0 meq/L   Glomerular Filtration Rate, Estimated    Collection Time: 08/06/21  6:39 AM   Result Value Ref Range    Est, Glom Filt Rate >90 ml/min/1.73m2       MRI ABDOMEN W WO CONTRAST   Final Result   1. No common bile duct stone. 2. No intrahepatic ductal dilatation. Pancreatic duct is unremarkable. 3. Gallbladder removed. 4. No liver lesions. **This report has been created using voice recognition software. It may contain minor errors which are inherent in voice recognition technology. **      Final report electronically signed by Dr. Ayla Moody on 8/4/2021 10:03 AM      CT ABDOMEN PELVIS W IV CONTRAST Additional Contrast? None   Final Result      1.  Stable CT scan of the abdomen and pelvis, no interval change since previous study dated 3 January 2021.   2. Mild diffuse fatty replacement liver. 3. Status post cholecystectomy and hysterectomy. 4. Punctate nonobstructing stone in the lower pole of the right kidney. Small left renal cyst.   5. Hiatal hernia. 6. Small mesenteric and retroperitoneal lymph nodes. 7. Postoperative changes in the lower lumbar spine. 8. Otherwise negative CT scan of the abdomen and pelvis. **This report has been created using voice recognition software. It may contain minor errors which are inherent in voice recognition technology. **      Final report electronically signed by DR Nikki Vila on 8/3/2021 11:54 AM            Electronically signed by Elliott Justice PA-C on 8/6/2021 at 8:33 AM

## 2021-08-06 NOTE — BRIEF OP NOTE
Brief Postoperative Note      Patient: Tammi Lauren  YOB: 1970  MRN: [id number]    Date of Procedure: 8/6/2021    Pre-Op Diagnosis: Abnormal Liver Function Test and Jaundice    Post-Op Diagnosis:  Erosive duodenitis, sphincterotomy  With sludge extraction. Procedure(s):  ERCP SPHINCTER/PAPILLOTOMY    Surgeon(s):  Pauline Ovalle MD    Assistant:  * No surgical staff found *    Anesthesia: General    Estimated Blood Loss (mL): none    Complications: None    Specimens:   * No specimens in log *    Implants:  * No implants in log *      Drains: * No LDAs found *    Findings:  Erosive duodenitis, sphincterotomy  With sludge extraction.      Electronically signed by Pauline Ovalle MD on 8/6/2021 at 3:48 PM

## 2021-08-06 NOTE — PLAN OF CARE
Problem: Pain:  Goal: Pain level will decrease  Description: Pain level will decrease  8/5/2021 2251 by Irena Armendariz RN  Outcome: Ongoing  Note: Patient will have decrease pain. Patient will rate pain on a 0-10 scale. Non-pharmaceutical pain interventions will be used before medications. Problem: Pain:  Goal: Control of acute pain  Description: Control of acute pain  8/5/2021 2251 by Irena Armendariz RN  Outcome: Ongoing  Note: Patient will have decrease pain. Patient will rate pain on a 0-10 scale. Non-pharmaceutical pain interventions will be used before medications. Patient rates abdominal pain as a 5/10 pain. Patient encouraged to rest and reposition. Problem: Discharge Planning:  Goal: Participates in care planning  Description: Participates in care planning  8/5/2021 2251 by Irena Armendariz RN  Outcome: Ongoing  Note: Patient plans to discharge home once medically stable and following ERCP. Problem: Cardiac Output - Decreased:  Goal: Hemodynamic stability will improve  Description: Hemodynamic stability will improve  8/5/2021 2251 by Irena Armendariz RN  Outcome: Ongoing  Note: Patient will remain hemodynamically stable. Blood pressure stable and within normal range. Problem: Falls - Risk of:  Goal: Will remain free from falls  Description: Will remain free from falls  8/5/2021 2251 by Irena Armendariz RN  Outcome: Ongoing  Note: Patient free from accidental injury. Bed in low locked position, side rails up x2. Call light and personal belongings within reach. Care plan reviewed with patient, no questions or concerns at this time.

## 2021-08-06 NOTE — PRE SEDATION
Sedation/Analgesia History & Physical    Patient: Ashley Cooper : 1970  Med Rec#: [id number] Acc#: [id number]   Provider Performing Procedure: Howie Frank MD  Primary Care Physician: Linda Solis MD    PRE-PROCEDURE   Full CODE [x]Yes  DNR-CCA/DNR-CC []Yes   Brief History/Pre-Procedure Diagnosis: abnormal LFT and RUQ pain           MEDICAL HISTORY    []Additional information:       has a past medical history of Hx of blood clots, Hypertension, Kidney stone, Nausea & vomiting, and Thyroid disease. SOCIAL HISTORY  Social History     Tobacco History     Smoking Status  Never Smoker    Smokeless Tobacco Use  Never Used          Alcohol History     Alcohol Use Status  Yes Comment  occasionally           Drug Use     Drug Use Status  No          Sexual Activity     Sexually Active  Not Asked                FAMILY HISTORY     Family History   Problem Relation Age of Onset    Cancer Mother     Colon Cancer Neg Hx     Esophageal Cancer Neg Hx     Liver Cancer Neg Hx     Rectal Cancer Neg Hx     Stomach Cancer Neg Hx        SURGICAL HISTORY   has a past surgical history that includes Hysterectomy ();  section;  section (); Foot surgery; Cholecystectomy (); Tonsillectomy; back surgery (???); Foot surgery (Left, 10/30/14); Upper gastrointestinal endoscopy (N/A, 2021); Colonoscopy (Left, 2021); Colonoscopy (2021); and EGD ().   Additional information:       ALLERGIES   Allergies as of 2021 - Fully Reviewed 2021   Allergen Reaction Noted    Pcn [penicillins] Rash 10/24/2014     Additional information:       MEDICATIONS   Coumadin Use Last 7 Days [x]No []Yes  Antiplatelet drug therapy use last 7 days  [x]No []Yes  Other anticoagulant use last 7 days  [x]No []Yes    Current Facility-Administered Medications:     docusate sodium (COLACE) capsule 100 mg, 100 mg, Oral, Daily, Abiola Mccloud PA-C, 100 mg at 21 0946    ciprofloxacin (CIPRO) IVPB 400 mg, 400 mg, Intravenous, Once, Lillian Brown MD    indomethacin (INDOCIN) 50 MG suppository 100 mg, 100 mg, Rectal, Once, Lillian Brown MD    ciprofloxacin (CIPRO) 400 MG/200ML IVPB, , , ,     indomethacin (INDOCIN) 50 MG suppository, , , ,     levothyroxine (SYNTHROID) tablet 150 mcg, 150 mcg, Oral, Daily, Latosha Pearson MD, 150 mcg at 08/06/21 6315    loperamide (IMODIUM) capsule 2 mg, 2 mg, Oral, 4x Daily PRN, Latosha Pearson MD    sodium chloride flush 0.9 % injection 5-40 mL, 5-40 mL, Intravenous, 2 times per day, Latosha Pearson MD    sodium chloride flush 0.9 % injection 5-40 mL, 5-40 mL, Intravenous, PRN, Latosha Pearson MD    0.9 % sodium chloride infusion, 25 mL, Intravenous, PRN, Latosha Pearson MD    enoxaparin (LOVENOX) injection 40 mg, 40 mg, Subcutaneous, Daily, Latosha Pearson MD, 40 mg at 08/05/21 1610    ondansetron (ZOFRAN-ODT) disintegrating tablet 4 mg, 4 mg, Oral, Q8H PRN **OR** ondansetron (ZOFRAN) injection 4 mg, 4 mg, Intravenous, Q6H PRN, Latosha Pearson MD, 4 mg at 08/04/21 2006    polyethylene glycol (GLYCOLAX) packet 17 g, 17 g, Oral, Daily PRN, Latosha Pearson MD    losartan (COZAAR) tablet 100 mg, 100 mg, Oral, Daily, 100 mg at 08/06/21 0946 **AND** hydroCHLOROthiazide (HYDRODIURIL) tablet 25 mg, 25 mg, Oral, Daily, Latosha Pearson MD, 25 mg at 08/06/21 4613    lactated ringers infusion, , Intravenous, Continuous, Latosha Pearson MD, Last Rate: 100 mL/hr at 08/06/21 0025, New Bag at 08/06/21 0025    oxyCODONE (ROXICODONE) immediate release tablet 5 mg, 5 mg, Oral, Q4H PRN, 5 mg at 08/06/21 0949 **OR** oxyCODONE (ROXICODONE) immediate release tablet 10 mg, 10 mg, Oral, Q4H PRN, Latosha Pearson MD, 10 mg at 08/05/21 1022  Prior to Admission medications    Medication Sig Start Date End Date Taking?  Authorizing Provider   docusate sodium (COLACE) 100 MG capsule Take 100 mg by mouth daily   Yes Historical Provider, MD   Turmeric (QC TUMERIC COMPLEX) 500 MG CAPS Take 1,500 tablets by mouth daily   Yes Historical Provider, MD   Multiple Vitamins-Minerals (THERAPEUTIC MULTIVITAMIN-MINERALS) tablet Take 1 tablet by mouth daily   Yes Historical Provider, MD   cyanocobalamin 1000 MCG tablet Take 1,000 mcg by mouth daily   Yes Historical Provider, MD   Magnesium 100 MG CAPS Take 250 mg by mouth daily   Yes Historical Provider, MD   levothyroxine (SYNTHROID) 150 MCG tablet Take 150 mcg by mouth Daily. Yes Historical Provider, MD   olmesartan-hydrochlorothiazide (BENICAR HCT) 40-25 MG per tablet Take 1 tablet by mouth daily. Yes Historical Provider, MD   acetaminophen (TYLENOL) 500 MG tablet Take 500 mg by mouth every 6 hours as needed for Pain    Historical Provider, MD     Additional information:       PHYSICAL:   Heart:  [x]Regular rate and rhythm  []Other:    Lungs:  [x]Clear    []Other:    Abdomen: [x]Soft    []Other:    Mental Status: [x]Alert & Oriented  []Other:        PLANNED PROCEDURE   [x]ERCP  []Colonoscopy []Flex Sigmoid     Consent: I have discussed with the patient and/or the patient representative the indication, alternatives, and the possible risks and/or complications of the planned procedure and the anesthesia methods. The patient and/or patient representative appear to understand and agree to proceed. SEDATION  Please see anesthesia note. The medication Planned :  Planned agent:[x]Midazolam []Meperidine [x]Sublimaze []Morphine  []Diazepam  [x]Propofol     Airway Assessment:   See anesthesia no please     Monitoring and Safety: The patient will be placed on a cardiac monitor and vital signs, pulse oximetry and level of consciousness will be continuously evaluated throughout the procedure. The patient will be closely monitored until recovery from the medications is complete and the patient has returned to baseline status. Respiratory therapy will be on standby during the procedure.     [x]Pre-procedure diagnostic studies complete and results available. Comment:    [x]Previous sedation/anesthesia experiences assessed. Comment:    [x]The patient is an appropriate candidate to undergo the planned procedure sedation and anesthesia. (Refer to nursing sedation/analgesia documentation record)  [x]Formulation and discussion of sedation/procedure plan, risks, and expectations with patient and/or responsible adult completed. [x]Patient examined immediately prior to the procedure.  (Refer to nursing sedation/analgesia documentation record)    Teo Young MD, MD   Electronically signed

## 2021-08-06 NOTE — ANESTHESIA PRE PROCEDURE
Department of Anesthesiology  Preprocedure Note       Name:  Kristina Chow   Age:  48 y.o.  :  1970                                          MRN:  [id number]         Date:  2021      Surgeon: Juli Og):  Angela Pack MD    Procedure: Procedure(s):  ERCP DIAGNOSTIC    Medications prior to admission:   Prior to Admission medications    Medication Sig Start Date End Date Taking? Authorizing Provider   docusate sodium (COLACE) 100 MG capsule Take 100 mg by mouth daily   Yes Historical Provider, MD   Turmeric (QC TUMERIC COMPLEX) 500 MG CAPS Take 1,500 tablets by mouth daily   Yes Historical Provider, MD   Multiple Vitamins-Minerals (THERAPEUTIC MULTIVITAMIN-MINERALS) tablet Take 1 tablet by mouth daily   Yes Historical Provider, MD   cyanocobalamin 1000 MCG tablet Take 1,000 mcg by mouth daily   Yes Historical Provider, MD   Magnesium 100 MG CAPS Take 250 mg by mouth daily   Yes Historical Provider, MD   levothyroxine (SYNTHROID) 150 MCG tablet Take 150 mcg by mouth Daily. Yes Historical Provider, MD   olmesartan-hydrochlorothiazide (BENICAR HCT) 40-25 MG per tablet Take 1 tablet by mouth daily.    Yes Historical Provider, MD   acetaminophen (TYLENOL) 500 MG tablet Take 500 mg by mouth every 6 hours as needed for Pain    Historical Provider, MD       Current medications:    Current Facility-Administered Medications   Medication Dose Route Frequency Provider Last Rate Last Admin    docusate sodium (COLACE) capsule 100 mg  100 mg Oral Daily Abiola Mccloud PA-C   100 mg at 21 0946    levothyroxine (SYNTHROID) tablet 150 mcg  150 mcg Oral Daily Taty Ryan MD   150 mcg at 21 7418    loperamide (IMODIUM) capsule 2 mg  2 mg Oral 4x Daily PRN Taty Ryan MD        sodium chloride flush 0.9 % injection 5-40 mL  5-40 mL Intravenous 2 times per day Taty Ryan MD        sodium chloride flush 0.9 % injection 5-40 mL  5-40 mL Intravenous PRN Taty Ryan MD        0.9 % sodium chloride infusion  25 mL Intravenous PRN Jaden Reyes MD        enoxaparin (LOVENOX) injection 40 mg  40 mg Subcutaneous Daily Jaden Reyes MD   40 mg at 21 1610    ondansetron (ZOFRAN-ODT) disintegrating tablet 4 mg  4 mg Oral Q8H PRN Jaden Reyes MD        Or    ondansetron [facility]) injection 4 mg  4 mg Intravenous Q6H PRN Jaden Reyes MD   4 mg at 21    polyethylene glycol (GLYCOLAX) packet 17 g  17 g Oral Daily PRN Jaden Reyes MD        losartan (COZAAR) tablet 100 mg  100 mg Oral Daily Jaden Reyes MD   100 mg at 21 0946    And    hydroCHLOROthiazide (HYDRODIURIL) tablet 25 mg  25 mg Oral Daily Jaden Reyes MD   25 mg at 21 0290    lactated ringers infusion   Intravenous Continuous Jaden Reyes  mL/hr at 21 0025 New Bag at 21 0025    oxyCODONE (ROXICODONE) immediate release tablet 5 mg  5 mg Oral Q4H PRN Jaden Reyes MD   5 mg at 21 4440    Or    oxyCODONE (ROXICODONE) immediate release tablet 10 mg  10 mg Oral Q4H PRN Jaden Reyes MD   10 mg at 21 1022       Allergies:     Allergies   Allergen Reactions    Pcn [Penicillins] Rash       Problem List:    Patient Active Problem List   Diagnosis Code    Abdominal pain, right lower quadrant R10.31    Puncture wound of heel without complication W45.361Q    Diarrhea R19.7    Intractable nausea and vomiting R11.2    Transaminitis R74.01       Past Medical History:        Diagnosis Date    Hx of blood clots     during preg    Hypertension     Kidney stone     Nausea & vomiting     Thyroid disease        Past Surgical History:        Procedure Laterality Date    BACK SURGERY  ???    fusion and disk removed     [address]    CHOLECYSTECTOMY      COLONOSCOPY Left 2021    COLONOSCOPY POLYPECTOMY HOT BIOPSY performed by Pauline Oavlle MD at [facility]    COLONOSCOPY 1/7/2021    COLONOSCOPY WITH BIOPSY performed by Salome Lang MD [facility]    EGD  2021    FOOT SURGERY      bilateral feet    FOOT SURGERY Left 10/30/14    synovectomy peroneal tendon, repair peroneal tendon, exc ganglion cyst ankle    HYSTERECTOMY  2001    TONSILLECTOMY      as a child    UPPER GASTROINTESTINAL ENDOSCOPY N/A 1/6/2021    EGD BIOPSY performed by Salome Lang MD at [facility]       Social History:    Social History     Tobacco Use    Smoking status: Never Smoker    Smokeless tobacco: Never Used   Substance Use Topics    Alcohol use: Yes     Comment: occasionally                                 Counseling given: Not Answered      Vital Signs (Current):   Vitals:    08/06/21 0345 08/06/21 0941 08/06/21 1211 08/06/21 1357   BP: 122/65 135/76 139/88 (!) 149/89   Pulse: 71 67 67 76   Resp: 18 16 16 16   Temp: 98.2 °F (36.8 °C) 97.7 °F (36.5 °C) 98 °F (36.7 °C) 96.8 °F (36 °C)   TempSrc: Oral Oral Oral Temporal   SpO2: 97%  97% 99%   Weight:       Height:                                                  BP Readings from Last 3 Encounters:   08/06/21 (!) 149/89   03/03/21 132/76   01/20/21 126/69       NPO Status: Time of last liquid consumption: 0700                        Time of last solid consumption: 1745                        Date of last liquid consumption: 08/06/21                        Date of last solid food consumption: 08/05/21    BMI:   Wt Readings from Last 3 Encounters:   08/04/21 269 lb (122 kg)   03/03/21 263 lb (119.3 kg)   02/02/21 261 lb (118.4 kg)     Body mass index is 47.65 kg/m².     CBC:   Lab Results   Component Value Date    WBC 5.8 08/06/2021    RBC 4.64 08/06/2021    RBC 4.77 10/24/2011    HGB 13.7 08/06/2021    HCT 42.3 08/06/2021    MCV 91.2 08/06/2021    RDW 13.4 07/29/2014     08/06/2021       CMP:   Lab Results   Component Value Date     08/06/2021    K 4.0 08/06/2021    K 4.0 08/06/2021     08/06/2021    CO2 29 08/06/2021    BUN 7 08/06/2021    CREATININE 0.5 08/06/2021    LABGLOM >90 08/06/2021    GLUCOSE 95 08/06/2021    GLUCOSE 97 10/24/2011    PROT 6.0 08/06/2021    CALCIUM 9.6 08/06/2021    BILITOT 6.5 08/06/2021    ALKPHOS 421 08/06/2021     08/06/2021    ALT 1,322 08/06/2021       POC Tests: No results for input(s): POCGLU, POCNA, POCK, POCCL, POCBUN, POCHEMO, POCHCT in the last 72 hours. Coags:   Lab Results   Component Value Date    INR 1.04 08/06/2021    APTT 37.6 08/03/2021       HCG (If Applicable):   Lab Results   Component Value Date    PREGSERUM NEGATIVE 07/03/2013        ABGs: No results found for: PHART, PO2ART, DVM7MQU, FVX3WPW, BEART, A5CSNSTV     Type & Screen (If Applicable):  No results found for: LABABO, LABRH    Drug/Infectious Status (If Applicable):  Lab Results   Component Value Date    HEPCAB Negative 08/03/2021       COVID-19 Screening (If Applicable):   Lab Results   Component Value Date    COVID19 NOT DETECTED 01/06/2021           Anesthesia Evaluation  Patient summary reviewed and Nursing notes reviewed  Airway: Mallampati: II        Dental:          Pulmonary: breath sounds clear to auscultation                             Cardiovascular:    (+) hypertension:,         Rhythm: regular  Rate: normal                    Neuro/Psych:               GI/Hepatic/Renal:   (+) liver disease:,           Endo/Other:                     Abdominal:       Abdomen: soft. Vascular: Other Findings:             Anesthesia Plan      general     ASA 2       Induction: intravenous. Anesthetic plan and risks discussed with patient and spouse. Plan discussed with CRNA.                   Liv Pike DO   8/6/2021

## 2021-08-07 ENCOUNTER — APPOINTMENT (OUTPATIENT)
Dept: GENERAL RADIOLOGY | Age: 51
DRG: 441 | End: 2021-08-07
Payer: COMMERCIAL

## 2021-08-07 LAB
ALBUMIN SERPL-MCNC: 3.6 G/DL (ref 3.5–5.1)
ALP BLD-CCNC: 442 U/L (ref 38–126)
ALT SERPL-CCNC: 1279 U/L (ref 11–66)
ANA PATTERN: ABNORMAL
ANA PATTERN: ABNORMAL
ANA SCREEN: DETECTED
ANA TITER: ABNORMAL
ANA TITER: ABNORMAL
ANION GAP SERPL CALCULATED.3IONS-SCNC: 14 MEQ/L (ref 8–16)
ANTINUCLEAR ANTIBODY, HEP-2, IGG: DETECTED
ANTINUCLEAR ANTIBODY, HEP-2, IGG: DETECTED
AST SERPL-CCNC: 669 U/L (ref 5–40)
BILIRUB SERPL-MCNC: 5.1 MG/DL (ref 0.3–1.2)
BUN BLDV-MCNC: 13 MG/DL (ref 7–22)
CALCIUM SERPL-MCNC: 9.1 MG/DL (ref 8.5–10.5)
CERULOPLASMIN: 40 MG/DL (ref 17–54)
CHLORIDE BLD-SCNC: 93 MEQ/L (ref 98–111)
CO2: 25 MEQ/L (ref 23–33)
CREAT SERPL-MCNC: 0.4 MG/DL (ref 0.4–1.2)
ERYTHROCYTE [DISTWIDTH] IN BLOOD BY AUTOMATED COUNT: 17 % (ref 11.5–14.5)
ERYTHROCYTE [DISTWIDTH] IN BLOOD BY AUTOMATED COUNT: 55 FL (ref 35–45)
F-ACTIN AB IGG: 28 UNITS (ref 0–19)
GFR SERPL CREATININE-BSD FRML MDRD: > 90 ML/MIN/1.73M2
GLUCOSE BLD-MCNC: 99 MG/DL (ref 70–108)
HCT VFR BLD CALC: 44.5 % (ref 37–47)
HEMOGLOBIN: 14.6 GM/DL (ref 12–16)
INR BLD: 1.13 (ref 0.85–1.13)
LIPASE: 278.7 U/L (ref 5.6–51.3)
MCH RBC QN AUTO: 29.4 PG (ref 26–33)
MCHC RBC AUTO-ENTMCNC: 32.8 GM/DL (ref 32.2–35.5)
MCV RBC AUTO: 89.5 FL (ref 81–99)
NEUTROPHIL CYTOPLASMIC AB IGG: NORMAL
PLATELET # BLD: 400 THOU/MM3 (ref 130–400)
PMV BLD AUTO: 10.7 FL (ref 9.4–12.4)
POTASSIUM REFLEX MAGNESIUM: 4.8 MEQ/L (ref 3.5–5.2)
POTASSIUM SERPL-SCNC: 4.8 MEQ/L (ref 3.5–5.2)
RBC # BLD: 4.97 MILL/MM3 (ref 4.2–5.4)
SODIUM BLD-SCNC: 132 MEQ/L (ref 135–145)
TOTAL PROTEIN: 6.5 G/DL (ref 6.1–8)
WBC # BLD: 11.4 THOU/MM3 (ref 4.8–10.8)

## 2021-08-07 PROCEDURE — 85027 COMPLETE CBC AUTOMATED: CPT

## 2021-08-07 PROCEDURE — 74018 RADEX ABDOMEN 1 VIEW: CPT

## 2021-08-07 PROCEDURE — 99233 SBSQ HOSP IP/OBS HIGH 50: CPT | Performed by: PHYSICIAN ASSISTANT

## 2021-08-07 PROCEDURE — 6360000002 HC RX W HCPCS: Performed by: INTERNAL MEDICINE

## 2021-08-07 PROCEDURE — 6370000000 HC RX 637 (ALT 250 FOR IP): Performed by: PHYSICIAN ASSISTANT

## 2021-08-07 PROCEDURE — 83690 ASSAY OF LIPASE: CPT

## 2021-08-07 PROCEDURE — 85610 PROTHROMBIN TIME: CPT

## 2021-08-07 PROCEDURE — 6360000002 HC RX W HCPCS: Performed by: PHYSICIAN ASSISTANT

## 2021-08-07 PROCEDURE — 2580000003 HC RX 258: Performed by: PHYSICIAN ASSISTANT

## 2021-08-07 PROCEDURE — 6370000000 HC RX 637 (ALT 250 FOR IP): Performed by: INTERNAL MEDICINE

## 2021-08-07 PROCEDURE — C9113 INJ PANTOPRAZOLE SODIUM, VIA: HCPCS | Performed by: INTERNAL MEDICINE

## 2021-08-07 PROCEDURE — 2580000003 HC RX 258: Performed by: INTERNAL MEDICINE

## 2021-08-07 PROCEDURE — 80053 COMPREHEN METABOLIC PANEL: CPT

## 2021-08-07 PROCEDURE — 1200000003 HC TELEMETRY R&B

## 2021-08-07 PROCEDURE — 36415 COLL VENOUS BLD VENIPUNCTURE: CPT

## 2021-08-07 RX ORDER — SENNA PLUS 8.6 MG/1
1 TABLET ORAL NIGHTLY
Status: DISCONTINUED | OUTPATIENT
Start: 2021-08-07 | End: 2021-08-12 | Stop reason: HOSPADM

## 2021-08-07 RX ORDER — SODIUM CHLORIDE, SODIUM LACTATE, POTASSIUM CHLORIDE, CALCIUM CHLORIDE 600; 310; 30; 20 MG/100ML; MG/100ML; MG/100ML; MG/100ML
INJECTION, SOLUTION INTRAVENOUS CONTINUOUS
Status: DISCONTINUED | OUTPATIENT
Start: 2021-08-07 | End: 2021-08-07 | Stop reason: SDUPTHER

## 2021-08-07 RX ORDER — LIDOCAINE 4 G/G
1 PATCH TOPICAL DAILY
Status: DISCONTINUED | OUTPATIENT
Start: 2021-08-07 | End: 2021-08-12 | Stop reason: HOSPADM

## 2021-08-07 RX ORDER — IBUPROFEN 200 MG
200 TABLET ORAL EVERY 4 HOURS PRN
Status: DISCONTINUED | OUTPATIENT
Start: 2021-08-07 | End: 2021-08-12 | Stop reason: HOSPADM

## 2021-08-07 RX ORDER — HYDROXYZINE HYDROCHLORIDE 50 MG/ML
50 INJECTION, SOLUTION INTRAMUSCULAR EVERY 6 HOURS PRN
Status: DISCONTINUED | OUTPATIENT
Start: 2021-08-07 | End: 2021-08-12 | Stop reason: HOSPADM

## 2021-08-07 RX ADMIN — SODIUM CHLORIDE, POTASSIUM CHLORIDE, SODIUM LACTATE AND CALCIUM CHLORIDE: 600; 310; 30; 20 INJECTION, SOLUTION INTRAVENOUS at 14:34

## 2021-08-07 RX ADMIN — SODIUM CHLORIDE, PRESERVATIVE FREE 10 ML: 5 INJECTION INTRAVENOUS at 08:59

## 2021-08-07 RX ADMIN — IBUPROFEN 200 MG: 200 TABLET, FILM COATED ORAL at 23:00

## 2021-08-07 RX ADMIN — HYDROMORPHONE HYDROCHLORIDE 1 MG: 1 INJECTION, SOLUTION INTRAMUSCULAR; INTRAVENOUS; SUBCUTANEOUS at 11:42

## 2021-08-07 RX ADMIN — OXYCODONE 10 MG: 5 TABLET ORAL at 01:05

## 2021-08-07 RX ADMIN — SODIUM CHLORIDE, PRESERVATIVE FREE 10 ML: 5 INJECTION INTRAVENOUS at 09:00

## 2021-08-07 RX ADMIN — SENNOSIDES 8.6 MG: 8.6 TABLET, COATED ORAL at 20:56

## 2021-08-07 RX ADMIN — SODIUM CHLORIDE, PRESERVATIVE FREE 10 ML: 5 INJECTION INTRAVENOUS at 20:49

## 2021-08-07 RX ADMIN — LEVOTHYROXINE SODIUM 150 MCG: 0.15 TABLET ORAL at 08:59

## 2021-08-07 RX ADMIN — ONDANSETRON 4 MG: 2 INJECTION INTRAMUSCULAR; INTRAVENOUS at 05:04

## 2021-08-07 RX ADMIN — HYDROMORPHONE HYDROCHLORIDE 1 MG: 1 INJECTION, SOLUTION INTRAMUSCULAR; INTRAVENOUS; SUBCUTANEOUS at 14:34

## 2021-08-07 RX ADMIN — PANTOPRAZOLE SODIUM 40 MG: 40 INJECTION, POWDER, FOR SOLUTION INTRAVENOUS at 08:59

## 2021-08-07 RX ADMIN — HYDROMORPHONE HYDROCHLORIDE 1 MG: 1 INJECTION, SOLUTION INTRAMUSCULAR; INTRAVENOUS; SUBCUTANEOUS at 20:49

## 2021-08-07 RX ADMIN — ENOXAPARIN SODIUM 40 MG: 40 INJECTION, SOLUTION INTRAVENOUS; SUBCUTANEOUS at 16:59

## 2021-08-07 RX ADMIN — SODIUM CHLORIDE, POTASSIUM CHLORIDE, SODIUM LACTATE AND CALCIUM CHLORIDE: 600; 310; 30; 20 INJECTION, SOLUTION INTRAVENOUS at 05:09

## 2021-08-07 RX ADMIN — HYDROMORPHONE HYDROCHLORIDE 1 MG: 1 INJECTION, SOLUTION INTRAMUSCULAR; INTRAVENOUS; SUBCUTANEOUS at 23:06

## 2021-08-07 RX ADMIN — HYDROMORPHONE HYDROCHLORIDE 1 MG: 1 INJECTION, SOLUTION INTRAMUSCULAR; INTRAVENOUS; SUBCUTANEOUS at 16:59

## 2021-08-07 RX ADMIN — DOCUSATE SODIUM 100 MG: 100 CAPSULE ORAL at 10:06

## 2021-08-07 RX ADMIN — POTASSIUM CHLORIDE 150 ML/HR: 2 INJECTION, SOLUTION, CONCENTRATE INTRAVENOUS at 20:49

## 2021-08-07 RX ADMIN — HYDROXYZINE HYDROCHLORIDE 50 MG: 50 INJECTION, SOLUTION INTRAMUSCULAR at 11:42

## 2021-08-07 ASSESSMENT — PAIN DESCRIPTION - FREQUENCY
FREQUENCY: CONTINUOUS

## 2021-08-07 ASSESSMENT — PAIN DESCRIPTION - ONSET
ONSET: ON-GOING

## 2021-08-07 ASSESSMENT — PAIN DESCRIPTION - DESCRIPTORS
DESCRIPTORS: ACHING;SHARP
DESCRIPTORS: ACHING;SHARP
DESCRIPTORS: ACHING
DESCRIPTORS: ACHING;SHARP

## 2021-08-07 ASSESSMENT — PAIN SCALES - GENERAL
PAINLEVEL_OUTOF10: 8
PAINLEVEL_OUTOF10: 9
PAINLEVEL_OUTOF10: 0
PAINLEVEL_OUTOF10: 6
PAINLEVEL_OUTOF10: 6
PAINLEVEL_OUTOF10: 9
PAINLEVEL_OUTOF10: 10

## 2021-08-07 ASSESSMENT — PAIN DESCRIPTION - PROGRESSION
CLINICAL_PROGRESSION: NOT CHANGED

## 2021-08-07 ASSESSMENT — PAIN DESCRIPTION - PAIN TYPE
TYPE: ACUTE PAIN

## 2021-08-07 ASSESSMENT — PAIN DESCRIPTION - LOCATION
LOCATION: ABDOMEN
LOCATION: ABDOMEN
LOCATION: ABDOMEN;BACK
LOCATION: ABDOMEN
LOCATION: ABDOMEN
LOCATION: ABDOMEN;BACK
LOCATION: ABDOMEN

## 2021-08-07 ASSESSMENT — PAIN DESCRIPTION - ORIENTATION
ORIENTATION: RIGHT

## 2021-08-07 ASSESSMENT — PAIN - FUNCTIONAL ASSESSMENT
PAIN_FUNCTIONAL_ASSESSMENT: ACTIVITIES ARE NOT PREVENTED

## 2021-08-07 NOTE — PROGRESS NOTES
< > = values in this interval not displayed. Hepatic:   Recent Labs     08/05/21  0541 08/06/21  0639 08/07/21  0746   * 889* 669*   ALT 1,281* 1,322* 1,279*   BILITOT 5.2* 6.5* 5.1*   ALKPHOS 396* 421* 442*     INR:   Recent Labs     08/05/21  0541 08/06/21  0639 08/07/21  0746   INR 1.06 1.04 1.13       Imaging:  No results found for this or any previous visit. Results for orders placed during the hospital encounter of 08/03/21    CT ABDOMEN PELVIS W IV CONTRAST Additional Contrast? None    Narrative  PROCEDURE: CT ABDOMEN PELVIS W IV CONTRAST    CLINICAL INFORMATION: right sided abdominal pain; . COMPARISON: CT scan of the abdomen and pelvis dated 3 January 2021. .    TECHNIQUE: Axial 5 mm CT images were obtained through the abdomen and pelvis after the administration of intravenous contrast. Coronal and sagittal reconstructions were obtained. All CT scans at this facility use dose modulation, iterative reconstruction, and/or weight-based dosing when appropriate to reduce radiation dose to as low as reasonably achievable. FINDINGS:      The visualized aspects of the lung bases are clear. The base of the heart is within appropriate limits. There is mild diffuse fatty replacement in the liver. The spleen is normal. The adrenals and pancreas are normal. The patient is status post cholecystectomy. There is is a 7.8 cm probable left renal cyst. There is a 2.1 mm stone in the lower pole of  right kidney with no associated hydronephrosis. There is a moderate-sized hiatal hernia    No abnormalities of the small bowel loops are noted. There is atherosclerotic calcification in the abdominal aorta and iliac arteries. There are small mesenteric and retroperitoneal lymph nodes present. The urinary bladder is normal. The patient is status post hysterectomy. There is no pelvic free fluid. The colon is within normal limits.   . There are postoperative changes in the lower lumbar spine.    Impression  1. Stable CT scan of the abdomen and pelvis, no interval change since previous study dated 3 January 2021.  2. Mild diffuse fatty replacement liver. 3. Status post cholecystectomy and hysterectomy. 4. Punctate nonobstructing stone in the lower pole of the right kidney. Small left renal cyst.  5. Hiatal hernia. 6. Small mesenteric and retroperitoneal lymph nodes. 7. Postoperative changes in the lower lumbar spine. 8. Otherwise negative CT scan of the abdomen and pelvis. **This report has been created using voice recognition software. It may contain minor errors which are inherent in voice recognition technology. **    Final report electronically signed by DR Jorge Lafleur on 8/3/2021 11:54 AM    No results found for this or any previous visit. No results found for this or any previous visit. Endoscopy Finding:      Objective:   Vitals: BP (!) 143/67   Pulse 63   Temp 99.5 °F (37.5 °C) (Oral)   Resp 18   Ht 5' 3\" (1.6 m)   Wt 264 lb 9.6 oz (120 kg)   SpO2 99%   BMI 46.87 kg/m²     Intake/Output Summary (Last 24 hours) at 8/7/2021 1432  Last data filed at 8/7/2021 1255  Gross per 24 hour   Intake 893.15 ml   Output 1 ml   Net 892.15 ml     General appearance: alert and cooperative with exam  Lungs: clear to auscultation bilaterally  Heart: regular rate and rhythm, S1, S2 normal, no murmur, click, rub or gallop  Abdomen: soft, non-tender; bowel sounds normal; no masses,  no organomegaly  Extremities: extremities normal, atraumatic, no cyanosis or edema    Assessment and Plan:   1. Abnormal function test typical biliary colic patient status post cholecystectomy but indicate biliary stone or sludge however imaging studies so far did not confirm it  2. MRCP reviewed with the patient did not confirm biliary dilation of the stone  3. S/P ERCP with sphincterotomy and sludge extractions discussed the finding with the patient . 4.  Post ERCP discomfort possible mild pancreatitis lipase is 287 we will keep n.p.o. repeat lab for tomorrow      Follow up in GI Clinic after discharge in 1 to 2 weeks week(s)    Patient Active Problem List:     Abdominal pain, right lower quadrant     Puncture wound of heel without complication     Diarrhea     Intractable nausea and vomiting     Transaminitis      Electronically signed by Tawana Suero MD on 8/7/2021 at 2:32 PM

## 2021-08-07 NOTE — OP NOTE
[address] [place] [address]                                OPERATIVE REPORT    PATIENT NAME: JEFE Ervin                    :        1970  MED REC NO:   [id number]                           ROOM:       0025  ACCOUNT NO:   [de-identified]                           ADMIT DATE: 2021  PROVIDER:     [place] Reason:  2021    INDICATIONS:  The patient with history of abnormal liver function tests,  obese, with abdominal discomfort, more in the right upper quadrant as  well as abnormal liver function tests having gone on for more than few  weeks. Presents with significant elevation of liver function tests. ALT, AST. Also bilirubin. With persistent right upper quadrant  discomfort. Her alkaline phosphatase is coming up to 421 today. ALT is  1322. . Bilirubin total is 6.5. Discussed with the patient and  the family the option of therapy because of history of recurrent  abdominal discomfort right upper quadrant, as well as abnormal liver  function test no choledocholithiasis seen in the MRCP. Discussed the option of  ERCP with sphincterotomy and sweeping the common bile duct to make sure  there is no stone in there. Family accepted the need for procedure. Informed about what procedure with possible complications and feet surgical  management, as well as pancreatitis. Plan today for ERCP to evaluate  with sphincterotomy and possible stone extraction or sweeping common  bile duct. ASA CLASSIFICATION:  III. SURGEON:  Serena Colindres MD    ESTIMATED BLOOD LOSS:  None. DESCRIPTION OF PROCEDURE:  The patient was brought to the GI lab, room  14. Consent was obtained. Sedation done by endotracheal intubation by  the Anesthesia Service. The patient was monitored during the procedure  with pulse oximetry, blood pressure monitoring, oxygen given by  endotracheal intubation.   The patient was positioned in prone position,  right side slightly up. Mouth piece was put in the oral cavity to keep  the oral cavity open. We used scope Olympus 180. Scope was advanced to  the duodenum. All in all, it seemed that the patient has mild erosive  duodenitis. Selective cannulation of the common bile duct achieved with  no difficulty. Pancreas was not cannulated. Contrast injected. Did  not show any biliary dilation or any abnormality in the bowel duct. We  elected to do sphincterotomy with sweeping the common bile duct with a  balloon. Next, sphincterotomy performed in the usual fashion up to 0.9  mm. Then, sphincterotome was withdrawn, replaced with balloon with  maximum dimension of 12 mm. Scoped the common bile duct multiple times,  sludge seen, however no real stone seen. Obstructive cholangiogram  showed no more sludge seen. So the balloon and the guidewire were  withdrawn. The duodenum was washed with sterile normal saline and  procedure was terminated with no immediate complications. IMPRESSION:  1. Erosive duodenitis. 2.  Sphincterotomy. 3.  Extraction of sludge, no stone seen. PLAN:  1. Keep NPO for now, re-evaluate. 2.  Repeat above-mentioned tests. 3.  If doing fine tomorrow, she will start a clear liquid diet and  advance slowly.         Flaca Chappell M.D.    D: 08/06/2021 16:08:41       T: 08/06/2021 23:59:32     [id number]  Job#: [id number]     Doc#: [id number]    CC:  Milton Farrar M.D.

## 2021-08-07 NOTE — PLAN OF CARE
Problem: Pain:  Goal: Pain level will decrease  Description: Pain level will decrease  Outcome: Ongoing   Patient's pain level has been between 6-10/10 this shift. New PRN pain medication was added, patient more comfortable after new medication given. Ice/heat and repositioning offered as needed. Problem: Pain:  Goal: Control of acute pain  Description: Control of acute pain  Outcome: Ongoing   Patient's pain level has been between 6-10/10 this shift. New PRN pain medication was added, patient more comfortable after new medication given. Ice/heat and repositioning offered as needed. Problem: Pain:  Goal: Control of chronic pain  Description: Control of chronic pain  Outcome: Ongoing  Patient voices no chronic pain this shift. PRN pain medication and comfort measures available if needed. Problem: Discharge Planning:  Goal: Participates in care planning  Description: Participates in care planning  Outcome: Ongoing  Patient plans to go home with  at discharge. Will follow for any more discharge needs. Problem: Cardiac Output - Decreased:  Goal: Hemodynamic stability will improve  Description: Hemodynamic stability will improve  Outcome: Ongoing  Patient's vital signs have been within normal limits. Problem: Falls - Risk of:  Goal: Will remain free from falls  Description: Will remain free from falls  Outcome: Ongoing  Patient has remained free from falls this shift. Bed alarm on, call light and personal belongings within reach. Non-skid socks on when up. Problem: Fluid Volume:  Goal: Will maintain adequate fluid volume  Description: Will maintain adequate fluid volume  Outcome: Ongoing  Patient NPO this shift due to new onset pancreatitis. Ice chips given when requested. Patient voiding adequately.       Problem: Sensory:  Goal: General experience of comfort will improve  Description: General experience of comfort will improve  Outcome: Ongoing  Patient appears more comfortable by the end of the shift. New pain medication ordered seemed to help patient. Repositioning encouraged, as well as walking in the halls. Care plan reviewed with patient and . Patient and  verbalize understanding of the plan of care and contribute to goal setting.

## 2021-08-07 NOTE — PROGRESS NOTES
Assessment and Plan:        1. Abdominal pain w/ transaminitis: chronic undifferentiated abdominal pain and transaminitis  >1 year. GI consulted. Negative hepatitis panel. Denies acetaminophen useANA, ANCA, F-actin (smooth muscle) Ab, Ferritin, IgG, mitochondrial Ab M2 IG, smooth muscle antibody. Further viral serologies pending. Patient's LFTs slightly trended down today. MRCP obtained as well and unremarkable. Patient still having pain. 8/6-> LFTs trending up. Plan for ERCP today. Pending course may consider transfer to location with hepatobiliary specialty. Discussed case with GI.  8/7-> LFTs slightly trending down. 2. Acute pancreatitis: Likely secondary to s/p ERCP. Lipase elevated at 70.7. Maintain NPO. IV fluids increased. Pain control with Dilaudid 1 mg every 2 hours as needed. 3. Hyponatremia: Mild, continue to monitor with daily BMP  4. Leukocytosis: Likely reactive secondary to above. Continue to trend daily  5. 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 Omar Jacques note as seen on colon biopsy. 6. Essential HTN: Cozaar and Hydrodiuril, stable continue to monitor  7. Hypothyroidism: Synthroid 150mcg daily  8. Dehydration, POA: Patient was concentrated with hemoglobin near 16 and endorsed being very dry. Started IV fluids. With improvement noted in her hemoglobin concentration. We will continue IV fluids for now. 9. Severe obesity: BMI 47.65      CC:  Abdominal pain; transaminitis  Hospital course: 50yoF with  hypothyroidism, and HTN and PSHx of cholecystectomy and hysterectomy presents for evaluation of abdominal pain. Patient reports intermittent episodes of periumbilical and RUQ abdominal pain over the past year. Pain ranges from 5-6 to 7-8 in severity and is associated with nausea and dry heaving. Episodes typically last for hours and occur 3-5 times/week (though she has symptom free days). Patient denies exacerbating or alleviating factors.  Denies fevers/chills, chest pain, pale stools, melena, dysuria, hematuria. Reports 3 episodes of watery diarrhea yesterday as well as darker urine over past week. Patient presented to [facility] ED this morning due to worsening pain. Initial HFT reveales ALT 1567, , Alk Phos 384. Viral hepatitis panel negative. INR and aPTT WNL. CT abdomen pelvis showed mild diffuse fatty replacement of liver. Patient's was first evaluated for abdominal pain last January. She was worked up for celiac disease but had normal TGG. However, biopsy showed features of Celiac disease and lymphocytic colitis. She has since followed a strict gluten free diet. In June, she presented to an ED in [facility] for a similar episode of abdominal pain but her transaminases were in the 200s. Patient recently returned from vacation near [place], [place]. She states that she cooked her own food while on vacation. She also began seeing a chiropractor 4wks ago and was started on several supplements including \"beta plus,\" mimosa, moringa, oregano oil, and wormwood TID but has not taken any 1.5wks. Patient's mother and nephew have history of autoimmune disease. Patient does not recall specifics but reports that her mother \"needs to have her esophagus dilated\" as a result and that her nephew's symptoms are primarily GI.    8/5-> Patient symptoms unchanged. Still having both right upper and lower quadrant abdominal pain and intermittent nausea without emesis. New rash noted on R buttock. No confusion. Denies CP.     8/6-> LFTS continue to trend up. Pt reports continued abd pain 7/10 in R side of abd. Admits to intermittent nausea and \"watery tongue\" without emesis. Denies fever/chills. No CP/SOB. Pt reports she is anxious about taking pain medicine causes she does not want to get constipated. Educated regarded stool softeners. Subjective:  8/7-> pt having significant pain overnight in the R side abd and radiating into her back. Pt denies fever/chills. Denies CP/SOB.  Admits to continued nausea. Denies passing flatus. No BM since Sunday. Check KUB. ERCP planned for 1500 today. ROS: abdominal pain, nausea, diarrhea (12 point review of systems completed. Pertinent positives noted. Otherwise ROS is negative)  PMH:  Per HPI  SHX:  Occasional ETOH use  Social History     Tobacco Use    Smoking status: Never Smoker    Smokeless tobacco: Never Used   Substance Use Topics    Alcohol use: Yes     Comment: occasionally     Drug use: No     FHX:   Family History   Problem Relation Age of Onset    Cancer Mother     Colon Cancer Neg Hx     Esophageal Cancer Neg Hx     Liver Cancer Neg Hx     Rectal Cancer Neg Hx     Stomach Cancer Neg Hx      Allergies: Allergies   Allergen Reactions    Pcn [Penicillins] Rash     Medications:          Vital Signs:   BP (!) 143/77   Pulse 57   Temp 99 °F (37.2 °C) (Oral)   Resp 18   Ht 5' 3\" (1.6 m)   Wt 264 lb 9.6 oz (120 kg)   SpO2 98%   BMI 46.87 kg/m²      Intake/Output Summary (Last 24 hours) at 8/7/2021 0930  Last data filed at 8/7/2021 0900  Gross per 24 hour   Intake 893.15 ml   Output --   Net 893.15 ml        General:   Well appearing, NAD  HEENT:  normocephalic and atraumatic. No scleral icterus. PERR. Neck: supple. No JVD. No thyromegaly. Lungs: clear to auscultation. No retractions  Cardiac: RRR without murmur. Abdomen: soft. Tenderness to palpation in RUQ and RLQ and periumbilical region. Bowel sounds hypoactive. No guarding. Rovsing sign (-)  Extremities:  No clubbing, cyanosis, or edema x 4. Vasculature: capillary refill < 3 seconds. Palpable LE pulses bilaterally. Skin:  warm and dry. Psych:  Alert and oriented x4. Affect appropriate  Lymph:  No supraclavicular adenopathy. Neurologic:  No focal deficit. No seizures. No asterixis    Data: (All radiographs, tracings, PFTs, and imaging are personally viewed and interpreted unless otherwise noted).    No results found for this or any previous visit (from the past 24 hour(s)). FL ERCP BILIARY AND PANCREATIC S&I   Final Result   Status post ERCP. **This report has been created using voice recognition software. It may contain minor errors which are inherent in voice recognition technology. **      Final report electronically signed by Dr. Leonard Fuentes on 8/6/2021 4:45 PM      MRI ABDOMEN W WO CONTRAST   Final Result   1. No common bile duct stone. 2. No intrahepatic ductal dilatation. Pancreatic duct is unremarkable. 3. Gallbladder removed. 4. No liver lesions. **This report has been created using voice recognition software. It may contain minor errors which are inherent in voice recognition technology. **      Final report electronically signed by Dr. Amarilys Reddy on 8/4/2021 10:03 AM      CT ABDOMEN PELVIS W IV CONTRAST Additional Contrast? None   Final Result      1. Stable CT scan of the abdomen and pelvis, no interval change since previous study dated 3 January 2021.   2. Mild diffuse fatty replacement liver. 3. Status post cholecystectomy and hysterectomy. 4. Punctate nonobstructing stone in the lower pole of the right kidney. Small left renal cyst.   5. Hiatal hernia. 6. Small mesenteric and retroperitoneal lymph nodes. 7. Postoperative changes in the lower lumbar spine. 8. Otherwise negative CT scan of the abdomen and pelvis. **This report has been created using voice recognition software. It may contain minor errors which are inherent in voice recognition technology. **      Final report electronically signed by DR Helio Ohara on 8/3/2021 11:54 AM      XR ABDOMEN (KUB) (SINGLE AP VIEW)    (Results Pending)         Electronically signed by Baldemar Carnes PA-C on 8/7/2021 at 9:30 AM

## 2021-08-08 ENCOUNTER — APPOINTMENT (OUTPATIENT)
Dept: CT IMAGING | Age: 51
DRG: 441 | End: 2021-08-08
Payer: COMMERCIAL

## 2021-08-08 LAB
ALBUMIN SERPL-MCNC: 3.3 G/DL (ref 3.5–5.1)
ALP BLD-CCNC: 373 U/L (ref 38–126)
ALT SERPL-CCNC: 1002 U/L (ref 11–66)
ANION GAP SERPL CALCULATED.3IONS-SCNC: 10 MEQ/L (ref 8–16)
AST SERPL-CCNC: 511 U/L (ref 5–40)
BILIRUB SERPL-MCNC: 7.5 MG/DL (ref 0.3–1.2)
BUN BLDV-MCNC: 14 MG/DL (ref 7–22)
CALCIUM SERPL-MCNC: 8.6 MG/DL (ref 8.5–10.5)
CHLORIDE BLD-SCNC: 98 MEQ/L (ref 98–111)
CO2: 28 MEQ/L (ref 23–33)
CREAT SERPL-MCNC: 0.6 MG/DL (ref 0.4–1.2)
ERYTHROCYTE [DISTWIDTH] IN BLOOD BY AUTOMATED COUNT: 17.7 % (ref 11.5–14.5)
ERYTHROCYTE [DISTWIDTH] IN BLOOD BY AUTOMATED COUNT: 58.4 FL (ref 35–45)
GFR SERPL CREATININE-BSD FRML MDRD: > 90 ML/MIN/1.73M2
GLUCOSE BLD-MCNC: 88 MG/DL (ref 70–108)
HCT VFR BLD CALC: 43.1 % (ref 37–47)
HEMOGLOBIN: 13.8 GM/DL (ref 12–16)
INR BLD: 1.14 (ref 0.85–1.13)
LIPASE: 168.4 U/L (ref 5.6–51.3)
MCH RBC QN AUTO: 29.3 PG (ref 26–33)
MCHC RBC AUTO-ENTMCNC: 32 GM/DL (ref 32.2–35.5)
MCV RBC AUTO: 91.5 FL (ref 81–99)
NEUTROPHIL CYTOPLASMIC AB IGG: NORMAL
PLATELET # BLD: 345 THOU/MM3 (ref 130–400)
PMV BLD AUTO: 9.8 FL (ref 9.4–12.4)
POTASSIUM REFLEX MAGNESIUM: 4.3 MEQ/L (ref 3.5–5.2)
POTASSIUM SERPL-SCNC: 4.3 MEQ/L (ref 3.5–5.2)
PROCALCITONIN: 0.59 NG/ML (ref 0.01–0.09)
RBC # BLD: 4.71 MILL/MM3 (ref 4.2–5.4)
SODIUM BLD-SCNC: 136 MEQ/L (ref 135–145)
TOTAL PROTEIN: 5.8 G/DL (ref 6.1–8)
WBC # BLD: 19.6 THOU/MM3 (ref 4.8–10.8)

## 2021-08-08 PROCEDURE — 6370000000 HC RX 637 (ALT 250 FOR IP): Performed by: INTERNAL MEDICINE

## 2021-08-08 PROCEDURE — 83690 ASSAY OF LIPASE: CPT

## 2021-08-08 PROCEDURE — 36415 COLL VENOUS BLD VENIPUNCTURE: CPT

## 2021-08-08 PROCEDURE — 6360000002 HC RX W HCPCS: Performed by: PHYSICIAN ASSISTANT

## 2021-08-08 PROCEDURE — 1200000003 HC TELEMETRY R&B

## 2021-08-08 PROCEDURE — 99233 SBSQ HOSP IP/OBS HIGH 50: CPT | Performed by: PHYSICIAN ASSISTANT

## 2021-08-08 PROCEDURE — 2580000003 HC RX 258: Performed by: PHYSICIAN ASSISTANT

## 2021-08-08 PROCEDURE — 80053 COMPREHEN METABOLIC PANEL: CPT

## 2021-08-08 PROCEDURE — 6360000002 HC RX W HCPCS: Performed by: INTERNAL MEDICINE

## 2021-08-08 PROCEDURE — 6370000000 HC RX 637 (ALT 250 FOR IP): Performed by: PHYSICIAN ASSISTANT

## 2021-08-08 PROCEDURE — 84145 PROCALCITONIN (PCT): CPT

## 2021-08-08 PROCEDURE — 85610 PROTHROMBIN TIME: CPT

## 2021-08-08 PROCEDURE — 74178 CT ABD&PLV WO CNTR FLWD CNTR: CPT

## 2021-08-08 PROCEDURE — 85027 COMPLETE CBC AUTOMATED: CPT

## 2021-08-08 PROCEDURE — 6360000004 HC RX CONTRAST MEDICATION: Performed by: INTERNAL MEDICINE

## 2021-08-08 PROCEDURE — 87040 BLOOD CULTURE FOR BACTERIA: CPT

## 2021-08-08 RX ORDER — PANTOPRAZOLE SODIUM 40 MG/1
40 TABLET, DELAYED RELEASE ORAL
Status: DISCONTINUED | OUTPATIENT
Start: 2021-08-08 | End: 2021-08-12 | Stop reason: HOSPADM

## 2021-08-08 RX ADMIN — DOCUSATE SODIUM 100 MG: 100 CAPSULE ORAL at 09:09

## 2021-08-08 RX ADMIN — HYDROMORPHONE HYDROCHLORIDE 1 MG: 1 INJECTION, SOLUTION INTRAMUSCULAR; INTRAVENOUS; SUBCUTANEOUS at 18:32

## 2021-08-08 RX ADMIN — PANTOPRAZOLE SODIUM 40 MG: 40 TABLET, DELAYED RELEASE ORAL at 10:14

## 2021-08-08 RX ADMIN — HYDROMORPHONE HYDROCHLORIDE 1 MG: 1 INJECTION, SOLUTION INTRAMUSCULAR; INTRAVENOUS; SUBCUTANEOUS at 09:09

## 2021-08-08 RX ADMIN — HYDROMORPHONE HYDROCHLORIDE 1 MG: 1 INJECTION, SOLUTION INTRAMUSCULAR; INTRAVENOUS; SUBCUTANEOUS at 03:39

## 2021-08-08 RX ADMIN — POTASSIUM CHLORIDE 150 ML/HR: 2 INJECTION, SOLUTION, CONCENTRATE INTRAVENOUS at 12:01

## 2021-08-08 RX ADMIN — POTASSIUM CHLORIDE 150 ML/HR: 2 INJECTION, SOLUTION, CONCENTRATE INTRAVENOUS at 19:12

## 2021-08-08 RX ADMIN — IBUPROFEN 200 MG: 200 TABLET, FILM COATED ORAL at 14:56

## 2021-08-08 RX ADMIN — ENOXAPARIN SODIUM 40 MG: 40 INJECTION, SOLUTION INTRAVENOUS; SUBCUTANEOUS at 16:31

## 2021-08-08 RX ADMIN — LEVOTHYROXINE SODIUM 150 MCG: 0.15 TABLET ORAL at 06:06

## 2021-08-08 RX ADMIN — HYDROMORPHONE HYDROCHLORIDE 1 MG: 1 INJECTION, SOLUTION INTRAMUSCULAR; INTRAVENOUS; SUBCUTANEOUS at 12:01

## 2021-08-08 RX ADMIN — HYDROMORPHONE HYDROCHLORIDE 1 MG: 1 INJECTION, SOLUTION INTRAMUSCULAR; INTRAVENOUS; SUBCUTANEOUS at 16:32

## 2021-08-08 RX ADMIN — ONDANSETRON 4 MG: 2 INJECTION INTRAMUSCULAR; INTRAVENOUS at 14:51

## 2021-08-08 RX ADMIN — IOPAMIDOL 80 ML: 755 INJECTION, SOLUTION INTRAVENOUS at 09:53

## 2021-08-08 RX ADMIN — HYDROCHLOROTHIAZIDE 25 MG: 25 TABLET ORAL at 09:09

## 2021-08-08 RX ADMIN — HYDROMORPHONE HYDROCHLORIDE 1 MG: 1 INJECTION, SOLUTION INTRAMUSCULAR; INTRAVENOUS; SUBCUTANEOUS at 23:42

## 2021-08-08 RX ADMIN — LOSARTAN POTASSIUM 100 MG: 100 TABLET, FILM COATED ORAL at 09:09

## 2021-08-08 RX ADMIN — SENNOSIDES 8.6 MG: 8.6 TABLET, COATED ORAL at 20:41

## 2021-08-08 RX ADMIN — PANTOPRAZOLE SODIUM 40 MG: 40 TABLET, DELAYED RELEASE ORAL at 16:32

## 2021-08-08 RX ADMIN — POTASSIUM CHLORIDE 150 ML/HR: 2 INJECTION, SOLUTION, CONCENTRATE INTRAVENOUS at 04:01

## 2021-08-08 RX ADMIN — HYDROMORPHONE HYDROCHLORIDE 1 MG: 1 INJECTION, SOLUTION INTRAMUSCULAR; INTRAVENOUS; SUBCUTANEOUS at 20:45

## 2021-08-08 ASSESSMENT — PAIN SCALES - GENERAL
PAINLEVEL_OUTOF10: 8
PAINLEVEL_OUTOF10: 9
PAINLEVEL_OUTOF10: 6
PAINLEVEL_OUTOF10: 8
PAINLEVEL_OUTOF10: 9
PAINLEVEL_OUTOF10: 0

## 2021-08-08 ASSESSMENT — PAIN DESCRIPTION - ONSET
ONSET: ON-GOING

## 2021-08-08 ASSESSMENT — PAIN DESCRIPTION - PROGRESSION
CLINICAL_PROGRESSION: NOT CHANGED

## 2021-08-08 ASSESSMENT — PAIN DESCRIPTION - ORIENTATION
ORIENTATION: RIGHT

## 2021-08-08 ASSESSMENT — PAIN - FUNCTIONAL ASSESSMENT
PAIN_FUNCTIONAL_ASSESSMENT: ACTIVITIES ARE NOT PREVENTED

## 2021-08-08 ASSESSMENT — PAIN DESCRIPTION - PAIN TYPE
TYPE: ACUTE PAIN

## 2021-08-08 ASSESSMENT — PAIN DESCRIPTION - FREQUENCY
FREQUENCY: CONTINUOUS

## 2021-08-08 ASSESSMENT — PAIN DESCRIPTION - LOCATION
LOCATION: ABDOMEN;BACK

## 2021-08-08 ASSESSMENT — PAIN DESCRIPTION - DESCRIPTORS
DESCRIPTORS: ACHING

## 2021-08-08 NOTE — PLAN OF CARE
Problem: Pain:  Goal: Pain level will decrease  Description: Pain level will decrease  Outcome: Ongoing  Patient's pain level has stayed around 9-10/10 this shift. PRN dilaudid given when requested. Ice/heat and repositioning offered if tolerated. Problem: Pain:  Goal: Control of acute pain  Description: Control of acute pain  Outcome: Ongoing  Patient's pain level has stayed aroun 9-10/10 this shift. PRN dilaudid given when requested. Ice/heat and repositioning offered if tolerated. Problem: Pain:  Goal: Control of chronic pain  Description: Control of chronic pain  Outcome: Ongoing  Patient voices no concerns of chronic pain this shift. PRN pain medications ordered if needed, ice/heat and repositioning also available. Problem: Discharge Planning:  Goal: Participates in care planning  Description: Participates in care planning  Outcome: Ongoing  Patient plans to go home with  at discharge. Will follow for any additional needs. Problem: Cardiac Output - Decreased:  Goal: Hemodynamic stability will improve  Description: Hemodynamic stability will improve  Outcome: Ongoing  Patient's vital signs have been stable this shift. Temperature was a little elevated, tylenol given for that. Problem: Falls - Risk of:  Goal: Will remain free from falls  Description: Will remain free from falls  Outcome: Ongoing  Patient has remained free from falls this shift. Bed/chair alarm on when up. Call light and personal belongings within reach. Non-skid socks on when up. Problem: Fluid Volume:  Goal: Will maintain adequate fluid volume  Description: Will maintain adequate fluid volume  Outcome: Ongoing  Patient remains NPO except for ice chips per doctor orders. Lactated Ringers infusion with 20 meq's of potassium running at 150 mL/hr. Patient voiding adequately.       Problem: Sensory:  Goal: General experience of comfort will improve  Description: General experience of comfort will improve  Outcome: Ongoing  Patient appears comfortable at times, with pain also being present at times. Non-pharmacological comfort strategies used throughout the shift to help keep comfortable. Care plan reviewed with patient and . Patient and  verbalize understanding of the plan of care and contribute to goal setting.

## 2021-08-08 NOTE — PROGRESS NOTES
Gastroenterology  Progress Note    8/8/2021 9:34 AM  Subjective:   Admit Date: 8/3/2021    Interval History: Patient with history of recurrent abdominal discomfort intermittent typical of biliary origin associated with abnormality to function test intermittent at this time is high ALT AST however the imaging study with MRCP did not confirm biliary obstructions or stone. Sludge due to the problem not seen with MRCP cannot rule out. Acute hepatitis profile for ABC serologies so far been negative other work-up still pending. No clear indication for ERCP at this time however can be considered patient clinically not improve after discussion with the patient with possible complications which try to happen will repeat a function test and watch closely. 8/7 S/P ERCP with sphincterotomy and sludge extraction developed discomfort with slight deviation the lipase might be a mild pancreatitis we will keep n.p.o. hold antibiotic at this time repeat lab for tomorrow  8/8/2021 significant discomfort CT scans of pancreatitis in the head white cell is high diffuse infectious patient with antibiotic at this stage but have no complications so as long the patient doing fine we will hold antibiotics.   Laparotomy hepatitis positive will hold presented to the pancreatitis improved will need PET will be repeat lab for tomorrow    Diet: Diet NPO Exceptions are: Sips of Water with Meds    Medications:   Scheduled Meds:    pantoprazole  40 mg Oral BID AC    lidocaine  1 patch Transdermal Daily    senna  1 tablet Oral Nightly    docusate sodium  100 mg Oral Daily    sodium chloride (PF)  10 mL Intravenous Daily    levothyroxine  150 mcg Oral Daily    sodium chloride flush  5-40 mL Intravenous 2 times per day    enoxaparin  40 mg Subcutaneous Daily    losartan  100 mg Oral Daily    And    hydroCHLOROthiazide  25 mg Oral Daily     Continuous Infusions:    lactated ringers with KCL 20 mEq infusion 150 mL/hr (08/08/21 0401)   [facility] sodium chloride         CBC:   Recent Labs     08/06/21 0639 08/07/21 0746 08/08/21 0627   WBC 5.8 11.4* 19.6*   HGB 13.7 14.6 13.8    400 345     BMP:    Recent Labs     08/06/21  0639 08/06/21  0639 08/07/21 0746 08/08/21 0627     --  132* 136   K 4.0  4.0   < > 4.8  4.8 4.3  4.3     --  93* 98   CO2 29  --  25 28   BUN 7  --  13 14   CREATININE 0.5  --  0.4 0.6   GLUCOSE 95  --  99 88    < > = values in this interval not displayed. Hepatic:   Recent Labs     08/06/21 0639 08/07/21 0746 08/08/21 0627   * 669* 511*   ALT 1,322* 1,279* 1,002*   BILITOT 6.5* 5.1* 7.5*   ALKPHOS 421* 442* 373*     INR:   Recent Labs     08/06/21 0639 08/07/21 0746 08/08/21 0627   INR 1.04 1.13 1.14*       Imaging:  No results found for this or any previous visit. Results for orders placed during the hospital encounter of 08/03/21    CT ABDOMEN PELVIS W IV CONTRAST Additional Contrast? None    Narrative  PROCEDURE: CT ABDOMEN PELVIS W IV CONTRAST    CLINICAL INFORMATION: right sided abdominal pain; . COMPARISON: CT scan of the abdomen and pelvis dated 3 January 2021. .    TECHNIQUE: Axial 5 mm CT images were obtained through the abdomen and pelvis after the administration of intravenous contrast. Coronal and sagittal reconstructions were obtained. All CT scans at this facility use dose modulation, iterative reconstruction, and/or weight-based dosing when appropriate to reduce radiation dose to as low as reasonably achievable. FINDINGS:      The visualized aspects of the lung bases are clear. The base of the heart is within appropriate limits. There is mild diffuse fatty replacement in the liver. The spleen is normal. The adrenals and pancreas are normal. The patient is status post cholecystectomy. There is is a 7.8 cm probable left renal cyst. There is a 2.1 mm stone in the lower pole of  right kidney with no associated hydronephrosis.       There is a moderate-sized hiatal hernia    No abnormalities of the small bowel loops are noted. There is atherosclerotic calcification in the abdominal aorta and iliac arteries. There are small mesenteric and retroperitoneal lymph nodes present. The urinary bladder is normal. The patient is status post hysterectomy. There is no pelvic free fluid. The colon is within normal limits. . There are postoperative changes in the lower lumbar spine. Impression  1. Stable CT scan of the abdomen and pelvis, no interval change since previous study dated 3 January 2021.  2. Mild diffuse fatty replacement liver. 3. Status post cholecystectomy and hysterectomy. 4. Punctate nonobstructing stone in the lower pole of the right kidney. Small left renal cyst.  5. Hiatal hernia. 6. Small mesenteric and retroperitoneal lymph nodes. 7. Postoperative changes in the lower lumbar spine. 8. Otherwise negative CT scan of the abdomen and pelvis. **This report has been created using voice recognition software. It may contain minor errors which are inherent in voice recognition technology. **    Final report electronically signed by DR Mino Pinto on 8/3/2021 11:54 AM    No results found for this or any previous visit. No results found for this or any previous visit. Endoscopy Finding:      Objective:   Vitals: BP (!) 152/84   Pulse 99   Temp 99.3 °F (37.4 °C) (Oral)   Resp 18   Ht 5' 3\" (1.6 m)   Wt 264 lb 9.6 oz (120 kg)   SpO2 96%   BMI 46.87 kg/m²     Intake/Output Summary (Last 24 hours) at 8/8/2021 0934  Last data filed at 8/8/2021 0339  Gross per 24 hour   Intake 2761. 59 ml   Output 1 ml   Net 2760.59 ml     General appearance: alert and cooperative with exam  Lungs: clear to auscultation bilaterally  Heart: regular rate and rhythm, S1, S2 normal, no murmur, click, rub or gallop  Abdomen: soft, non-tender; bowel sounds normal; no masses,  no organomegaly  Extremities: extremities normal, atraumatic, no cyanosis or edema    Assessment and Plan:   1. Abnormal function test typical biliary colic patient status post cholecystectomy but indicate biliary stone or sludge however imaging studies so far did not confirm it  2. MRCP reviewed with the patient did not confirm biliary dilation of the stone  3. S/P ERCP with sphincterotomy and sludge extractions discussed the finding with the patient . 4. Post ERCP discomfort possible mild pancreatitis lipase is 287 improved today we will keep n.p.o. for now repeat lab for tomorrow  5.  CT scan confirmed pancreatitis in the head conservative management no clear indication for antibiotic at this time      Follow up in GI Clinic after discharge in 1 to 2 weeks week(s)    Patient Active Problem List:     Abdominal pain, right lower quadrant     Puncture wound of heel without complication     Diarrhea     Intractable nausea and vomiting     Transaminitis      Electronically signed by Goldy Arevalo MD on 8/8/2021 at 9:34 AM

## 2021-08-08 NOTE — PLAN OF CARE
Problem: Pain:  Goal: Pain level will decrease  Description: Pain level will decrease  8/8/2021 0057 by Tony Guaman RN  Outcome: Ongoing  Note: Pt complains of pain at a 9/10. Non pharmacological interventions completed which include rest, and repositioning. Pt states pain goal at a 0/10. Pain assessed every 4 hours, and as needed. PRN pain medications given as ordered. Problem: Pain:  Goal: Control of acute pain  Description: Control of acute pain  8/8/2021 0057 by Tony Guaman RN  Outcome: Ongoing  Note: Pt complains of pain at a 9/10. Non pharmacological interventions completed which include rest, and repositioning. Pt states pain goal at a 0/10. Pain assessed every 4 hours, and as needed. PRN pain medications given as ordered. Problem: Pain:  Goal: Control of chronic pain  Description: Control of chronic pain  8/8/2021 0057 by Tony Guaman RN  Outcome: Ongoing  Note: Pt complains of pain at a 9/10. Non pharmacological interventions completed which include rest, and repositioning. Pt states pain goal at a 0/10. Pain assessed every 4 hours, and as needed. PRN pain medications given as ordered. Problem: Discharge Planning:  Goal: Participates in care planning  Description: Participates in care planning  8/8/2021 0057 by Tony Guaman RN  Outcome: Ongoing  Note: Pt plans to be discharged home when appropriate. Problem: Cardiac Output - Decreased:  Goal: Hemodynamic stability will improve  Description: Hemodynamic stability will improve  8/8/2021 0057 by Tony Guaman RN  Outcome: Ongoing  Note: Pt running low grade temp this shift. MD notified. Ibuprofen given per order. Problem: Falls - Risk of:  Goal: Will remain free from falls  Description: Will remain free from falls  8/8/2021 0057 by Tony Guaman RN  Outcome: Ongoing  Note: Absence of falls this shift. Fall prevention in place. Fall band applied. Bed alarm activated as needed.      Problem: Fluid Volume:  Goal: Will maintain adequate fluid volume  Description: Will maintain adequate fluid volume  8/8/2021 0057 by Morena Dhaliwal RN  Outcome: Ongoing  Note: Monitoring I&O's. Problem: Sensory:  Goal: General experience of comfort will improve  Description: General experience of comfort will improve  8/8/2021 0057 by Morena Dhaliwal RN  Outcome: Ongoing  Note: Dilaudid given as needed for pain. Pt resting comfortably in bed with eyes closed. Care plan reviewed with patient. Patient verbalize understanding of the plan of care and contribute to goal setting.

## 2021-08-08 NOTE — PROGRESS NOTES
Assessment and Plan:        1. Abdominal pain w/ transaminitis: chronic undifferentiated abdominal pain and transaminitis  >1 year. GI consulted. Negative hepatitis panel. Denies acetaminophen useANA, ANCA, F-actin (smooth muscle) Ab, Ferritin, IgG, mitochondrial Ab M2 IG, smooth muscle antibody. Further viral serologies pending. Patient's LFTs slightly trended down today. MRCP obtained as well and unremarkable. Patient still having pain. 8/6-> LFTs trending up. Plan for ERCP today. Pending course may consider transfer to location with hepatobiliary specialty. Discussed case with GI.  8/7-> LFTs slightly trending down.  8/8-> LFTs trending down, bilirubin remains elevated. Felt likely related to acute pancreatitis / inflammation. Continue to trend. EMILY +, defer Prednisone at this time, discussed with GI.  2. Acute pancreatitis: Likely secondary to s/p ERCP. Lipase elevated at 278.7. Maintain NPO. IV fluids increased. Pain control with Dilaudid 1 mg every 2 hours as needed. 8/8-> Lipase trending down. Continue with NPO and IVFs. 3. Hyperbilirubinemia: bilirubin trending up, now noted at 7.5. Likely related to inflammation associated with pancreatitis. 4. Hyponatremia: resolved, continue to monitor with daily BMP  5. Leukocytosis: Likely reactive secondary to above. Continue to trend daily. Will defer abx at this time after discussion with GI.  6. Possible celiac disease/lymphocytic colitis: per Dr. Bell Pod note as seen on colon biopsy. 7. Essential HTN: Cozaar and Hydrodiuril, stable continue to monitor  8. Hypothyroidism: Synthroid 150mcg daily  9. Dehydration, POA: Patient was concentrated with hemoglobin near 16 and endorsed being very dry. Started IV fluids. With improvement noted in her hemoglobin concentration. We will continue IV fluids for now.   10. Severe obesity: BMI 47.65      CC:  Abdominal pain; transaminitis    Hospital course: 50yoF with  hypothyroidism, and HTN and PSHx of cholecystectomy and hysterectomy presents for evaluation of abdominal pain. Patient reports intermittent episodes of periumbilical and RUQ abdominal pain over the past year. Pain ranges from 5-6 to 7-8 in severity and is associated with nausea and dry heaving. Episodes typically last for hours and occur 3-5 times/week (though she has symptom free days). Patient denies exacerbating or alleviating factors. Denies fevers/chills, chest pain, pale stools, melena, dysuria, hematuria. Reports 3 episodes of watery diarrhea yesterday as well as darker urine over past week. Patient presented to [facility] ED this morning due to worsening pain. Initial HFT reveales ALT 1567, , Alk Phos 384. Viral hepatitis panel negative. INR and aPTT WNL. CT abdomen pelvis showed mild diffuse fatty replacement of liver. Patient's was first evaluated for abdominal pain last January. She was worked up for celiac disease but had normal TGG. However, biopsy showed features of Celiac disease and lymphocytic colitis. She has since followed a strict gluten free diet. In June, she presented to an ED in [facility] for a similar episode of abdominal pain but her transaminases were in the 200s. Patient recently returned from vacation near [place], [place]. She states that she cooked her own food while on vacation. She also began seeing a chiropractor 4wks ago and was started on several supplements including \"beta plus,\" mimosa, moringa, oregano oil, and wormwood TID but has not taken any 1.5wks. Patient's mother and nephew have history of autoimmune disease. Patient does not recall specifics but reports that her mother \"needs to have her esophagus dilated\" as a result and that her nephew's symptoms are primarily GI.    8/5-> Patient symptoms unchanged. Still having both right upper and lower quadrant abdominal pain and intermittent nausea without emesis. New rash noted on R buttock. No confusion.  Denies CP.     8/6-> LFTS continue to trend up. Pt reports continued abd pain 7/10 in R side of abd. Admits to intermittent nausea and \"watery tongue\" without emesis. Denies fever/chills. No CP/SOB. Pt reports she is anxious about taking pain medicine causes she does not want to get constipated. Educated regarded stool softeners. 8/7-> pt having significant pain overnight in the R side abd and radiating into her back. Pt denies fever/chills. Denies CP/SOB. Admits to continued nausea. Denies passing flatus. No BM since Sunday. Check KUB. Subjective:  8/8-> pt still having pain in R side abd and epigastric pain. Some nausea without emesis. Had a temperature of 100.3 overnight. Denies CP/SOB. ERCP planned for 1500 today. ROS: abdominal pain, nausea, diarrhea (12 point review of systems completed. Pertinent positives noted. Otherwise ROS is negative)  PMH:  Per HPI  SHX:  Occasional ETOH use  Social History     Tobacco Use    Smoking status: Never Smoker    Smokeless tobacco: Never Used   Substance Use Topics    Alcohol use: Yes     Comment: occasionally     Drug use: No     FHX:   Family History   Problem Relation Age of Onset    Cancer Mother     Colon Cancer Neg Hx     Esophageal Cancer Neg Hx     Liver Cancer Neg Hx     Rectal Cancer Neg Hx     Stomach Cancer Neg Hx      Allergies: Allergies   Allergen Reactions    Pcn [Penicillins] Rash     Medications:          Vital Signs:   /85   Pulse 108   Temp 97.7 °F (36.5 °C) (Oral)   Resp 18   Ht 5' 3\" (1.6 m)   Wt 267 lb 8 oz (121.3 kg)   SpO2 95%   BMI 47.39 kg/m²      Intake/Output Summary (Last 24 hours) at 8/8/2021 1523  Last data filed at 8/8/2021 1518  Gross per 24 hour   Intake 4305.99 ml   Output --   Net 4305.99 ml        General:   acutely ill appearing, NAD  HEENT:  normocephalic and atraumatic. No scleral icterus. PERR. Neck: supple. No JVD. No thyromegaly. Lungs: clear to auscultation. No retractions  Cardiac: RRR without murmur. Abdomen: soft. Tenderness to palpation in RUQ and RLQ and epigastric region. Bowel sounds hypoactive. No guarding. Rovsing sign (-)  Extremities:  No clubbing, cyanosis, or edema x 4. Vasculature: capillary refill < 3 seconds. Palpable LE pulses bilaterally. Skin:  warm and dry. Psych:  Alert and oriented x4. Affect appropriate  Lymph:  No supraclavicular adenopathy. Neurologic:  No focal deficit. No seizures. No asterixis    Data: (All radiographs, tracings, PFTs, and imaging are personally viewed and interpreted unless otherwise noted).    Recent Results (from the past 24 hour(s))   Antinuclear AB, Single Pattern    Collection Time: 08/07/21  7:59 PM   Result Value Ref Range    EMILY Pattern Speckled (A)     EMILY Titer 1:1280 (A)    Antinuclear AB, Single Pattern    Collection Time: 08/07/21 11:59 PM   Result Value Ref Range    EMILY Pattern Homogeneous (A)     EMILY Titer 1:1280 (A)    Comprehensive Metabolic Panel w/ Reflex to MG    Collection Time: 08/08/21  6:27 AM   Result Value Ref Range    Glucose 88 70 - 108 mg/dL    CREATININE 0.6 0.4 - 1.2 mg/dL    BUN 14 7 - 22 mg/dL    Sodium 136 135 - 145 meq/L    Potassium reflex Magnesium 4.3 3.5 - 5.2 meq/L    Chloride 98 98 - 111 meq/L    CO2 28 23 - 33 meq/L    Calcium 8.6 8.5 - 10.5 mg/dL     (H) 5 - 40 U/L    Alkaline Phosphatase 373 (H) 38 - 126 U/L    Total Protein 5.8 (L) 6.1 - 8.0 g/dL    Albumin 3.3 (L) 3.5 - 5.1 g/dL    Total Bilirubin 7.5 (H) 0.3 - 1.2 mg/dL    ALT 1,002 (H) 11 - 66 U/L   CBC    Collection Time: 08/08/21  6:27 AM   Result Value Ref Range    WBC 19.6 (H) 4.8 - 10.8 thou/mm3    RBC 4.71 4.20 - 5.40 mill/mm3    Hemoglobin 13.8 12.0 - 16.0 gm/dl    Hematocrit 43.1 37.0 - 47.0 %    MCV 91.5 81.0 - 99.0 fL    MCH 29.3 26.0 - 33.0 pg    MCHC 32.0 (L) 32.2 - 35.5 gm/dl    RDW-CV 17.7 (H) 11.5 - 14.5 %    RDW-SD 58.4 (H) 35.0 - 45.0 fL    Platelets 664 387 - 471 thou/mm3    MPV 9.8 9.4 - 12.4 fL   Protime-INR    Collection Time: 08/08/21  6:27 AM Result Value Ref Range    INR 1.14 (H) 0.85 - 1.13   Lipase    Collection Time: 08/08/21  6:27 AM   Result Value Ref Range    Lipase 168.4 (H) 5.6 - 51.3 U/L   Comprehensive Metabolic Panel    Collection Time: 08/08/21  6:27 AM   Result Value Ref Range    Potassium 4.3 3.5 - 5.2 meq/L   Anion Gap    Collection Time: 08/08/21  6:27 AM   Result Value Ref Range    Anion Gap 10.0 8.0 - 16.0 meq/L   Glomerular Filtration Rate, Estimated    Collection Time: 08/08/21  6:27 AM   Result Value Ref Range    Est, Glom Filt Rate >90 ml/min/1.73m2   Procalcitonin    Collection Time: 08/08/21  9:03 AM   Result Value Ref Range    Procalcitonin 0.59 (H) 0.01 - 0.09 ng/mL       CT ABDOMEN PELVIS W WO CONTRAST Additional Contrast? None   Final Result       1. Acute pancreatitis (Steph score = 2) without evidence of complication at this time. 2. Colonic diverticulosis. **This report has been created using voice recognition software. It may contain minor errors which are inherent in voice recognition technology. **      Final report electronically signed by Dr. Adilene De Paz MD on 8/8/2021 10:28 AM      XR ABDOMEN (KUB) (SINGLE AP VIEW)   Final Result   Moderate retained stool. **This report has been created using voice recognition software. It may contain minor errors which are inherent in voice recognition technology. **      Final report electronically signed by Dr. Adilene De Paz MD on 8/7/2021 11:32 AM      FL ERCP BILIARY AND PANCREATIC S&I   Final Result   Status post ERCP. **This report has been created using voice recognition software. It may contain minor errors which are inherent in voice recognition technology. **      Final report electronically signed by Dr. Winkler Kidney on 8/6/2021 4:45 PM      MRI ABDOMEN W WO CONTRAST   Final Result   1. No common bile duct stone. 2. No intrahepatic ductal dilatation. Pancreatic duct is unremarkable.    3. Gallbladder removed. 4. No liver lesions. **This report has been created using voice recognition software. It may contain minor errors which are inherent in voice recognition technology. **      Final report electronically signed by Dr. Nhan Li on 8/4/2021 10:03 AM      CT ABDOMEN PELVIS W IV CONTRAST Additional Contrast? None   Final Result      1. Stable CT scan of the abdomen and pelvis, no interval change since previous study dated 3 January 2021.   2. Mild diffuse fatty replacement liver. 3. Status post cholecystectomy and hysterectomy. 4. Punctate nonobstructing stone in the lower pole of the right kidney. Small left renal cyst.   5. Hiatal hernia. 6. Small mesenteric and retroperitoneal lymph nodes. 7. Postoperative changes in the lower lumbar spine. 8. Otherwise negative CT scan of the abdomen and pelvis. **This report has been created using voice recognition software. It may contain minor errors which are inherent in voice recognition technology. **      Final report electronically signed by DR Arturo Stevens on 8/3/2021 11:54 AM            Electronically signed by Ele Avila PA-C on 8/8/2021 at 3:23 PM

## 2021-08-09 PROBLEM — E43 SEVERE MALNUTRITION (HCC): Status: ACTIVE | Noted: 2021-08-09

## 2021-08-09 LAB
ALBUMIN SERPL-MCNC: 3.4 G/DL (ref 3.5–5.1)
ALP BLD-CCNC: 353 U/L (ref 38–126)
ALT SERPL-CCNC: 746 U/L (ref 11–66)
ANION GAP SERPL CALCULATED.3IONS-SCNC: 11 MEQ/L (ref 8–16)
AST SERPL-CCNC: 319 U/L (ref 5–40)
BACTERIA: ABNORMAL
BILIRUB SERPL-MCNC: 10.5 MG/DL (ref 0.3–1.2)
BILIRUBIN URINE: ABNORMAL
BLOOD, URINE: NEGATIVE
BUN BLDV-MCNC: 9 MG/DL (ref 7–22)
CALCIUM IONIZED: 1.19 MMOL/L (ref 1.12–1.32)
CALCIUM SERPL-MCNC: 9.1 MG/DL (ref 8.5–10.5)
CASTS: ABNORMAL /LPF
CASTS: ABNORMAL /LPF
CHARACTER, URINE: CLEAR
CHLORIDE BLD-SCNC: 95 MEQ/L (ref 98–111)
CO2: 30 MEQ/L (ref 23–33)
COLOR: ABNORMAL
CREAT SERPL-MCNC: 0.3 MG/DL (ref 0.4–1.2)
CRYSTALS: ABNORMAL
EPITHELIAL CELLS, UA: ABNORMAL /HPF
ERYTHROCYTE [DISTWIDTH] IN BLOOD BY AUTOMATED COUNT: 17.5 % (ref 11.5–14.5)
ERYTHROCYTE [DISTWIDTH] IN BLOOD BY AUTOMATED COUNT: 58 FL (ref 35–45)
GFR SERPL CREATININE-BSD FRML MDRD: > 90 ML/MIN/1.73M2
GLUCOSE BLD-MCNC: 86 MG/DL (ref 70–108)
GLUCOSE, URINE: NEGATIVE MG/DL
HCT VFR BLD CALC: 40.7 % (ref 37–47)
HEMOGLOBIN: 13.3 GM/DL (ref 12–16)
ICTOTEST: POSITIVE
INR BLD: 1.16 (ref 0.85–1.13)
KETONES, URINE: 15
LEUKOCYTE ESTERASE, URINE: NEGATIVE
LIPASE: 82 U/L (ref 5.6–51.3)
MAGNESIUM: 1.6 MG/DL (ref 1.6–2.4)
MCH RBC QN AUTO: 29.7 PG (ref 26–33)
MCHC RBC AUTO-ENTMCNC: 32.7 GM/DL (ref 32.2–35.5)
MCV RBC AUTO: 90.8 FL (ref 81–99)
MISCELLANEOUS LAB TEST RESULT: ABNORMAL
NITRITE, URINE: NEGATIVE
PH UA: 7.5 (ref 5–9)
PHOSPHORUS: 1.9 MG/DL (ref 2.4–4.7)
PHOSPHORUS: 3.2 MG/DL (ref 2.4–4.7)
PLATELET # BLD: 336 THOU/MM3 (ref 130–400)
PMV BLD AUTO: 10.5 FL (ref 9.4–12.4)
POTASSIUM REFLEX MAGNESIUM: 4 MEQ/L (ref 3.5–5.2)
POTASSIUM SERPL-SCNC: 4 MEQ/L (ref 3.5–5.2)
PROTEIN UA: NEGATIVE MG/DL
RBC # BLD: 4.48 MILL/MM3 (ref 4.2–5.4)
RBC URINE: ABNORMAL /HPF
RENAL EPITHELIAL, UA: ABNORMAL
SMOOTH MUSCLE AB IGG TITER: NORMAL
SODIUM BLD-SCNC: 136 MEQ/L (ref 135–145)
SPECIFIC GRAVITY UA: 1.01 (ref 1–1.03)
TOTAL PROTEIN: 6.1 G/DL (ref 6.1–8)
UROBILINOGEN, URINE: 2 EU/DL (ref 0–1)
WBC # BLD: 18.5 THOU/MM3 (ref 4.8–10.8)
WBC UA: ABNORMAL /HPF
YEAST: ABNORMAL

## 2021-08-09 PROCEDURE — 81001 URINALYSIS AUTO W/SCOPE: CPT

## 2021-08-09 PROCEDURE — 87086 URINE CULTURE/COLONY COUNT: CPT

## 2021-08-09 PROCEDURE — 85027 COMPLETE CBC AUTOMATED: CPT

## 2021-08-09 PROCEDURE — 2500000003 HC RX 250 WO HCPCS

## 2021-08-09 PROCEDURE — 36415 COLL VENOUS BLD VENIPUNCTURE: CPT

## 2021-08-09 PROCEDURE — 83735 ASSAY OF MAGNESIUM: CPT

## 2021-08-09 PROCEDURE — 6370000000 HC RX 637 (ALT 250 FOR IP): Performed by: PHYSICIAN ASSISTANT

## 2021-08-09 PROCEDURE — 6370000000 HC RX 637 (ALT 250 FOR IP): Performed by: INTERNAL MEDICINE

## 2021-08-09 PROCEDURE — 1200000003 HC TELEMETRY R&B

## 2021-08-09 PROCEDURE — 84100 ASSAY OF PHOSPHORUS: CPT

## 2021-08-09 PROCEDURE — 6360000002 HC RX W HCPCS: Performed by: INTERNAL MEDICINE

## 2021-08-09 PROCEDURE — 2580000003 HC RX 258: Performed by: INTERNAL MEDICINE

## 2021-08-09 PROCEDURE — 80053 COMPREHEN METABOLIC PANEL: CPT

## 2021-08-09 PROCEDURE — 99233 SBSQ HOSP IP/OBS HIGH 50: CPT | Performed by: PHYSICIAN ASSISTANT

## 2021-08-09 PROCEDURE — 82330 ASSAY OF CALCIUM: CPT

## 2021-08-09 PROCEDURE — 85610 PROTHROMBIN TIME: CPT

## 2021-08-09 PROCEDURE — 2580000003 HC RX 258

## 2021-08-09 PROCEDURE — 83690 ASSAY OF LIPASE: CPT

## 2021-08-09 PROCEDURE — 6360000002 HC RX W HCPCS: Performed by: PHYSICIAN ASSISTANT

## 2021-08-09 PROCEDURE — 2580000003 HC RX 258: Performed by: PHYSICIAN ASSISTANT

## 2021-08-09 RX ORDER — LEUCINE, PHENYLALANINE, LYSINE, METHIONINE, ISOLEUCINE, VALINE, HISTIDINE, THREONINE, TRYPTOPHAN, ALANINE, GLYCINE, ARGININE, PROLINE, SERINE, TYROSINE, DEXTROSE 311; 238; 247; 170; 255; 247; 204; 179; 77; 880; 438; 489; 289; 213; 17; 5 MG/100ML; MG/100ML; MG/100ML; MG/100ML; MG/100ML; MG/100ML; MG/100ML; MG/100ML; MG/100ML; MG/100ML; MG/100ML; MG/100ML; MG/100ML; MG/100ML; MG/100ML; G/100ML
2040 INJECTION INTRAVENOUS
Status: DISCONTINUED | OUTPATIENT
Start: 2021-08-09 | End: 2021-08-10

## 2021-08-09 RX ORDER — METHYLPREDNISOLONE SODIUM SUCCINATE 40 MG/ML
40 INJECTION, POWDER, LYOPHILIZED, FOR SOLUTION INTRAMUSCULAR; INTRAVENOUS DAILY
Status: DISCONTINUED | OUTPATIENT
Start: 2021-08-09 | End: 2021-08-12 | Stop reason: HOSPADM

## 2021-08-09 RX ORDER — MAGNESIUM SULFATE IN WATER 40 MG/ML
2000 INJECTION, SOLUTION INTRAVENOUS PRN
Status: DISCONTINUED | OUTPATIENT
Start: 2021-08-09 | End: 2021-08-12 | Stop reason: HOSPADM

## 2021-08-09 RX ADMIN — HYDROMORPHONE HYDROCHLORIDE 1 MG: 1 INJECTION, SOLUTION INTRAMUSCULAR; INTRAVENOUS; SUBCUTANEOUS at 04:07

## 2021-08-09 RX ADMIN — HYDROMORPHONE HYDROCHLORIDE 1 MG: 1 INJECTION, SOLUTION INTRAMUSCULAR; INTRAVENOUS; SUBCUTANEOUS at 06:33

## 2021-08-09 RX ADMIN — POTASSIUM CHLORIDE 150 ML/HR: 2 INJECTION, SOLUTION, CONCENTRATE INTRAVENOUS at 01:53

## 2021-08-09 RX ADMIN — POTASSIUM CHLORIDE 150 ML/HR: 2 INJECTION, SOLUTION, CONCENTRATE INTRAVENOUS at 08:04

## 2021-08-09 RX ADMIN — SENNOSIDES 8.6 MG: 8.6 TABLET, COATED ORAL at 19:55

## 2021-08-09 RX ADMIN — POTASSIUM CHLORIDE 150 ML/HR: 2 INJECTION, SOLUTION, CONCENTRATE INTRAVENOUS at 17:58

## 2021-08-09 RX ADMIN — HYDROMORPHONE HYDROCHLORIDE 1 MG: 1 INJECTION, SOLUTION INTRAMUSCULAR; INTRAVENOUS; SUBCUTANEOUS at 11:24

## 2021-08-09 RX ADMIN — HYDROCHLOROTHIAZIDE 25 MG: 25 TABLET ORAL at 08:04

## 2021-08-09 RX ADMIN — HYDROMORPHONE HYDROCHLORIDE 1 MG: 1 INJECTION, SOLUTION INTRAMUSCULAR; INTRAVENOUS; SUBCUTANEOUS at 19:56

## 2021-08-09 RX ADMIN — HYDROMORPHONE HYDROCHLORIDE 1 MG: 1 INJECTION, SOLUTION INTRAMUSCULAR; INTRAVENOUS; SUBCUTANEOUS at 01:53

## 2021-08-09 RX ADMIN — LEVOTHYROXINE SODIUM 150 MCG: 0.15 TABLET ORAL at 04:11

## 2021-08-09 RX ADMIN — SODIUM PHOSPHATE, MONOBASIC, MONOHYDRATE 18 MMOL: 276; 142 INJECTION, SOLUTION INTRAVENOUS at 17:21

## 2021-08-09 RX ADMIN — HYDROMORPHONE HYDROCHLORIDE 1 MG: 1 INJECTION, SOLUTION INTRAMUSCULAR; INTRAVENOUS; SUBCUTANEOUS at 13:56

## 2021-08-09 RX ADMIN — SODIUM CHLORIDE, PRESERVATIVE FREE 10 ML: 5 INJECTION INTRAVENOUS at 19:56

## 2021-08-09 RX ADMIN — ONDANSETRON 4 MG: 2 INJECTION INTRAMUSCULAR; INTRAVENOUS at 09:49

## 2021-08-09 RX ADMIN — PANTOPRAZOLE SODIUM 40 MG: 40 TABLET, DELAYED RELEASE ORAL at 04:11

## 2021-08-09 RX ADMIN — METHYLPREDNISOLONE SODIUM SUCCINATE 40 MG: 40 INJECTION, POWDER, FOR SOLUTION INTRAMUSCULAR; INTRAVENOUS at 14:59

## 2021-08-09 RX ADMIN — PANTOPRAZOLE SODIUM 40 MG: 40 TABLET, DELAYED RELEASE ORAL at 16:42

## 2021-08-09 RX ADMIN — ENOXAPARIN SODIUM 40 MG: 40 INJECTION, SOLUTION INTRAVENOUS; SUBCUTANEOUS at 16:41

## 2021-08-09 RX ADMIN — DOCUSATE SODIUM 100 MG: 100 CAPSULE ORAL at 08:04

## 2021-08-09 RX ADMIN — LOSARTAN POTASSIUM 100 MG: 100 TABLET, FILM COATED ORAL at 08:04

## 2021-08-09 RX ADMIN — MAGNESIUM SULFATE HEPTAHYDRATE 2000 MG: 40 INJECTION, SOLUTION INTRAVENOUS at 22:04

## 2021-08-09 ASSESSMENT — PAIN DESCRIPTION - LOCATION
LOCATION: ABDOMEN
LOCATION: ABDOMEN

## 2021-08-09 ASSESSMENT — PAIN SCALES - GENERAL
PAINLEVEL_OUTOF10: 0
PAINLEVEL_OUTOF10: 8
PAINLEVEL_OUTOF10: 7
PAINLEVEL_OUTOF10: 8
PAINLEVEL_OUTOF10: 8

## 2021-08-09 ASSESSMENT — PAIN DESCRIPTION - PROGRESSION
CLINICAL_PROGRESSION: NOT CHANGED
CLINICAL_PROGRESSION: NOT CHANGED

## 2021-08-09 ASSESSMENT — PAIN - FUNCTIONAL ASSESSMENT
PAIN_FUNCTIONAL_ASSESSMENT: ACTIVITIES ARE NOT PREVENTED
PAIN_FUNCTIONAL_ASSESSMENT: ACTIVITIES ARE NOT PREVENTED

## 2021-08-09 ASSESSMENT — PAIN DESCRIPTION - DESCRIPTORS
DESCRIPTORS: ACHING
DESCRIPTORS: ACHING

## 2021-08-09 ASSESSMENT — PAIN DESCRIPTION - ONSET
ONSET: ON-GOING
ONSET: ON-GOING

## 2021-08-09 ASSESSMENT — PAIN DESCRIPTION - PAIN TYPE
TYPE: ACUTE PAIN
TYPE: ACUTE PAIN

## 2021-08-09 ASSESSMENT — PAIN DESCRIPTION - DIRECTION
RADIATING_TOWARDS: BACK
RADIATING_TOWARDS: BACK

## 2021-08-09 ASSESSMENT — PAIN DESCRIPTION - ORIENTATION
ORIENTATION: RIGHT
ORIENTATION: RIGHT

## 2021-08-09 ASSESSMENT — PAIN DESCRIPTION - FREQUENCY
FREQUENCY: CONTINUOUS
FREQUENCY: CONTINUOUS

## 2021-08-09 NOTE — PROGRESS NOTES
Gastroenterology  Progress Note    8/9/2021 6:13 PM  Subjective:   Admit Date: 8/3/2021    Interval History: Patient with history of recurrent abdominal discomfort intermittent typical of biliary origin associated with abnormality to function test intermittent at this time is high ALT AST however the imaging study with MRCP did not confirm biliary obstructions or stone. Sludge due to the problem not seen with MRCP cannot rule out. Acute hepatitis profile for ABC serologies so far been negative other work-up still pending. No clear indication for ERCP at this time however can be considered patient clinically not improve after discussion with the patient with possible complications which try to happen will repeat a function test and watch closely. 8/7 S/P ERCP with sphincterotomy and sludge extraction developed discomfort with slight deviation the lipase might be a mild pancreatitis we will keep n.p.o. hold antibiotic at this time repeat lab for tomorrow  8/8/2021 significant discomfort CT scans of pancreatitis in the head white cell is high diffuse infectious patient with antibiotic at this stage but have no complications so as long the patient doing fine we will hold antibiotics. Laparotomy hepatitis positive will hold presented to the pancreatitis improved will need repeat lab for tomorrow  8/9/2021 clinically improve transaminase abdominal bilirubin is up that the EMILY is very high discussed with the family the potential diagnosis of autoimmune hepatitis will start IV dose of Solu-Medrol, antibiotic on hold the white cell is high reaction to the pancreatitis will advance to clear liquid diet repeat lab tomorrow. Diet: ADULT DIET;  Clear Liquid    Medications:   Scheduled Meds:    methylPREDNISolone  40 mg Intravenous Daily    sodium phosphate IVPB  18 mmol Intravenous Once    [START ON 8/10/2021] insulin lispro  0-6 Units Subcutaneous 4 times per day    phosphorus replacement protocol   Other RX Placeholder    magnesium replacement protocol   Other RX Placeholder    pantoprazole  40 mg Oral BID AC    lidocaine  1 patch Transdermal Daily    senna  1 tablet Oral Nightly    docusate sodium  100 mg Oral Daily    sodium chloride (PF)  10 mL Intravenous Daily    levothyroxine  150 mcg Oral Daily    sodium chloride flush  5-40 mL Intravenous 2 times per day    enoxaparin  40 mg Subcutaneous Daily    losartan  100 mg Oral Daily    And    hydroCHLOROthiazide  25 mg Oral Daily     Continuous Infusions:    CLINIMIX 4.25/5      lactated ringers with KCL 20 mEq infusion 150 mL/hr (08/09/21 3438)    sodium chloride         CBC:   Recent Labs     08/07/21  0746 08/08/21 0627 08/09/21  0909   WBC 11.4* 19.6* 18.5*   HGB 14.6 13.8 13.3    345 336     BMP:    Recent Labs     08/07/21  0746 08/07/21  0746 08/08/21 0627 08/09/21  0909   *  --  136 136   K 4.8  4.8   < > 4.3  4.3 4.0  4.0   CL 93*  --  98 95*   CO2 25  --  28 30   BUN 13  --  14 9   CREATININE 0.4  --  0.6 0.3*   GLUCOSE 99  --  88 86    < > = values in this interval not displayed. Hepatic:   Recent Labs     08/07/21  0746 08/08/21 0627 08/09/21  0909   * 511* 319*   ALT 1,279* 1,002* 746*   BILITOT 5.1* 7.5* 10.5*   ALKPHOS 442* 373* 353*     INR:   Recent Labs     08/07/21  0746 08/08/21 0627 08/09/21  0909   INR 1.13 1.14* 1.16*       Imaging:  No results found for this or any previous visit. Results for orders placed during the hospital encounter of 08/03/21    CT ABDOMEN PELVIS W IV CONTRAST Additional Contrast? None    Narrative  PROCEDURE: CT ABDOMEN PELVIS W IV CONTRAST    CLINICAL INFORMATION: right sided abdominal pain; . COMPARISON: CT scan of the abdomen and pelvis dated 3 January 2021. .    TECHNIQUE: Axial 5 mm CT images were obtained through the abdomen and pelvis after the administration of intravenous contrast. Coronal and sagittal reconstructions were obtained.     All CT scans at this facility use dose modulation, iterative reconstruction, and/or weight-based dosing when appropriate to reduce radiation dose to as low as reasonably achievable. FINDINGS:      The visualized aspects of the lung bases are clear. The base of the heart is within appropriate limits. There is mild diffuse fatty replacement in the liver. The spleen is normal. The adrenals and pancreas are normal. The patient is status post cholecystectomy. There is is a 7.8 cm probable left renal cyst. There is a 2.1 mm stone in the lower pole of  right kidney with no associated hydronephrosis. There is a moderate-sized hiatal hernia    No abnormalities of the small bowel loops are noted. There is atherosclerotic calcification in the abdominal aorta and iliac arteries. There are small mesenteric and retroperitoneal lymph nodes present. The urinary bladder is normal. The patient is status post hysterectomy. There is no pelvic free fluid. The colon is within normal limits. . There are postoperative changes in the lower lumbar spine. Impression  1. Stable CT scan of the abdomen and pelvis, no interval change since previous study dated 3 January 2021.  2. Mild diffuse fatty replacement liver. 3. Status post cholecystectomy and hysterectomy. 4. Punctate nonobstructing stone in the lower pole of the right kidney. Small left renal cyst.  5. Hiatal hernia. 6. Small mesenteric and retroperitoneal lymph nodes. 7. Postoperative changes in the lower lumbar spine. 8. Otherwise negative CT scan of the abdomen and pelvis. **This report has been created using voice recognition software. It may contain minor errors which are inherent in voice recognition technology. **    Final report electronically signed by DR Jemima Coulter on 8/3/2021 11:54 AM    No results found for this or any previous visit. No results found for this or any previous visit.       Endoscopy Finding:      Objective:   Vitals: /74   Pulse 86   Temp 98.7 °F (37.1 °C) (Oral)   Resp 18   Ht 5' 3\" (1.6 m)   Wt 263 lb 3.2 oz (119.4 kg)   SpO2 96%   BMI 46.62 kg/m²     Intake/Output Summary (Last 24 hours) at 8/9/2021 1813  Last data filed at 8/9/2021 4186  Gross per 24 hour   Intake 857.73 ml   Output --   Net 857.73 ml     General appearance: alert and cooperative with exam  Lungs: clear to auscultation bilaterally  Heart: regular rate and rhythm, S1, S2 normal, no murmur, click, rub or gallop  Abdomen: soft, non-tender; bowel sounds normal; no masses,  no organomegaly  Extremities: extremities normal, atraumatic, no cyanosis or edema    Assessment and Plan:   1. Abnormal function test typical biliary colic patient status post cholecystectomy but indicate biliary stone or sludge however imaging studies so far did not confirm it  2. MRCP reviewed with the patient did not confirm biliary dilation of the stone  3. S/P ERCP with sphincterotomy and sludge extractions discussed the finding with the patient . 4. Post ERCP discomfort possible mild pancreatitis lipase is 287 improved today we will keep n.p.o. for now repeat lab for tomorrow  5. No plan for antibiotic therapy for the pancreatitis study does not support need for therapy at this time  6. CT scan confirmed pancreatitis in the head conservative management no clear indication for antibiotic at this time.   7. EMILY titer is very high which also before also been hepatitis      Follow up in GI Clinic after discharge in 1 to 2 weeks week(s)    Patient Active Problem List:     Abdominal pain, right lower quadrant     Puncture wound of heel without complication     Diarrhea     Intractable nausea and vomiting     Transaminitis      Electronically signed by Lilo Mcnally MD on 8/9/2021 at 6:13 PM

## 2021-08-09 NOTE — PLAN OF CARE
Problem: Pain:  Goal: Pain level will decrease  Description: Pain level will decrease  Outcome: Ongoing  Note: Pt has pain voiced this shift at 8/10. Pt pain goal is 7/10. RN continues to deliver PRN pain medications as ordered. RN tries to complete none invasive and none prescribed methods to help reduce pain like rest.  Pain re-assessed frequently. RN also updating MD on pain needs and medication. Bed alarm set after pain meds given. Fall band intact. Goal: Control of acute pain  Description: Control of acute pain  Outcome: Ongoing  Note: Pt has pain voiced this shift at 8/10. Pt pain goal is 7/10. RN continues to deliver PRN pain medications as ordered. RN tries to complete none invasive and none prescribed methods to help reduce pain like rest.  Pain re-assessed frequently. RN also updating MD on pain needs and medication. Bed alarm set after pain meds given. Fall band intact. Problem: Discharge Planning:  Goal: Participates in care planning  Description: Participates in care planning  Outcome: Ongoing  Note: Plans to go home at discharge. Problem: Cardiac Output - Decreased:  Goal: Hemodynamic stability will improve  Description: Hemodynamic stability will improve  Outcome: Ongoing  Note: VS WNL. Problem: Falls - Risk of:  Goal: Will remain free from falls  Description: Will remain free from falls  Outcome: Ongoing  Note: Pt absent of  falls this shift. RN visual checks on pt hourly with rounds. Call light in reach, bed in lowest position. Fall band intact. Bed alarm set. Pt educated to not get up per self to reduce the risk for falls. Problem: Fluid Volume:  Goal: Will maintain adequate fluid volume  Description: Will maintain adequate fluid volume  Outcome: Ongoing  Note: Strict I and O's. Fluids continue. Care plan reviewed with patient. Patient verbalize understanding of the plan of care and contribute to goal setting.

## 2021-08-09 NOTE — CONSULTS
Comprehensive Nutrition Assessment    Type and Reason for Visit:  Initial, Consult (PN Recommendations)    Nutrition Recommendations/Plan:   *Recommend starting 4.25/5 Clinimix based on 10 kcal/kg and dosing weight of 69 kg. *NPO per MD.  *Continue Colace and Senna as ordered d/t constipation. *Continue Vistril and Zofran PRN d/t nausea/emesis. Nutrition Assessment: Pt. severely malnourished AEB criteria listed below. At risk for further nutritional compromise r/t admit d/t abdominal pain w/transaminitis and pancreatitis, hx celiac disease and lactose intolerance and follows a strict Gluten Free, Dairy Free diet PTA, NPO at present with plan to starting PN and underlying medical condition (hx: celiac disease, HTN, Obesity and Thyroid Disease). Nutrition recommendations/interventions as per above. Malnutrition Assessment:  Malnutrition Status:  Severe malnutrition    Context:  Acute Illness     Findings of the 6 clinical characteristics of malnutrition:  Energy Intake:  7 - 50% or less of estimated energy requirements for 5 or more days  Weight Loss:  1 - 1% to 2% over 1 week (-2.2% weight loss in one week)     Body Fat Loss:  1 - Mild body fat loss Orbital   Muscle Mass Loss:  No significant muscle mass loss    Fluid Accumulation:  No significant fluid accumulation Extremities   Strength:  Not Performed    Estimated Daily Nutrient Needs:  Energy (kcal):  4597-3686 kcal/day (10-15 kcal/kg); Weight Used for Energy Requirements:  Current (119.4 kg)     Protein (g):  104+ g/day (2+ g/kg); Weight Used for Protein Requirements:  Ideal (52.3 kg)         Nutrition Related Findings: admit d/t abdominal pain w/transaminitis and pancreatitis; hx celiac disease and lactose intolerance and follows a strict Gluten Free, Dairy Free diet PTA.  Pt seen with family; pt reports not eating anything over the past 6 days since admit; pt reports good intake at baseline PTA, consuming x3 meals daily; pt reports some occasional nausea/emesis since admit but states the Zofran and Vistaril PRN helps; last BM was on 8/3 per pt report. Consulted for PN. Pt with Peripheral IV only. Spoke with RN and Pharmacy regarding plan to starting Clinimix 4.25/5 tonight d/t missing the cut off time for 3-in-1 PPN. Labs: Na 136, K+ 4, BUN 9, Cr. 0.3, Glucose 86, Phosphorus 3.2, Magnesium 1.9 and Lipase 82. Rx includes: Colace, Solu-medrol, Protonix, Senna, Vistaril PRN and Zofran PRN. Wounds:  None       Current Nutrition Therapies:    ADULT DIET; Clear Liquid  Current Parenteral Nutrition Orders:  · Type and Formula: Premix Peripheral (Clinimix 4.25/5 based on 10 kcal/kg with a dosing weight of 69 kg)   · Lipids: None  · Duration: Continuous  · Rate/Volume: 85 mL/hr per pharmacy  · Current PN Order Provides: 694 kcal, 87 grams protein and 102 grams dextrose    Anthropometric Measures:  · Height: 5' 3\" (160 cm)  · Current Body Weight: 263 lb 3.2 oz (119.4 kg) (8/8; +trace edema BLE)   · Admission Body Weight: 269 lb (122 kg) (8/4; +trace edema BLE)    · Usual Body Weight:  (262 lbs per pt report. No actual weights per EMR)     · Ideal Body Weight: 115 lbs  · BMI: 46.6  · BMI Categories: Obese Class 3 (BMI 40.0 or greater)       Nutrition Diagnosis:   · Severe malnutrition, In context of acute illness or injury related to inadequate protein-energy intake as evidenced by weight loss greater than or equal to 2% in 1 week (pt consuming less than 50% of meals x6 days since admit)    Nutrition Interventions:   Food and/or Nutrient Delivery:  Continue NPO, Start Parenteral Nutrition  Nutrition Education/Counseling:  Education initiated (Discussed plan to start Clinimix 4.25/5 tonight with patient and family.)   Coordination of Nutrition Care:  Continue to monitor while inpatient    Goals:  Pt will tolerate adequate nutrition support to meet 75% or more of estimated nutritional needs during LOS until able to transition to solely po feeds. Nutrition Monitoring and Evaluation:   Behavioral-Environmental Outcomes:  None Identified   Food/Nutrient Intake Outcomes:  Parenteral Nutrition Intake/Tolerance  Physical Signs/Symptoms Outcomes:  Biochemical Data, Constipation, GI Status, Nausea or Vomiting, Weight, Skin, Nutrition Focused Physical Findings, Fluid Status or Edema     Discharge Planning:     Too soon to determine     Electronically signed by Joe River MS, RD, LD on 8/9/21 at 2:40 PM EDT    Contact: [phone]

## 2021-08-09 NOTE — PLAN OF CARE
Problem: Nutrition  Goal: Optimal nutrition therapy  Outcome: Ongoing   Nutrition Problem #1: Severe malnutrition, In context of acute illness or injury  Intervention: Food and/or Nutrient Delivery: Continue NPO, Start Parenteral Nutrition  Nutritional Goals: Pt will tolerate adequate nutrition support to meet 75% or more of estimated nutritional needs during LOS until able to transition to solely po feeds.

## 2021-08-09 NOTE — PROGRESS NOTES
Pharmacy Consult  PPN    Ordering Provider: MICHELA Serrano    Indication for PPN: Malnourishment due to comorbid conditions (transaminitis, pancreatitis, celiac disease)    Macronutrients   DW: 69 kg   Total Kcal: 10 Kcal/kg   Infusion Rate: 85 mL/hr    Electrolyte Replacement: sodium phosphorous 18 mmol IVPB    Assessment/ Plan:  Okay to start if phos>2. Initiate peripheral Climix 4.25/5 tonight.    Added low dose sliding scale Humalog Q6H  Ordered BMP, mag, phos, and ical tomorrow 08/10 with morning labs    Seema Liao PharmD  8/9/2021  5:10 PM

## 2021-08-09 NOTE — PROGRESS NOTES
Assessment and Plan:        1. Abdominal pain w/ transaminitis: chronic undifferentiated abdominal pain and transaminitis  >1 year. GI consulted. Negative hepatitis panel. Denies acetaminophen useANA, ANCA, F-actin (smooth muscle) Ab, Ferritin, IgG, mitochondrial Ab M2 IG, smooth muscle antibody. Further viral serologies pending. Patient's LFTs slightly trended down today. MRCP obtained as well and unremarkable. Patient still having pain. 8/6-> LFTs trending up. Plan for ERCP today. Pending course may consider transfer to location with hepatobiliary specialty. Discussed case with GI.  8/7-> LFTs slightly trending down.  8/9-> LFTs trending down, bilirubin remains elevated, unclear etiology, possibly related to acute pancreatitis / inflammation. Continue to trend. EMILY +, discussed with GI and start IV Solu-medrol at this time. 2. Acute pancreatitis: Likely secondary to s/p ERCP. Lipase elevated at 278.7. Maintain NPO. IV fluids increased. Pain control with Dilaudid 1 mg every 2 hours as needed. 8/8-> Lipase trending down. Continue with NPO and IVFs. 8/9-> continues to have pain, await diet changes per GI.  3. Hyperbilirubinemia: bilirubin trending up, now noted at 10.5. Likely related to inflammation associated with pancreatitis, but unclear etiology  4. Hyponatremia: resolved, continue to monitor with daily BMP  5. Leukocytosis: Likely reactive secondary to above. Continue to trend daily. Will defer abx at this time after discussion with GI.  6. Possible celiac disease/lymphocytic colitis: per Dr. Armin Woodard note as seen on colon biopsy. 7. Essential HTN: Cozaar and Hydrodiuril, stable continue to monitor  8. Hypothyroidism: Synthroid 150mcg daily  9. Dehydration, POA: Patient was concentrated with hemoglobin near 16 and endorsed being very dry. Started IV fluids. With improvement noted in her hemoglobin concentration. We will continue IV fluids for now.   10. Severe obesity: BMI 47.65      CC:  Abdominal pain; transaminitis    Hospital course: 46yoF with  hypothyroidism, and HTN and PSHx of cholecystectomy and hysterectomy presents for evaluation of abdominal pain. Patient reports intermittent episodes of periumbilical and RUQ abdominal pain over the past year. Pain ranges from 5-6 to 7-8 in severity and is associated with nausea and dry heaving. Episodes typically last for hours and occur 3-5 times/week (though she has symptom free days). Patient denies exacerbating or alleviating factors. Denies fevers/chills, chest pain, pale stools, melena, dysuria, hematuria. Reports 3 episodes of watery diarrhea yesterday as well as darker urine over past week. Patient presented to [facility] ED this morning due to worsening pain. Initial HFT reveales ALT 1567, , Alk Phos 384. Viral hepatitis panel negative. INR and aPTT WNL. CT abdomen pelvis showed mild diffuse fatty replacement of liver. Patient's was first evaluated for abdominal pain last January. She was worked up for celiac disease but had normal TGG. However, biopsy showed features of Celiac disease and lymphocytic colitis. She has since followed a strict gluten free diet. In June, she presented to an ED in [facility] for a similar episode of abdominal pain but her transaminases were in the 200s. Patient recently returned from vacation near [facility]. She states that she cooked her own food while on vacation. She also began seeing a chiropractor 4wks ago and was started on several supplements including \"beta plus,\" mimosa, moringa, oregano oil, and wormwood TID but has not taken any 1.5wks. Patient's mother and nephew have history of autoimmune disease. Patient does not recall specifics but reports that her mother \"needs to have her esophagus dilated\" as a result and that her nephew's symptoms are primarily GI.    8/5-> Patient symptoms unchanged. Still having both right upper and lower quadrant abdominal pain and intermittent nausea without emesis. New rash noted on R buttock. No confusion. Denies CP.     8/6-> LFTS continue to trend up. Pt reports continued abd pain 7/10 in R side of abd. Admits to intermittent nausea and \"watery tongue\" without emesis. Denies fever/chills. No CP/SOB. Pt reports she is anxious about taking pain medicine causes she does not want to get constipated. Educated regarded stool softeners. 8/7-> pt having significant pain overnight in the R side abd and radiating into her back. Pt denies fever/chills. Denies CP/SOB. Admits to continued nausea. Denies passing flatus. No BM since Sunday. Check KUB. 8/8-> pt still having pain in R side abd and epigastric pain. Some nausea without emesis. Had a temperature of 100.3 overnight. Denies CP/SOB. Subjective:  8/9-> pt is doing okay, reports slight improvement of her pain from 10 to 8/10. notign epigastric discomfort with radation into back. EMILY positive and concern for autoimmune hepatitis. Discussed case with GI. Not recommending abx at this time after discussion. Pt denies fever/chills. Admits to suprapubic discomfort today but no dysuria, increased urgency/frequency. ERCP planned for 1500 today. ROS: abdominal pain, nausea, diarrhea (12 point review of systems completed. Pertinent positives noted. Otherwise ROS is negative)  PMH:  Per HPI  SHX:  Occasional ETOH use  Social History     Tobacco Use    Smoking status: Never Smoker    Smokeless tobacco: Never Used   Substance Use Topics    Alcohol use: Yes     Comment: occasionally     Drug use: No     FHX:   Family History   Problem Relation Age of Onset    Cancer Mother     Colon Cancer Neg Hx     Esophageal Cancer Neg Hx     Liver Cancer Neg Hx     Rectal Cancer Neg Hx     Stomach Cancer Neg Hx      Allergies:    Allergies   Allergen Reactions    Gluten Meal     Lactose Intolerance (Gi)     Pcn [Penicillins] Rash     Medications:          Vital Signs:   /74   Pulse 86   Temp 98.7 °F (37.1 °C) (Oral) Resp 18   Ht 5' 3\" (1.6 m)   Wt 263 lb 3.2 oz (119.4 kg)   SpO2 96%   BMI 46.62 kg/m²      Intake/Output Summary (Last 24 hours) at 8/9/2021 1636  Last data filed at 8/9/2021 0323  Gross per 24 hour   Intake 857.73 ml   Output --   Net 857.73 ml        General:   acutely ill appearing, NAD  HEENT:  normocephalic and atraumatic. Scleral icterus present. PERR. Neck: supple. No JVD. No thyromegaly. Lungs: clear to auscultation. No retractions  Cardiac: RRR without murmur. Abdomen: soft. Suprapubic tenderness. Tenderness to palpation in RUQ and RLQ and epigastric region. Bowel sounds hypoactive. No guarding. Rovsing sign (-)  Extremities:  No clubbing, cyanosis, or edema x 4. Vasculature: capillary refill < 3 seconds. Palpable LE pulses bilaterally. Skin:  warm and dry. Psych:  Alert and oriented x4. Affect appropriate  Lymph:  No supraclavicular adenopathy. Neurologic:  No focal deficit. No seizures. No asterixis    Data: (All radiographs, tracings, PFTs, and imaging are personally viewed and interpreted unless otherwise noted).    Recent Results (from the past 24 hour(s))   Comprehensive Metabolic Panel w/ Reflex to MG    Collection Time: 08/09/21  9:09 AM   Result Value Ref Range    Glucose 86 70 - 108 mg/dL    CREATININE 0.3 (L) 0.4 - 1.2 mg/dL    BUN 9 7 - 22 mg/dL    Sodium 136 135 - 145 meq/L    Potassium reflex Magnesium 4.0 3.5 - 5.2 meq/L    Chloride 95 (L) 98 - 111 meq/L    CO2 30 23 - 33 meq/L    Calcium 9.1 8.5 - 10.5 mg/dL     (H) 5 - 40 U/L    Alkaline Phosphatase 353 (H) 38 - 126 U/L    Total Protein 6.1 6.1 - 8.0 g/dL    Albumin 3.4 (L) 3.5 - 5.1 g/dL    Total Bilirubin 10.5 (H) 0.3 - 1.2 mg/dL     (H) 11 - 66 U/L   CBC    Collection Time: 08/09/21  9:09 AM   Result Value Ref Range    WBC 18.5 (H) 4.8 - 10.8 thou/mm3    RBC 4.48 4.20 - 5.40 mill/mm3    Hemoglobin 13.3 12.0 - 16.0 gm/dl    Hematocrit 40.7 37.0 - 47.0 %    MCV 90.8 81.0 - 99.0 fL    MCH 29.7 26.0 - 33.0 pg    MCHC 32.7 32.2 - 35.5 gm/dl    RDW-CV 17.5 (H) 11.5 - 14.5 %    RDW-SD 58.0 (H) 35.0 - 45.0 fL    Platelets 726 464 - 516 thou/mm3    MPV 10.5 9.4 - 12.4 fL   Protime-INR    Collection Time: 08/09/21  9:09 AM   Result Value Ref Range    INR 1.16 (H) 0.85 - 1.13   Lipase    Collection Time: 08/09/21  9:09 AM   Result Value Ref Range    Lipase 82.0 (H) 5.6 - 51.3 U/L   Comprehensive Metabolic Panel    Collection Time: 08/09/21  9:09 AM   Result Value Ref Range    Potassium 4.0 3.5 - 5.2 meq/L   Anion Gap    Collection Time: 08/09/21  9:09 AM   Result Value Ref Range    Anion Gap 11.0 8.0 - 16.0 meq/L   Glomerular Filtration Rate, Estimated    Collection Time: 08/09/21  9:09 AM   Result Value Ref Range    Est, Glom Filt Rate >90 ml/min/1.73m2   Urinalysis with microscopic    Collection Time: 08/09/21  2:54 PM   Result Value Ref Range    Glucose, Urine NEGATIVE NEGATIVE mg/dl    Bilirubin Urine MODERATE (A) NEGATIVE    Ketones, Urine 15 (A) NEGATIVE    Specific Gravity, UA 1.011 1.002 - 1.030    Blood, Urine NEGATIVE NEGATIVE    pH, UA 7.5 5.0 - 9.0    Protein, UA NEGATIVE NEGATIVE mg/dl    Urobilinogen, Urine 2.0 (A) 0.0 - 1.0 eu/dl    Nitrite, Urine NEGATIVE NEGATIVE    Leukocyte Esterase, Urine NEGATIVE NEGATIVE    Color, UA DK YELLOW YELLOW-STRAW    Character, Urine CLEAR [id number]    RBC, UA 0-2 0-2/hpf /hpf    WBC, UA NONE SEEN 0-4/hpf /hpf    Epithelial Cells, UA NONE SEEN 3-5/hpf /hpf    Bacteria, UA NONE SEEN FEW/NONE SEEN    Casts NONE SEEN NONE SEEN /lpf    Crystals NONE SEEN NONE SEEN    Renal Epithelial, UA NONE SEEN NONE SEEN    Yeast, UA NONE SEEN NONE SEEN    Casts NONE SEEN /lpf    Miscellaneous Lab Test Result NONE SEEN    ICTOTEST, URINE    Collection Time: 08/09/21  2:54 PM   Result Value Ref Range    Ictotest POSITIVE (A) NEGATIVE   Phosphorus    Collection Time: 08/09/21  3:19 PM   Result Value Ref Range    Phosphorus 1.9 (L) 2.4 - 4.7 mg/dL   Magnesium    Collection Time: 08/09/21  3:19 PM   Result Value Ref Range    Magnesium 1.6 1.6 - 2.4 mg/dL   Calcium, Ionized    Collection Time: 08/09/21  3:19 PM   Result Value Ref Range    Calcium, Ion 1.19 1.12 - 1.32 mmol/L       CT ABDOMEN PELVIS W WO CONTRAST Additional Contrast? None   Final Result       1. Acute pancreatitis (Steph score = 2) without evidence of complication at this time. 2. Colonic diverticulosis. **This report has been created using voice recognition software. It may contain minor errors which are inherent in voice recognition technology. **      Final report electronically signed by Dr. Giancarlo Ritter MD on 8/8/2021 10:28 AM      XR ABDOMEN (KUB) (SINGLE AP VIEW)   Final Result   Moderate retained stool. **This report has been created using voice recognition software. It may contain minor errors which are inherent in voice recognition technology. **      Final report electronically signed by Dr. Giancarlo Ritter MD on 8/7/2021 11:32 AM      FL ERCP BILIARY AND PANCREATIC S&I   Final Result   Status post ERCP. **This report has been created using voice recognition software. It may contain minor errors which are inherent in voice recognition technology. **      Final report electronically signed by Dr. Jung Mg on 8/6/2021 4:45 PM      MRI ABDOMEN W WO CONTRAST   Final Result   1. No common bile duct stone. 2. No intrahepatic ductal dilatation. Pancreatic duct is unremarkable. 3. Gallbladder removed. 4. No liver lesions. **This report has been created using voice recognition software. It may contain minor errors which are inherent in voice recognition technology. **      Final report electronically signed by Dr. Kirill Finney on 8/4/2021 10:03 AM      CT ABDOMEN PELVIS W IV CONTRAST Additional Contrast? None   Final Result      1.  Stable CT scan of the abdomen and pelvis, no interval change since previous study dated 3 January 2021.   2. Mild diffuse fatty replacement liver. 3. Status post cholecystectomy and hysterectomy. 4. Punctate nonobstructing stone in the lower pole of the right kidney. Small left renal cyst.   5. Hiatal hernia. 6. Small mesenteric and retroperitoneal lymph nodes. 7. Postoperative changes in the lower lumbar spine. 8. Otherwise negative CT scan of the abdomen and pelvis. **This report has been created using voice recognition software. It may contain minor errors which are inherent in voice recognition technology. **      Final report electronically signed by DR Sabina Rodrigues on 8/3/2021 11:54 AM            Electronically signed by [facility]/P MARIANA CALLAWAY on 8/9/2021 at 4:36 PM

## 2021-08-10 ENCOUNTER — APPOINTMENT (OUTPATIENT)
Dept: GENERAL RADIOLOGY | Age: 51
DRG: 441 | End: 2021-08-10
Payer: COMMERCIAL

## 2021-08-10 LAB
ALBUMIN SERPL-MCNC: 2.8 G/DL (ref 3.5–5.1)
ALP BLD-CCNC: 292 U/L (ref 38–126)
ALT SERPL-CCNC: 505 U/L (ref 11–66)
ANION GAP SERPL CALCULATED.3IONS-SCNC: 11 MEQ/L (ref 8–16)
AST SERPL-CCNC: 177 U/L (ref 5–40)
BILIRUB SERPL-MCNC: 4.1 MG/DL (ref 0.3–1.2)
BUN BLDV-MCNC: 10 MG/DL (ref 7–22)
CALCIUM IONIZED: 1.19 MMOL/L (ref 1.12–1.32)
CALCIUM SERPL-MCNC: 9.1 MG/DL (ref 8.5–10.5)
CHLORIDE BLD-SCNC: 95 MEQ/L (ref 98–111)
CO2: 30 MEQ/L (ref 23–33)
CREAT SERPL-MCNC: 0.4 MG/DL (ref 0.4–1.2)
ERYTHROCYTE [DISTWIDTH] IN BLOOD BY AUTOMATED COUNT: 17.3 % (ref 11.5–14.5)
ERYTHROCYTE [DISTWIDTH] IN BLOOD BY AUTOMATED COUNT: 58.9 FL (ref 35–45)
GFR SERPL CREATININE-BSD FRML MDRD: > 90 ML/MIN/1.73M2
GLUCOSE BLD-MCNC: 124 MG/DL (ref 70–108)
GLUCOSE BLD-MCNC: 126 MG/DL (ref 70–108)
GLUCOSE BLD-MCNC: 130 MG/DL (ref 70–108)
GLUCOSE BLD-MCNC: 132 MG/DL (ref 70–108)
HCT VFR BLD CALC: 36.8 % (ref 37–47)
HEMOGLOBIN: 12 GM/DL (ref 12–16)
INR BLD: 1.25 (ref 0.85–1.13)
LIPASE: 24.7 U/L (ref 5.6–51.3)
MAGNESIUM: 2 MG/DL (ref 1.6–2.4)
MCH RBC QN AUTO: 30.3 PG (ref 26–33)
MCHC RBC AUTO-ENTMCNC: 32.6 GM/DL (ref 32.2–35.5)
MCV RBC AUTO: 92.9 FL (ref 81–99)
ORGANISM: ABNORMAL
PHOSPHORUS: 2.3 MG/DL (ref 2.4–4.7)
PLATELET # BLD: 329 THOU/MM3 (ref 130–400)
PMV BLD AUTO: 10.6 FL (ref 9.4–12.4)
POTASSIUM REFLEX MAGNESIUM: 4.2 MEQ/L (ref 3.5–5.2)
POTASSIUM SERPL-SCNC: 4.2 MEQ/L (ref 3.5–5.2)
RBC # BLD: 3.96 MILL/MM3 (ref 4.2–5.4)
SODIUM BLD-SCNC: 136 MEQ/L (ref 135–145)
TOTAL PROTEIN: 5.9 G/DL (ref 6.1–8)
URINE CULTURE, ROUTINE: ABNORMAL
WBC # BLD: 16.1 THOU/MM3 (ref 4.8–10.8)

## 2021-08-10 PROCEDURE — 80053 COMPREHEN METABOLIC PANEL: CPT

## 2021-08-10 PROCEDURE — 74018 RADEX ABDOMEN 1 VIEW: CPT

## 2021-08-10 PROCEDURE — 2580000003 HC RX 258: Performed by: INTERNAL MEDICINE

## 2021-08-10 PROCEDURE — 1200000003 HC TELEMETRY R&B

## 2021-08-10 PROCEDURE — 2500000003 HC RX 250 WO HCPCS: Performed by: PHYSICIAN ASSISTANT

## 2021-08-10 PROCEDURE — 99233 SBSQ HOSP IP/OBS HIGH 50: CPT | Performed by: PHYSICIAN ASSISTANT

## 2021-08-10 PROCEDURE — 84100 ASSAY OF PHOSPHORUS: CPT

## 2021-08-10 PROCEDURE — 2580000003 HC RX 258: Performed by: PHYSICIAN ASSISTANT

## 2021-08-10 PROCEDURE — 6370000000 HC RX 637 (ALT 250 FOR IP): Performed by: INTERNAL MEDICINE

## 2021-08-10 PROCEDURE — 36415 COLL VENOUS BLD VENIPUNCTURE: CPT

## 2021-08-10 PROCEDURE — 82330 ASSAY OF CALCIUM: CPT

## 2021-08-10 PROCEDURE — 83690 ASSAY OF LIPASE: CPT

## 2021-08-10 PROCEDURE — 6360000002 HC RX W HCPCS: Performed by: PHYSICIAN ASSISTANT

## 2021-08-10 PROCEDURE — 82948 REAGENT STRIP/BLOOD GLUCOSE: CPT

## 2021-08-10 PROCEDURE — 85610 PROTHROMBIN TIME: CPT

## 2021-08-10 PROCEDURE — 83735 ASSAY OF MAGNESIUM: CPT

## 2021-08-10 PROCEDURE — 6360000002 HC RX W HCPCS: Performed by: INTERNAL MEDICINE

## 2021-08-10 PROCEDURE — 6370000000 HC RX 637 (ALT 250 FOR IP): Performed by: PHYSICIAN ASSISTANT

## 2021-08-10 PROCEDURE — 85027 COMPLETE CBC AUTOMATED: CPT

## 2021-08-10 RX ORDER — BISACODYL 10 MG
10 SUPPOSITORY, RECTAL RECTAL DAILY PRN
Status: DISCONTINUED | OUTPATIENT
Start: 2021-08-10 | End: 2021-08-12 | Stop reason: HOSPADM

## 2021-08-10 RX ADMIN — HYDROCHLOROTHIAZIDE 25 MG: 25 TABLET ORAL at 07:47

## 2021-08-10 RX ADMIN — PANTOPRAZOLE SODIUM 40 MG: 40 TABLET, DELAYED RELEASE ORAL at 05:03

## 2021-08-10 RX ADMIN — HYDROMORPHONE HYDROCHLORIDE 1 MG: 1 INJECTION, SOLUTION INTRAMUSCULAR; INTRAVENOUS; SUBCUTANEOUS at 20:53

## 2021-08-10 RX ADMIN — SODIUM CHLORIDE, PRESERVATIVE FREE 10 ML: 5 INJECTION INTRAVENOUS at 20:52

## 2021-08-10 RX ADMIN — SODIUM PHOSPHATE, MONOBASIC, MONOHYDRATE 18 MMOL: 276; 142 INJECTION, SOLUTION INTRAVENOUS at 10:58

## 2021-08-10 RX ADMIN — LEUCINE, PHENYLALANINE, LYSINE, METHIONINE, ISOLEUCINE, VALINE, HISTIDINE, THREONINE, TRYPTOPHAN, ALANINE, GLYCINE, ARGININE, PROLINE, SERINE, TYROSINE, DEXTROSE 2040 ML: 311; 238; 247; 170; 255; 247; 204; 179; 77; 880; 438; 489; 289; 213; 17; 5 INJECTION INTRAVENOUS at 01:08

## 2021-08-10 RX ADMIN — HYDROMORPHONE HYDROCHLORIDE 1 MG: 1 INJECTION, SOLUTION INTRAMUSCULAR; INTRAVENOUS; SUBCUTANEOUS at 18:38

## 2021-08-10 RX ADMIN — POTASSIUM CHLORIDE 150 ML/HR: 2 INJECTION, SOLUTION, CONCENTRATE INTRAVENOUS at 03:43

## 2021-08-10 RX ADMIN — DOCUSATE SODIUM 100 MG: 100 CAPSULE ORAL at 07:47

## 2021-08-10 RX ADMIN — ENOXAPARIN SODIUM 40 MG: 40 INJECTION, SOLUTION INTRAVENOUS; SUBCUTANEOUS at 16:32

## 2021-08-10 RX ADMIN — SODIUM CHLORIDE, PRESERVATIVE FREE 10 ML: 5 INJECTION INTRAVENOUS at 07:47

## 2021-08-10 RX ADMIN — PANTOPRAZOLE SODIUM 40 MG: 40 TABLET, DELAYED RELEASE ORAL at 16:32

## 2021-08-10 RX ADMIN — SENNOSIDES 8.6 MG: 8.6 TABLET, COATED ORAL at 20:51

## 2021-08-10 RX ADMIN — POTASSIUM CHLORIDE 45 ML/HR: 2 INJECTION, SOLUTION, CONCENTRATE INTRAVENOUS at 10:58

## 2021-08-10 RX ADMIN — LOSARTAN POTASSIUM 100 MG: 100 TABLET, FILM COATED ORAL at 07:47

## 2021-08-10 RX ADMIN — METHYLPREDNISOLONE SODIUM SUCCINATE 40 MG: 40 INJECTION, POWDER, FOR SOLUTION INTRAMUSCULAR; INTRAVENOUS at 07:47

## 2021-08-10 RX ADMIN — HYDROMORPHONE HYDROCHLORIDE 1 MG: 1 INJECTION, SOLUTION INTRAMUSCULAR; INTRAVENOUS; SUBCUTANEOUS at 05:03

## 2021-08-10 RX ADMIN — LEVOTHYROXINE SODIUM 150 MCG: 0.15 TABLET ORAL at 05:03

## 2021-08-10 RX ADMIN — IBUPROFEN 200 MG: 200 TABLET, FILM COATED ORAL at 03:43

## 2021-08-10 ASSESSMENT — PAIN - FUNCTIONAL ASSESSMENT
PAIN_FUNCTIONAL_ASSESSMENT: ACTIVITIES ARE NOT PREVENTED

## 2021-08-10 ASSESSMENT — PAIN DESCRIPTION - DESCRIPTORS
DESCRIPTORS: ACHING

## 2021-08-10 ASSESSMENT — PAIN DESCRIPTION - ORIENTATION
ORIENTATION: RIGHT

## 2021-08-10 ASSESSMENT — PAIN DESCRIPTION - DIRECTION
RADIATING_TOWARDS: BACK

## 2021-08-10 ASSESSMENT — PAIN DESCRIPTION - ONSET
ONSET: ON-GOING

## 2021-08-10 ASSESSMENT — PAIN DESCRIPTION - FREQUENCY
FREQUENCY: CONTINUOUS

## 2021-08-10 ASSESSMENT — PAIN DESCRIPTION - LOCATION
LOCATION: ABDOMEN

## 2021-08-10 ASSESSMENT — PAIN DESCRIPTION - PROGRESSION
CLINICAL_PROGRESSION: NOT CHANGED

## 2021-08-10 ASSESSMENT — PAIN SCALES - GENERAL
PAINLEVEL_OUTOF10: 7
PAINLEVEL_OUTOF10: 0
PAINLEVEL_OUTOF10: 9
PAINLEVEL_OUTOF10: 9
PAINLEVEL_OUTOF10: 8
PAINLEVEL_OUTOF10: 8
PAINLEVEL_OUTOF10: 7
PAINLEVEL_OUTOF10: 0

## 2021-08-10 ASSESSMENT — PAIN DESCRIPTION - PAIN TYPE
TYPE: ACUTE PAIN

## 2021-08-10 NOTE — PROGRESS NOTES
Pharmacy Consult       PPN    Ordering Provider: Reuben ABERNATHY    Indication for PPN: Malnourishment due to comorbid conditions (transaminitis, pancreatitis, celiac disease)    Macronutrients   DW: 69 kg   AA: 1.2 g/kg   Total Kcal: 10 Kcal/kg   Infusion Rate: 100 mL/hr    Electrolytes (per bag)   NaCl:         180 mEq   NaPhos:       24 mmol     K Acetate:     40 mEq     Calcium Gluc:   4.65 mEq   Magnesium:      16.24 mEq      MV:      10 mL   Trace Elements: 1 mL    Electrolyte Replacement: sodium phosphorus 18 mmol IVPB    Assessment/ Plan:  Lipids removed d/t shortage.  Initiate 2in1 PPN  Maintenance fluid of potassium chloride in LR decreased to 45mL/hr  Ordered BMP, phos, ical, and magnesium to be checked tomorrow morning 08/11 with am labs    Andrews Bernard PharmD  8/10/2021  11:10 AM

## 2021-08-10 NOTE — PLAN OF CARE
Problem: Pain:  Goal: Pain level will decrease  Description: Pain level will decrease  8/10/2021 1301 by Dominique Mccarthy RN  Outcome: Ongoing  Note: Pt has pain voiced this shift at 8/10. Pt pain goal is 7/10. RN continues to deliver PRN pain medications as ordered. RN tries to complete none invasive and none prescribed methods to help reduce pain like ice pack and massage. Pain re-assessed frequently. RN also updating MD on pain needs and medication. Bed alarm set after pain meds given. Fall band intact. Problem: Pain:  Goal: Control of acute pain  Description: Control of acute pain  8/10/2021 1301 by Dominique Mccarthy RN  Outcome: Ongoing  Note: Pt has pain voiced this shift at 8/10. Pt pain goal is 7/10. RN continues to deliver PRN pain medications as ordered. RN tries to complete none invasive and none prescribed methods to help reduce pain like ice pack and massage. Pain re-assessed frequently. RN also updating MD on pain needs and medication. Bed alarm set after pain meds given. Fall band intact. Problem: Discharge Planning:  Goal: Participates in care planning  Description: Participates in care planning  8/10/2021 1301 by Dominique Mccarthy RN  Outcome: Ongoing  Note: Plans to go home at discharge. Problem: Cardiac Output - Decreased:  Goal: Hemodynamic stability will improve  Description: Hemodynamic stability will improve  8/10/2021 1301 by Dominique Mccarthy RN  Outcome: Ongoing  Note: VS WNL. Fluids running. Full liquid diet. Problem: Falls - Risk of:  Goal: Will remain free from falls  Description: Will remain free from falls  8/10/2021 1301 by Dominique Mccarthy RN  Outcome: Ongoing  Note: Pt absent of  falls this shift. RN visual checks on pt hourly with rounds. Call light in reach, bed in lowest position. Fall band intact. Bed alarm set. Pt educated to not get up per self to reduce the risk for falls.        Problem: Fluid Volume:  Goal: Will maintain adequate fluid volume  Description: Will maintain adequate fluid volume  8/10/2021 1301 by Nitish Levin RN  Outcome: Ongoing  Note: Fluids continue. Tolerating oral fluids. Care plan reviewed with patient. Patient verbalize understanding of the plan of care and contribute to goal setting.      Problem: Nutrition  Goal: Optimal nutrition therapy  8/10/2021 1301 by Nitish Levin RN  Outcome: Ongoing

## 2021-08-10 NOTE — PLAN OF CARE
Problem: Pain:  Goal: Pain level will decrease  Description: Pain level will decrease  8/10/2021 0126 by Miko Finney RN  Outcome: Ongoing  Note: Pt complains of pain at a 7/10. Non pharmacological interventions completed which include rest, and repositioning. Pt states pain goal at a 0/10. Pain assessed every 4 hours, and as needed. PRN pain medications given as ordered. Problem: Pain:  Goal: Control of acute pain  Description: Control of acute pain  8/10/2021 0126 by Miko Finney RN  Outcome: Ongoing  Note: Pt complains of pain at a 7/10. Non pharmacological interventions completed which include rest, and repositioning. Pt states pain goal at a 0/10. Pain assessed every 4 hours, and as needed. PRN pain medications given as ordered. Problem: Discharge Planning:  Goal: Participates in care planning  Description: Participates in care planning  8/10/2021 0126 by Miko Finney RN  Outcome: Ongoing  Note: Pt updated on plan of care. Problem: Cardiac Output - Decreased:  Goal: Hemodynamic stability will improve  Description: Hemodynamic stability will improve  8/10/2021 0126 by Miko Finney RN  Outcome: Ongoing  Note: Vitals stable. Problem: Falls - Risk of:  Goal: Will remain free from falls  Description: Will remain free from falls  8/10/2021 0126 by Miko Finney RN  Outcome: Ongoing  Note: Absence of falls this shift. Fall prevention in place. Fall band applied. Bed alarm activated as needed. Problem: Fluid Volume:  Goal: Will maintain adequate fluid volume  Description: Will maintain adequate fluid volume  8/10/2021 0126 by Miko Finney RN  Outcome: Ongoing  Note: Monitoring I&O's and daily weights. Problem: Nutrition  Goal: Optimal nutrition therapy  8/10/2021 0126 by Miko Finney RN  Outcome: Ongoing  Note: Pt tolerating clear liquid diet. PPN running per order. Care plan reviewed with patient. Patient verbalize understanding of the plan of care and contribute to goal setting.

## 2021-08-10 NOTE — PROGRESS NOTES
Gastroenterology  Progress Note    8/10/2021 7:41 AM  Subjective:   Admit Date: 8/3/2021    Interval History: Patient with history of recurrent abdominal discomfort intermittent typical of biliary origin associated with abnormality to function test intermittent at this time is high ALT AST however the imaging study with MRCP did not confirm biliary obstructions or stone. Sludge due to the problem not seen with MRCP cannot rule out. Acute hepatitis profile for ABC serologies so far been negative other work-up still pending. No clear indication for ERCP at this time however can be considered patient clinically not improve after discussion with the patient with possible complications which try to happen will repeat a function test and watch closely. 8/7 S/P ERCP with sphincterotomy and sludge extraction developed discomfort with slight deviation the lipase might be a mild pancreatitis we will keep n.p.o. hold antibiotic at this time repeat lab for tomorrow  8/8/2021 significant discomfort CT scans of pancreatitis in the head white cell is high diffuse infectious patient with antibiotic at this stage but have no complications so as long the patient doing fine we will hold antibiotics. Laparotomy hepatitis positive will hold presented to the pancreatitis improved will need repeat lab for tomorrow  8/9/2021 clinically improve transaminase abdominal bilirubin is up that the EMILY is very high discussed with the family the potential diagnosis of autoimmune hepatitis will start IV dose of Solu-Medrol, antibiotic on hold the white cell is high reaction to the pancreatitis will advance to clear liquid diet repeat lab tomorrow.   10.5 to 4.1 with improvement also transaminases are down diet advance to full liquid as tolerated to low-fat if tolerated discharge home on triple dose prednisone 40 g for 3 days 30 mg for 3 days do not go below 20 we will follow-up closely from the office after discharge  8/10/2021 started missing prednisone yesterday significant improvement in her bilirubin 1 down from    Diet: ADULT DIET; Full Liquid; Gluten Free    Medications:   Scheduled Meds:    methylPREDNISolone  40 mg Intravenous Daily    insulin lispro  0-6 Units Subcutaneous 4 times per day    phosphorus replacement protocol   Other RX Placeholder    pantoprazole  40 mg Oral BID AC    lidocaine  1 patch Transdermal Daily    senna  1 tablet Oral Nightly    docusate sodium  100 mg Oral Daily    sodium chloride (PF)  10 mL Intravenous Daily    levothyroxine  150 mcg Oral Daily    sodium chloride flush  5-40 mL Intravenous 2 times per day    enoxaparin  40 mg Subcutaneous Daily    losartan  100 mg Oral Daily    And    hydroCHLOROthiazide  25 mg Oral Daily     Continuous Infusions:    CLINIMIX 4.25/5 2,040 mL (08/10/21 0108)    lactated ringers with KCL 20 mEq infusion 150 mL/hr (08/10/21 0343)    sodium chloride         CBC:   Recent Labs     08/08/21  0627 08/09/21  0909 08/10/21  0534   WBC 19.6* 18.5* 16.1*   HGB 13.8 13.3 12.0    336 329     BMP:    Recent Labs     08/08/21  0627 08/08/21  0627 08/09/21  0909 08/10/21  0534     --  136 136   K 4.3  4.3   < > 4.0  4.0 4.2   CL 98  --  95* 95*   CO2 28  --  30 30   BUN 14  --  9 10   CREATININE 0.6  --  0.3* 0.4   GLUCOSE 88  --  86 130*    < > = values in this interval not displayed. Hepatic:   Recent Labs     08/08/21  0627 08/09/21  0909 08/10/21  0534   * 319* 177*   ALT 1,002* 746* 505*   BILITOT 7.5* 10.5* 4.1*   ALKPHOS 373* 353* 292*     INR:   Recent Labs     08/08/21  0627 08/09/21  0909 08/10/21  0534   INR 1.14* 1.16* 1.25*       Imaging:  No results found for this or any previous visit. Results for orders placed during the hospital encounter of 08/03/21    CT ABDOMEN PELVIS W IV CONTRAST Additional Contrast? None    Narrative  PROCEDURE: CT ABDOMEN PELVIS W IV CONTRAST    CLINICAL INFORMATION: right sided abdominal pain; .     COMPARISON: CT scan of the abdomen and pelvis dated 3 January 2021. .    TECHNIQUE: Axial 5 mm CT images were obtained through the abdomen and pelvis after the administration of intravenous contrast. Coronal and sagittal reconstructions were obtained. All CT scans at this facility use dose modulation, iterative reconstruction, and/or weight-based dosing when appropriate to reduce radiation dose to as low as reasonably achievable. FINDINGS:      The visualized aspects of the lung bases are clear. The base of the heart is within appropriate limits. There is mild diffuse fatty replacement in the liver. The spleen is normal. The adrenals and pancreas are normal. The patient is status post cholecystectomy. There is is a 7.8 cm probable left renal cyst. There is a 2.1 mm stone in the lower pole of  right kidney with no associated hydronephrosis. There is a moderate-sized hiatal hernia    No abnormalities of the small bowel loops are noted. There is atherosclerotic calcification in the abdominal aorta and iliac arteries. There are small mesenteric and retroperitoneal lymph nodes present. The urinary bladder is normal. The patient is status post hysterectomy. There is no pelvic free fluid. The colon is within normal limits. . There are postoperative changes in the lower lumbar spine. Impression  1. Stable CT scan of the abdomen and pelvis, no interval change since previous study dated 3 January 2021.  2. Mild diffuse fatty replacement liver. 3. Status post cholecystectomy and hysterectomy. 4. Punctate nonobstructing stone in the lower pole of the right kidney. Small left renal cyst.  5. Hiatal hernia. 6. Small mesenteric and retroperitoneal lymph nodes. 7. Postoperative changes in the lower lumbar spine. 8. Otherwise negative CT scan of the abdomen and pelvis. **This report has been created using voice recognition software.  It may contain minor errors which are inherent in voice recognition technology. **    Final report electronically signed by DR Panda Hester on 8/3/2021 11:54 AM    No results found for this or any previous visit. No results found for this or any previous visit. Endoscopy Finding:      Objective:   Vitals: /72   Pulse 60   Temp 98.6 °F (37 °C) (Oral)   Resp 18   Ht 5' 3\" (1.6 m)   Wt 263 lb 3.2 oz (119.4 kg)   SpO2 94%   BMI 46.62 kg/m²     Intake/Output Summary (Last 24 hours) at 8/10/2021 0741  Last data filed at 8/10/2021 0341  Gross per 24 hour   Intake 4921.32 ml   Output --   Net 4921.32 ml     General appearance: alert and cooperative with exam  Lungs: clear to auscultation bilaterally  Heart: regular rate and rhythm, S1, S2 normal, no murmur, click, rub or gallop  Abdomen: soft, non-tender; bowel sounds normal; no masses,  no organomegaly  Extremities: extremities normal, atraumatic, no cyanosis or edema    Assessment and Plan:   1. Abnormal function test typical biliary colic patient status post cholecystectomy but indicate biliary stone or sludge however imaging studies so far did not confirm it  2. MRCP reviewed with the patient did not confirm biliary dilation of the stone  3. S/P ERCP with sphincterotomy and sludge extractions discussed the finding with the patient . 4. Post ERCP discomfort possible mild pancreatitis lipase is 287 improved today we will keep n.p.o. for now repeat lab for tomorrow  5. No plan for antibiotic therapy for the pancreatitis study does not support need for therapy at this time  6. CT scan confirmed pancreatitis in the head conservative management no clear indication for antibiotic at this time. 7. EMILY titer is very high which also before also been hepatitis  8.  Severe improvement in liver function test supported diagnosis autoimmune hepatitis diet advance as tolerated discharge on taper dose prednisone will follow closely from the office      Follow up in GI Clinic after discharge in 1 to 2 weeks week(s)    Patient Active Problem List:     Abdominal pain, right lower quadrant     Puncture wound of heel without complication     Diarrhea     Intractable nausea and vomiting     Transaminitis      Electronically signed by Uziel Cho MD on 8/10/2021 at 7:41 AM

## 2021-08-10 NOTE — PROGRESS NOTES
Assessment and Plan:        1. Abdominal pain w/ transaminitis: chronic undifferentiated abdominal pain and transaminitis  >1 year. GI consulted. Negative hepatitis panel. Denies acetaminophen useANA, ANCA, F-actin (smooth muscle) Ab, Ferritin, IgG, mitochondrial Ab M2 IG, smooth muscle antibody. Further viral serologies pending. Patient's LFTs slightly trended down today. MRCP obtained as well and unremarkable. Patient still having pain. 8/6-> LFTs trending up. Plan for ERCP today. Pending course may consider transfer to location with hepatobiliary specialty. Discussed case with GI.  8/7-> LFTs slightly trending down.  8/9-> LFTs trending down, bilirubin remains elevated, unclear etiology, possibly related to acute pancreatitis / inflammation. Continue to trend. EMILY +, discussed with GI and start IV Solu-medrol at this time. 8/10-> continue with IV Solu-medrol today and at discharge send with Prednisone taper per GI. 2. Acute pancreatitis: Likely secondary to s/p ERCP. Lipase elevated at 278.7. Maintain NPO. IV fluids increased. Pain control with Dilaudid 1 mg every 2 hours as needed. 8/10-> advanced diet as tolerated per GI. Pt admits to some nausea with eating still. 3. Hyperbilirubinemia: bilirubin trending up, now noted at 10.5. Likely related to inflammation associated with pancreatitis, but unclear etiology  8/10-> significant improvement from 10.5 to 4.1  4. Hyponatremia: resolved, continue to monitor with daily BMP  5. Leukocytosis: Likely reactive secondary to above. Continue to trend daily. Will defer abx at this time after discussion with GI. Afebrile. UA neg. 6. Possible celiac disease/lymphocytic colitis: per Dr. Daniel Joaquin note as seen on colon biopsy. 7. Essential HTN: Cozaar and Hydrodiuril, stable continue to monitor  8. Hypothyroidism: Synthroid 150mcg daily  9. Dehydration, POA: Patient was concentrated with hemoglobin near 16 and endorsed being very dry. Started IV fluids.   With improvement noted in her hemoglobin concentration. We will continue IV fluids for now. 10. Severe obesity: BMI 47.65      CC:  Abdominal pain; transaminitis    Hospital course: 50yoF with  hypothyroidism, and HTN and PSHx of cholecystectomy and hysterectomy presents for evaluation of abdominal pain. Patient reports intermittent episodes of periumbilical and RUQ abdominal pain over the past year. Pain ranges from 5-6 to 7-8 in severity and is associated with nausea and dry heaving. Episodes typically last for hours and occur 3-5 times/week (though she has symptom free days). Patient denies exacerbating or alleviating factors. Denies fevers/chills, chest pain, pale stools, melena, dysuria, hematuria. Reports 3 episodes of watery diarrhea yesterday as well as darker urine over past week. Patient presented to [facility] ED this morning due to worsening pain. Initial HFT reveales ALT 1567, , Alk Phos 384. Viral hepatitis panel negative. INR and aPTT WNL. CT abdomen pelvis showed mild diffuse fatty replacement of liver. Patient's was first evaluated for abdominal pain last January. She was worked up for celiac disease but had normal TGG. However, biopsy showed features of Celiac disease and lymphocytic colitis. She has since followed a strict gluten free diet. In June, she presented to an ED in [facility] for a similar episode of abdominal pain but her transaminases were in the 200s. Patient recently returned from vacation near [facility]. She states that she cooked her own food while on vacation. She also began seeing a chiropractor 4wks ago and was started on several supplements including \"beta plus,\" mimosa, moringa, oregano oil, and wormwood TID but has not taken any 1.5wks. Patient's mother and nephew have history of autoimmune disease.  Patient does not recall specifics but reports that her mother \"needs to have her esophagus dilated\" as a result and that her nephew's symptoms are primarily GI.    8/5-> Patient symptoms unchanged. Still having both right upper and lower quadrant abdominal pain and intermittent nausea without emesis. New rash noted on R buttock. No confusion. Denies CP.     8/6-> LFTS continue to trend up. Pt reports continued abd pain 7/10 in R side of abd. Admits to intermittent nausea and \"watery tongue\" without emesis. Denies fever/chills. No CP/SOB. Pt reports she is anxious about taking pain medicine causes she does not want to get constipated. Educated regarded stool softeners. 8/7-> pt having significant pain overnight in the R side abd and radiating into her back. Pt denies fever/chills. Denies CP/SOB. Admits to continued nausea. Denies passing flatus. No BM since Sunday. Check KUB. 8/8-> pt still having pain in R side abd and epigastric pain. Some nausea without emesis. Had a temperature of 100.3 overnight. Denies CP/SOB.   8/9-> pt is doing okay, reports slight improvement of her pain from 10 to 8/10. notign epigastric discomfort with radation into back. EMILY positive and concern for autoimmune hepatitis. Discussed case with GI. Not recommending abx at this time after discussion. Pt denies fever/chills. Admits to suprapubic discomfort today but no dysuria, increased urgency/frequency. Subjective:  8/10-> pt is doing okay, reports continued 8/10 pain in her epigastric and RUQ areas. She was seen by GI who instructed to advance diet as tolerated. Pt reports that she is feeling a little bit nauseated with food and does not feel comfortable with being discharge home with this and her pain. Pt denies fever/chills. Denies CP/SOB. ROS: abdominal pain, nausea, diarrhea (12 point review of systems completed. Pertinent positives noted.  Otherwise ROS is negative)  PMH:  Per HPI  SHX:  Occasional ETOH use  Social History     Tobacco Use    Smoking status: Never Smoker    Smokeless tobacco: Never Used   Substance Use Topics    Alcohol use: Yes     Comment: occasionally     Drug use: No     FHX:   Family History   Problem Relation Age of Onset    Cancer Mother     Colon Cancer Neg Hx     Esophageal Cancer Neg Hx     Liver Cancer Neg Hx     Rectal Cancer Neg Hx     Stomach Cancer Neg Hx      Allergies: Allergies   Allergen Reactions    Gluten Meal     Lactose Intolerance (Gi)     Pcn [Penicillins] Rash     Medications:          Vital Signs:   /62   Pulse 65   Temp 98.7 °F (37.1 °C) (Oral)   Resp 18   Ht 5' 3\" (1.6 m)   Wt 263 lb 3.2 oz (119.4 kg)   SpO2 94%   BMI 46.62 kg/m²      Intake/Output Summary (Last 24 hours) at 8/10/2021 1317  Last data filed at 8/10/2021 0747  Gross per 24 hour   Intake 4931.32 ml   Output --   Net 4931.32 ml        General:   acutely ill appearing, NAD  HEENT:  normocephalic and atraumatic. Scleral icterus present. PERR. Neck: supple. No JVD. No thyromegaly. Lungs: clear to auscultation. No retractions  Cardiac: RRR without murmur. Abdomen: soft. Suprapubic tenderness. Tenderness to palpation in RUQ and RLQ and epigastric region. Bowel sounds hypoactive. No guarding. Rovsing sign (-)  Extremities:  No clubbing, cyanosis, or edema x 4. Vasculature: capillary refill < 3 seconds. Palpable LE pulses bilaterally. Skin:  warm and dry. Psych:  Alert and oriented x4. Affect appropriate  Lymph:  No supraclavicular adenopathy. Neurologic:  No focal deficit. No seizures. No asterixis    Data: (All radiographs, tracings, PFTs, and imaging are personally viewed and interpreted unless otherwise noted).    Recent Results (from the past 24 hour(s))   Urinalysis with microscopic    Collection Time: 08/09/21  2:54 PM   Result Value Ref Range    Glucose, Urine NEGATIVE NEGATIVE mg/dl    Bilirubin Urine MODERATE (A) NEGATIVE    Ketones, Urine 15 (A) NEGATIVE    Specific Gravity, UA 1.011 1.002 - 1.030    Blood, Urine NEGATIVE NEGATIVE    pH, UA 7.5 5.0 - 9.0    Protein, UA NEGATIVE NEGATIVE mg/dl    Urobilinogen, Urine 2.0 (A) 0.0 - 1.0 eu/dl    Nitrite, Urine NEGATIVE NEGATIVE    Leukocyte Esterase, Urine NEGATIVE NEGATIVE    Color, UA DK YELLOW YELLOW-STRAW    Character, Urine CLEAR [id number]    RBC, UA 0-2 0-2/hpf /hpf    WBC, UA NONE SEEN 0-4/hpf /hpf    Epithelial Cells, UA NONE SEEN 3-5/hpf /hpf    Bacteria, UA NONE SEEN FEW/NONE SEEN    Casts NONE SEEN NONE SEEN /lpf    Crystals NONE SEEN NONE SEEN    Renal Epithelial, UA NONE SEEN NONE SEEN    Yeast, UA NONE SEEN NONE SEEN    Casts NONE SEEN /lpf    Miscellaneous Lab Test Result NONE SEEN    Culture, Urine    Collection Time: 08/09/21  2:54 PM    Specimen: Urine, clean catch   Result Value Ref Range    Urine Culture, Routine No growth-preliminary     ICTOTEST, URINE    Collection Time: 08/09/21  2:54 PM   Result Value Ref Range    Ictotest POSITIVE (A) NEGATIVE   Phosphorus    Collection Time: 08/09/21  3:19 PM   Result Value Ref Range    Phosphorus 1.9 (L) 2.4 - 4.7 mg/dL   Magnesium    Collection Time: 08/09/21  3:19 PM   Result Value Ref Range    Magnesium 1.6 1.6 - 2.4 mg/dL   Calcium, Ionized    Collection Time: 08/09/21  3:19 PM   Result Value Ref Range    Calcium, Ion 1.19 1.12 - 1.32 mmol/L   Phosphorus    Collection Time: 08/09/21 10:15 PM   Result Value Ref Range    Phosphorus 3.2 2.4 - 4.7 mg/dL   POCT Glucose    Collection Time: 08/10/21 12:14 AM   Result Value Ref Range    POC Glucose 126 (H) 70 - 108 mg/dl   Comprehensive Metabolic Panel w/ Reflex to MG    Collection Time: 08/10/21  5:34 AM   Result Value Ref Range    Glucose 130 (H) 70 - 108 mg/dL    CREATININE 0.4 0.4 - 1.2 mg/dL    BUN 10 7 - 22 mg/dL    Sodium 136 135 - 145 meq/L    Potassium reflex Magnesium 4.2 3.5 - 5.2 meq/L    Chloride 95 (L) 98 - 111 meq/L    CO2 30 23 - 33 meq/L    Calcium 9.1 8.5 - 10.5 mg/dL     (H) 5 - 40 U/L    Alkaline Phosphatase 292 (H) 38 - 126 U/L    Total Protein 5.9 (L) 6.1 - 8.0 g/dL    Albumin 2.8 (L) 3.5 - 5.1 g/dL    Total Bilirubin 4.1 (H) 0.3 - 1.2 mg/dL     (H) 11 - 66 U/L   CBC    Collection Time: 08/10/21  5:34 AM   Result Value Ref Range    WBC 16.1 (H) 4.8 - 10.8 thou/mm3    RBC 3.96 (L) 4.20 - 5.40 mill/mm3    Hemoglobin 12.0 12.0 - 16.0 gm/dl    Hematocrit 36.8 (L) 37.0 - 47.0 %    MCV 92.9 81.0 - 99.0 fL    MCH 30.3 26.0 - 33.0 pg    MCHC 32.6 32.2 - 35.5 gm/dl    RDW-CV 17.3 (H) 11.5 - 14.5 %    RDW-SD 58.9 (H) 35.0 - 45.0 fL    Platelets 706 729 - 789 thou/mm3    MPV 10.6 9.4 - 12.4 fL   Protime-INR    Collection Time: 08/10/21  5:34 AM   Result Value Ref Range    INR 1.25 (H) 0.85 - 1.13   Lipase    Collection Time: 08/10/21  5:34 AM   Result Value Ref Range    Lipase 24.7 5.6 - 51.3 U/L   Calcium, Ionized    Collection Time: 08/10/21  5:34 AM   Result Value Ref Range    Calcium, Ion 1.19 1.12 - 1.32 mmol/L   Phosphorus    Collection Time: 08/10/21  5:34 AM   Result Value Ref Range    Phosphorus 2.3 (L) 2.4 - 4.7 mg/dL   Magnesium    Collection Time: 08/10/21  5:34 AM   Result Value Ref Range    Magnesium 2.0 1.6 - 2.4 mg/dL   Comprehensive Metabolic Panel    Collection Time: 08/10/21  5:34 AM   Result Value Ref Range    Potassium 4.2 3.5 - 5.2 meq/L   Anion Gap    Collection Time: 08/10/21  5:34 AM   Result Value Ref Range    Anion Gap 11.0 8.0 - 16.0 meq/L   Glomerular Filtration Rate, Estimated    Collection Time: 08/10/21  5:34 AM   Result Value Ref Range    Est, Glom Filt Rate >90 ml/min/1.73m2   POCT Glucose    Collection Time: 08/10/21  5:54 AM   Result Value Ref Range    POC Glucose 124 (H) 70 - 108 mg/dl   POCT Glucose    Collection Time: 08/10/21 11:56 AM   Result Value Ref Range    POC Glucose 132 (H) 70 - 108 mg/dl       XR ABDOMEN (KUB) (SINGLE AP VIEW)   Final Result   1. Paucity of gas in small bowel loops. 2. Air throughout the colon and stool in the descending colon and right colon. The colon is mildly distended. **This report has been created using voice recognition software.  It may contain minor errors which are inherent in voice recognition technology. **      Final report electronically signed by Dr. Anurag Salecdo on 8/10/2021 7:10 AM      CT ABDOMEN PELVIS W WO CONTRAST Additional Contrast? None   Final Result       1. Acute pancreatitis (Steph score = 2) without evidence of complication at this time. 2. Colonic diverticulosis. **This report has been created using voice recognition software. It may contain minor errors which are inherent in voice recognition technology. **      Final report electronically signed by Dr. Lynnette Costello MD on 8/8/2021 10:28 AM      XR ABDOMEN (KUB) (SINGLE AP VIEW)   Final Result   Moderate retained stool. **This report has been created using voice recognition software. It may contain minor errors which are inherent in voice recognition technology. **      Final report electronically signed by Dr. Lynnette Costello MD on 8/7/2021 11:32 AM      FL ERCP BILIARY AND PANCREATIC S&I   Final Result   Status post ERCP. **This report has been created using voice recognition software. It may contain minor errors which are inherent in voice recognition technology. **      Final report electronically signed by Dr. Monse Rizvi on 8/6/2021 4:45 PM      MRI ABDOMEN W WO CONTRAST   Final Result   1. No common bile duct stone. 2. No intrahepatic ductal dilatation. Pancreatic duct is unremarkable. 3. Gallbladder removed. 4. No liver lesions. **This report has been created using voice recognition software. It may contain minor errors which are inherent in voice recognition technology. **      Final report electronically signed by Dr. Aruna Fitch on 8/4/2021 10:03 AM      CT ABDOMEN PELVIS W IV CONTRAST Additional Contrast? None   Final Result      1. Stable CT scan of the abdomen and pelvis, no interval change since previous study dated 3 January 2021.   2. Mild diffuse fatty replacement liver. 3. Status post cholecystectomy and hysterectomy.    4. Punctate nonobstructing stone in the lower pole of the right kidney. Small left renal cyst.   5. Hiatal hernia. 6. Small mesenteric and retroperitoneal lymph nodes. 7. Postoperative changes in the lower lumbar spine. 8. Otherwise negative CT scan of the abdomen and pelvis. **This report has been created using voice recognition software. It may contain minor errors which are inherent in voice recognition technology. **      Final report electronically signed by DR Pati Ross on 8/3/2021 11:54 AM            Electronically signed by Adriel Mcmullen PA-C on 8/10/2021 at 1:17 PM

## 2021-08-10 NOTE — PROGRESS NOTES
Comprehensive Nutrition Assessment    Type and Reason for Visit:  Reassess (PO/PN Monitor)    Nutrition Recommendations/Plan:   *Due to shortage of IV lipids - IV lipids will only be in the 1800 bag on Monday, Wednesday and Friday per pharmacy policy. *Recommend switching to 2-in-1 PPN based on 10 kcal/kg and 1.2 g/kg protein with a dosing weight of 69 kg d/t low phosphorus today. *Advance diet as tolerated. *Started [place] ONS BID. Nutrition Assessment: Pt improving from a nutritional standpoint AEB pt report of tolerating Clear Liquid diet well last night and is amenable to try the [facility] [facility] now that diet is advanced to Full Liquid today. Remains at risk for further nutritional compromise r/t admit d/t abdominal pain w/transaminitis and pancreatitis, hx celiac disease and lactose intolerance and follows a strict Gluten Free, Dairy Free diet PTA, pt only on a Full Liquid diet and continues on PN and underlying medical condition (hx: celiac disease, HTN, Obesity and Thyroid Disease). Nutrition recommendations/interventions as per above. Malnutrition Assessment:  Malnutrition Status:  Severe malnutrition    Context:  Acute Illness     Findings of the 6 clinical characteristics of malnutrition:  Energy Intake:  7 - 50% or less of estimated energy requirements for 5 or more days  Weight Loss:  1 - 1% to 2% over 1 week (-2.2% weight loss in one week)     Body Fat Loss:  1 - Mild body fat loss Orbital   Muscle Mass Loss:  No significant muscle mass loss    Fluid Accumulation:  No significant fluid accumulation Extremities   Strength:  Not Performed    Estimated Daily Nutrient Needs:  Energy (kcal):  6978-9993 kcal/day (10-15 kcal/kg); Weight Used for Energy Requirements:  Current (119.4 kg)     Protein (g):  104+ g/day (2+ g/kg);  Weight Used for Protein Requirements:  Ideal (52.3 kg)          Nutrition Related Findings: admit d/t abdominal pain w/transaminitis and pancreatitis; hx celiac disease and lactose intolerance and follows a strict Gluten Free, Dairy Free diet PTA. Pt seen this morning; pt reports tolerating Clear Liquid diet last night and Full Liquid diet this morning; pt amenable to try [facility] during LOS; pt denies any nausea/emesis last night or this morning; last BM x1 on 8/9. Clinimix 4.25/5 was starting last night and was running at 85 mL/hr today. Plan to switching to 2-in-1 PPN today. No plan for PICC line at this time. Labs: Na 136, K+ 4.2, BUN 10, Cr. 0.4, Glucose 130, Phosphorus 2.3, Magnesium 2 and Lipase 24.7. Received Phosphorus replacement yesterday. Rx includes: Colace, Humalog, Solu-medrol, Protonix, Senna, Vistaril PRN and Zofran PRN. Wounds:  None       Current Nutrition Therapies:    ADULT DIET; Full Liquid; Gluten Free  Adult Oral Nutrition Supplement; Other Oral Supplement; [employer] with Lunch and Supper  Current Parenteral Nutrition Orders:  · Type and Formula: Premix Peripheral (Clinimix 4.25/5 based on 10 kcal/kg with a dosing weight of 69 kg)   · Lipids: None  · Duration: Continuous  · Rate/Volume: 85 mL/hr per pharmacy  · Current PN Order Provides: 694 kcal, 87 grams protein and 102 grams dextrose   · Goal PN Orders Provides: Recommend switching to 2-in-1 PPN based on 10 kcal/kg and 1.2 g/kg protein based on dosing weight of 69 kg to provide: 690 kcal, 83 grams protein and 105.3 grams dextrose    Anthropometric Measures:  · Height: 5' 3\" (160 cm)  · Current Body Weight: 263 lb 3.2 oz (119.4 kg) (8/8; +trace edema BLE)   · Admission Body Weight: 269 lb (122 kg) (8/4; +trace edema BLE)    · Usual Body Weight:  (262 lbs per pt report.  No actual weights per EMR)     · Ideal Body Weight: 115 lbs  · BMI: 46.6   · BMI Categories: Obese Class 3 (BMI 40.0 or greater)       Nutrition Diagnosis:   · Severe malnutrition, In context of acute illness or injury related to inadequate protein-energy intake as evidenced by weight loss greater than or equal to 2% in 1 week (pt consuming less than 50% of meals x6 days since admit)    Nutrition Interventions:   Food and/or Nutrient Delivery:  Continue Current Diet, Start Oral Nutrition Supplement, Modify Parenteral Nutrition  Nutrition Education/Counseling:  Education initiated (Encouraged po intake of meals and use of ONS at best effort. Discussed plan to switch to 2-in-1 PPN today with patient and pharmacy)   Coordination of Nutrition Care:  Continue to monitor while inpatient    Goals:  Pt will tolerate and consume adequate nutrition support to meet 75% or more of estimated nutritional needs during LOS until able to transition to solely po feeds. Nutrition Monitoring and Evaluation:   Behavioral-Environmental Outcomes:  None Identified   Food/Nutrient Intake Outcomes:  Diet Advancement/Tolerance, Food and Nutrient Intake, Supplement Intake, Parenteral Nutrition Intake/Tolerance  Physical Signs/Symptoms Outcomes:  Biochemical Data, Weight, Skin, Nutrition Focused Physical Findings, Fluid Status or Edema, GI Status, Nausea or Vomiting     Discharge Planning:     Too soon to determine     Electronically signed by Robinson George, MS, RD, LD on 8/10/21 at 10:07 AM EDT    Contact: [phone]

## 2021-08-10 NOTE — CARE COORDINATION
8/10/21, 8:39 AM EDT    DISCHARGE ON GOING EVALUATION    [facility] day: 7  Location: [facility]25/025-A Reason for admit: Transaminitis [R74.01]   Procedure: 8/7 S/P ERCP with sphincterotomy and sludge extraction  08/08 CT abdomen pelvis:  1. Acute pancreatitis (Steph score = 2) without evidence of complication at this time. 2. Colonic diverticulosis  Barriers to Discharge: Treating for autoimmune hepatitis. Dietitian now following for PPN but started po yesterday. Advance as tolerated. IV steroids. No plans to transfer to tertiary center now.  and , Alk Phos 177. All down from yesterday. PCP: Margot Malloy MD  Readmission Risk Score: 10%  Patient Goals/Plan/Treatment Preferences: Plans return home with spouse.

## 2021-08-10 NOTE — PLAN OF CARE
Problem: Nutrition  Goal: Optimal nutrition therapy  8/10/2021 1034 by Lila Zafar, MS, RD, LD  Outcome: Ongoing   Nutrition Problem #1: Severe malnutrition, In context of acute illness or injury  Intervention: Food and/or Nutrient Delivery: Continue Current Diet, Start Oral Nutrition Supplement, Modify Parenteral Nutrition  Nutritional Goals: Pt will tolerate and consume adequate nutrition support to meet 75% or more of estimated nutritional needs during LOS until able to transition to solely po feeds.

## 2021-08-11 LAB
ALBUMIN SERPL-MCNC: 3.4 G/DL (ref 3.5–5.1)
ALP BLD-CCNC: 303 U/L (ref 38–126)
ALT SERPL-CCNC: 447 U/L (ref 11–66)
ANION GAP SERPL CALCULATED.3IONS-SCNC: 11 MEQ/L (ref 8–16)
AST SERPL-CCNC: 135 U/L (ref 5–40)
BILIRUB SERPL-MCNC: 3.2 MG/DL (ref 0.3–1.2)
BUN BLDV-MCNC: 15 MG/DL (ref 7–22)
CALCIUM IONIZED: 1.15 MMOL/L (ref 1.12–1.32)
CALCIUM SERPL-MCNC: 9.3 MG/DL (ref 8.5–10.5)
CHLORIDE BLD-SCNC: 98 MEQ/L (ref 98–111)
CO2: 32 MEQ/L (ref 23–33)
CREAT SERPL-MCNC: 0.7 MG/DL (ref 0.4–1.2)
ERYTHROCYTE [DISTWIDTH] IN BLOOD BY AUTOMATED COUNT: 17.6 % (ref 11.5–14.5)
ERYTHROCYTE [DISTWIDTH] IN BLOOD BY AUTOMATED COUNT: 60.1 FL (ref 35–45)
GFR SERPL CREATININE-BSD FRML MDRD: 88 ML/MIN/1.73M2
GLUCOSE BLD-MCNC: 101 MG/DL (ref 70–108)
HCT VFR BLD CALC: 39.8 % (ref 37–47)
HEMOGLOBIN: 12.7 GM/DL (ref 12–16)
INR BLD: 1.17 (ref 0.85–1.13)
LIPASE: 24.2 U/L (ref 5.6–51.3)
MAGNESIUM: 1.7 MG/DL (ref 1.6–2.4)
MCH RBC QN AUTO: 30 PG (ref 26–33)
MCHC RBC AUTO-ENTMCNC: 31.9 GM/DL (ref 32.2–35.5)
MCV RBC AUTO: 93.9 FL (ref 81–99)
PHOSPHORUS: 4.3 MG/DL (ref 2.4–4.7)
PLATELET # BLD: 456 THOU/MM3 (ref 130–400)
PMV BLD AUTO: 10.6 FL (ref 9.4–12.4)
POTASSIUM REFLEX MAGNESIUM: 3.3 MEQ/L (ref 3.5–5.2)
RBC # BLD: 4.24 MILL/MM3 (ref 4.2–5.4)
SODIUM BLD-SCNC: 141 MEQ/L (ref 135–145)
TOTAL PROTEIN: 6.2 G/DL (ref 6.1–8)
WBC # BLD: 16.3 THOU/MM3 (ref 4.8–10.8)

## 2021-08-11 PROCEDURE — 99233 SBSQ HOSP IP/OBS HIGH 50: CPT | Performed by: PHYSICIAN ASSISTANT

## 2021-08-11 PROCEDURE — 36415 COLL VENOUS BLD VENIPUNCTURE: CPT

## 2021-08-11 PROCEDURE — 6370000000 HC RX 637 (ALT 250 FOR IP): Performed by: PHYSICIAN ASSISTANT

## 2021-08-11 PROCEDURE — 2580000003 HC RX 258: Performed by: INTERNAL MEDICINE

## 2021-08-11 PROCEDURE — 80053 COMPREHEN METABOLIC PANEL: CPT

## 2021-08-11 PROCEDURE — 6370000000 HC RX 637 (ALT 250 FOR IP): Performed by: INTERNAL MEDICINE

## 2021-08-11 PROCEDURE — 83735 ASSAY OF MAGNESIUM: CPT

## 2021-08-11 PROCEDURE — 82330 ASSAY OF CALCIUM: CPT

## 2021-08-11 PROCEDURE — 6360000002 HC RX W HCPCS: Performed by: PHYSICIAN ASSISTANT

## 2021-08-11 PROCEDURE — 83690 ASSAY OF LIPASE: CPT

## 2021-08-11 PROCEDURE — 85027 COMPLETE CBC AUTOMATED: CPT

## 2021-08-11 PROCEDURE — 85610 PROTHROMBIN TIME: CPT

## 2021-08-11 PROCEDURE — 84100 ASSAY OF PHOSPHORUS: CPT

## 2021-08-11 PROCEDURE — 6360000002 HC RX W HCPCS: Performed by: INTERNAL MEDICINE

## 2021-08-11 PROCEDURE — 1200000003 HC TELEMETRY R&B

## 2021-08-11 RX ORDER — POTASSIUM CHLORIDE 20 MEQ/1
40 TABLET, EXTENDED RELEASE ORAL PRN
Status: DISCONTINUED | OUTPATIENT
Start: 2021-08-11 | End: 2021-08-12 | Stop reason: HOSPADM

## 2021-08-11 RX ORDER — POTASSIUM CHLORIDE 7.45 MG/ML
10 INJECTION INTRAVENOUS PRN
Status: DISCONTINUED | OUTPATIENT
Start: 2021-08-11 | End: 2021-08-12 | Stop reason: HOSPADM

## 2021-08-11 RX ADMIN — SODIUM CHLORIDE, PRESERVATIVE FREE 10 ML: 5 INJECTION INTRAVENOUS at 20:37

## 2021-08-11 RX ADMIN — OXYCODONE 5 MG: 5 TABLET ORAL at 08:14

## 2021-08-11 RX ADMIN — SODIUM CHLORIDE, PRESERVATIVE FREE 10 ML: 5 INJECTION INTRAVENOUS at 08:15

## 2021-08-11 RX ADMIN — LOSARTAN POTASSIUM 100 MG: 100 TABLET, FILM COATED ORAL at 08:15

## 2021-08-11 RX ADMIN — PANTOPRAZOLE SODIUM 40 MG: 40 TABLET, DELAYED RELEASE ORAL at 05:50

## 2021-08-11 RX ADMIN — METHYLPREDNISOLONE SODIUM SUCCINATE 40 MG: 40 INJECTION, POWDER, FOR SOLUTION INTRAMUSCULAR; INTRAVENOUS at 08:15

## 2021-08-11 RX ADMIN — ENOXAPARIN SODIUM 40 MG: 40 INJECTION, SOLUTION INTRAVENOUS; SUBCUTANEOUS at 16:36

## 2021-08-11 RX ADMIN — HYDROCHLOROTHIAZIDE 25 MG: 25 TABLET ORAL at 08:15

## 2021-08-11 RX ADMIN — LEVOTHYROXINE SODIUM 150 MCG: 0.15 TABLET ORAL at 05:50

## 2021-08-11 RX ADMIN — IBUPROFEN 200 MG: 200 TABLET, FILM COATED ORAL at 20:37

## 2021-08-11 RX ADMIN — HYDROMORPHONE HYDROCHLORIDE 1 MG: 1 INJECTION, SOLUTION INTRAMUSCULAR; INTRAVENOUS; SUBCUTANEOUS at 04:02

## 2021-08-11 RX ADMIN — SODIUM CHLORIDE, PRESERVATIVE FREE 10 ML: 5 INJECTION INTRAVENOUS at 08:20

## 2021-08-11 RX ADMIN — POTASSIUM CHLORIDE 40 MEQ: 1500 TABLET, EXTENDED RELEASE ORAL at 08:14

## 2021-08-11 RX ADMIN — SENNOSIDES 8.6 MG: 8.6 TABLET, COATED ORAL at 20:37

## 2021-08-11 RX ADMIN — POLYETHYLENE GLYCOL 3350 17 G: 17 POWDER, FOR SOLUTION ORAL at 08:14

## 2021-08-11 RX ADMIN — DOCUSATE SODIUM 100 MG: 100 CAPSULE ORAL at 08:15

## 2021-08-11 RX ADMIN — PANTOPRAZOLE SODIUM 40 MG: 40 TABLET, DELAYED RELEASE ORAL at 16:36

## 2021-08-11 ASSESSMENT — PAIN DESCRIPTION - LOCATION
LOCATION: BACK
LOCATION: ABDOMEN;BACK

## 2021-08-11 ASSESSMENT — PAIN - FUNCTIONAL ASSESSMENT
PAIN_FUNCTIONAL_ASSESSMENT: ACTIVITIES ARE NOT PREVENTED

## 2021-08-11 ASSESSMENT — PAIN DESCRIPTION - PAIN TYPE
TYPE: ACUTE PAIN

## 2021-08-11 ASSESSMENT — PAIN DESCRIPTION - DESCRIPTORS
DESCRIPTORS: SHARP

## 2021-08-11 ASSESSMENT — PAIN DESCRIPTION - PROGRESSION
CLINICAL_PROGRESSION: NOT CHANGED

## 2021-08-11 ASSESSMENT — PAIN DESCRIPTION - FREQUENCY
FREQUENCY: CONTINUOUS

## 2021-08-11 ASSESSMENT — PAIN DESCRIPTION - ONSET
ONSET: ON-GOING

## 2021-08-11 ASSESSMENT — PAIN DESCRIPTION - ORIENTATION
ORIENTATION: LOWER;RIGHT
ORIENTATION: LOWER
ORIENTATION: LOWER;RIGHT
ORIENTATION: LOWER;RIGHT

## 2021-08-11 ASSESSMENT — PAIN SCALES - GENERAL
PAINLEVEL_OUTOF10: 8
PAINLEVEL_OUTOF10: 5
PAINLEVEL_OUTOF10: 0
PAINLEVEL_OUTOF10: 10
PAINLEVEL_OUTOF10: 8
PAINLEVEL_OUTOF10: 6

## 2021-08-11 NOTE — PLAN OF CARE
Problem: Pain:  Goal: Pain level will decrease  Description: Pain level will decrease  8/11/2021 0024 by Frankie Calderon RN  Outcome: Ongoing  Note: Pt complains of pain at a 9/10. Non pharmacological interventions completed which include rest, and repositioning. Pt states pain goal at a 0/10. Pain assessed every 4 hours, and as needed. PRN pain medications given as ordered. Problem: Pain:  Goal: Control of acute pain  Description: Control of acute pain  8/11/2021 0024 by Frankie Calderon RN  Outcome: Ongoing  Note: Pt complains of pain at a 9/10. Non pharmacological interventions completed which include rest, and repositioning. Pt states pain goal at a 0/10. Pain assessed every 4 hours, and as needed. PRN pain medications given as ordered. Problem: Discharge Planning:  Goal: Participates in care planning  Description: Participates in care planning  8/11/2021 0024 by Frankie Calderon RN  Outcome: Ongoing  Note: Pt updated on plan of care. Problem: Cardiac Output - Decreased:  Goal: Hemodynamic stability will improve  Description: Hemodynamic stability will improve  8/11/2021 0024 by Frankie Calderon RN  Outcome: Ongoing  Note: Vitals stable this shift. Problem: Falls - Risk of:  Goal: Will remain free from falls  Description: Will remain free from falls  8/11/2021 0024 by Frankie Calderon RN  Outcome: Ongoing  Note: Absence of falls this shift. Fall prevention in place. Fall band applied. Bed alarm activated as needed. Problem: Fluid Volume:  Goal: Will maintain adequate fluid volume  Description: Will maintain adequate fluid volume  8/11/2021 0024 by Frankie Calderon RN  Outcome: Ongoing  Note: Monitoring I&O's and daily weights. Problem: Nutrition  Goal: Optimal nutrition therapy  8/11/2021 0024 by Frankie Calderon RN  Outcome: Ongoing  Note: Pt tolerating meals. Care plan reviewed with patient . Patient verbalize understanding of the plan of care and contribute to goal setting.

## 2021-08-11 NOTE — PROGRESS NOTES
Gastroenterology  Progress Note    8/11/2021 3:45 PM  Subjective:   Admit Date: 8/3/2021    Interval History: Patient with history of recurrent abdominal discomfort intermittent typical of biliary origin associated with abnormality to function test intermittent at this time is high ALT AST however the imaging study with MRCP did not confirm biliary obstructions or stone. Sludge due to the problem not seen with MRCP cannot rule out. Acute hepatitis profile for ABC serologies so far been negative other work-up still pending. No clear indication for ERCP at this time however can be considered patient clinically not improve after discussion with the patient with possible complications which try to happen will repeat a function test and watch closely. 8/7 S/P ERCP with sphincterotomy and sludge extraction developed discomfort with slight deviation the lipase might be a mild pancreatitis we will keep n.p.o. hold antibiotic at this time repeat lab for tomorrow  8/8/2021 significant discomfort CT scans of pancreatitis in the head white cell is high diffuse infectious patient with antibiotic at this stage but have no complications so as long the patient doing fine we will hold antibiotics. Laparotomy hepatitis positive will hold presented to the pancreatitis improved will need repeat lab for tomorrow  8/9/2021 clinically improve transaminase abdominal bilirubin is up that the EMILY is very high discussed with the family the potential diagnosis of autoimmune hepatitis will start IV dose of Solu-Medrol, antibiotic on hold the white cell is high reaction to the pancreatitis will advance to clear liquid diet repeat lab tomorrow.   10.5 to 4.1 with improvement also transaminases are down diet advance to full liquid as tolerated to low-fat if tolerated discharge home on triple dose prednisone 40 g for 3 days 30 mg for 3 days do not go below 20 we will follow-up closely from the office after discharge  8/10/2021 started missing prednisone yesterday significant improvement in her bilirubin 1 down  8/11/2021 more improvement of the liver function test enzymes as well as bilirubin amylase is normal tolerating diet but not as much started slow repeat lab for tomorrow she may need to go home on triple dose prednisone will check ferritin iron again liver high in the last lab check brought in the possibility of hemochromatosis. Diet: Adult Oral Nutrition Supplement; Other Oral Supplement; [employer] with Lunch and Supper  ADULT DIET; Easy to Chew; Gluten Free    Medications:   Scheduled Meds:    methylPREDNISolone  40 mg Intravenous Daily    phosphorus replacement protocol   Other RX Placeholder    pantoprazole  40 mg Oral BID AC    lidocaine  1 patch Transdermal Daily    senna  1 tablet Oral Nightly    docusate sodium  100 mg Oral Daily    sodium chloride (PF)  10 mL Intravenous Daily    levothyroxine  150 mcg Oral Daily    sodium chloride flush  5-40 mL Intravenous 2 times per day    enoxaparin  40 mg Subcutaneous Daily    losartan  100 mg Oral Daily    And    hydroCHLOROthiazide  25 mg Oral Daily     Continuous Infusions:    sodium chloride         CBC:   Recent Labs     08/09/21  0909 08/10/21  0534 08/11/21  0557   WBC 18.5* 16.1* 16.3*   HGB 13.3 12.0 12.7    329 456*     BMP:    Recent Labs     08/09/21  0909 08/09/21  0909 08/10/21  0534 08/11/21  0557     --  136 141   K 4.0  4.0   < > 4.2  4.2 3.3*   CL 95*  --  95* 98   CO2 30  --  30 32   BUN 9  --  10 15   CREATININE 0.3*  --  0.4 0.7   GLUCOSE 86  --  130* 101    < > = values in this interval not displayed. Hepatic:   Recent Labs     08/09/21  0909 08/10/21  0534 08/11/21  0557   * 177* 135*   * 505* 447*   BILITOT 10.5* 4.1* 3.2*   ALKPHOS 353* 292* 303*     INR:   Recent Labs     08/09/21  0909 08/10/21  0534 08/11/21  0557   INR 1.16* 1.25* 1.17*       Imaging:  No results found for this or any previous visit.     Results for orders placed during the hospital encounter of 08/03/21    CT ABDOMEN PELVIS W IV CONTRAST Additional Contrast? None    Narrative  PROCEDURE: CT ABDOMEN PELVIS W IV CONTRAST    CLINICAL INFORMATION: right sided abdominal pain; . COMPARISON: CT scan of the abdomen and pelvis dated 3 January 2021. .    TECHNIQUE: Axial 5 mm CT images were obtained through the abdomen and pelvis after the administration of intravenous contrast. Coronal and sagittal reconstructions were obtained. All CT scans at this facility use dose modulation, iterative reconstruction, and/or weight-based dosing when appropriate to reduce radiation dose to as low as reasonably achievable. FINDINGS:      The visualized aspects of the lung bases are clear. The base of the heart is within appropriate limits. There is mild diffuse fatty replacement in the liver. The spleen is normal. The adrenals and pancreas are normal. The patient is status post cholecystectomy. There is is a 7.8 cm probable left renal cyst. There is a 2.1 mm stone in the lower pole of  right kidney with no associated hydronephrosis. There is a moderate-sized hiatal hernia    No abnormalities of the small bowel loops are noted. There is atherosclerotic calcification in the abdominal aorta and iliac arteries. There are small mesenteric and retroperitoneal lymph nodes present. The urinary bladder is normal. The patient is status post hysterectomy. There is no pelvic free fluid. The colon is within normal limits. . There are postoperative changes in the lower lumbar spine. Impression  1. Stable CT scan of the abdomen and pelvis, no interval change since previous study dated 3 January 2021.  2. Mild diffuse fatty replacement liver. 3. Status post cholecystectomy and hysterectomy. 4. Punctate nonobstructing stone in the lower pole of the right kidney. Small left renal cyst.  5. Hiatal hernia. 6. Small mesenteric and retroperitoneal lymph nodes.   7. Postoperative changes in the lower lumbar spine. 8. Otherwise negative CT scan of the abdomen and pelvis. **This report has been created using voice recognition software. It may contain minor errors which are inherent in voice recognition technology. **    Final report electronically signed by DR Reina Brown on 8/3/2021 11:54 AM    No results found for this or any previous visit. No results found for this or any previous visit. Endoscopy Finding:      Objective:   Vitals: BP (!) 140/67   Pulse 83   Temp 99.1 °F (37.3 °C) (Oral)   Resp 18   Ht 5' 3\" (1.6 m)   Wt 261 lb 6.4 oz (118.6 kg)   SpO2 95%   BMI 46.30 kg/m²     Intake/Output Summary (Last 24 hours) at 8/11/2021 1545  Last data filed at 8/11/2021 1409  Gross per 24 hour   Intake 490 ml   Output --   Net 490 ml     General appearance: alert and cooperative with exam  Lungs: clear to auscultation bilaterally  Heart: regular rate and rhythm, S1, S2 normal, no murmur, click, rub or gallop  Abdomen: soft, non-tender; bowel sounds normal; no masses,  no organomegaly  Extremities: extremities normal, atraumatic, no cyanosis or edema    Assessment and Plan:   1. Abnormal function test typical biliary colic patient status post cholecystectomy but indicate biliary stone or sludge however imaging studies so far did not confirm it  2. MRCP reviewed with the patient did not confirm biliary dilation of the stone  3. S/P ERCP with sphincterotomy and sludge extractions discussed the finding with the patient . 4. Post ERCP discomfort possible mild pancreatitis lipase is 287 improved today we will keep n.p.o. for now repeat lab for tomorrow  5. No plan for antibiotic therapy for the pancreatitis study does not support need for therapy at this time  6. CT scan confirmed pancreatitis in the head conservative management no clear indication for antibiotic at this time. 7. EMILY titer is very high which also before also been hepatitis  8.  Significant improvement in liver function test supported diagnosis autoimmune hepatitis diet advance as tolerated discharge on taper dose prednisone will follow closely from the office      Follow up in GI Clinic after discharge in 1 to 2 weeks week(s)    Patient Active Problem List:     Abdominal pain, right lower quadrant     Puncture wound of heel without complication     Diarrhea     Intractable nausea and vomiting     Transaminitis      Electronically signed by Salome Lang MD on 8/11/2021 at 3:45 PM

## 2021-08-11 NOTE — PROGRESS NOTES
Comprehensive Nutrition Assessment    Type and Reason for Visit:  Reassess    Nutrition Recommendations/Plan:   Recommend diet as tolerated - per GI. Increase ONS (Jensen Vargas) to TID. Rx per GI. If u/a to tolerate po diet - consider restart PN - recommend dosing wt: 69 kgm - 10 kcals/kgm, 2 gm protein/kgm. Nutrition Assessment:    Pt. with no improvement from a nutritional standpoint AEB poor po intake, abdominal pain (worse with eating). Remains at risk for further nutritional compromise r/t severe malnutrition, altered GI function (abdominal pain with transaminitis, pancreatitis, celiac disease, lymphocytic colitis; ? Autoimmune hepatitis) and underlying medical condition (hx HTN, thyroid disease, obesity). Nutrition recommendations/interventions as per above. Malnutrition Assessment:  Malnutrition Status:  Severe malnutrition    Context:  Acute Illness     Findings of the 6 clinical characteristics of malnutrition:  Energy Intake:  7 - 50% or less of estimated energy requirements for 5 or more days  Weight Loss:  1 - 1% to 2% over 1 week (-2.2% weight loss in one week)     Body Fat Loss:  1 - Mild body fat loss Orbital   Muscle Mass Loss:  No significant muscle mass loss    Fluid Accumulation:  No significant fluid accumulation Extremities   Strength:  Not Performed    Estimated Daily Nutrient Needs:  Energy (kcal):  3553-2238 kcal/day (10-15 kcal/kg); Weight Used for Energy Requirements:  Current (119.4 kg)     Protein (g):  104+ g/day (2+ g/kg); Weight Used for Protein Requirements:  Ideal (52.3 kg)            Nutrition Related Findings:   Pt. Seen this am - reports abdominal pain worse with eating; 1 BM noted past 48 hours; diet advanced; tolerating ONS - requests TID; PPN stopped per GI with diet advancement;  Rx includes Dulcolax, Solumedrol, Senokot, PPI, Imodium, Glycolax, Zofran; 8/11: Glucose 101, Potassium 3.3, Magnesium 1.7, Phosphorus 4.3; GI following    Wounds:  None       Current Nutrition Therapies:    Adult Oral Nutrition Supplement; Other Oral Supplement; [employer] with Lunch and Supper  ADULT DIET; Easy to Chew; Gluten Free    Anthropometric Measures:  · Height: 5' 3\" (160 cm)  · Current Body Weight: 263 lb 3.2 oz (119.4 kg) (8/8; +trace edema BLE)   · Admission Body Weight: 269 lb (122 kg) (8/4; +trace edema BLE)    · Usual Body Weight:  (262 lbs per pt report. No actual weights per EMR)     · Ideal Body Weight: 115 lbs;      · BMI: 46.6  · BMI Categories: Obese Class 3 (BMI 40.0 or greater)       Nutrition Diagnosis:   · Severe malnutrition, In context of acute illness or injury related to inadequate protein-energy intake as evidenced by weight loss greater than or equal to 2% in 1 week (pt consuming less than 50% of meals x6 days since admit)      Nutrition Interventions:   Food and/or Nutrient Delivery:  Continue Current Diet, Continue Oral Nutrition Supplement  Nutrition Education/Counseling:  Education initiated (8/11 Encouraged po, ONS as tolerated. Discussed appropriate use of ONS/trialing at discharge.)   Coordination of Nutrition Care:  Continue to monitor while inpatient    Goals:  Pt. will tolerate and consume 75% or more of meals during LOS. Nutrition Monitoring and Evaluation:   Behavioral-Environmental Outcomes:  None Identified   Food/Nutrient Intake Outcomes:  Diet Advancement/Tolerance, Food and Nutrient Intake, Supplement Intake  Physical Signs/Symptoms Outcomes:  Biochemical Data, Chewing or Swallowing, GI Status, Nausea or Vomiting, Fluid Status or Edema, Nutrition Focused Physical Findings, Skin, Weight     Discharge Planning:     Too soon to determine     Electronically signed by Luc Shaw RD, LD on 8/11/21 at 2:40 PM EDT    Contact: [phone]

## 2021-08-11 NOTE — CARE COORDINATION
8/11/21, 11:06 AM EDT    DISCHARGE ON GOING EVALUATION    [facility] day: 8  Location: -25/025-A Reason for admit: Transaminitis [R74.01]   Procedure: 8/7 S/P ERCP with sphincterotomy and sludge extraction  08/08 CT abdomen pelvis:  1. Acute pancreatitis (Steph score = 2) without evidence of complication at this time. 2. Colonic diverticulosis  Barriers to Discharge: Started po yesterday and PPN discontinued. Pt developed pain requiring Dilaudid. Decreased diet back to clear liquids. Follow labs and replace as indicated. Today Lipase normal. ALT/AST both down slightly from yesterday. PCP: Maren Burgos MD  Readmission Risk Score: 10%  Patient Goals/Plan/Treatment Preferences: From home with spouse. Follow for needs.

## 2021-08-11 NOTE — ADT AUTH CERT
Comments  Comment  [address]   [address] [facility] [address]     Auth number: [id number]     Return Contact Information:     Name: Court Strong     Phone: [phone]     Fax: [phone]   Last edited by Court Strong on 21 at [id number]   Patient Demographics    Name Patient ID [facility] Gender Identity Birth Date   Lavell Rooney [id number]  Female [birth year] (50 yrs)   Address Phone Email Employer     [email or web address] [facility] [address] [phone] (H)   [phone] (Z)   [phone] (OTHER PHONE) [email or web address] [email or web address] [employer] service   [address] Occupation Emp Status    -- White (non-) -- Full Time    Reg Status PCP Date Last Verified Next Review Date    Verified Julian Lyons MD  [phone] 21    Admission Date Discharge Date Admitting Provider     21 -- Jarrod Henley MD     Marital Status Taoism       [address]      Emergency Contact 1 Emergency Contact 2 Emergency Contact 3   Cleatis Hashimoto [phone] Karime Gusman (2)    [address]   [phone] Leland Lopez Raheel Ham (H)   [phone] (W)   [phone] (H)   Subscriber Details  Hospital Account [de-identified]  CVG Subscriber Name/Sex/Relation Subscriber  Subscriber Address/Phone Subscriber Emp/Emp Phone   1. [facility]   [id number] [address] [place] [address] Male   (Spouse) 1972 [email or web address] [facility]  [email or web address], [address]   [phone](K) EATON CONNER    Utilization Reviews       Abdominal Pain, Undiagnosed - Care Day 8 (8/10/2021) by Sydnie Sanches RN       Review Status Review Entered   Completed 2021 15:19      Criteria Review      Care Day: 8 Care Date: 8/10/2021 Level of Care: Inpatient Floor    Guideline Day 2    Level Of Care    (X) Floor to discharge    Clinical Status    (X) * Hemodynamic stability    2021 3:19 PM EDT by Marilyn Webb      /62   Pulse 65   Temp 98.7 °F (37.1 °C) (Oral)   Resp 18   Ht 5' 3\" (1.6 m)   Wt 263 lb 3.2 oz (119.4 kg)   SpO2 94%   BMI 46.62 kg/m²    (X) * Pain absent or managed    ( ) * Etiology or finding requiring inpatient treatment absent    ( ) * Liquid or advanced diet tolerated    ( ) * Discharge plans and education understood    Activity    (X) * Ambulatory or acceptable for next level of care    Routes    ( ) * Oral hydration    ( ) * Oral medications or regimen acceptable for next level of care    8/11/2021 3:19 PM EDT by Marilyn Webb      LOVENOX DAILY,  COZAAR DAILY, HCTZ DAILY, SYNTHROID DAILY, LIDOCAINE PATCH DAILY, SOLUMDROL IV X1, PROTONIX BID, SODIUM PHOS IV X1, POTASSIUM CHL IV X3, DILAUDID IV X3    ( ) * Oral diet or acceptable for next level of care    * Milestone   Additional Notes   INTERNAL MED 8/10/21:      Assessment and Plan:         1. Abdominal pain w/ transaminitis: chronic undifferentiated abdominal pain and transaminitis  >1 year. GI consulted. Negative hepatitis panel. Denies acetaminophen useANA, ANCA, F-actin (smooth muscle) Ab, Ferritin, IgG, mitochondrial Ab M2 IG, smooth muscle antibody.  Further viral serologies pending.  Patient's LFTs slightly trended down today. MRCP obtained as well and unremarkable.  Patient still having pain. 8/6-> LFTs trending up. Plan for ERCP today. Pending course may consider transfer to location with hepatobiliary specialty. Discussed case with GI.   8/7-> LFTs slightly trending down.   8/9-> LFTs trending down, bilirubin remains elevated, unclear etiology, possibly related to acute pancreatitis / inflammation. Continue to trend. EMILY +, discussed with GI and start IV Solu-medrol at this time. 8/10-> continue with IV Solu-medrol today and at discharge send with Prednisone taper per GI. 2. Acute pancreatitis: Likely secondary to s/p ERCP. Lipase elevated at 278.7. Maintain NPO. IV fluids increased.  Pain control with Dilaudid 1 mg every 2 hours as needed. 8/10-> advanced diet as tolerated per GI. Pt admits to some nausea with eating still. 3. Hyperbilirubinemia: bilirubin trending up, now noted at 10.5. Likely related to inflammation associated with pancreatitis, but unclear etiology   8/10-> significant improvement from 10.5 to 4.1   4. Hyponatremia: resolved, continue to monitor with daily BMP   5. Leukocytosis: Likely reactive secondary to above. Continue to trend daily. Will defer abx at this time after discussion with GI. Afebrile. UA neg. 6. Possible celiac disease/lymphocytic colitis: per Dr. Peg Nova note as seen on colon biopsy. 7. Essential HTN: Cozaar and Hydrodiuril, stable continue to monitor   8. Hypothyroidism: Synthroid 150mcg daily   9. Dehydration, POA: Patient was concentrated with hemoglobin near 16 and endorsed being very dry.  Started IV fluids.  With improvement noted in her hemoglobin concentration.  We will continue IV fluids for now. 10. Severe obesity: BMI 47.65           CC:  Abdominal pain; transaminitis       Hospital course: 50yoF with  hypothyroidism, and HTN and PSHx of cholecystectomy and hysterectomy presents for evaluation of abdominal pain. Patient reports intermittent episodes of periumbilical and RUQ abdominal pain over the past year. Pain ranges from 5-6 to 7-8 in severity and is associated with nausea and dry heaving. Episodes typically last for hours and occur 3-5 times/week (though she has symptom free days). Patient denies exacerbating or alleviating factors. Denies fevers/chills, chest pain, pale stools, melena, dysuria, hematuria. Reports 3 episodes of watery diarrhea yesterday as well as darker urine over past week. Patient presented to [facility] ED this morning due to worsening pain. Initial HFT reveales ALT 1567, , Alk Phos 384. Viral hepatitis panel negative. INR and aPTT WNL. CT abdomen pelvis showed mild diffuse fatty replacement of liver.    Patient's was first evaluated for abdominal pain last January. She was worked up for celiac disease but had normal TGG. However, biopsy showed features of Celiac disease and lymphocytic colitis. She has since followed a strict gluten free diet. In June, she presented to an ED in [facility] for a similar episode of abdominal pain but her transaminases were in the 200s. Patient recently returned from vacation near [place], [place]. She states that she cooked her own food while on vacation. She also began seeing a chiropractor 4wks ago and was started on several supplements including \"beta plus,\" mimosa, moringa, oregano oil, and wormwood TID but has not taken any 1.5wks. Patient's mother and nephew have history of autoimmune disease. Patient does not recall specifics but reports that her mother \"needs to have her esophagus dilated\" as a result and that her nephew's symptoms are primarily GI.       8/5-> Patient symptoms unchanged. Still having both right upper and lower quadrant abdominal pain and intermittent nausea without emesis. New rash noted on R buttock.  No confusion. Denies CP.        8/6-> LFTS continue to trend up. Pt reports continued abd pain 7/10 in R side of abd. Admits to intermittent nausea and \"watery tongue\" without emesis. Denies fever/chills. No CP/SOB. Pt reports she is anxious about taking pain medicine causes she does not want to get constipated. Educated regarded stool softeners. 8/7-> pt having significant pain overnight in the R side abd and radiating into her back. Pt denies fever/chills. Denies CP/SOB. Admits to continued nausea. Denies passing flatus. No BM since Sunday. Check KUB. 8/8-> pt still having pain in R side abd and epigastric pain. Some nausea without emesis. Had a temperature of 100.3 overnight. Denies CP/SOB.    8/9-> pt is doing okay, reports slight improvement of her pain from 10 to 8/10. notign epigastric discomfort with radation into back.  EMILY positive and concern for autoimmune hepatitis. Discussed case with GI. Not recommending abx at this time after discussion. Pt denies fever/chills. Admits to suprapubic discomfort today but no dysuria, increased urgency/frequency.        Subjective:   8/10-> pt is doing okay, reports continued 8/10 pain in her epigastric and RUQ areas. She was seen by GI who instructed to advance diet as tolerated. Pt reports that she is feeling a little bit nauseated with food and does not feel comfortable with being discharge home with this and her pain. Pt denies fever/chills. Denies CP/SOB. Vital Signs:    /62   Pulse 65   Temp 98.7 °F (37.1 °C) (Oral)   Resp 18   Ht 5' 3\" (1.6 m)   Wt 263 lb 3.2 oz (119.4 kg)   SpO2 94%   BMI 46.62 kg/m²            · XR ABDOMEN (KUB) (SINGLE AP VIEW)   · Final Result   · 1. Paucity of gas in small bowel loops. · 2. Air throughout the colon and stool in the descending colon and right colon. The colon is mildly distended. ·     ·     ·     ·     · **This report has been created using voice recognition software. It may contain minor errors which are inherent in voice recognition technology. **   ·     · Final report electronically signed by Dr. Brent Christensen on 8/10/2021 7:10 AM   ·     · CT ABDOMEN PELVIS W WO CONTRAST Additional Contrast? None   · Final Result   ·    · 1. Acute pancreatitis (Steph score = 2) without evidence of complication at this time. · 2. Colonic diverticulosis. ·     ·     ·     ·     · **This report has been created using voice recognition software. It may contain minor errors which are inherent in voice recognition technology. **   ·     · Final report electronically signed by Dr. Hussein Seay MD on 8/8/2021 10:28 AM   ·     · XR ABDOMEN (KUB) (SINGLE AP VIEW)   · Final Result   · Moderate retained stool. ·     ·     ·     ·     · **This report has been created using voice recognition software.  It may contain minor errors which are inherent in voice recognition technology. **   ·     · Final report electronically signed by Dr. Alena Loyola MD on 8/7/2021 11:32 AM   ·     · FL ERCP BILIARY AND PANCREATIC S&I   · Final Result   · Status post ERCP. ·     ·     ·     ·     · **This report has been created using voice recognition software.  It may contain minor errors which are inherent in voice recognition technology. **   ·     · Final report electronically signed by Dr. Cami Hooker on 8/6/2021 4:45 PM   ·     · MRI ABDOMEN W WO CONTRAST   · Final Result   · 1. No common bile duct stone. · 2. No intrahepatic ductal dilatation. Pancreatic duct is unremarkable. · 3. Gallbladder removed. · 4. No liver lesions. ·     ·     ·     · **This report has been created using voice recognition software.  It may contain minor errors which are inherent in voice recognition technology. **   ·     · Final report electronically signed by Dr. Bill Rosen on 8/4/2021 10:03 AM   ·     · CT ABDOMEN PELVIS W IV CONTRAST Additional Contrast? None   · Final Result   ·     · 1. Stable CT scan of the abdomen and pelvis, no interval change since previous study dated 3 January 2021. · 2. Mild diffuse fatty replacement liver. · 3. Status post cholecystectomy and hysterectomy. · 4. Punctate nonobstructing stone in the lower pole of the right kidney. Small left renal cyst.   · 5. Hiatal hernia. · 6. Small mesenteric and retroperitoneal lymph nodes. · 7. Postoperative changes in the lower lumbar spine. · 8. Otherwise negative CT scan of the abdomen and pelvis. ·     ·     · **This report has been created using voice recognition software. It may contain minor errors which are inherent in voice recognition technology. **   ·     · Final report electronically signed by DR Quin Savage on 8/3/2021 11:54 AM            Abdominal Pain, Undiagnosed - Care Day 7 (8/9/2021) by Joshua Aragon RN       Review Status Review Entered   Completed 8/11/2021 15:15      Criteria Review Care Day: 7 Care Date: 8/9/2021 Level of Care: Inpatient Floor    Guideline Day 2    Level Of Care    (X) Floor to discharge    Clinical Status    (X) * Hemodynamic stability    8/11/2021 3:15 PM EDT by David Adame      Vital Signs:   /74   Pulse 86   Temp 98.7 °F (37.1 °C) (Oral)   Resp 18   Ht 5' 3\" (1.6 m)   Wt 263 lb 3.2 oz (119.4 kg)   SpO2 96%   BMI 46.62 kg/m²    (X) * Pain absent or managed    ( ) * Etiology or finding requiring inpatient treatment absent    ( ) * Liquid or advanced diet tolerated    ( ) * Discharge plans and education understood    Activity    (X) * Ambulatory or acceptable for next level of care    Routes    ( ) * Oral hydration    ( ) * Oral medications or regimen acceptable for next level of care    8/11/2021 3:15 PM EDT by David Adame      LOVENOX DAILY, COZAAR DAILY, HCTZ DAILY, SYNTHROID DAILY, LIDOCAINE PATCH DAILY, SOLUMEDROL IV X1, PROTONIX BID, SODIUM PHOS IV X1, POTASSIUM CHL IV X3, DILAUDID IV X6, MAG SULFATE IV X1, ZOFRAN IV X1    ( ) * Oral diet or acceptable for next level of care    * Milestone   Additional Notes   INTERNAL MED 8/9/21:      Assessment and Plan:         1. Abdominal pain w/ transaminitis: chronic undifferentiated abdominal pain and transaminitis  >1 year. GI consulted. Negative hepatitis panel. Denies acetaminophen useANA, ANCA, F-actin (smooth muscle) Ab, Ferritin, IgG, mitochondrial Ab M2 IG, smooth muscle antibody.  Further viral serologies pending.  Patient's LFTs slightly trended down today. MRCP obtained as well and unremarkable.  Patient still having pain. 8/6-> LFTs trending up. Plan for ERCP today. Pending course may consider transfer to location with hepatobiliary specialty. Discussed case with GI.   8/7-> LFTs slightly trending down.   8/9-> LFTs trending down, bilirubin remains elevated, unclear etiology, possibly related to acute pancreatitis / inflammation. Continue to trend.  EMILY +, discussed with GI and start IV Solu-medrol at this time. 2. Acute pancreatitis: Likely secondary to s/p ERCP. Lipase elevated at 278.7. Maintain NPO. IV fluids increased. Pain control with Dilaudid 1 mg every 2 hours as needed. 8/8-> Lipase trending down. Continue with NPO and IVFs. 8/9-> continues to have pain, await diet changes per GI.   3. Hyperbilirubinemia: bilirubin trending up, now noted at 10.5. Likely related to inflammation associated with pancreatitis, but unclear etiology   4. Hyponatremia: resolved, continue to monitor with daily BMP   5. Leukocytosis: Likely reactive secondary to above. Continue to trend daily. Will defer abx at this time after discussion with GI.   6. Possible celiac disease/lymphocytic colitis: per Dr. Omar Jacques note as seen on colon biopsy. 7. Essential HTN: Cozaar and Hydrodiuril, stable continue to monitor   8. Hypothyroidism: Synthroid 150mcg daily   9. Dehydration, POA: Patient was concentrated with hemoglobin near 16 and endorsed being very dry.  Started IV fluids.  With improvement noted in her hemoglobin concentration.  We will continue IV fluids for now. 10. Severe obesity: BMI 47.65           CC:  Abdominal pain; transaminitis       Hospital course: 50yoF with  hypothyroidism, and HTN and PSHx of cholecystectomy and hysterectomy presents for evaluation of abdominal pain. Patient reports intermittent episodes of periumbilical and RUQ abdominal pain over the past year. Pain ranges from 5-6 to 7-8 in severity and is associated with nausea and dry heaving. Episodes typically last for hours and occur 3-5 times/week (though she has symptom free days). Patient denies exacerbating or alleviating factors. Denies fevers/chills, chest pain, pale stools, melena, dysuria, hematuria. Reports 3 episodes of watery diarrhea yesterday as well as darker urine over past week. Patient presented to [facility] ED this morning due to worsening pain. Initial HFT reveales ALT 1567, , Alk Phos 384.  Viral hepatitis panel negative. INR and aPTT WNL. CT abdomen pelvis showed mild diffuse fatty replacement of liver. Patient's was first evaluated for abdominal pain last January. She was worked up for celiac disease but had normal TGG. However, biopsy showed features of Celiac disease and lymphocytic colitis. She has since followed a strict gluten free diet. In June, she presented to an ED in [facility] for a similar episode of abdominal pain but her transaminases were in the 200s. Patient recently returned from vacation near [facility]. She states that she cooked her own food while on vacation. She also began seeing a chiropractor 4wks ago and was started on several supplements including \"beta plus,\" mimosa, moringa, oregano oil, and wormwood TID but has not taken any 1.5wks. Patient's mother and nephew have history of autoimmune disease. Patient does not recall specifics but reports that her mother \"needs to have her esophagus dilated\" as a result and that her nephew's symptoms are primarily GI.       8/5-> Patient symptoms unchanged. Still having both right upper and lower quadrant abdominal pain and intermittent nausea without emesis. New rash noted on R buttock.  No confusion. Denies CP.        8/6-> LFTS continue to trend up. Pt reports continued abd pain 7/10 in R side of abd. Admits to intermittent nausea and \"watery tongue\" without emesis. Denies fever/chills. No CP/SOB. Pt reports she is anxious about taking pain medicine causes she does not want to get constipated. Educated regarded stool softeners. 8/7-> pt having significant pain overnight in the R side abd and radiating into her back. Pt denies fever/chills. Denies CP/SOB. Admits to continued nausea. Denies passing flatus. No BM since Sunday. Check KUB. 8/8-> pt still having pain in R side abd and epigastric pain. Some nausea without emesis. Had a temperature of 100.3 overnight.  Denies CP/SOB.        Subjective:   8/9-> pt is doing okay, reports slight improvement of her pain from 10 to 8/10. notign epigastric discomfort with radation into back. EMILY positive and concern for autoimmune hepatitis. Discussed case with GI. Not recommending abx at this time after discussion. Pt denies fever/chills. Admits to suprapubic discomfort today but no dysuria, increased urgency/frequency.                ERCP planned for 1500 today. Vital Signs:    /74   Pulse 86   Temp 98.7 °F (37.1 °C) (Oral)   Resp 18   Ht 5' 3\" (1.6 m)   Wt 263 lb 3.2 oz (119.4 kg)   SpO2 96%   BMI 46.62 kg/m²        · CT ABDOMEN PELVIS W WO CONTRAST Additional Contrast? None   · Final Result   ·    · 1. Acute pancreatitis (Steph score = 2) without evidence of complication at this time. · 2. Colonic diverticulosis. ·     ·     ·     ·     · **This report has been created using voice recognition software. It may contain minor errors which are inherent in voice recognition technology. **   ·     · Final report electronically signed by Dr. Dennis Calderon MD on 8/8/2021 10:28 AM   ·     · XR ABDOMEN (KUB) (SINGLE AP VIEW)   · Final Result   · Moderate retained stool. ·     ·     ·     ·     · **This report has been created using voice recognition software. It may contain minor errors which are inherent in voice recognition technology. **   ·     · Final report electronically signed by Dr. Dennis Calderon MD on 8/7/2021 11:32 AM   ·     · FL ERCP BILIARY AND PANCREATIC S&I   · Final Result   · Status post ERCP. ·     ·     ·     ·     · **This report has been created using voice recognition software.  It may contain minor errors which are inherent in voice recognition technology. **   ·     · Final report electronically signed by Dr. Leonard Fuentes on 8/6/2021 4:45 PM   ·     · MRI ABDOMEN W WO CONTRAST   · Final Result   · 1. No common bile duct stone. · 2. No intrahepatic ductal dilatation. Pancreatic duct is unremarkable. · 3. Gallbladder removed. · 4.  No liver lesions. ·     ·     ·     · **This report has been created using voice recognition software.  It may contain minor errors which are inherent in voice recognition technology. **   ·     · Final report electronically signed by Dr. Jocelin Chery on 8/4/2021 10:03 AM   ·     · CT ABDOMEN PELVIS W IV CONTRAST Additional Contrast? None   · Final Result   ·     · 1. Stable CT scan of the abdomen and pelvis, no interval change since previous study dated 3 January 2021. · 2. Mild diffuse fatty replacement liver. · 3. Status post cholecystectomy and hysterectomy. · 4. Punctate nonobstructing stone in the lower pole of the right kidney. Small left renal cyst.   · 5. Hiatal hernia. · 6. Small mesenteric and retroperitoneal lymph nodes. · 7. Postoperative changes in the lower lumbar spine. · 8. Otherwise negative CT scan of the abdomen and pelvis. ·     ·     · **This report has been created using voice recognition software. It may contain minor errors which are inherent in voice recognition technology. **   ·     · Final report electronically signed by DR Azra Frazier on 8/3/2021 11:54 AM   ·

## 2021-08-12 VITALS
DIASTOLIC BLOOD PRESSURE: 56 MMHG | WEIGHT: 261.4 LBS | TEMPERATURE: 98.8 F | HEART RATE: 65 BPM | OXYGEN SATURATION: 95 % | SYSTOLIC BLOOD PRESSURE: 103 MMHG | RESPIRATION RATE: 18 BRPM | HEIGHT: 63 IN | BODY MASS INDEX: 46.32 KG/M2

## 2021-08-12 LAB
ALBUMIN SERPL-MCNC: 3.2 G/DL (ref 3.5–5.1)
ALP BLD-CCNC: 279 U/L (ref 38–126)
ALT SERPL-CCNC: 353 U/L (ref 11–66)
ANION GAP SERPL CALCULATED.3IONS-SCNC: 10 MEQ/L (ref 8–16)
AST SERPL-CCNC: 101 U/L (ref 5–40)
BILIRUB SERPL-MCNC: 2.7 MG/DL (ref 0.3–1.2)
BUN BLDV-MCNC: 15 MG/DL (ref 7–22)
CALCIUM SERPL-MCNC: 9 MG/DL (ref 8.5–10.5)
CHLORIDE BLD-SCNC: 98 MEQ/L (ref 98–111)
CO2: 31 MEQ/L (ref 23–33)
CREAT SERPL-MCNC: 0.7 MG/DL (ref 0.4–1.2)
ERYTHROCYTE [DISTWIDTH] IN BLOOD BY AUTOMATED COUNT: 17.4 % (ref 11.5–14.5)
ERYTHROCYTE [DISTWIDTH] IN BLOOD BY AUTOMATED COUNT: 57.5 FL (ref 35–45)
GFR SERPL CREATININE-BSD FRML MDRD: 88 ML/MIN/1.73M2
GLUCOSE BLD-MCNC: 106 MG/DL (ref 70–108)
HCT VFR BLD CALC: 41.1 % (ref 37–47)
HEMOGLOBIN: 13.2 GM/DL (ref 12–16)
INR BLD: 1.08 (ref 0.85–1.13)
MAGNESIUM: 1.7 MG/DL (ref 1.6–2.4)
MCH RBC QN AUTO: 29.6 PG (ref 26–33)
MCHC RBC AUTO-ENTMCNC: 32.1 GM/DL (ref 32.2–35.5)
MCV RBC AUTO: 92.2 FL (ref 81–99)
PLATELET # BLD: 475 THOU/MM3 (ref 130–400)
PMV BLD AUTO: 10.3 FL (ref 9.4–12.4)
POTASSIUM REFLEX MAGNESIUM: 3.3 MEQ/L (ref 3.5–5.2)
POTASSIUM SERPL-SCNC: 3.3 MEQ/L (ref 3.5–5.2)
RBC # BLD: 4.46 MILL/MM3 (ref 4.2–5.4)
SODIUM BLD-SCNC: 139 MEQ/L (ref 135–145)
TOTAL PROTEIN: 6.1 G/DL (ref 6.1–8)
WBC # BLD: 12.5 THOU/MM3 (ref 4.8–10.8)

## 2021-08-12 PROCEDURE — 6370000000 HC RX 637 (ALT 250 FOR IP): Performed by: PHYSICIAN ASSISTANT

## 2021-08-12 PROCEDURE — 82784 ASSAY IGA/IGD/IGG/IGM EACH: CPT

## 2021-08-12 PROCEDURE — 6370000000 HC RX 637 (ALT 250 FOR IP): Performed by: INTERNAL MEDICINE

## 2021-08-12 PROCEDURE — 6360000002 HC RX W HCPCS: Performed by: INTERNAL MEDICINE

## 2021-08-12 PROCEDURE — 83735 ASSAY OF MAGNESIUM: CPT

## 2021-08-12 PROCEDURE — 36415 COLL VENOUS BLD VENIPUNCTURE: CPT

## 2021-08-12 PROCEDURE — 80053 COMPREHEN METABOLIC PANEL: CPT

## 2021-08-12 PROCEDURE — 85610 PROTHROMBIN TIME: CPT

## 2021-08-12 PROCEDURE — 85027 COMPLETE CBC AUTOMATED: CPT

## 2021-08-12 PROCEDURE — 6360000002 HC RX W HCPCS: Performed by: PHYSICIAN ASSISTANT

## 2021-08-12 PROCEDURE — 2580000003 HC RX 258: Performed by: INTERNAL MEDICINE

## 2021-08-12 PROCEDURE — 99239 HOSP IP/OBS DSCHRG MGMT >30: CPT | Performed by: PHYSICIAN ASSISTANT

## 2021-08-12 RX ORDER — ONDANSETRON 4 MG/1
4 TABLET, ORALLY DISINTEGRATING ORAL EVERY 8 HOURS PRN
Qty: 12 TABLET | Refills: 0 | Status: SHIPPED | OUTPATIENT
Start: 2021-08-12

## 2021-08-12 RX ORDER — PANTOPRAZOLE SODIUM 40 MG/1
40 TABLET, DELAYED RELEASE ORAL
Qty: 60 TABLET | Refills: 1 | Status: SHIPPED | OUTPATIENT
Start: 2021-08-12 | End: 2021-12-17

## 2021-08-12 RX ORDER — DOCUSATE SODIUM 100 MG/1
200 CAPSULE, LIQUID FILLED ORAL DAILY
Status: DISCONTINUED | OUTPATIENT
Start: 2021-08-12 | End: 2021-08-12 | Stop reason: HOSPADM

## 2021-08-12 RX ORDER — PREDNISONE 10 MG/1
TABLET ORAL
Qty: 41 TABLET | Refills: 0 | Status: SHIPPED | OUTPATIENT
Start: 2021-08-12 | End: 2021-08-22

## 2021-08-12 RX ORDER — POTASSIUM CHLORIDE 20 MEQ/1
20 TABLET, EXTENDED RELEASE ORAL DAILY
Qty: 14 TABLET | Refills: 0 | Status: SHIPPED | OUTPATIENT
Start: 2021-08-12 | End: 2021-08-26

## 2021-08-12 RX ORDER — IBUPROFEN 800 MG/1
800 TABLET ORAL EVERY 8 HOURS PRN
Qty: 30 TABLET | Refills: 0 | Status: SHIPPED | OUTPATIENT
Start: 2021-08-12 | End: 2021-08-23

## 2021-08-12 RX ORDER — POLYETHYLENE GLYCOL 3350 17 G/17G
17 POWDER, FOR SOLUTION ORAL DAILY
Status: DISCONTINUED | OUTPATIENT
Start: 2021-08-12 | End: 2021-08-12 | Stop reason: HOSPADM

## 2021-08-12 RX ORDER — PREDNISONE 20 MG/1
40 TABLET ORAL DAILY
Qty: 14 TABLET | Refills: 0 | Status: SHIPPED | OUTPATIENT
Start: 2021-08-12 | End: 2021-08-12 | Stop reason: HOSPADM

## 2021-08-12 RX ORDER — PANTOPRAZOLE SODIUM 40 MG/1
40 TABLET, DELAYED RELEASE ORAL
Qty: 60 TABLET | Refills: 0 | Status: SHIPPED | OUTPATIENT
Start: 2021-08-12 | End: 2021-08-12

## 2021-08-12 RX ADMIN — MAGNESIUM HYDROXIDE 30 ML: 2400 SUSPENSION ORAL at 08:34

## 2021-08-12 RX ADMIN — PANTOPRAZOLE SODIUM 40 MG: 40 TABLET, DELAYED RELEASE ORAL at 16:58

## 2021-08-12 RX ADMIN — LOSARTAN POTASSIUM 100 MG: 100 TABLET, FILM COATED ORAL at 08:34

## 2021-08-12 RX ADMIN — DOCUSATE SODIUM 200 MG: 100 CAPSULE ORAL at 08:33

## 2021-08-12 RX ADMIN — NALOXEGOL OXALATE 12.5 MG: 12.5 TABLET, FILM COATED ORAL at 10:11

## 2021-08-12 RX ADMIN — ONDANSETRON 4 MG: 2 INJECTION INTRAMUSCULAR; INTRAVENOUS at 09:28

## 2021-08-12 RX ADMIN — LEVOTHYROXINE SODIUM 150 MCG: 0.15 TABLET ORAL at 05:48

## 2021-08-12 RX ADMIN — POLYETHYLENE GLYCOL 3350 17 G: 17 POWDER, FOR SOLUTION ORAL at 08:34

## 2021-08-12 RX ADMIN — SODIUM CHLORIDE, PRESERVATIVE FREE 10 ML: 5 INJECTION INTRAVENOUS at 08:44

## 2021-08-12 RX ADMIN — POTASSIUM CHLORIDE 40 MEQ: 1500 TABLET, EXTENDED RELEASE ORAL at 08:33

## 2021-08-12 RX ADMIN — PANTOPRAZOLE SODIUM 40 MG: 40 TABLET, DELAYED RELEASE ORAL at 05:48

## 2021-08-12 RX ADMIN — HYDROCHLOROTHIAZIDE 25 MG: 25 TABLET ORAL at 08:44

## 2021-08-12 RX ADMIN — METHYLPREDNISOLONE SODIUM SUCCINATE 40 MG: 40 INJECTION, POWDER, FOR SOLUTION INTRAMUSCULAR; INTRAVENOUS at 08:34

## 2021-08-12 ASSESSMENT — PAIN SCALES - GENERAL: PAINLEVEL_OUTOF10: 7

## 2021-08-12 NOTE — PROGRESS NOTES
Educated on discharge instructions, medications, outpt labs, and follow up appointments. No further questions or concerns voiced. Discharged to home with spouse.

## 2021-08-12 NOTE — PROGRESS NOTES
Gastroenterology  Progress Note    8/12/2021 3:36 PM  Subjective:   Admit Date: 8/3/2021    Interval History: Patient with history of recurrent abdominal discomfort intermittent typical of biliary origin associated with abnormality to function test intermittent at this time is high ALT AST however the imaging study with MRCP did not confirm biliary obstructions or stone. Sludge due to the problem not seen with MRCP cannot rule out. Acute hepatitis profile for ABC serologies so far been negative other work-up still pending. No clear indication for ERCP at this time however can be considered patient clinically not improve after discussion with the patient with possible complications which try to happen will repeat a function test and watch closely. 8/7 S/P ERCP with sphincterotomy and sludge extraction developed discomfort with slight deviation the lipase might be a mild pancreatitis we will keep n.p.o. hold antibiotic at this time repeat lab for tomorrow  8/8/2021 significant discomfort CT scans of pancreatitis in the head white cell is high diffuse infectious patient with antibiotic at this stage but have no complications so as long the patient doing fine we will hold antibiotics. Laparotomy hepatitis positive will hold presented to the pancreatitis improved will need repeat lab for tomorrow  8/9/2021 clinically improve transaminase abdominal bilirubin is up that the EMILY is very high discussed with the family the potential diagnosis of autoimmune hepatitis will start IV dose of Solu-Medrol, antibiotic on hold the white cell is high reaction to the pancreatitis will advance to clear liquid diet repeat lab tomorrow.   10.5 to 4.1 with improvement also transaminases are down diet advance to full liquid as tolerated to low-fat if tolerated discharge home on triple dose prednisone 40 g for 3 days 30 mg for 3 days do not go below 20 we will follow-up closely from the office after discharge  8/10/2021 started missing prednisone yesterday significant improvement in her bilirubin 1 down  8/11/2021 more improvement of the liver function test enzymes as well as bilirubin amylase is normal tolerating diet but not as much started slow repeat lab for tomorrow she may need to go home on triple dose prednisone will check ferritin iron again liver high in the last lab check brought in the possibility of hemochromatosis. 8/12 stable for discharge on PPI BID an dapper dose prednisone     Diet: Adult Oral Nutrition Supplement; Other Oral Supplement; [employer] with Lunch and Supper  ADULT DIET; Easy to Chew; Gluten Free    Medications:   Scheduled Meds:    docusate sodium  200 mg Oral Daily    naloxegol  12.5 mg Oral QAM    polyethylene glycol  17 g Oral Daily    methylPREDNISolone  40 mg Intravenous Daily    phosphorus replacement protocol   Other RX Placeholder    pantoprazole  40 mg Oral BID AC    lidocaine  1 patch Transdermal Daily    senna  1 tablet Oral Nightly    sodium chloride (PF)  10 mL Intravenous Daily    levothyroxine  150 mcg Oral Daily    sodium chloride flush  5-40 mL Intravenous 2 times per day    enoxaparin  40 mg Subcutaneous Daily    losartan  100 mg Oral Daily    And    hydroCHLOROthiazide  25 mg Oral Daily     Continuous Infusions:    sodium chloride         CBC:   Recent Labs     08/10/21  0534 08/11/21  0557 08/12/21  0546   WBC 16.1* 16.3* 12.5*   HGB 12.0 12.7 13.2    456* 475*     BMP:    Recent Labs     08/10/21  0534 08/10/21  0534 08/11/21  0557 08/12/21  0546     --  141 139   K 4.2  4.2   < > 3.3* 3.3*  3.3*   CL 95*  --  98 98   CO2 30  --  32 31   BUN 10  --  15 15   CREATININE 0.4  --  0.7 0.7   GLUCOSE 130*  --  101 106    < > = values in this interval not displayed.      Hepatic:   Recent Labs     08/10/21  0534 08/11/21  0557 08/12/21  0546   * 135* 101*   * 447* 353*   BILITOT 4.1* 3.2* 2.7*   ALKPHOS 292* 303* 279*     INR:   Recent Labs in the lower pole of the right kidney. Small left renal cyst.  5. Hiatal hernia. 6. Small mesenteric and retroperitoneal lymph nodes. 7. Postoperative changes in the lower lumbar spine. 8. Otherwise negative CT scan of the abdomen and pelvis. **This report has been created using voice recognition software. It may contain minor errors which are inherent in voice recognition technology. **    Final report electronically signed by DR Reina Brown on 8/3/2021 11:54 AM    No results found for this or any previous visit. No results found for this or any previous visit. Endoscopy Finding:      Objective:   Vitals: BP (!) 103/56   Pulse 65   Temp 98.8 °F (37.1 °C) (Oral)   Resp 18   Ht 5' 3\" (1.6 m)   Wt 261 lb 6.4 oz (118.6 kg)   SpO2 95%   BMI 46.30 kg/m²     Intake/Output Summary (Last 24 hours) at 8/12/2021 1536  Last data filed at 8/12/2021 1400  Gross per 24 hour   Intake 740 ml   Output --   Net 740 ml     General appearance: alert and cooperative with exam  Lungs: clear to auscultation bilaterally  Heart: regular rate and rhythm, S1, S2 normal, no murmur, click, rub or gallop  Abdomen: soft, non-tender; bowel sounds normal; no masses,  no organomegaly  Extremities: extremities normal, atraumatic, no cyanosis or edema    Assessment and Plan:   1. Abnormal function test typical biliary colic patient status post cholecystectomy but indicate biliary stone or sludge however imaging studies so far did not confirm it  2. MRCP reviewed with the patient did not confirm biliary dilation of the stone  3. S/P ERCP with sphincterotomy and sludge extractions discussed the finding with the patient . 4. Post ERCP discomfort possible mild pancreatitis lipase is 287 improved today we will keep n.p.o. for now repeat lab for tomorrow  5. No plan for antibiotic therapy for the pancreatitis study does not support need for therapy at this time  6.  CT scan confirmed pancreatitis in the head conservative management no clear indication for antibiotic at this time. 7. EMILY titer is very high which also before also been hepatitis  8.  Significant  improvement in liver function test supported diagnosis autoimmune hepatitis diet advance as tolerated discharge on taper dose prednisone will follow closely from the office      Follow up in GI Clinic after discharge in 1 to 2 weeks week(s)    Patient Active Problem List:     Abdominal pain, right lower quadrant     Puncture wound of heel without complication     Diarrhea     Intractable nausea and vomiting     Transaminitis      Electronically signed by Nini Torres MD on 8/12/2021 at 3:36 PM

## 2021-08-12 NOTE — CARE COORDINATION
8/12/21, 1:15 PM EDT    DISCHARGE ON GOING EVALUATION    [facility] day: 9  Location: [facility]25/025-A Reason for admit: Transaminitis [R74.01]   Procedure: 8/7 S/P ERCP with sphincterotomy and sludge extraction  08/08 CT abdomen pelvis:  1. Acute pancreatitis (Steph score = 2) without evidence of complication at this time. 2. Colonic diverticulosis  Barriers to Discharge: Tolerating foods a little better. Using ibuprofen for pain now. Continue Movantik. Had large BM today. Some nausea with breakfast this morning . AST/ALT continue to trend down. PCP: Kayla Bernabe MD  Readmission Risk Score: 9%  Patient Goals/Plan/Treatment Preferences: Plans return home with spouse. Potential discharge today. 8/12/21, 1:18 PM EDT    Patient goals/plan/ treatment preferences discussed by  and . Patient goals/plan/ treatment preferences reviewed with patient/ family. Patient/ family verbalize understanding of discharge plan and are in agreement with goal/plan/treatment preferences. Understanding was demonstrated using the teach back method. AVS provided by RN at time of discharge, which includes all necessary medical information pertaining to the patients current course of illness, treatment, post-discharge goals of care, and treatment preferences. Potential discharge home later today. Pt plans home with spouse and no new services.

## 2021-08-12 NOTE — PROGRESS NOTES
8/10. notign epigastric discomfort with radation into back. EMILY positive and concern for autoimmune hepatitis. Discussed case with GI. Not recommending abx at this time after discussion. Pt denies fever/chills. Admits to suprapubic discomfort today but no dysuria, increased urgency/frequency. 8/10-> pt is doing okay, reports continued 8/10 pain in her epigastric and RUQ areas. She was seen by GI who instructed to advance diet as tolerated. Pt reports that she is feeling a little bit nauseated with food and does not feel comfortable with being discharge home with this and her pain. Pt denies fever/chills. Denies CP/SOB. \"    Subjective (past 24 hours): Pt with continued abdominal pain. She is passing gas, no BM since arrival, does not appear toxic. States she did not tolerate soft diet yesterday but is tolerating better today and wants to try again for lunch. Pt states that PTA she had diarrhea approx 4-7 times a day but has not had a BM since arrival. Notes only mild improvement in her sx since removing gluten from her diet. Pt reports trip to [facility] in Feb 2020. Spent >30 minutes with the patient directly, addressing her questions and concerns regarding hepatitis, further op follow up that will be needed, and concerns of medicaton side effects. ROS: Pertinent positives as noted in HPI. All other systems reviewed and negative. Past medical history, family history, social history and allergies reviewed again and is unchanged since admission. Medications:  Reviewed.   Infusion Medications    sodium chloride       Scheduled Medications    methylPREDNISolone  40 mg Intravenous Daily    phosphorus replacement protocol   Other RX Placeholder    pantoprazole  40 mg Oral BID AC    lidocaine  1 patch Transdermal Daily    senna  1 tablet Oral Nightly    docusate sodium  100 mg Oral Daily    sodium chloride (PF)  10 mL Intravenous Daily    levothyroxine  150 mcg Oral Daily    sodium chloride flush 5-40 mL Intravenous 2 times per day    enoxaparin  40 mg Subcutaneous Daily    losartan  100 mg Oral Daily    And    hydroCHLOROthiazide  25 mg Oral Daily     PRN Meds: potassium chloride **OR** potassium alternative oral replacement **OR** potassium chloride, bisacodyl, magnesium sulfate, hydrOXYzine, HYDROmorphone, ibuprofen, loperamide, sodium chloride flush, sodium chloride, ondansetron **OR** ondansetron, polyethylene glycol, oxyCODONE **OR** oxyCODONE    I/O:     Intake/Output Summary (Last 24 hours) at 8/12/2021 0040  Last data filed at 8/11/2021 2039  Gross per 24 hour   Intake 740 ml   Output --   Net 740 ml       Diet:  Adult Oral Nutrition Supplement; Other Oral Supplement; [employer] with Lunch and Supper  ADULT DIET; Easy to Chew; Gluten Free    Exam:  BP (!) 136/93   Pulse 75   Temp 98.4 °F (36.9 °C) (Oral)   Resp 16   Ht 5' 3\" (1.6 m)   Wt 261 lb 6.4 oz (118.6 kg)   SpO2 98%   BMI 46.30 kg/m²   General:   Pleasant female. NAD. HEENT:  normocephalic and atraumatic. No scleral icterus. PERR. Neck: supple. No JVD. No thyromegaly. Lungs: clear to auscultation. No retractions  Cardiac: RRR without murmur. Abdomen: soft. +RUQ tenderness. Bowel sounds positive. Extremities:  No clubbing, cyanosis, or edema x 4. Vasculature: capillary refill < 3 seconds. Palpable LE pulses bilaterally. Skin:  warm and dry. Psych:  Alert and oriented x3. Affect appropriate  Lymph:  No supraclavicular adenopathy. Neurologic:  No focal deficit. No seizures.       Data: (All radiographs, tracings, PFTs, and imaging are personally viewed and interpreted unless otherwise noted)  Labs:   Recent Labs     08/09/21  0909 08/10/21  0534 08/11/21  0557   WBC 18.5* 16.1* 16.3*   HGB 13.3 12.0 12.7   HCT 40.7 36.8* 39.8    329 456*     Recent Labs     08/09/21  0909 08/09/21  0909 08/09/21  1519 08/09/21  2215 08/10/21  0534 08/11/21  0557     --   --   --  136 141   K 4.0  4.0   < >  --   -- colon and stool in the descending colon and right colon. The colon is mildly distended. **This report has been created using voice recognition software. It may contain minor errors which are inherent in voice recognition technology. **      Final report electronically signed by Dr. Karime Green on 8/10/2021 7:10 AM      CT ABDOMEN PELVIS W WO CONTRAST Additional Contrast? None   Final Result       1. Acute pancreatitis (Steph score = 2) without evidence of complication at this time. 2. Colonic diverticulosis. **This report has been created using voice recognition software. It may contain minor errors which are inherent in voice recognition technology. **      Final report electronically signed by Dr. Alonso Cheng MD on 8/8/2021 10:28 AM      XR ABDOMEN (KUB) (SINGLE AP VIEW)   Final Result   Moderate retained stool. **This report has been created using voice recognition software. It may contain minor errors which are inherent in voice recognition technology. **      Final report electronically signed by Dr. Alonso Cheng MD on 8/7/2021 11:32 AM      FL ERCP BILIARY AND PANCREATIC S&I   Final Result   Status post ERCP. **This report has been created using voice recognition software. It may contain minor errors which are inherent in voice recognition technology. **      Final report electronically signed by Dr. Ketty Leonard on 8/6/2021 4:45 PM      MRI ABDOMEN W WO CONTRAST   Final Result   1. No common bile duct stone. 2. No intrahepatic ductal dilatation. Pancreatic duct is unremarkable. 3. Gallbladder removed. 4. No liver lesions. **This report has been created using voice recognition software. It may contain minor errors which are inherent in voice recognition technology. **      Final report electronically signed by Dr. Rashid Simmons on 8/4/2021 10:03 AM      CT ABDOMEN PELVIS W IV CONTRAST Additional Contrast? None   Final Result      1. Stable CT scan of the abdomen and pelvis, no interval change since previous study dated 3 January 2021.   2. Mild diffuse fatty replacement liver. 3. Status post cholecystectomy and hysterectomy. 4. Punctate nonobstructing stone in the lower pole of the right kidney. Small left renal cyst.   5. Hiatal hernia. 6. Small mesenteric and retroperitoneal lymph nodes. 7. Postoperative changes in the lower lumbar spine. 8. Otherwise negative CT scan of the abdomen and pelvis. **This report has been created using voice recognition software. It may contain minor errors which are inherent in voice recognition technology. **      Final report electronically signed by DR Ky Marroquin on 8/3/2021 11:54 AM        MRI ABDOMEN W WO CONTRAST    Result Date: 8/4/2021  PROCEDURE: MRI ABDOMEN W WO CONTRAST CLINICAL INFORMATION: abnormal LFT r/o obstructive jaundice COMPARISON: CT 8/3/2021 TECHNIQUE: Routine MRI abdomen without and with IV contrast.  Routine MRCP was also performed. The MRCP examination includes 3D reconstructions of the biliary tree and the pancreatic duct. CONTRAST: ProHance, 20 mL FINDINGS: LIVER: 1. The liver is normal size. 2. The hepatic parenchymal signal is normal. 3. There is no hepatic mass. 4. There is no intrahepatic biliary dilatation. 5. There is normal enhancement of the portal and the hepatic veins. 6. There is normal enhancement of the portal venous confluence, SMV, and splenic vein. GALLBLADDER: 1. The gallbladder is absent. BILIARY TREE: 1. The common duct is normal size measuring 6 mm in maximal diameter. 2. There is no choledocholithiasis. PANCREAS: 1. The pancreas is normal size and signal characteristics. 2. There is normal pancreatic enhancement. 3. There is no pancreatic mass. 4. The pancreatic duct is normal size measuring 2 mm. 5. There is no pancreatic divisum. 6. There is no peripancreatic fluid or inflammation. SPLEEN/ADRENAL GLANDS/KIDNEYS: Unremarkable. BOWEL: Nonobstructive. FREE AIR/FREE FLUID/INFLAMMATION: None. LYMPHADENOPATHY: There are no pathologically enlarged lymph nodes. ABDOMINAL AORTA/IVC: Unremarkable. LUNG BASES: Unremarkable. MUSCULOSKELETAL: Unremarkable. OTHER: Small 8 mm left renal cyst.     1. No common bile duct stone. 2. No intrahepatic ductal dilatation. Pancreatic duct is unremarkable. 3. Gallbladder removed. 4. No liver lesions. **This report has been created using voice recognition software. It may contain minor errors which are inherent in voice recognition technology. ** Final report electronically signed by Dr. Milton Snyder on 8/4/2021 10:03 AM    CT ABDOMEN PELVIS W IV CONTRAST Additional Contrast? None    Result Date: 8/3/2021  PROCEDURE: CT ABDOMEN PELVIS W IV CONTRAST CLINICAL INFORMATION: right sided abdominal pain; . COMPARISON: CT scan of the abdomen and pelvis dated 3 January 2021. . TECHNIQUE: Axial 5 mm CT images were obtained through the abdomen and pelvis after the administration of intravenous contrast. Coronal and sagittal reconstructions were obtained. All CT scans at this facility use dose modulation, iterative reconstruction, and/or weight-based dosing when appropriate to reduce radiation dose to as low as reasonably achievable. FINDINGS: The visualized aspects of the lung bases are clear. The base of the heart is within appropriate limits. There is mild diffuse fatty replacement in the liver. The spleen is normal. The adrenals and pancreas are normal. The patient is status post cholecystectomy. There is is a 7.8 cm probable left renal cyst. There is a 2.1 mm stone in the lower pole of right kidney with no associated hydronephrosis. There is a moderate-sized hiatal hernia No abnormalities of the small bowel loops are noted. There is atherosclerotic calcification in the abdominal aorta and iliac arteries. There are small mesenteric and retroperitoneal lymph nodes present.   The urinary bladder is normal. The patient is status post hysterectomy. There is no pelvic free fluid. The colon is within normal limits. . There are postoperative changes in the lower lumbar spine. 1. Stable CT scan of the abdomen and pelvis, no interval change since previous study dated 3 January 2021. 2. Mild diffuse fatty replacement liver. 3. Status post cholecystectomy and hysterectomy. 4. Punctate nonobstructing stone in the lower pole of the right kidney. Small left renal cyst. 5. Hiatal hernia. 6. Small mesenteric and retroperitoneal lymph nodes. 7. Postoperative changes in the lower lumbar spine. 8. Otherwise negative CT scan of the abdomen and pelvis. **This report has been created using voice recognition software. It may contain minor errors which are inherent in voice recognition technology. ** Final report electronically signed by DR Deshaun Benavidez on 8/3/2021 11:54 AM        Tele:   [x] yes             [] no      Active Hospital Problems    Diagnosis Date Noted    [facility] [E[facility]] 08/09/2021     Class: Acute    Transaminitis [R74.01] 08/03/2021       Electronically signed by Sky Romero PA-C on 8/12/2021 at 12:40 AM

## 2021-08-13 LAB
BLOOD CULTURE, ROUTINE: NORMAL
BLOOD CULTURE, ROUTINE: NORMAL

## 2021-08-13 NOTE — FLOWSHEET NOTE
Patient called regarding labs and outpatient referral. Nurse went over referral and out patient lab ordered.

## 2021-08-13 NOTE — DISCHARGE SUMMARY
Hospitalist Discharge Summary    Patient: Yamilex Dietz  YOB: 1970  MRN: [id number]   Acct: [de-identified]    Primary Care Physician: Kayla Bernabe MD    Admit date  8/3/2021    Discharge date: 8/12/2021   Disposition: Home      Discharge Assessment and Plan:    1. Acute autoimmune hepatitis: chronic undifferentiated abdominal pain and transaminitis  >1 year. GI consulted. Negative hepatitis panel and viral serologies. ERCP 8/7 unremarkable. +EMILY - started on IV Solu-Medrol 8/9, transitioned to PO and taper as OP per GI. Recommend referral to Hepatology. GI will continue to follow as OP. PCP to monitor for other autoimmune disorders.      2. Acute pancreatitis: Likely secondary to s/p ERCP. Resolved. Tolerating general diet.      3. Possible celiac disease/lymphocytic colitis: per Dr. Peg Nova note as seen on colon biopsy. Celiac reflex panel and mitochondrial antibodies ordered, follow up with GI/PCP OP.      4. Essential HTN: Cozaar and Hydrodiuril, stable continue to monitor     5. Constipation: increase Colace. Add glycolax and movantik given opioid use. MOM, suppository prn. Resolved, cont bowel regimen at [place].      6. Obestiy: BMI 46.30        Chief Complaint on presentation: Abdominal pain; transaminitis    Initial H&P / Hospital Course: Per last progress note: \"50yoF with  hypothyroidism, and HTN and PSHx of cholecystectomy and hysterectomy presents for evaluation of abdominal pain. Patient reports intermittent episodes of periumbilical and RUQ abdominal pain over the past year. Pain ranges from 5-6 to 7-8 in severity and is associated with nausea and dry heaving. Episodes typically last for hours and occur 3-5 times/week (though she has symptom free days). Patient denies exacerbating or alleviating factors. Denies fevers/chills, chest pain, pale stools, melena, dysuria, hematuria. Reports 3 episodes of watery diarrhea yesterday as well as darker urine over past week.   Patient presented to [address] ED this morning due to worsening pain. Initial HFT reveales ALT 1567, , Alk Phos 384. Viral hepatitis panel negative. INR and aPTT WNL. CT abdomen pelvis showed mild diffuse fatty replacement of liver. Patient's was first evaluated for abdominal pain last January. She was worked up for celiac disease but had normal TGG. However, biopsy showed features of Celiac disease and lymphocytic colitis. She has since followed a strict gluten free diet. In June, she presented to an ED in [facility] for a similar episode of abdominal pain but her transaminases were in the 200s. Patient recently returned from vacation near [facility]. She states that she cooked her own food while on vacation. She also began seeing a chiropractor 4wks ago and was started on several supplements including \"beta plus,\" mimosa, moringa, oregano oil, and wormwood TID but has not taken any 1.5wks. Patient's mother and nephew have history of autoimmune disease. Patient does not recall specifics but reports that her mother \"needs to have her esophagus dilated\" as a result and that her nephew's symptoms are primarily GI.     8/5-> Patient symptoms unchanged. Still having both right upper and lower quadrant abdominal pain and intermittent nausea without emesis. New rash noted on R buttock.  No confusion. Denies CP.      8/6-> LFTS continue to trend up. Pt reports continued abd pain 7/10 in R side of abd. Admits to intermittent nausea and \"watery tongue\" without emesis. Denies fever/chills. No CP/SOB. Pt reports she is anxious about taking pain medicine causes she does not want to get constipated. Educated regarded stool softeners. 8/7-> pt having significant pain overnight in the R side abd and radiating into her back. Pt denies fever/chills. Denies CP/SOB. Admits to continued nausea. Denies passing flatus. No BM since Sunday. Check KUB. 8/8-> pt still having pain in R side abd and epigastric pain. Some nausea without emesis. Had a temperature of 100.3 overnight. Denies CP/SOB.   8/9-> pt is doing okay, reports slight improvement of her pain from 10 to 8/10. notign epigastric discomfort with radation into back. EMILY positive and concern for autoimmune hepatitis. Discussed case with GI. Not recommending abx at this time after discussion. Pt denies fever/chills. Admits to suprapubic discomfort today but no dysuria, increased urgency/frequency. 8/10-> pt is doing okay, reports continued 8/10 pain in her epigastric and RUQ areas. She was seen by GI who instructed to advance diet as tolerated. Pt reports that she is feeling a little bit nauseated with food and does not feel comfortable with being discharge home with this and her pain. Pt denies fever/chills. Denies CP/SOB. \"    8/11- I took over care today. Pt with continued abdominal pain. She is passing gas, no BM since arrival, does not appear toxic. States she did not tolerate soft diet yesterday but is tolerating better today and wants to try again for lunch. Pt states that PTA she had diarrhea approx 4-7 times a day but has not had a BM since arrival. Notes only mild improvement in her sx since removing gluten from her diet. Pt reports trip to [facility] in Feb 2020. Spent >30 minutes with the patient directly, addressing her questions and concerns regarding hepatitis, further op follow up that will be needed, and concerns of medicaton side effects. Subjective (day of discharge): Pt with continued abdominal pain, improved and tolerable. Denies fever/chills, sob, cp. Tolerating general diet. Pt discharged in stable condition with appropriate follow up.         Physical Exam:-  Vitals: Patient Vitals for the past 24 hrs:   BP Temp Temp src Pulse Resp SpO2   08/12/21 1530 (!) 103/56 98.8 °F (37.1 °C) Oral 65 18 95 %   08/12/21 1119 122/80 -- Oral 73 18 95 %   08/12/21 0828 123/76 98.5 °F (36.9 °C) Oral 79 16 96 %   08/12/21 0324 (!) 111/90 98.6 °F (37 °C) Oral 76 16 98 %     Weight: Weight: 261 lb 6.4 oz (118.6 kg)   24 hour intake/output:     Intake/Output Summary (Last 24 hours) at 8/12/2021 2223  Last data filed at 8/12/2021 1400  Gross per 24 hour   Intake 490 ml   Output --   Net 490 ml       General appearance: No apparent distress, appears stated age and cooperative. HEENT: Normal cephalic, atraumatic without obvious deformity. Pupils equal, round, and reactive to light. Extra ocular muscles intact. Conjunctivae/corneas clear. Neck: Supple, with full range of motion. No jugular venous distention. Trachea midline. Respiratory:  Normal respiratory effort. Clear to auscultation, bilaterally without Rales/Wheezes/Rhonchi. Cardiovascular: Regular rate and rhythm with normal S1/S2 without murmurs, rubs or gallops. Abdomen: Soft, non-tender, non-distended with normal bowel sounds. Musculoskeletal:  No clubbing, cyanosis or edema bilaterally. Skin: Skin color, texture, turgor normal.  No rashes or lesions. Neurologic:  Neurovascularly intact without any focal sensory/motor deficits.  Cranial nerves: II-XII intact, grossly non-focal.  Psychiatric: Alert and oriented, thought content appropriate, normal insight  Capillary Refill: Brisk,< 3 seconds   Peripheral Pulses: +2 palpable, equal bilaterally     Labs :  Recent Results (from the past 72 hour(s))   POCT Glucose    Collection Time: 08/10/21 12:14 AM   Result Value Ref Range    POC Glucose 126 (H) 70 - 108 mg/dl   Comprehensive Metabolic Panel w/ Reflex to MG    Collection Time: 08/10/21  5:34 AM   Result Value Ref Range    Glucose 130 (H) 70 - 108 mg/dL    CREATININE 0.4 0.4 - 1.2 mg/dL    BUN 10 7 - 22 mg/dL    Sodium 136 135 - 145 meq/L    Potassium reflex Magnesium 4.2 3.5 - 5.2 meq/L    Chloride 95 (L) 98 - 111 meq/L    CO2 30 23 - 33 meq/L    Calcium 9.1 8.5 - 10.5 mg/dL     (H) 5 - 40 U/L    Alkaline Phosphatase 292 (H) 38 - 126 U/L    Total Protein 5.9 (L) 6.1 - 8.0 g/dL    Albumin 2.8 (L) 3.5 - 5.1 g/dL    Total Bilirubin 4.1 (H) 0.3 - 1.2 mg/dL     (H) 11 - 66 U/L   CBC    Collection Time: 08/10/21  5:34 AM   Result Value Ref Range    WBC 16.1 (H) 4.8 - 10.8 thou/mm3    RBC 3.96 (L) 4.20 - 5.40 mill/mm3    Hemoglobin 12.0 12.0 - 16.0 gm/dl    Hematocrit 36.8 (L) 37.0 - 47.0 %    MCV 92.9 81.0 - 99.0 fL    MCH 30.3 26.0 - 33.0 pg    MCHC 32.6 32.2 - 35.5 gm/dl    RDW-CV 17.3 (H) 11.5 - 14.5 %    RDW-SD 58.9 (H) 35.0 - 45.0 fL    Platelets 671 819 - 263 thou/mm3    MPV 10.6 9.4 - 12.4 fL   Protime-INR    Collection Time: 08/10/21  5:34 AM   Result Value Ref Range    INR 1.25 (H) 0.85 - 1.13   Lipase    Collection Time: 08/10/21  5:34 AM   Result Value Ref Range    Lipase 24.7 5.6 - 51.3 U/L   Calcium, Ionized    Collection Time: 08/10/21  5:34 AM   Result Value Ref Range    Calcium, Ion 1.19 1.12 - 1.32 mmol/L   Phosphorus    Collection Time: 08/10/21  5:34 AM   Result Value Ref Range    Phosphorus 2.3 (L) 2.4 - 4.7 mg/dL   Magnesium    Collection Time: 08/10/21  5:34 AM   Result Value Ref Range    Magnesium 2.0 1.6 - 2.4 mg/dL   Comprehensive Metabolic Panel    Collection Time: 08/10/21  5:34 AM   Result Value Ref Range    Potassium 4.2 3.5 - 5.2 meq/L   Anion Gap    Collection Time: 08/10/21  5:34 AM   Result Value Ref Range    Anion Gap 11.0 8.0 - 16.0 meq/L   Glomerular Filtration Rate, Estimated    Collection Time: 08/10/21  5:34 AM   Result Value Ref Range    Est, Glom Filt Rate >90 ml/min/1.73m2   POCT Glucose    Collection Time: 08/10/21  5:54 AM   Result Value Ref Range    POC Glucose 124 (H) 70 - 108 mg/dl   POCT Glucose    Collection Time: 08/10/21 11:56 AM   Result Value Ref Range    POC Glucose 132 (H) 70 - 108 mg/dl   Comprehensive Metabolic Panel w/ Reflex to MG    Collection Time: 08/11/21  5:57 AM   Result Value Ref Range    Glucose 101 70 - 108 mg/dL    CREATININE 0.7 0.4 - 1.2 mg/dL    BUN 15 7 - 22 mg/dL    Sodium 141 135 - 145 meq/L    Potassium reflex Magnesium 3.3 (L) 3.5 - 5.2 meq/L    Chloride 98 98 - 111 meq/L    CO2 32 23 - 33 meq/L    Calcium 9.3 8.5 - 10.5 mg/dL     (H) 5 - 40 U/L    Alkaline Phosphatase 303 (H) 38 - 126 U/L    Total Protein 6.2 6.1 - 8.0 g/dL    Albumin 3.4 (L) 3.5 - 5.1 g/dL    Total Bilirubin 3.2 (H) 0.3 - 1.2 mg/dL     (H) 11 - 66 U/L   CBC    Collection Time: 08/11/21  5:57 AM   Result Value Ref Range    WBC 16.3 (H) 4.8 - 10.8 thou/mm3    RBC 4.24 4.20 - 5.40 mill/mm3    Hemoglobin 12.7 12.0 - 16.0 gm/dl    Hematocrit 39.8 37.0 - 47.0 %    MCV 93.9 81.0 - 99.0 fL    MCH 30.0 26.0 - 33.0 pg    MCHC 31.9 (L) 32.2 - 35.5 gm/dl    RDW-CV 17.6 (H) 11.5 - 14.5 %    RDW-SD 60.1 (H) 35.0 - 45.0 fL    Platelets 151 (H) 148 - 400 thou/mm3    MPV 10.6 9.4 - 12.4 fL   Protime-INR    Collection Time: 08/11/21  5:57 AM   Result Value Ref Range    INR 1.17 (H) 0.85 - 1.13   Lipase    Collection Time: 08/11/21  5:57 AM   Result Value Ref Range    Lipase 24.2 5.6 - 51.3 U/L   Phosphorus    Collection Time: 08/11/21  5:57 AM   Result Value Ref Range    Phosphorus 4.3 2.4 - 4.7 mg/dL   Calcium, Ionized    Collection Time: 08/11/21  5:57 AM   Result Value Ref Range    Calcium, Ion 1.15 1.12 - 1.32 mmol/L   Magnesium    Collection Time: 08/11/21  5:57 AM   Result Value Ref Range    Magnesium 1.7 1.6 - 2.4 mg/dL   Anion Gap    Collection Time: 08/11/21  5:57 AM   Result Value Ref Range    Anion Gap 11.0 8.0 - 16.0 meq/L   Glomerular Filtration Rate, Estimated    Collection Time: 08/11/21  5:57 AM   Result Value Ref Range    Est, Glom Filt Rate 88 (A) ml/min/1.73m2   Comprehensive Metabolic Panel w/ Reflex to MG    Collection Time: 08/12/21  5:46 AM   Result Value Ref Range    Glucose 106 70 - 108 mg/dL    CREATININE 0.7 0.4 - 1.2 mg/dL    BUN 15 7 - 22 mg/dL    Sodium 139 135 - 145 meq/L    Potassium reflex Magnesium 3.3 (L) 3.5 - 5.2 meq/L    Chloride 98 98 - 111 meq/L    CO2 31 23 - 33 meq/L    Calcium 9.0 8.5 - 10.5 mg/dL     (H) 5 - 40 U/L    Alkaline Phosphatase 279 (H) 38 - 126 U/L    Total Protein 6.1 6.1 - 8.0 g/dL    Albumin 3.2 (L) 3.5 - 5.1 g/dL    Total Bilirubin 2.7 (H) 0.3 - 1.2 mg/dL     (H) 11 - 66 U/L   CBC    Collection Time: 08/12/21  5:46 AM   Result Value Ref Range    WBC 12.5 (H) 4.8 - 10.8 thou/mm3    RBC 4.46 4.20 - 5.40 mill/mm3    Hemoglobin 13.2 12.0 - 16.0 gm/dl    Hematocrit 41.1 37.0 - 47.0 %    MCV 92.2 81.0 - 99.0 fL    MCH 29.6 26.0 - 33.0 pg    MCHC 32.1 (L) 32.2 - 35.5 gm/dl    RDW-CV 17.4 (H) 11.5 - 14.5 %    RDW-SD 57.5 (H) 35.0 - 45.0 fL    Platelets 394 (H) 337 - 400 thou/mm3    MPV 10.3 9.4 - 12.4 fL   Protime-INR    Collection Time: 08/12/21  5:46 AM   Result Value Ref Range    INR 1.08 0.85 - 1.13   Comprehensive Metabolic Panel    Collection Time: 08/12/21  5:46 AM   Result Value Ref Range    Potassium 3.3 (L) 3.5 - 5.2 meq/L   Anion Gap    Collection Time: 08/12/21  5:46 AM   Result Value Ref Range    Anion Gap 10.0 8.0 - 16.0 meq/L   Glomerular Filtration Rate, Estimated    Collection Time: 08/12/21  5:46 AM   Result Value Ref Range    Est, Glom Filt Rate 88 (A) ml/min/1.73m2   Magnesium    Collection Time: 08/12/21  5:46 AM   Result Value Ref Range    Magnesium 1.7 1.6 - 2.4 mg/dL        Microbiology:    Blood culture #1:   Lab Results   Component Value Date    BC No growth-preliminary  08/08/2021     Blood culture #2:No results found for: BLOODCULT2  Organism:    Lab Results   Component Value Date    LABGRAM  07/29/2014     No segmented neutrophils observed. Moderate epithelial cells observed. Many large gram positive bacilli. Few gram negative bacilli. Smear consistent with Normal Vaginal Pavel. MRSA culture only:No results found for: [facility]  Urine culture:   Lab Results   Component Value Date    LABURIN  08/09/2021     No growth-preliminary Growth of Contaminants. The mixture of organisms present are not a common cause of urinary tract infections and probably represent skin pavel or distal urethral pavel.       Lab Results Component Value Date    ORG Growth of Contaminants 08/09/2021      Respiratory culture: No results found for: CULTRESP  Aerobic and Anaerobic :  No results found for: LABAERO  No results found for: LABANAE    Urinalysis:     Lab Results   Component Value Date    NITRU NEGATIVE 08/09/2021    WBCUA NONE SEEN 08/09/2021    BACTERIA NONE SEEN 08/09/2021    RBCUA 0-2 08/09/2021    BLOODU NEGATIVE 08/09/2021    SPECGRAV 1.011 08/09/2021    GLUCOSEU NEGATIVE 08/03/2021       Radiology:  MRI ABDOMEN W WO CONTRAST    Result Date: 8/4/2021  PROCEDURE: MRI ABDOMEN W WO CONTRAST CLINICAL INFORMATION: abnormal LFT r/o obstructive jaundice COMPARISON: CT 8/3/2021 TECHNIQUE: Routine MRI abdomen without and with IV contrast.  Routine MRCP was also performed. The MRCP examination includes 3D reconstructions of the biliary tree and the pancreatic duct. CONTRAST: ProHance, 20 mL FINDINGS: LIVER: 1. The liver is normal size. 2. The hepatic parenchymal signal is normal. 3. There is no hepatic mass. 4. There is no intrahepatic biliary dilatation. 5. There is normal enhancement of the portal and the hepatic veins. 6. There is normal enhancement of the portal venous confluence, SMV, and splenic vein. GALLBLADDER: 1. The gallbladder is absent. BILIARY TREE: 1. The common duct is normal size measuring 6 mm in maximal diameter. 2. There is no choledocholithiasis. PANCREAS: 1. The pancreas is normal size and signal characteristics. 2. There is normal pancreatic enhancement. 3. There is no pancreatic mass. 4. The pancreatic duct is normal size measuring 2 mm. 5. There is no pancreatic divisum. 6. There is no peripancreatic fluid or inflammation. SPLEEN/ADRENAL GLANDS/KIDNEYS: Unremarkable. BOWEL: Nonobstructive. FREE AIR/FREE FLUID/INFLAMMATION: None. LYMPHADENOPATHY: There are no pathologically enlarged lymph nodes. ABDOMINAL AORTA/IVC: Unremarkable. LUNG BASES: Unremarkable. MUSCULOSKELETAL: Unremarkable.  OTHER: Small 8 mm left renal cyst. 1. No common bile duct stone. 2. No intrahepatic ductal dilatation. Pancreatic duct is unremarkable. 3. Gallbladder removed. 4. No liver lesions. **This report has been created using voice recognition software. It may contain minor errors which are inherent in voice recognition technology. ** Final report electronically signed by Dr. Isiah Josue on 8/4/2021 10:03 AM    CT ABDOMEN PELVIS W IV CONTRAST Additional Contrast? None    Result Date: 8/3/2021  PROCEDURE: CT ABDOMEN PELVIS W IV CONTRAST CLINICAL INFORMATION: right sided abdominal pain; . COMPARISON: CT scan of the abdomen and pelvis dated 3 January 2021. . TECHNIQUE: Axial 5 mm CT images were obtained through the abdomen and pelvis after the administration of intravenous contrast. Coronal and sagittal reconstructions were obtained. All CT scans at this facility use dose modulation, iterative reconstruction, and/or weight-based dosing when appropriate to reduce radiation dose to as low as reasonably achievable. FINDINGS: The visualized aspects of the lung bases are clear. The base of the heart is within appropriate limits. There is mild diffuse fatty replacement in the liver. The spleen is normal. The adrenals and pancreas are normal. The patient is status post cholecystectomy. There is is a 7.8 cm probable left renal cyst. There is a 2.1 mm stone in the lower pole of right kidney with no associated hydronephrosis. There is a moderate-sized hiatal hernia No abnormalities of the small bowel loops are noted. There is atherosclerotic calcification in the abdominal aorta and iliac arteries. There are small mesenteric and retroperitoneal lymph nodes present. The urinary bladder is normal. The patient is status post hysterectomy. There is no pelvic free fluid. The colon is within normal limits. . There are postoperative changes in the lower lumbar spine.      1. Stable CT scan of the abdomen and pelvis, no interval change since previous study dated 3 January 2021. 2. Mild diffuse fatty replacement liver. 3. Status post cholecystectomy and hysterectomy. 4. Punctate nonobstructing stone in the lower pole of the right kidney. Small left renal cyst. 5. Hiatal hernia. 6. Small mesenteric and retroperitoneal lymph nodes. 7. Postoperative changes in the lower lumbar spine. 8. Otherwise negative CT scan of the abdomen and pelvis. **This report has been created using voice recognition software. It may contain minor errors which are inherent in voice recognition technology. ** Final report electronically signed by DR Azra Frazier on 8/3/2021 11:54 AM       Consults:   IP CONSULT TO GI  IP CONSULT TO DIETITIAN  IP CONSULT TO [id number]    Discharge Medications:      Medication List      START taking these medications    ibuprofen 800 MG tablet  Commonly known as: ADVIL;MOTRIN  Take 1 tablet by mouth every 8 hours as needed for Pain     ondansetron 4 MG disintegrating tablet  Commonly known as: ZOFRAN-ODT  Take 1 tablet by mouth every 8 hours as needed for Nausea or Vomiting     potassium chloride 20 MEQ extended release tablet  Commonly known as: KLOR-CON M  Take 1 tablet by mouth daily for 14 days     predniSONE 10 MG tablet  Commonly known as: DELTASONE  4 tablets for 3 days, then 3 tablets for 5 days, then 2 tablets for 7 days. Repeat LFT and follow up with GI for continued dosing.         CONTINUE taking these medications    acetaminophen 500 MG tablet  Commonly known as: TYLENOL     Benicar HCT 40-25 MG per tablet  Generic drug: olmesartan-hydroCHLOROthiazide     cyanocobalamin 1000 MCG tablet     docusate sodium 100 MG capsule  Commonly known as: COLACE     levothyroxine 150 MCG tablet  Commonly known as: SYNTHROID     Magnesium 100 MG Caps     pantoprazole 40 MG tablet  Commonly known as: PROTONIX  Take 1 tablet by mouth 2 times daily (before meals)     QC Tumeric Complex 500 MG Caps  Generic drug: Turmeric     therapeutic multivitamin-minerals tablet           Where to Get Your Medications      These medications were sent to [address], [address] [facility], [address]    Phone: [phone]   · ibuprofen 800 MG tablet  · ondansetron 4 MG disintegrating tablet  · pantoprazole 40 MG tablet  · potassium chloride 20 MEQ extended release tablet  · predniSONE 10 MG tablet          Patient Instructions:    Discharge lab work: CMP, CBC  Activity: activity as tolerated  Diet: No diet orders on file      Follow-up visits:   Tahir Mccoy MD  [address] [place] [address] [address] [facility]  [phone]    Schedule an appointment as soon as possible for a visit in 1 week      Mario River MD  [address] [place] [address]  [phone]    Schedule an appointment as soon as possible for a visit in 1 week  1-2 weeks         Disposition: home  Condition at Discharge: Stable    Time Spent: 35 minutes    Signed: Thank you Garcia Russ MD for the opportunity to be involved in this patient's care.     Electronically signed by Mary Allen PA-C on 8/12/2021 at 10:23 PM  Discharging Hospitalist

## 2021-08-14 LAB — CELIAC SEROLOGY: NORMAL

## 2021-08-17 ENCOUNTER — HOSPITAL ENCOUNTER (OUTPATIENT)
Age: 51
Discharge: HOME | End: 2021-08-17
Payer: COMMERCIAL

## 2021-08-17 DIAGNOSIS — R74.01 TRANSAMINITIS: ICD-10-CM

## 2021-08-17 LAB
ALBUMIN SERPL-MCNC: 3.7 G/DL (ref 3.5–5.1)
ALP BLD-CCNC: 253 U/L (ref 38–126)
ALT SERPL-CCNC: 253 U/L (ref 11–66)
ANION GAP SERPL CALCULATED.3IONS-SCNC: 11 MEQ/L (ref 8–16)
AST SERPL-CCNC: 117 U/L (ref 5–40)
BILIRUB SERPL-MCNC: 1.7 MG/DL (ref 0.3–1.2)
BILIRUBIN DIRECT: 1 MG/DL (ref 0–0.3)
BUN BLDV-MCNC: 16 MG/DL (ref 7–22)
CALCIUM SERPL-MCNC: 9.7 MG/DL (ref 8.5–10.5)
CHLORIDE BLD-SCNC: 104 MEQ/L (ref 98–111)
CO2: 25 MEQ/L (ref 23–33)
CREAT SERPL-MCNC: 0.7 MG/DL (ref 0.4–1.2)
GFR SERPL CREATININE-BSD FRML MDRD: 88 ML/MIN/1.73M2
GLUCOSE BLD-MCNC: 132 MG/DL (ref 70–108)
POTASSIUM SERPL-SCNC: 3.9 MEQ/L (ref 3.5–5.2)
SODIUM BLD-SCNC: 140 MEQ/L (ref 135–145)
TOTAL PROTEIN: 6.5 G/DL (ref 6.1–8)

## 2021-08-17 PROCEDURE — 36415 COLL VENOUS BLD VENIPUNCTURE: CPT

## 2021-08-17 PROCEDURE — 82248 BILIRUBIN DIRECT: CPT

## 2021-08-17 PROCEDURE — 80053 COMPREHEN METABOLIC PANEL: CPT

## 2021-08-18 ENCOUNTER — HOSPITAL ENCOUNTER (OUTPATIENT)
Age: 51
Discharge: HOME | End: 2021-08-18
Payer: COMMERCIAL

## 2021-08-18 LAB
ERYTHROCYTE [DISTWIDTH] IN BLOOD BY AUTOMATED COUNT: 15.5 % (ref 11.5–14.5)
ERYTHROCYTE [DISTWIDTH] IN BLOOD BY AUTOMATED COUNT: 55.1 FL (ref 35–45)
FERRITIN: 913 NG/ML (ref 10–291)
HCT VFR BLD CALC: 42.6 % (ref 37–47)
HEMOGLOBIN: 13.4 GM/DL (ref 12–16)
IRON: 118 UG/DL (ref 50–170)
MCH RBC QN AUTO: 30.4 PG (ref 26–33)
MCHC RBC AUTO-ENTMCNC: 31.5 GM/DL (ref 32.2–35.5)
MCV RBC AUTO: 96.6 FL (ref 81–99)
PLATELET # BLD: 394 THOU/MM3 (ref 130–400)
PMV BLD AUTO: 10.9 FL (ref 9.4–12.4)
RBC # BLD: 4.41 MILL/MM3 (ref 4.2–5.4)
WBC # BLD: 8.4 THOU/MM3 (ref 4.8–10.8)

## 2021-08-18 PROCEDURE — 82728 ASSAY OF FERRITIN: CPT

## 2021-08-18 PROCEDURE — 83540 ASSAY OF IRON: CPT

## 2021-08-18 PROCEDURE — 85027 COMPLETE CBC AUTOMATED: CPT

## 2021-08-18 PROCEDURE — 36415 COLL VENOUS BLD VENIPUNCTURE: CPT

## 2021-12-14 ENCOUNTER — APPOINTMENT (OUTPATIENT)
Dept: GENERAL RADIOLOGY | Age: 51
End: 2021-12-14
Payer: COMMERCIAL

## 2021-12-14 ENCOUNTER — HOSPITAL ENCOUNTER (EMERGENCY)
Age: 51
Discharge: HOME | End: 2021-12-14
Attending: EMERGENCY MEDICINE
Payer: COMMERCIAL

## 2021-12-14 VITALS
BODY MASS INDEX: 46.78 KG/M2 | SYSTOLIC BLOOD PRESSURE: 129 MMHG | TEMPERATURE: 98.3 F | RESPIRATION RATE: 17 BRPM | WEIGHT: 264 LBS | HEIGHT: 63 IN | HEART RATE: 92 BPM | OXYGEN SATURATION: 97 % | DIASTOLIC BLOOD PRESSURE: 85 MMHG

## 2021-12-14 DIAGNOSIS — R07.9 CHEST PAIN, UNSPECIFIED TYPE: Primary | ICD-10-CM

## 2021-12-14 LAB
ALBUMIN SERPL-MCNC: 3.9 G/DL (ref 3.5–5.1)
ALP BLD-CCNC: 116 U/L (ref 38–126)
ALT SERPL-CCNC: 35 U/L (ref 11–66)
ANION GAP SERPL CALCULATED.3IONS-SCNC: 13 MEQ/L (ref 8–16)
AST SERPL-CCNC: 27 U/L (ref 5–40)
BASOPHILS # BLD: 0.3 %
BASOPHILS ABSOLUTE: 0 THOU/MM3 (ref 0–0.1)
BILIRUB SERPL-MCNC: < 0.2 MG/DL (ref 0.3–1.2)
BILIRUBIN DIRECT: < 0.2 MG/DL (ref 0–0.3)
BUN BLDV-MCNC: 23 MG/DL (ref 7–22)
CALCIUM SERPL-MCNC: 9.4 MG/DL (ref 8.5–10.5)
CHLORIDE BLD-SCNC: 104 MEQ/L (ref 98–111)
CO2: 26 MEQ/L (ref 23–33)
CREAT SERPL-MCNC: 0.7 MG/DL (ref 0.4–1.2)
EOSINOPHIL # BLD: 0.4 %
EOSINOPHILS ABSOLUTE: 0 THOU/MM3 (ref 0–0.4)
ERYTHROCYTE [DISTWIDTH] IN BLOOD BY AUTOMATED COUNT: 12.4 % (ref 11.5–14.5)
ERYTHROCYTE [DISTWIDTH] IN BLOOD BY AUTOMATED COUNT: 43.5 FL (ref 35–45)
FLU A ANTIGEN: NEGATIVE
FLU B ANTIGEN: NEGATIVE
GFR SERPL CREATININE-BSD FRML MDRD: 88 ML/MIN/1.73M2
GLUCOSE BLD-MCNC: 137 MG/DL (ref 70–108)
HCT VFR BLD CALC: 45.1 % (ref 37–47)
HEMOGLOBIN: 14.8 GM/DL (ref 12–16)
IMMATURE GRANS (ABS): 0.04 THOU/MM3 (ref 0–0.07)
IMMATURE GRANULOCYTES: 0.3 %
LYMPHOCYTES # BLD: 24.6 %
LYMPHOCYTES ABSOLUTE: 2.8 THOU/MM3 (ref 1–4.8)
MCH RBC QN AUTO: 31.2 PG (ref 26–33)
MCHC RBC AUTO-ENTMCNC: 32.8 GM/DL (ref 32.2–35.5)
MCV RBC AUTO: 94.9 FL (ref 81–99)
MONOCYTES # BLD: 5.9 %
MONOCYTES ABSOLUTE: 0.7 THOU/MM3 (ref 0.4–1.3)
NUCLEATED RED BLOOD CELLS: 0 /100 WBC
PLATELET # BLD: 329 THOU/MM3 (ref 130–400)
PMV BLD AUTO: 10.1 FL (ref 9.4–12.4)
POTASSIUM REFLEX MAGNESIUM: 4 MEQ/L (ref 3.5–5.2)
PRO-BNP: 200.1 PG/ML (ref 0–900)
RBC # BLD: 4.75 MILL/MM3 (ref 4.2–5.4)
SARS-COV-2, NAAT: NOT  DETECTED
SEG NEUTROPHILS: 68.5 %
SEGMENTED NEUTROPHILS ABSOLUTE COUNT: 7.9 THOU/MM3 (ref 1.8–7.7)
SODIUM BLD-SCNC: 143 MEQ/L (ref 135–145)
TOTAL PROTEIN: 6.6 G/DL (ref 6.1–8)
TROPONIN T: < 0.01 NG/ML
TSH SERPL DL<=0.05 MIU/L-ACNC: 4.16 UIU/ML (ref 0.4–4.2)
WBC # BLD: 11.5 THOU/MM3 (ref 4.8–10.8)

## 2021-12-14 PROCEDURE — 6370000000 HC RX 637 (ALT 250 FOR IP): Performed by: STUDENT IN AN ORGANIZED HEALTH CARE EDUCATION/TRAINING PROGRAM

## 2021-12-14 PROCEDURE — 84484 ASSAY OF TROPONIN QUANT: CPT

## 2021-12-14 PROCEDURE — 36415 COLL VENOUS BLD VENIPUNCTURE: CPT

## 2021-12-14 PROCEDURE — 93005 ELECTROCARDIOGRAM TRACING: CPT | Performed by: STUDENT IN AN ORGANIZED HEALTH CARE EDUCATION/TRAINING PROGRAM

## 2021-12-14 PROCEDURE — 85025 COMPLETE CBC W/AUTO DIFF WBC: CPT

## 2021-12-14 PROCEDURE — 83880 ASSAY OF NATRIURETIC PEPTIDE: CPT

## 2021-12-14 PROCEDURE — 80076 HEPATIC FUNCTION PANEL: CPT

## 2021-12-14 PROCEDURE — 84443 ASSAY THYROID STIM HORMONE: CPT

## 2021-12-14 PROCEDURE — 99283 EMERGENCY DEPT VISIT LOW MDM: CPT

## 2021-12-14 PROCEDURE — 87804 INFLUENZA ASSAY W/OPTIC: CPT

## 2021-12-14 PROCEDURE — 80048 BASIC METABOLIC PNL TOTAL CA: CPT

## 2021-12-14 PROCEDURE — 71046 X-RAY EXAM CHEST 2 VIEWS: CPT

## 2021-12-14 PROCEDURE — 87635 SARS-COV-2 COVID-19 AMP PRB: CPT

## 2021-12-14 RX ORDER — FUROSEMIDE 40 MG/1
40 TABLET ORAL DAILY
COMMUNITY

## 2021-12-14 RX ORDER — BUDESONIDE 3 MG/1
3 CAPSULE, COATED PELLETS ORAL EVERY MORNING
COMMUNITY

## 2021-12-14 RX ORDER — NITROGLYCERIN 0.4 MG/1
0.4 TABLET SUBLINGUAL EVERY 5 MIN PRN
Status: DISCONTINUED | OUTPATIENT
Start: 2021-12-14 | End: 2021-12-15 | Stop reason: HOSPADM

## 2021-12-14 RX ORDER — LANOLIN ALCOHOL/MO/W.PET/CERES
1000 CREAM (GRAM) TOPICAL DAILY
COMMUNITY

## 2021-12-14 RX ORDER — ASPIRIN 81 MG/1
324 TABLET, CHEWABLE ORAL ONCE
Status: COMPLETED | OUTPATIENT
Start: 2021-12-14 | End: 2021-12-14

## 2021-12-14 RX ORDER — CHOLECALCIFEROL (VITAMIN D3) 50 MCG
1 TABLET ORAL
COMMUNITY

## 2021-12-14 RX ORDER — SODIUM CHLORIDE, SODIUM LACTATE, POTASSIUM CHLORIDE, AND CALCIUM CHLORIDE .6; .31; .03; .02 G/100ML; G/100ML; G/100ML; G/100ML
1000 INJECTION, SOLUTION INTRAVENOUS ONCE
Status: DISCONTINUED | OUTPATIENT
Start: 2021-12-14 | End: 2021-12-14

## 2021-12-14 RX ADMIN — ASPIRIN 81 MG CHEWABLE TABLET 324 MG: 81 TABLET CHEWABLE at 20:26

## 2021-12-14 RX ADMIN — NITROGLYCERIN 0.4 MG: 0.4 TABLET, ORALLY DISINTEGRATING SUBLINGUAL at 20:32

## 2021-12-14 RX ADMIN — NITROGLYCERIN 0.4 MG: 0.4 TABLET, ORALLY DISINTEGRATING SUBLINGUAL at 20:43

## 2021-12-14 RX ADMIN — NITROGLYCERIN 0.4 MG: 0.4 TABLET, ORALLY DISINTEGRATING SUBLINGUAL at 20:27

## 2021-12-14 ASSESSMENT — ENCOUNTER SYMPTOMS
COLOR CHANGE: 0
NAUSEA: 0
STRIDOR: 0
BACK PAIN: 1
SHORTNESS OF BREATH: 1
SORE THROAT: 0
CHOKING: 0
CONSTIPATION: 0
COUGH: 0
CHEST TIGHTNESS: 1
VOMITING: 0
DIARRHEA: 0
RHINORRHEA: 0
WHEEZING: 0
APNEA: 0
ABDOMINAL PAIN: 0

## 2021-12-14 ASSESSMENT — PAIN SCALES - GENERAL
PAINLEVEL_OUTOF10: 8
PAINLEVEL_OUTOF10: 7

## 2021-12-14 ASSESSMENT — PAIN DESCRIPTION - PAIN TYPE
TYPE: ACUTE PAIN
TYPE: ACUTE PAIN

## 2021-12-14 ASSESSMENT — PAIN DESCRIPTION - DESCRIPTORS
DESCRIPTORS: TIGHTNESS
DESCRIPTORS: TIGHTNESS

## 2021-12-14 ASSESSMENT — PAIN DESCRIPTION - FREQUENCY: FREQUENCY: INTERMITTENT

## 2021-12-14 ASSESSMENT — HEART SCORE: ECG: 0

## 2021-12-14 ASSESSMENT — PAIN DESCRIPTION - ORIENTATION: ORIENTATION: LEFT

## 2021-12-14 ASSESSMENT — PAIN DESCRIPTION - LOCATION
LOCATION: CHEST
LOCATION: CHEST

## 2021-12-14 NOTE — Clinical Note
Tesha Montejo was seen and treated in our emergency department on 12/14/2021. She may return to work on 12/18/2021. If you have any questions or concerns, please don't hesitate to call.       Elena Peñaloza MD

## 2021-12-14 NOTE — Clinical Note
Abilio Simms was seen and treated in our emergency department on 12/14/2021. She may return to work on 12/18/2021. If you have any questions or concerns, please don't hesitate to call.       Юлия Pérez MD

## 2021-12-15 LAB
EKG ATRIAL RATE: 108 BPM
EKG P AXIS: 44 DEGREES
EKG P-R INTERVAL: 162 MS
EKG Q-T INTERVAL: 326 MS
EKG QRS DURATION: 76 MS
EKG QTC CALCULATION (BAZETT): 436 MS
EKG R AXIS: 1 DEGREES
EKG T AXIS: 42 DEGREES
EKG VENTRICULAR RATE: 108 BPM

## 2021-12-15 PROCEDURE — 93010 ELECTROCARDIOGRAM REPORT: CPT | Performed by: INTERNAL MEDICINE

## 2021-12-15 NOTE — ED NOTES
Pt is resting in cot with respirations even and unlabored. RN medicated pt per [facility]. Pt voices no concern or need at this time. Call light is within reach. Will continue to monitor.       Arti Laird RN  12/14/21 [id number]

## 2021-12-15 NOTE — ED PROVIDER NOTES
[place] ENCOUNTER          Pt Name: Soren Page  MRN: [id number]  [place] 1970  Date of evaluation: 12/14/2021  Treating Resident Physician: Ian Higginbotham MD  Supervising Physician: Chong Halsted, [address] [place] [address]       Chief Complaint   Patient presents with    Chest Pain     History obtained from chart review and the patient. HISTORY OF PRESENT ILLNESS    Nikki Barillas is a 46 y.o. female who presents to the emergency department for evaluation of this of breath on exertion, chest heaviness. Yesterday a.m. he started feeling fatigued. This morning when she got up she was short of breath on minimal exertion which is unusual for her. She felt like pressure on her chest like a heaviness. Occasionally she would have sharp pinching type pain in her left chest.  Symptoms lasted throughout the day. When she checked her watch later in the day had noted to that she had a heart rate between 150 860. She looked at an ECG on her watch as well which told her she was in atrial fibrillation. Again with a heart rate in the 160s. She notes no prior symptoms like this. She states she is on a diuretic as she is also on chronic steroids due to autoimmune hepatitis for which she is followed at [facility] for. She also notes upper back pain between her shoulder blades today. States she has received a COVID-19 vaccine, and that no one else in the house is ill. The patient has no other acute complaints at this time. REVIEW OF SYSTEMS   Review of Systems   Constitutional: Positive for fatigue. Negative for activity change, appetite change, chills, diaphoresis, fever and unexpected weight change. HENT: Negative for rhinorrhea, sneezing and sore throat. Respiratory: Positive for chest tightness and shortness of breath. Negative for apnea, cough, choking, wheezing and stridor. Cardiovascular: Positive for chest pain and palpitations. Negative for leg swelling. Gastrointestinal: Negative for abdominal pain, constipation, diarrhea, nausea and vomiting. Genitourinary: Negative for dysuria and urgency. Musculoskeletal: Positive for back pain. Upper back pain     Skin: Negative for color change, pallor, rash and wound. Neurological: Negative for dizziness, seizures, syncope, weakness, numbness and headaches. Psychiatric/Behavioral: Negative for agitation and confusion. All other systems reviewed and are negative.         PAST MEDICAL AND SURGICAL HISTORY     Past Medical History:   Diagnosis Date    Hx of blood clots     during preg    Hypertension     Kidney stone     Nausea & vomiting     Thyroid disease      Past Surgical History:   Procedure Laterality Date    BACK SURGERY  ???    fusion and disk removed     SECTION       SECTION      CHOLECYSTECTOMY  0499'I    COLONOSCOPY Left 2021    COLONOS[facility]    COLONOSCOPY  2021    [facility]ON[facility] WITH [facility]OPSY [facility]    EGD      ERCP N/A 2021    ERCP SPHINCTER/PAPILLOTOMY performed by [facility] at [facility]      bilateral feet    FOOT SURGERY Left 10/30/14    synovectomy peroneal tendon, repair peroneal tendon, exc ganglion cyst ankle    HYSTERECTOMY      TONSILLECTOMY      as a child    UPPER GASTROINTESTINAL ENDOSCOPY N/A 2021    EGD [facility]OPSY [facility]         MEDICATIONS     Current Facility-Administered Medications:     nitroGLYCERIN (NITROSTAT) SL tablet 0.4 mg, 0.4 mg, SubLINGual, Q5 Min PRN, Radhika Hadley MD, 0.4 mg at 21    Current Outpatient Medications:     furosemide (LASIX) 40 MG tablet, Take 40 mg by mouth daily, Disp: , Rfl:     budesonide (ENTOCORT EC) 3 MG extended release capsule, Take 3 mg by mouth every morning, Disp: , (119.7 kg)     Initial vital signs and nursing assessment reviewed and abnormal from sinus tachycardia. Body mass index is 46.77 kg/m². Pulsoximetry is normal per my interpretation. Additional Vital Signs:  Vitals:    12/14/21 2023   BP: 129/85   Pulse: 92   Resp: 17   Temp:    SpO2: 97%       Physical Exam  Vitals and nursing note reviewed. Constitutional:       General: She is not in acute distress. Appearance: Normal appearance. She is normal weight. She is not ill-appearing, toxic-appearing or diaphoretic. HENT:      Head: Normocephalic. Mouth/Throat:      Pharynx: Oropharynx is clear. Eyes:      General: No scleral icterus. Right eye: No discharge. Left eye: No discharge. Conjunctiva/sclera: Conjunctivae normal.   Cardiovascular:      Rate and Rhythm: Regular rhythm. Tachycardia present. Pulses: Normal pulses. Heart sounds: Normal heart sounds. Pulmonary:      Effort: Pulmonary effort is normal.      Breath sounds: Normal breath sounds. Abdominal:      General: Abdomen is flat. Palpations: Abdomen is soft. Tenderness: There is no abdominal tenderness. There is no guarding or rebound. Musculoskeletal:         General: Normal range of motion. Cervical back: Normal range of motion. Skin:     General: Skin is warm and dry. Capillary Refill: Capillary refill takes less than 2 seconds. Neurological:      Mental Status: She is alert and oriented to person, place, and time. Psychiatric:         Mood and Affect: Mood normal.         Behavior: Behavior normal.             MEDICAL DECISION MAKING   Initial Assessment:   1. Acute coronary syndrome (anginal equivalent with new dyspnea on exertion)  2. Atrial fibrillation  3. Sinus tachycardia secondary to infection  4. Sinus arrhythmia secondary to infection  5. COVID-19  6.  Influenza    Plan:    ECG then continuous monitor   IV   CBC,BMP, BNP, Trop, TSH   Swabs for COVID-19 and Flu   Chest X-Ray   Aspirin   Trial of nitroglycerin    Summary:  Patient's heart rate though initially tachycardic came down to the 70s and was in normal sinus rhythm. Her ECG was not concerning. Labs are unremarkable and notable for a negative troponin. Patient received aspirin and 1 nitroglycerin tab. Patient's pain was slightly better after the nitroglycerin though did give her headache. Patient had a heart score of 3. Given that her symptoms started earlier this morning and her first troponin was negative further serial troponins and ECG are not indicated. She will be discharged home with instructions to return if new symptoms occur. She has been scheduled into our cardiology follow-up clinic on 12/17. ED RESULTS   Laboratory results:  Labs Reviewed   CBC WITH AUTO DIFFERENTIAL - Abnormal; Notable for the following components:       Result Value    WBC 11.5 (*)     Segs Absolute 7.9 (*)     All other components within normal limits   BASIC METABOLIC PANEL W/ REFLEX TO MG FOR LOW K - Abnormal; Notable for the following components:    Glucose 137 (*)     BUN 23 (*)     All other components within normal limits   HEPATIC FUNCTION PANEL - Abnormal; Notable for the following components: Total Bilirubin <0.2 (*)     All other components within normal limits   GLOMERULAR FILTRATION RATE, ESTIMATED - Abnormal; Notable for the following components:    Est, Glom Filt Rate 88 (*)     All other components within normal limits   COVID-19, RAPID   RAPID INFLUENZA A/B ANTIGENS   TROPONIN   BRAIN NATRIURETIC PEPTIDE   TSH WITH REFLEX   ANION GAP       Radiologic studies results:  XR CHEST (2 VW)   Final Result   Stable radiographic appearance of the chest. No evidence of an acute process. **This report has been created using voice recognition software. It may contain minor errors which are inherent in voice recognition technology. **      Final report electronically signed by Dr. Elliott Urena on 12/14/2021 9:02 PM          ED Medications administered this visit:   Medications   nitroGLYCERIN (NITROSTAT) SL tablet 0.4 mg (0.4 mg SubLINGual Given 12/14/21 2043)   aspirin chewable tablet 324 mg (324 mg Oral Given 12/14/21 2026)         ED COURSE     ED Course as of 12/14/21 2217   Tue Dec 14, 2021   2116 Feels slightly better post nitroglycerin. Pending labs. [SC]   2148 Troponin T: < 0.010 [SC]      ED Course User Index  [SC] Brad Chacko MD       Strict return precautions and follow up instructions were discussed with the patient prior to discharge, with which the patient agrees. MEDICATION CHANGES     New Prescriptions    No medications on file         FINAL DISPOSITION     Final diagnoses:   Chest pain, unspecified type     Condition: condition: fair  Dispo: Discharge to home      This transcription was electronically signed. Parts of this transcriptions may have been dictated by use of voice recognition software and electronically transcribed, and parts may have been transcribed with the assistance of an ED scribe. The transcription may contain errors not detected in proofreading. Please refer to my supervising physician's documentation if my documentation differs.     Electronically Signed: Ian Higginbotham MD, 12/14/21, 10:17 PM          Brad Chacko MD  Resident  12/14/21 [id number]

## 2021-12-15 NOTE — ED TRIAGE NOTES
Pt presents to the ED with c/o chest tightness. Pt states she has been feeling \"off\" all day. Pt states she has been feeling light headed and dizzy. Pt also reports having intermittent LT sided chest tightness. Pt reports pain is 8/10. Pt states she did their home bp monitor and an apple watch which are both reading afib. Pt denies cardiac history other than hypertension.

## 2021-12-17 ENCOUNTER — OFFICE VISIT (OUTPATIENT)
Dept: CARDIOLOGY CLINIC | Age: 51
End: 2021-12-17
Payer: COMMERCIAL

## 2021-12-17 VITALS
BODY MASS INDEX: 47.46 KG/M2 | DIASTOLIC BLOOD PRESSURE: 87 MMHG | SYSTOLIC BLOOD PRESSURE: 153 MMHG | HEIGHT: 64 IN | HEART RATE: 71 BPM | WEIGHT: 278 LBS

## 2021-12-17 DIAGNOSIS — R06.09 DOE (DYSPNEA ON EXERTION): ICD-10-CM

## 2021-12-17 DIAGNOSIS — I48.91 NEW ONSET A-FIB (HCC): ICD-10-CM

## 2021-12-17 DIAGNOSIS — I20.8 OTHER FORMS OF ANGINA PECTORIS (HCC): ICD-10-CM

## 2021-12-17 DIAGNOSIS — R00.2 PALPITATIONS: Primary | ICD-10-CM

## 2021-12-17 PROCEDURE — G8417 CALC BMI ABV UP PARAM F/U: HCPCS | Performed by: INTERNAL MEDICINE

## 2021-12-17 PROCEDURE — G8484 FLU IMMUNIZE NO ADMIN: HCPCS | Performed by: INTERNAL MEDICINE

## 2021-12-17 PROCEDURE — 99204 OFFICE O/P NEW MOD 45 MIN: CPT | Performed by: INTERNAL MEDICINE

## 2021-12-17 PROCEDURE — G8427 DOCREV CUR MEDS BY ELIG CLIN: HCPCS | Performed by: INTERNAL MEDICINE

## 2021-12-17 RX ORDER — ASPIRIN 81 MG/1
81 TABLET ORAL DAILY
Qty: 90 TABLET | Refills: 1 | Status: SHIPPED | OUTPATIENT
Start: 2021-12-17

## 2021-12-17 NOTE — PROGRESS NOTES
Pt C/O Pt had episode on tuesday of chest tightness a put husbands apple watch on ekg showed afib, pt went to ER. Pt was winded, dizziness, back pain and shoulder pain, light headed if bending over, swelling in hands, fatigue.           Pt denies, Headache, heart palpitations

## 2021-12-17 NOTE — PROGRESS NOTES
[facility] [address]  [place] [address]  Dept: [phone]  Dept Fax: [phone]  Loc: [phone]    Visit Date: 12/17/2021    Ms. Chano Vu is a 46 y.o. female  who presented for:  Chief Complaint   Patient presents with    Follow-up     tightness in chest pt was seen in ER    referred post ER visit for chest pain, dizziness. .. Smart watch rhythm c/w new onset atrial fibrillation     HPI:   HPI   Farhat Kelley is a pleasant 46year old female patient who  has a past medical history of Hepatitis, Hx of blood clots, Hypertension, Kidney stone, Nausea & vomiting, and Thyroid disease. Patient was referred to cardiology after recent ER visits. She reports recent onset bendopnea, dizziness, lightheadedness. She also reports episodes of SOB and increased dyspnea on exertions, \"feels winded\". She developed chest pain and palpitations prior to her visit to ER. At home, she wore her 's smart watch. Strip review from the smart watch is printed out and was brought to office by patient, suggestive of atrial fibrillation. EKG in ER revealed sinus tachycardia, ?anterior infarct, nonspecific TW changes in inferior leads. Troponin <0.01. She has h/o autoimmune hepatitis, was recently at [facility]. She is on Lasix, reports improvement in leg swelling.       Current Outpatient Medications:     furosemide (LASIX) 40 MG tablet, Take 40 mg by mouth daily, Disp: , Rfl:     budesonide (ENTOCORT EC) 3 MG extended release capsule, Take 3 mg by mouth every morning, Disp: , Rfl:     Cholecalciferol (D3) 50 MCG (2000 UT) TABS, Take 1 tablet by mouth, Disp: , Rfl:     vitamin B-12 (CYANOCOBALAMIN) 1000 MCG tablet, Take 1,000 mcg by mouth daily, Disp: , Rfl:     ondansetron (ZOFRAN-ODT) 4 MG disintegrating tablet, Take 1 tablet by mouth every 8 hours as needed for Nausea or Vomiting, Disp: 12 tablet, Rfl: 0    pantoprazole (PROTONIX) 40 MG tablet, Take 1 tablet by mouth 2 times daily (before meals), Disp: 60 tablet, Rfl: 1    docusate sodium (COLACE) 100 MG capsule, Take 100 mg by mouth daily, Disp: , Rfl:     Multiple Vitamins-Minerals (THERAPEUTIC MULTIVITAMIN-MINERALS) tablet, Take 1 tablet by mouth daily, Disp: , Rfl:     Magnesium 100 MG CAPS, Take 250 mg by mouth daily, Disp: , Rfl:     levothyroxine (SYNTHROID) 150 MCG tablet, Take 200 mcg by mouth Daily , Disp: , Rfl:     predniSONE (DELTASONE) 10 MG tablet, Take 10 mg by mouth daily, Disp: , Rfl:     potassium chloride (KLOR-CON M) 20 MEQ extended release tablet, Take 1 tablet by mouth daily for 14 days, Disp: 14 tablet, Rfl: 0    Past Medical History  Nikki  has a past medical history of Hepatitis, Hx of blood clots, Hypertension, Kidney stone, Nausea & vomiting, and Thyroid disease. Social History  Nikki  reports that she has never smoked. She has never used smokeless tobacco. She reports current alcohol use. She reports that she does not use drugs. Family History  Nikki family history includes Diabetes in her mother; Heart Surgery in her mother.     Past Surgical History   Past Surgical History:   Procedure Laterality Date    BACK SURGERY  ???    fusion and disk removed     SECTION       SECTION      CHOLECYSTECTOMY  5168'Z    COLONOSCOPY Left 2021    COLONOSCOPY POLYPECTOMY HOT BIOPSY [facility]    COLONOSCOPY  2021    COLONOSCOPY WITH BIOPSY performed [facility]    EGD      ERCP N/A 2021    ERCP SPHINCTER/PAPILLOTOMY performed [facility] at [facility]      bilateral feet    FOOT SURGERY Left 10/30/14    synovectomy peroneal tendon, repair peroneal tendon, exc ganglion cyst ankle    HYSTERECTOMY      TONSILLECTOMY      as a child    UPPER GASTROINTESTINAL ENDOSCOPY N/A 2021    EGD BIOPSY performed [facility] Review of Systems   Constitutional: Negative for chills and fever  HENT: Negative for congestion, sinus pressure, sneezing and sore throat. Eyes: Negative for pain, discharge, redness and itching. Respiratory: Negative for apnea, cough  Gastrointestinal: Negative for blood in stool, constipation, diarrhea   Endocrine: Negative for cold intolerance, heat intolerance, polydipsia. Genitourinary: Negative for dysuria, enuresis, flank pain and hematuria. Musculoskeletal: Negative for arthralgias, joint swelling and neck pain. Neurological: Negative for numbness and headaches. Psychiatric/Behavioral: Negative for agitation, confusion, decreased concentration and dysphoric mood. Objective:     BP (!) 153/87   Pulse 71   Ht 5' 4\" (1.626 m)   Wt 278 lb (126.1 kg)   BMI 47.72 kg/m²     Wt Readings from Last 3 Encounters:   12/17/21 278 lb (126.1 kg)   12/14/21 264 lb (119.7 kg)   08/23/21 256 lb (116.1 kg)     BP Readings from Last 3 Encounters:   12/17/21 (!) 153/87   12/14/21 129/85   08/23/21 123/71       Nursing note and vitals reviewed. Physical Exam   Constitutional: Oriented to person, place, and time. Appears well-developed and well-nourished. ENT: Moist mucous membranes. No bleeding. Tongue is midline. Head: Normocephalic and atraumatic. Eyes: EOM are normal. Pupils are equal, round, and reactive to light. Neck: Normal range of motion. Neck supple. No JVD present. Cardiovascular: Normal rate, regular rhythm, no murmur, no rubs, and intact distal pulses. Pulmonary/Chest: Effort normal and breath sounds normal. No respiratory distress. No wheezes. No rales. Abdominal: Soft. Bowel sounds are normal. No distension. There is no tenderness. Musculoskeletal: Normal range of motion. trace edema. Neurological: Alert and oriented to person, place, and time. No cranial nerve deficit. Coordination normal.   Skin: Skin is warm and dry. Psychiatric: Normal mood and affect.        No results found for: CKTOTAL, CKMB, CKMBINDEX    Lab Results   Component Value Date    WBC 11.5 12/14/2021    RBC 4.75 12/14/2021    RBC 4.77 10/24/2011    HGB 14.8 12/14/2021    HCT 45.1 12/14/2021    MCV 94.9 12/14/2021    MCH 31.2 12/14/2021    MCHC 32.8 12/14/2021    RDW 13.4 07/29/2014     12/14/2021    MPV 10.1 12/14/2021       Lab Results   Component Value Date     12/14/2021    K 4.0 12/14/2021     12/14/2021    CO2 26 12/14/2021    BUN 23 12/14/2021    LABALBU 3.9 12/14/2021    CREATININE 0.7 12/14/2021    CALCIUM 9.4 12/14/2021    LABGLOM 88 12/14/2021    GLUCOSE 137 12/14/2021    GLUCOSE 97 10/24/2011       Lab Results   Component Value Date    ALKPHOS 116 12/14/2021    ALT 35 12/14/2021    AST 27 12/14/2021    PROT 6.6 12/14/2021    BILITOT <0.2 12/14/2021    BILIDIR <0.2 12/14/2021    LABALBU 3.9 12/14/2021       Lab Results   Component Value Date    MG 1.7 08/12/2021       Lab Results   Component Value Date    INR 1.08 08/12/2021    INR 1.17 (H) 08/11/2021    INR 1.25 (H) 08/10/2021         No results found for: LABA1C    No results found for: TRIG, HDL, LDLCALC, LDLDIRECT, LABVLDL    Lab Results   Component Value Date    TSH 4.160 12/14/2021         Testing Reviewed:      I have individually reviewed the cardiac test below:    ECHO: No results found for this or any previous visit. AssessmentPlan:     1. Chest pain  2. Abnormal smart watch strips, c/w new onset atrial fibrillation   3. Bendopnea  4. LYNCH  5. SOB  6. Hypertension  7. Autoimmune hepatitis  8. Thyroid disease     · At home, she wore her 's smart watch.  Strip review from the smart watch is printed out and was brought to office by patient, suggestive of atrial fibrillation  · EKG in ER revealed sinus tachycardia, ?anterior infarct, nonspecific TW changes in inferior leads  · Troponin <0.01  · She has h/o hypertension   · ChadsVasc score (pending more studies) is 2 (HTN, female sex)  · D/w patient risk for stroke associated with A Fib  · Wishes to avoid [address] at this this time  · ASA  · Although strip is suggestive of AF (from smart watch), further assessment warranted  · Cardiac event monitor   · Start Metoprolol  · Will need to investigate for underlying structural heart disease, will proceed with a transthoracic echocardiogram   · Based on patient's risk factors and clinical presentation, stress test is indicated to investigate for possible underlying ischemic heart disease. Patient  agrees with that work up and its risks and benefits and potential need for additional testing if stress test is abnormal such as cardiac catheterization  · Patient was advised to report to the ER if has recurrent symptoms with specific instructions given about severity and duration of symptoms      Above findings and plan of care were discussed with patient in details, patient's questions were answered.      Disposition:  RTC in 12 months           Electronically signed by Kerry Ribera MD, [facility] [place], [place]    12/17/2021 at 9:35 AM EST

## 2021-12-28 ENCOUNTER — HOSPITAL ENCOUNTER (OUTPATIENT)
Dept: NON INVASIVE DIAGNOSTICS | Age: 51
Discharge: HOME | End: 2021-12-28
Payer: COMMERCIAL

## 2021-12-28 VITALS — WEIGHT: 271 LBS | HEIGHT: 64 IN | BODY MASS INDEX: 46.26 KG/M2

## 2021-12-28 DIAGNOSIS — R00.2 PALPITATIONS: ICD-10-CM

## 2021-12-28 DIAGNOSIS — I20.8 OTHER FORMS OF ANGINA PECTORIS (HCC): ICD-10-CM

## 2021-12-28 DIAGNOSIS — R06.09 DOE (DYSPNEA ON EXERTION): ICD-10-CM

## 2021-12-28 DIAGNOSIS — I48.91 NEW ONSET A-FIB (HCC): ICD-10-CM

## 2021-12-28 LAB
LV EF: 55 %
LVEF MODALITY: NORMAL

## 2021-12-28 PROCEDURE — 6360000002 HC RX W HCPCS

## 2021-12-28 PROCEDURE — A9500 TC99M SESTAMIBI: HCPCS | Performed by: INTERNAL MEDICINE

## 2021-12-28 PROCEDURE — 93270 REMOTE 30 DAY ECG REV/REPORT: CPT

## 2021-12-28 PROCEDURE — 78452 HT MUSCLE IMAGE SPECT MULT: CPT

## 2021-12-28 PROCEDURE — 93306 TTE W/DOPPLER COMPLETE: CPT

## 2021-12-28 PROCEDURE — 93017 CV STRESS TEST TRACING ONLY: CPT | Performed by: INTERNAL MEDICINE

## 2021-12-28 PROCEDURE — 3430000000 HC RX DIAGNOSTIC RADIOPHARMACEUTICAL: Performed by: INTERNAL MEDICINE

## 2021-12-28 RX ADMIN — Medication 30.4 MILLICURIE: at 11:35

## 2021-12-28 RX ADMIN — Medication 8.8 MILLICURIE: at 10:30

## 2022-01-06 ENCOUNTER — TELEPHONE (OUTPATIENT)
Dept: CARDIOLOGY CLINIC | Age: 52
End: 2022-01-06

## 2022-01-06 NOTE — TELEPHONE ENCOUNTER
It is advisable to finish the full duration of monitoring if possible, patient agrees  If not, will read for the duration applied

## 2022-01-06 NOTE — TELEPHONE ENCOUNTER
Pt. Stopped by  and said that the cardiac event monitor has been irritating her skin and was told by the company to remove it and turn it back in. Was applied on 12/28/21, Pt. Took it off on 1/5/22. Pt. Was wondering if she still needed to wear it?

## 2022-01-07 NOTE — TELEPHONE ENCOUNTER
Spoke with pt, she is frustrated because the company says the hospital needs to help her, and the hospital says the company needs to help her. Spoke with Alexsandra Daniels in [facility], and she will contact pt.

## 2022-01-30 NOTE — PROCEDURES
[address] [place] [address]                                 EVENT MONITOR    PATIENT NAME: JEFE Martin                    :        1970  MED REC NO:   [id number]                           ROOM:  ACCOUNT NO:   [de-identified]                           ADMIT DATE: 2021  PROVIDER:     Shoaib Foreman MD    DATE OF ENROLLMENT:  2021 until 2022. INDICATIONS FOR STUDY:  Palpitations, atrial fibrillation, angina. FINDINGS:  The patient's monitoring period was between 2021 and  2022. The baseline underlying rhythm was consistent with normal  sinus rhythm. The average heart rate during the study duration was 67  beats per minute. There was no evidence for profound bradycardia or  prolonged pauses. No evidence for atrial fibrillation. Multiple  patient-triggered events with symptoms reported including chest pain,  dizziness, skipped beats were noted; however, during those episodes, the  patient mostly have sinus rhythm to sinus bradycardia. Occasional  premature atrial complexes. Occasional premature ventricular complexes. CONCLUSION:  1. Normal sinus rhythm with average heart rate of 67 beats per minute. 2.  Occasional premature ventricular complexes. 3.  Occasional premature atrial complexes. 4.  No correlation with symptoms.         Marvin Guzman MD    D: 2022 14:23:35       T: 2022 15:47:04     [id number]  Job#: [id number]     Doc#: [id number]    CC:

## 2022-08-05 ENCOUNTER — HOSPITAL ENCOUNTER (OUTPATIENT)
Age: 52
Discharge: HOME | End: 2022-08-05

## 2022-08-08 LAB — MISC. #1 REFERENCE GROUP TEST: NORMAL

## 2022-11-28 ENCOUNTER — HOSPITAL ENCOUNTER (EMERGENCY)
Age: 52
Discharge: HOME | End: 2022-11-28
Payer: COMMERCIAL

## 2022-11-28 VITALS
DIASTOLIC BLOOD PRESSURE: 87 MMHG | SYSTOLIC BLOOD PRESSURE: 188 MMHG | OXYGEN SATURATION: 98 % | RESPIRATION RATE: 18 BRPM | TEMPERATURE: 98.7 F | HEART RATE: 67 BPM

## 2022-11-28 DIAGNOSIS — J06.9 VIRAL UPPER RESPIRATORY TRACT INFECTION: Primary | ICD-10-CM

## 2022-11-28 LAB
FLU A ANTIGEN: NEGATIVE
FLU B ANTIGEN: NEGATIVE
SARS-COV-2, NAA: NOT  DETECTED

## 2022-11-28 PROCEDURE — 87804 INFLUENZA ASSAY W/OPTIC: CPT

## 2022-11-28 PROCEDURE — 87635 SARS-COV-2 COVID-19 AMP PRB: CPT

## 2022-11-28 PROCEDURE — 99213 OFFICE O/P EST LOW 20 MIN: CPT

## 2022-11-28 PROCEDURE — 99203 OFFICE O/P NEW LOW 30 MIN: CPT | Performed by: NURSE PRACTITIONER

## 2022-11-28 RX ORDER — BENZONATATE 100 MG/1
100 CAPSULE ORAL 3 TIMES DAILY PRN
Qty: 30 CAPSULE | Refills: 0 | Status: SHIPPED | OUTPATIENT
Start: 2022-11-28

## 2022-11-28 ASSESSMENT — ENCOUNTER SYMPTOMS
BLOOD IN STOOL: 0
WHEEZING: 0
SHORTNESS OF BREATH: 0
DIARRHEA: 0
VOMITING: 0
NAUSEA: 0
EYE PAIN: 0
CONSTIPATION: 0
RHINORRHEA: 0
COUGH: 1
ABDOMINAL PAIN: 0

## 2022-11-28 ASSESSMENT — PAIN SCALES - GENERAL: PAINLEVEL_OUTOF10: 4

## 2022-11-28 ASSESSMENT — PAIN DESCRIPTION - FREQUENCY: FREQUENCY: INTERMITTENT

## 2022-11-28 ASSESSMENT — PAIN - FUNCTIONAL ASSESSMENT
PAIN_FUNCTIONAL_ASSESSMENT: 0-10
PAIN_FUNCTIONAL_ASSESSMENT: ACTIVITIES ARE NOT PREVENTED

## 2022-11-28 ASSESSMENT — PAIN DESCRIPTION - LOCATION: LOCATION: HEAD

## 2022-11-28 ASSESSMENT — PAIN DESCRIPTION - ONSET: ONSET: GRADUAL

## 2022-11-28 ASSESSMENT — PAIN DESCRIPTION - PAIN TYPE: TYPE: ACUTE PAIN

## 2022-11-28 ASSESSMENT — PAIN DESCRIPTION - DESCRIPTORS: DESCRIPTORS: OTHER (COMMENT)

## 2022-11-28 NOTE — Clinical Note
Elly Has was seen and treated in our emergency department on 11/28/2022. She may return to work on 11/30/2022. If you have any questions or concerns, please don't hesitate to call.       Rip Barnes, APRN - CNP

## 2022-11-28 NOTE — ED NOTES
Patient discharge instructions given to pt and pt verbalized understanding, 2 px given, no other needs at this time, and pt left in stable condition.      Augustin Tapia RN  11/28/22 1194

## 2022-11-28 NOTE — ED PROVIDER NOTES
[facility] Encounter      [facility]       Chief Complaint   Patient presents with    Cough    Chest Congestion        Nurses Notes reviewed and I agree except as noted in the HPI. HISTORY OF PRESENT ILLNESS   Nikki Taylor is a 46 y.o. female who presents to urgent care with complaint of cough, chest congestion and fatigue that started a couple days ago. Patient states that she was treated for an acute tonsillitis with Zithromax 500 mg x 5 days a little over a week ago. She did complete all of those antibiotics. She states that shortly after finishing those antibiotics she started having symptoms listed above. Patient denies other symptoms including chest pain, shortness of breath, nausea, vomiting, diarrhea or fever. Patient states that she has taken Tylenol and ibuprofen for her symptoms. REVIEW OF SYSTEMS     Review of Systems   Constitutional:  Positive for fatigue. Negative for appetite change, chills, fever and unexpected weight change. HENT:  Positive for congestion. Negative for ear pain and rhinorrhea. Eyes:  Negative for pain and visual disturbance. Respiratory:  Positive for cough. Negative for shortness of breath and wheezing. Cardiovascular:  Negative for chest pain, palpitations and leg swelling. Gastrointestinal:  Negative for abdominal pain, blood in stool, constipation, diarrhea, nausea and vomiting. Genitourinary:  Negative for dysuria, frequency and hematuria. Musculoskeletal:  Negative for arthralgias, joint swelling and neck stiffness. Skin:  Negative for rash. Neurological:  Negative for dizziness, syncope, weakness, light-headedness and headaches. Hematological:  Does not bruise/bleed easily.      PAST MEDICAL HISTORY         Diagnosis Date    Hepatitis     auto immune    Hx of blood clots 1997    during preg    Hypertension     Kidney stone     Nausea & vomiting     Thyroid disease        SURGICAL HISTORY     Patient  has a past surgical history that includes Hysterectomy ();  section;  section (); Foot surgery; Cholecystectomy (); Tonsillectomy; back surgery (???); Foot surgery (Left, 10/30/14); Upper gastrointestinal endoscopy (N/A, 2021); Colonoscopy (Left, 2021); Colonoscopy (2021); EGD (); and ERCP (N/A, 2021).     CURRENT MEDICATIONS       Discharge Medication List as of 2022  9:55 AM        CONTINUE these medications which have NOT CHANGED    Details   metoprolol tartrate (LOPRESSOR) 25 MG tablet Take 1 tablet by mouth 2 times daily, Disp-60 tablet, R-3Normal      aspirin EC 81 MG EC tablet Take 1 tablet by mouth daily, Disp-90 tablet, R-1Normal      furosemide (LASIX) 40 MG tablet Take 40 mg by mouth dailyHistorical Med      budesonide (ENTOCORT EC) 3 MG extended release capsule Take 3 mg by mouth every morningHistorical Med      Cholecalciferol (D3) 50 MCG ( UT) TABS Take 1 tablet by mouthHistorical Med      vitamin B-12 (CYANOCOBALAMIN) 1000 MCG tablet Take 1,000 mcg by mouth dailyHistorical Med      ondansetron (ZOFRAN-ODT) 4 MG disintegrating tablet Take 1 tablet by mouth every 8 hours as needed for Nausea or Vomiting, Disp-12 tablet, R-0Normal      potassium chloride (KLOR-CON M) 20 MEQ extended release tablet Take 1 tablet by mouth daily for 14 days, Disp-14 tablet, R-0Normal      pantoprazole (PROTONIX) 40 MG tablet Take 1 tablet by mouth 2 times daily (before meals), Disp-60 tablet, R-1Normal      docusate sodium (COLACE) 100 MG capsule Take 100 mg by mouth dailyHistorical Med      Multiple Vitamins-Minerals (THERAPEUTIC MULTIVITAMIN-MINERALS) tablet Take 1 tablet by mouth dailyHistorical Med      Magnesium 100 MG CAPS Take 250 mg by mouth dailyHistorical Med      levothyroxine (SYNTHROID) 150 MCG tablet Take 150 mcg by mouth DailyHistorical Med             ALLERGIES     Patient is is allergic to gluten meal, lactose intolerance (gi), and pcn [penicillins]. FAMILY HISTORY     Patient'sfamily history includes Diabetes in her mother; Heart Disease in her mother; Heart Surgery in her mother; High Blood Pressure in her mother. SOCIAL HISTORY     Patient  reports that she has never smoked. She has never used smokeless tobacco. She reports current alcohol use. She reports that she does not use drugs. PHYSICAL EXAM     ED TRIAGE VITALS  BP: (!) 188/87 (has not taken Bp meds yet today), Temp: 98.7 °F (37.1 °C), Heart Rate: 67, Resp: 18, SpO2: 98 %  Physical Exam  Vitals and nursing note reviewed. Constitutional:       Appearance: She is well-developed. HENT:      Head: Normocephalic and atraumatic. Eyes:      Conjunctiva/sclera: Conjunctivae normal.      Pupils: Pupils are equal, round, and reactive to light. Cardiovascular:      Rate and Rhythm: Normal rate and regular rhythm. Heart sounds: Normal heart sounds. No murmur heard. No gallop. Pulmonary:      Effort: Pulmonary effort is normal. No respiratory distress. Breath sounds: Normal breath sounds. No stridor. No decreased breath sounds, wheezing, rhonchi or rales. Chest:      Chest wall: No tenderness. Musculoskeletal:         General: Normal range of motion. Cervical back: Normal range of motion and neck supple. Skin:     General: Skin is warm and dry. Neurological:      Mental Status: She is alert and oriented to person, place, and time. DIAGNOSTIC RESULTS   Labs:  Results for orders placed or performed during the hospital encounter of 11/28/22   COVID-19, Rapid   Result Value Ref Range    SARS-CoV-2, DENIS NOT  DETECTED NOT DETECTED   Rapid influenza A/B antigens   Result Value Ref Range    Flu A Antigen Negative NEGATIVE    Flu B Antigen Negative NEGATIVE       IMAGING:  No orders to display     URGENT CARE COURSE:        MDM      Patient presents to urgent care with complaint of cough, chest congestion and fatigue that started a couple days ago. Differential diagnosis include not limited to influenza, COVID-19 or other viral illness. COVID-19 test are negative. Patient will be discharged home with symptomatic treatment. Patient follow-up with primary care provider. Patient instructed go to ER for worsening symptoms, chest pain, inability to keep liquids down, inability to urinate for greater than 8 hours or difficulty breathing. Follow-up with your primary care provider. May take Tylenol or ibuprofen as needed for pain or fever. Medications - No data to display  PROCEDURES:    Procedures    FINALIMPRESSION      1.  Viral upper respiratory tract infection        DISPOSITION/PLAN   DISPOSITION Decision To Discharge 11/28/2022 09:50:09 AM    PATIENT REFERRED TO:  Manoj Mata MD  [phone]    In 2 days    DISCHARGE MEDICATIONS:  Discharge Medication List as of 11/28/2022  9:55 AM        START taking these medications    Details   dextromethorphan-guaiFENesin (MUCINEX DM)  MG per extended release tablet Take 1 tablet by mouth every 12 hours as needed for Cough or Congestion, Disp-30 tablet, R-0Normal      benzonatate (TESSALON PERLES) 100 MG capsule Take 1 capsule by mouth 3 times daily as needed for Cough, Disp-30 capsule, R-0Normal           Discharge Medication List as of 11/28/2022  9:55 AM          FREDRICK Patel CNP, APRN - CNP  11/28/22 1007

## 2023-01-25 ENCOUNTER — TELEPHONE (OUTPATIENT)
Dept: CARDIOLOGY CLINIC | Age: 53
End: 2023-01-25

## 2023-01-25 NOTE — TELEPHONE ENCOUNTER
Received a fax from [facility] for a referral. Attempted to call patient and schedule and was unable to get a hold of patient and could not leave a message because the voicemail was full.

## 2023-02-06 NOTE — PROGRESS NOTES
[address] [facility] [address] [facility] [address]  Dept: [phone]  Dept Fax: [phone]  Loc: [phone]    Visit Date: 2/7/2023    Ms. Tom Berry is a 46 y.o. female  who presented for:  Chest pain   HPI:   HPI   Ruby Ridley is a pleasant 46year old female patient who  has a past medical history of Hepatitis, Hx of blood clots, Hypertension, Kidney stone, Nausea & vomiting, and Thyroid disease. Echocardiogram on 12/2021 revealed a preserved EF. Nuclear stress test 12/2021 revealed no evidence for ischemia. Cardiac event monitor 12/2021 revealed NSR, occasional premature atrial complexes, occasional premature ventricular complexes. The patient reports episodes of chest pain, left sided, feels like pinching or sometimes as tightness, radiates to the left shoulder, up to 7/10, can occur with rest or physical activity, associated with shortness of breath. Patient reports dyspnea on exertion, fatigue. She felt SOB walking to the office today. /80, HR 63. EKG revealed SR, old anterior infarct. Her apple watch gave her alerts of arhythmia, possible A Fib? . Reports palpitations.        Current Outpatient Medications:     benzonatate (TESSALON PERLES) 100 MG capsule, Take 1 capsule by mouth 3 times daily as needed for Cough, Disp: 30 capsule, Rfl: 0    metoprolol tartrate (LOPRESSOR) 25 MG tablet, Take 1 tablet by mouth 2 times daily, Disp: 60 tablet, Rfl: 3    aspirin EC 81 MG EC tablet, Take 1 tablet by mouth daily, Disp: 90 tablet, Rfl: 1    furosemide (LASIX) 40 MG tablet, Take 40 mg by mouth daily, Disp: , Rfl:     budesonide (ENTOCORT EC) 3 MG extended release capsule, Take 3 mg by mouth every morning, Disp: , Rfl:     Cholecalciferol (D3) 50 MCG (2000 UT) TABS, Take 1 tablet by mouth, Disp: , Rfl:     vitamin B-12 (CYANOCOBALAMIN) 1000 MCG tablet, Take 1,000 mcg by mouth daily, Disp: , Rfl:     ondansetron (ZOFRAN-ODT) 4 MG disintegrating tablet, Take 1 tablet by mouth every 8 hours as needed for Nausea or Vomiting, Disp: 12 tablet, Rfl: 0    potassium chloride (KLOR-CON M) 20 MEQ extended release tablet, Take 1 tablet by mouth daily for 14 days, Disp: 14 tablet, Rfl: 0    pantoprazole (PROTONIX) 40 MG tablet, Take 1 tablet by mouth 2 times daily (before meals), Disp: 60 tablet, Rfl: 1    docusate sodium (COLACE) 100 MG capsule, Take 100 mg by mouth daily, Disp: , Rfl:     Multiple Vitamins-Minerals (THERAPEUTIC MULTIVITAMIN-MINERALS) tablet, Take 1 tablet by mouth daily, Disp: , Rfl:     Magnesium 100 MG CAPS, Take 250 mg by mouth daily, Disp: , Rfl:     levothyroxine (SYNTHROID) 150 MCG tablet, Take 150 mcg by mouth Daily, Disp: , Rfl:     Past Medical History  Nikki  has a past medical history of Hepatitis, Hx of blood clots, Hypertension, Kidney stone, Nausea & vomiting, and Thyroid disease. Social History  Nikki  reports that she has never smoked. She has never used smokeless tobacco. She reports current alcohol use. She reports that she does not use drugs. Family History  Nikki family history includes Diabetes in her mother; Heart Disease in her mother; Heart Surgery in her mother; High Blood Pressure in her mother.     Past Surgical History   Past Surgical History:   Procedure Laterality Date    BACK SURGERY  1993???    fusion and disk removed    [address]  1990's    COLONOSCOPY Left 1/7/2021    COLONOSCOPY POLYPECTOMY HOT BIOPSY performed by Kole Guadarrama MD at [facility]    COLONOSCOPY  1/7/2021    COLONOSCOPY WITH BIOPSY performed by Kole Guadarrama MD at [facility]    EGD  2021    ERCP N/A 8/6/2021    ERCP SPHINCTER/PAPILLOTOMY performed by Kole Guadarrama MD at [place]      bilateral feet    FOOT SURGERY Left 10/30/14    synovectomy peroneal tendon, repair peroneal tendon, exc ganglion cyst ankle    HYSTERECTOMY (CERVIX STATUS UNKNOWN)  2001 TONSILLECTOMY      as a child    UPPER GASTROINTESTINAL ENDOSCOPY N/A 1/6/2021    EGD BIOPSY performed by Maged Kc MD at [facility]       Review of Systems   Constitutional: Negative for chills and fever  HENT: Negative for congestion, sinus pressure, sneezing and sore throat. Eyes: Negative for pain, discharge, redness and itching. Respiratory: Negative for apnea, cough  Gastrointestinal: Negative for blood in stool, constipation, diarrhea   Endocrine: Negative for cold intolerance, heat intolerance, polydipsia. Genitourinary: Negative for dysuria, enuresis, flank pain and hematuria. Musculoskeletal: Negative for arthralgias, joint swelling and neck pain. Neurological: Negative for numbness and headaches. Psychiatric/Behavioral: Negative for agitation, confusion, decreased concentration and dysphoric mood. Objective: There were no vitals taken for this visit. Wt Readings from Last 3 Encounters:   12/28/21 271 lb (122.9 kg)   12/17/21 278 lb (126.1 kg)   12/14/21 264 lb (119.7 kg)     BP Readings from Last 3 Encounters:   11/28/22 (!) 188/87   12/17/21 (!) 153/87   12/14/21 129/85       Nursing note and vitals reviewed. Physical Exam   Constitutional: Oriented to person, place, and time. Appears well-developed and well-nourished. ENT: Moist mucous membranes. No bleeding. Tongue is midline. Head: Normocephalic and atraumatic. Eyes: EOM are normal. Pupils are equal, round, and reactive to light. Neck: Normal range of motion. Neck supple. No JVD present. Cardiovascular: Normal rate, regular rhythm, murmur, no rubs, and intact distal pulses. Pulmonary/Chest: Effort normal and breath sounds normal. No respiratory distress. No wheezes. No rales. Abdominal: Soft. Bowel sounds are normal. No distension. There is no tenderness. Musculoskeletal: Normal range of motion. no edema. Neurological: Alert and oriented to person, place, and time. No cranial nerve deficit. Coordination normal.   Skin: Skin is warm and dry. Psychiatric: Normal mood and affect.        No results found for: CKTOTAL, CKMB, CKMBINDEX    Lab Results   Component Value Date/Time    WBC 7.5 01/29/2023 03:12 PM    WBC 11.5 12/14/2021 08:37 PM    RBC 4.74 01/29/2023 03:12 PM    HGB 14.3 01/29/2023 03:12 PM    HCT 41.8 01/29/2023 03:12 PM    MCV 88.1 01/29/2023 03:12 PM    MCH 30.2 01/29/2023 03:12 PM    MCHC 34.3 01/29/2023 03:12 PM    RDW 14.5 01/29/2023 03:12 PM     01/29/2023 03:12 PM    MPV 10.1 12/14/2021 08:37 PM       Lab Results   Component Value Date/Time     01/29/2023 03:12 PM    K 3.6 01/29/2023 03:12 PM    K 4.0 12/14/2021 08:37 PM     01/29/2023 03:12 PM    CO2 28 01/29/2023 03:12 PM    BUN 13 01/29/2023 03:12 PM    LABALBU 4.1 01/29/2023 03:12 PM    CREATININE 0.7 01/29/2023 03:12 PM    CALCIUM 9.3 01/29/2023 03:12 PM    LABGLOM >=60 01/29/2023 03:12 PM    GLUCOSE 114 01/29/2023 03:12 PM       Lab Results   Component Value Date/Time    ALKPHOS 94 01/29/2023 03:12 PM    ALKPHOS 116 12/14/2021 08:37 PM    ALT 23 01/29/2023 03:12 PM    AST 21 01/29/2023 03:12 PM    PROT 6.6 12/14/2021 08:37 PM    BILITOT 0.5 01/29/2023 03:12 PM    BILIDIR <0.2 12/14/2021 08:37 PM    LABALBU 4.1 01/29/2023 03:12 PM       Lab Results   Component Value Date/Time    MG 1.7 08/12/2021 05:46 AM       Lab Results   Component Value Date    INR 1.08 08/12/2021    INR 1.17 (H) 08/11/2021    INR 1.25 (H) 08/10/2021         No results found for: LABA1C    No results found for: TRIG, HDL, LDLCALC, LDLDIRECT, LABVLDL    Lab Results   Component Value Date/Time    TSH 4.160 12/14/2021 08:37 PM         Testing Reviewed:      I have individually reviewed the cardiac test below:    ECHO:   Results for orders placed or performed during the hospital encounter of 12/28/21   Echo 2D w doppler w color complete   Result Value Ref Range    Left Ventricular Ejection Fraction 55     LVEF MODALITY ECHO     Narrative Transthoracic Echocardiography Report (TTE)     Demographics      Patient Name    Yuni Cisneros Gender               Female      MR #            [id number]       Race                                                       Ethnicity      Account #       [de-identified]       Room Number      Accession       [id number]      Date of Study        12/28/2021   Number      Date of Birth   1970      Referring Physician  Kyle Meade MD      Age             46 year(s)      Susan Dewey [facility]                                      Interpreting         Ute Meade MD                                   Physician     Procedure    Type of Study      TTE procedure:ECHOCARDIOGRAM COMPLETE 2D W DOPPLER W COLOR. Procedure Date  Date: 12/28/2021 Start: 09:17 AM    Study Location: Echo Lab  Technical Quality: Adequate visualization    Indications:Chest pain and Shortness of breath. Additional Medical History:Blood Clots, Thyroid disease, Hypertension. Patient Status: Routine    Height: 64 inches Weight: 278.01 pounds BSA: 2.25 m^2 BMI: 47.72 kg/m^2    BP: 153/87 mmHg     Conclusions      Summary   Normal left ventricle size and systolic function. Ejection fraction was   estimated at 55%. There were no regional left ventricular wall motion   abnormalities and wall thickness was within normal limits. Trace tricuspid regurgitation. Signature      ----------------------------------------------------------------   Electronically signed by Ute Meade MD (Interpreting   physician) on 12/28/2021 at 04:32 PM   ----------------------------------------------------------------      Findings      Mitral Valve   The mitral valve structure was normal with normal leaflet separation. DOPPLER: The transmitral velocity was within the normal range with no   evidence for mitral stenosis.  There was no evidence of mitral regurgitation. Aortic Valve   The aortic valve was trileaflet with normal thickness and cuspal   separation. DOPPLER: Transaortic velocity was within the normal range with   no evidence of aortic stenosis. There was no evidence of aortic   regurgitation. Tricuspid Valve   The tricuspid valve structure was normal with normal leaflet separation. DOPPLER: There was no evidence of tricuspid stenosis. There was evidence   of Trace tricuspid regurgitation. Pulmonic Valve   The pulmonic valve leaflets exhibited normal thickness, no calcification,   and normal cuspal separation. DOPPLER: The transpulmonic velocity was   within the normal range with no evidence for regurgitation. Left Atrium   Left atrial size was normal.      Left Ventricle   Normal left ventricle size and systolic function. Ejection fraction was   estimated at 55%. There were no regional left ventricular wall motion   abnormalities and wall thickness was within normal limits. Right Atrium   Right atrial size was normal.      Right Ventricle   The right ventricular size was normal with normal systolic function and   wall thickness. Pericardial Effusion   The pericardium was normal in appearance with no evidence of a pericardial   effusion. Pleural Effusion   No evidence of pleural effusion. Aorta / Great Vessels   -Aortic root dimension within normal limits.   -The Pulmonary artery is within normal limits. -IVC size is within normal limits with normal respiratory phasic changes.      M-Mode/2D Measurements & Calculations      LV Diastolic    LV Systolic Dimension: 2.9  AV Cusp Separation: 1.6 cmAO   Dimension: 4.2  cm                          Root Dimension: 2.7 cmLA Area:   cm              LV Volume Diastolic: 28.2   53.3 cm^2   LV FS:31 %      ml   LV PW           LV Volume Systolic: 12.6 ml   Diastolic: 0.9  LV EDV/LV EDV Index: 78.6   cm              ml/35 m^2LV ESV/LV ESV      RV Diastolic Dimension: 2.7 cm Septum          Index: 32.2 UN/91 m^2   Diastolic: 1 cm EF Calculated: 59 %         Ascending Aorta: 3.1 cm                                               LA volume/Index: 43.9 ml                                               /20m^2     Doppler Measurements & Calculations      MV Peak E-Wave: 82.7 cm/s  AV Peak Velocity: 192  LVOT Peak Velocity: 106   MV Peak A-Wave: 84.4 cm/s  cm/s                   cm/s   MV E/A Ratio: 0.98         AV Peak Gradient:      LVOT Peak Gradient: 4   MV Peak Gradient: 2.74     14.75 mmHg             mmHg   mmHg                                                     TV Peak E-Wave: 62.1   MV Deceleration Time: 310                         cm/s   msec                                              TV Peak A-Wave: 50.1                              IVRT: 63 msec          cm/s      MV E' Septal Velocity: 8.2                        TV Peak Gradient: 1.54   cm/s                       AV DVI (Vmax):0.55     mmHg   MV A' Septal Velocity:                            TR Velocity:205 cm/s   13.1 cm/s                                         TR Gradient:16.81 mmHg   MV E' Lateral Velocity:                           PV Peak Velocity: 75.8   10.4 cm/s                                         cm/s   MV A' Lateral Velocity:                           PV Peak Gradient: 2.3   15.1 cm/s                                         mmHg   E/E' septal: 10.09   E/E' lateral: 7.95     [email or web address]  [id number]   ]    Ekg:   EKG Interpretation:  normal sinus rhythm, Anterior infarct. Cardiac Monitor:12/2021  INDICATIONS FOR STUDY:  Palpitations, atrial fibrillation, angina. FINDINGS:  The patient's monitoring period was between 12/28/2021 and  01/26/2022. The baseline underlying rhythm was consistent with normal  sinus rhythm. The average heart rate during the study duration was 67  beats per minute.   There was no evidence for profound bradycardia or  prolonged pauses. No evidence for atrial fibrillation. Multiple  patient-triggered events with symptoms reported including chest pain,  dizziness, skipped beats were noted; however, during those episodes, the  patient mostly have sinus rhythm to sinus bradycardia. Occasional  premature atrial complexes. Occasional premature ventricular complexes. CONCLUSION:  1. Normal sinus rhythm with average heart rate of 67 beats per minute. 2.  Occasional premature ventricular complexes. 3.  Occasional premature atrial complexes. 4.  No correlation with symptoms. Stress Test:12/2021  Imaging Results:Calculated gated LVEF 62 %. The T.I.D. ratio was 1.2 . Myocardial perfusion imaging was normal at rest and with stress. Myocardial perfusion imaging is not suggestive for myocardial ischemia. Conclusions      Summary   This Nuclear Medicine study was normal .      Recommendation   Clinical correlation is recommended. Signatures      ----------------------------------------------------------------   Electronically signed by Amber Huggins MD (Interpreting   Cardiologist) on 12/29/2021 at 13:05   ----------------------------------------------------------------    AssessmentPlan:     Harpreet Hudson is a pleasant 46year old female patient who  has a past medical history of Hepatitis, Hx of blood clots, Hypertension, Kidney stone, Nausea & vomiting, and Thyroid disease. Echocardiogram on 12/2021 revealed a preserved EF. Nuclear stress test 12/2021 revealed no evidence for ischemia. Cardiac event monitor 12/2021 revealed NSR, occasional premature atrial complexes, occasional premature ventricular complexes. The patient reports episodes of chest pain, left sided, feels like pinching or sometimes as tightness, radiates to the left shoulder, up to 7/10, can occur with rest or physical activity, associated with shortness of breath.  Patient reports dyspnea on exertion, fatigue. She felt SOB walking to the office today. /80, HR 63. EKG revealed SR, old anterior infarct. Her apple watch gave her alerts of arhythmia, possible A Fib? . Reports palpitations. Chest pain  Palpitations  Dyspnea on exertion   Dizziness  \"Smart watch alert\"  Hypertension  Autoimmune hepatitis  Thyroid disease     Echocardiogram on 12/2021 revealed a preserved EF  Nuclear stress test 12/2021 revealed no evidence for ischemia  Cardiac event monitor 12/2021 revealed NSR, occasional premature atrial complexes, occasional premature ventricular complexes  BP seems to be inadequately controlled   /80, HR 63   The patient was instructed to check the blood pressure at home, and record the readings. Patient will call office with blood pressure readings, will adjust patient's antihypertensive medications as needed accordingly   Symptoms are concerning for possible underlying rhythm problems, EKG was reviewed. Will proceed with a 48 Holter monitor   Limit caffeine intake    Limit Na intake   Daily weight    She is on Lasix, KCL   ? Underlying CHF  Will need to investigate for underlying structural heart disease, will proceed with a transthoracic echocardiogram    Patient was advised to report to the ER if has recurrent symptoms with specific instructions given about severity and duration of symptoms    Above findings and plan of care were discussed with patient in details, patient's questions were answered.      Disposition:  RTC in 12 months             Electronically signed by Guerrero Jc MD, [facility]    2/6/2023 at 1:50 PM EST

## 2023-02-07 ENCOUNTER — OFFICE VISIT (OUTPATIENT)
Dept: CARDIOLOGY CLINIC | Age: 53
End: 2023-02-07
Payer: COMMERCIAL

## 2023-02-07 VITALS
WEIGHT: 273 LBS | BODY MASS INDEX: 46.86 KG/M2 | DIASTOLIC BLOOD PRESSURE: 80 MMHG | SYSTOLIC BLOOD PRESSURE: 159 MMHG | HEART RATE: 63 BPM

## 2023-02-07 DIAGNOSIS — R07.9 CHEST PAIN IN ADULT: Primary | ICD-10-CM

## 2023-02-07 DIAGNOSIS — R00.2 PALPITATIONS: ICD-10-CM

## 2023-02-07 DIAGNOSIS — I50.32 CHRONIC HEART FAILURE WITH PRESERVED EJECTION FRACTION (HFPEF) (HCC): ICD-10-CM

## 2023-02-07 DIAGNOSIS — R06.09 DOE (DYSPNEA ON EXERTION): ICD-10-CM

## 2023-02-07 DIAGNOSIS — R06.02 SOB (SHORTNESS OF BREATH): ICD-10-CM

## 2023-02-07 PROCEDURE — 93000 ELECTROCARDIOGRAM COMPLETE: CPT | Performed by: INTERNAL MEDICINE

## 2023-02-07 PROCEDURE — 99214 OFFICE O/P EST MOD 30 MIN: CPT | Performed by: INTERNAL MEDICINE

## 2023-02-07 RX ORDER — LISINOPRIL 10 MG/1
10 TABLET ORAL DAILY
COMMUNITY

## 2023-02-07 NOTE — PROGRESS NOTES
Pt here for check up       Pt c/o chest pain, describes as a tightness, sob , dizziness , more fatigue heart rate up and down notice on apple watch, watch detected  afib     EKG done today     Pt  passed away than started noticing more chest pain

## 2023-02-16 ENCOUNTER — HOSPITAL ENCOUNTER (OUTPATIENT)
Dept: NON INVASIVE DIAGNOSTICS | Age: 53
Discharge: HOME | End: 2023-02-16
Payer: COMMERCIAL

## 2023-02-16 DIAGNOSIS — R06.02 SOB (SHORTNESS OF BREATH): ICD-10-CM

## 2023-02-16 DIAGNOSIS — R06.09 DOE (DYSPNEA ON EXERTION): ICD-10-CM

## 2023-02-16 DIAGNOSIS — R07.9 CHEST PAIN IN ADULT: ICD-10-CM

## 2023-02-16 DIAGNOSIS — R00.2 PALPITATIONS: ICD-10-CM

## 2023-02-16 DIAGNOSIS — I50.32 CHRONIC HEART FAILURE WITH PRESERVED EJECTION FRACTION (HFPEF) (HCC): ICD-10-CM

## 2023-02-16 LAB
LV EF: 58 %
LVEF MODALITY: NORMAL

## 2023-02-16 PROCEDURE — 3430000000 HC RX DIAGNOSTIC RADIOPHARMACEUTICAL: Performed by: INTERNAL MEDICINE

## 2023-02-16 PROCEDURE — 93306 TTE W/DOPPLER COMPLETE: CPT

## 2023-02-16 PROCEDURE — A9500 TC99M SESTAMIBI: HCPCS | Performed by: INTERNAL MEDICINE

## 2023-02-16 PROCEDURE — 93225 XTRNL ECG REC<48 HRS REC: CPT

## 2023-02-16 PROCEDURE — 93226 XTRNL ECG REC<48 HR SCAN A/R: CPT

## 2023-02-16 PROCEDURE — 78452 HT MUSCLE IMAGE SPECT MULT: CPT | Performed by: INTERNAL MEDICINE

## 2023-02-16 PROCEDURE — 93017 CV STRESS TEST TRACING ONLY: CPT | Performed by: INTERNAL MEDICINE

## 2023-02-16 PROCEDURE — 6360000002 HC RX W HCPCS

## 2023-02-16 RX ORDER — TECHNETIUM TC-99M SESTAMIBI 1 MG/10ML
33 INJECTION INTRAVENOUS
Status: COMPLETED | OUTPATIENT
Start: 2023-02-16 | End: 2023-02-16

## 2023-02-16 RX ORDER — TECHNETIUM TC-99M SESTAMIBI 1 MG/10ML
10 INJECTION INTRAVENOUS
Status: COMPLETED | OUTPATIENT
Start: 2023-02-16 | End: 2023-02-16

## 2023-02-16 RX ADMIN — Medication 10 MILLICURIE: at 11:50

## 2023-02-16 RX ADMIN — Medication 33 MILLICURIE: at 13:00

## 2023-02-17 ENCOUNTER — TELEPHONE (OUTPATIENT)
Dept: CARDIOLOGY CLINIC | Age: 53
End: 2023-02-17

## 2023-02-17 NOTE — TELEPHONE ENCOUNTER
Result note for Stress test, lexiscan    ----- Message from Summer Xie MD sent at 2/17/2023  9:12 AM EST -----  Please inform patient that stress test is less suggestive for ischemia \"blockages\". If symptoms persists/recur, patient should call office.

## 2023-06-21 ENCOUNTER — APPOINTMENT (OUTPATIENT)
Dept: INTERVENTIONAL RADIOLOGY/VASCULAR | Age: 53
End: 2023-06-21
Payer: COMMERCIAL

## 2023-06-21 ENCOUNTER — HOSPITAL ENCOUNTER (EMERGENCY)
Age: 53
Discharge: HOME | End: 2023-06-21
Attending: EMERGENCY MEDICINE
Payer: COMMERCIAL

## 2023-06-21 VITALS
TEMPERATURE: 97.8 F | OXYGEN SATURATION: 97 % | RESPIRATION RATE: 18 BRPM | SYSTOLIC BLOOD PRESSURE: 195 MMHG | BODY MASS INDEX: 45.36 KG/M2 | HEART RATE: 88 BPM | DIASTOLIC BLOOD PRESSURE: 99 MMHG | HEIGHT: 63 IN | WEIGHT: 256 LBS

## 2023-06-21 DIAGNOSIS — I87.2 VENOUS INSUFFICIENCY OF LEFT LEG: ICD-10-CM

## 2023-06-21 DIAGNOSIS — I10 UNCONTROLLED HYPERTENSION: ICD-10-CM

## 2023-06-21 DIAGNOSIS — M79.605 LEFT LEG PAIN: Primary | ICD-10-CM

## 2023-06-21 LAB
ALBUMIN SERPL BCG-MCNC: 4.1 G/DL (ref 3.5–5.1)
ALP SERPL-CCNC: 121 U/L (ref 38–126)
ALT SERPL W/O P-5'-P-CCNC: 20 U/L (ref 11–66)
ANION GAP SERPL CALC-SCNC: 12 MEQ/L (ref 8–16)
APTT PPP: 35.5 SECONDS (ref 22–38)
AST SERPL-CCNC: 20 U/L (ref 5–40)
BASOPHILS ABSOLUTE: 0 THOU/MM3 (ref 0–0.1)
BASOPHILS NFR BLD AUTO: 0.5 %
BILIRUB CONJ SERPL-MCNC: < 0.2 MG/DL (ref 0–0.3)
BILIRUB SERPL-MCNC: 0.4 MG/DL (ref 0.3–1.2)
BUN SERPL-MCNC: 9 MG/DL (ref 7–22)
CALCIUM SERPL-MCNC: 9.3 MG/DL (ref 8.5–10.5)
CHLORIDE SERPL-SCNC: 103 MEQ/L (ref 98–111)
CO2 SERPL-SCNC: 28 MEQ/L (ref 23–33)
CREAT SERPL-MCNC: 0.5 MG/DL (ref 0.4–1.2)
DEPRECATED RDW RBC AUTO: 45.8 FL (ref 35–45)
EOSINOPHIL NFR BLD AUTO: 1.4 %
EOSINOPHILS ABSOLUTE: 0.1 THOU/MM3 (ref 0–0.4)
ERYTHROCYTE [DISTWIDTH] IN BLOOD BY AUTOMATED COUNT: 13.5 % (ref 11.5–14.5)
GFR SERPL CREATININE-BSD FRML MDRD: > 60 ML/MIN/1.73M2
GLUCOSE SERPL-MCNC: 148 MG/DL (ref 70–108)
HCT VFR BLD AUTO: 43.4 % (ref 37–47)
HGB BLD-MCNC: 13.5 GM/DL (ref 12–16)
IMM GRANULOCYTES # BLD AUTO: 0.02 THOU/MM3 (ref 0–0.07)
IMM GRANULOCYTES NFR BLD AUTO: 0.3 %
INR PPP: 0.95 (ref 0.85–1.13)
LYMPHOCYTES ABSOLUTE: 2.2 THOU/MM3 (ref 1–4.8)
LYMPHOCYTES NFR BLD AUTO: 28.6 %
MCH RBC QN AUTO: 29 PG (ref 26–33)
MCHC RBC AUTO-ENTMCNC: 31.1 GM/DL (ref 32.2–35.5)
MCV RBC AUTO: 93.1 FL (ref 81–99)
MONOCYTES ABSOLUTE: 0.4 THOU/MM3 (ref 0.4–1.3)
MONOCYTES NFR BLD AUTO: 4.7 %
NEUTROPHILS NFR BLD AUTO: 64.5 %
NRBC BLD AUTO-RTO: 0 /100 WBC
OSMOLALITY SERPL CALC.SUM OF ELEC: 286.4 MOSMOL/KG (ref 275–300)
PLATELET # BLD AUTO: 319 THOU/MM3 (ref 130–400)
PMV BLD AUTO: 10.6 FL (ref 9.4–12.4)
POTASSIUM SERPL-SCNC: 4 MEQ/L (ref 3.5–5.2)
PROT SERPL-MCNC: 6.8 G/DL (ref 6.1–8)
RBC # BLD AUTO: 4.66 MILL/MM3 (ref 4.2–5.4)
SEGMENTED NEUTROPHILS ABSOLUTE COUNT: 5 THOU/MM3 (ref 1.8–7.7)
SODIUM SERPL-SCNC: 143 MEQ/L (ref 135–145)
TSH SERPL DL<=0.005 MIU/L-ACNC: 3.01 UIU/ML (ref 0.4–4.2)
WBC # BLD AUTO: 7.7 THOU/MM3 (ref 4.8–10.8)

## 2023-06-21 PROCEDURE — 82248 BILIRUBIN DIRECT: CPT

## 2023-06-21 PROCEDURE — 85025 COMPLETE CBC W/AUTO DIFF WBC: CPT

## 2023-06-21 PROCEDURE — 99284 EMERGENCY DEPT VISIT MOD MDM: CPT

## 2023-06-21 PROCEDURE — 84443 ASSAY THYROID STIM HORMONE: CPT

## 2023-06-21 PROCEDURE — 93971 EXTREMITY STUDY: CPT

## 2023-06-21 PROCEDURE — 85730 THROMBOPLASTIN TIME PARTIAL: CPT

## 2023-06-21 PROCEDURE — 85610 PROTHROMBIN TIME: CPT

## 2023-06-21 PROCEDURE — 80053 COMPREHEN METABOLIC PANEL: CPT

## 2023-06-21 PROCEDURE — 36415 COLL VENOUS BLD VENIPUNCTURE: CPT

## 2023-06-21 ASSESSMENT — PAIN - FUNCTIONAL ASSESSMENT: PAIN_FUNCTIONAL_ASSESSMENT: 0-10

## 2023-06-21 ASSESSMENT — PAIN DESCRIPTION - LOCATION: LOCATION: LEG

## 2023-06-21 ASSESSMENT — PAIN DESCRIPTION - ORIENTATION: ORIENTATION: LEFT

## 2023-06-21 ASSESSMENT — PAIN SCALES - GENERAL: PAINLEVEL_OUTOF10: 8

## 2023-06-21 NOTE — ED PROVIDER NOTES
[address] [place] [address] [address] [address] EMERGENCY DEPT      EMERGENCY MEDICINE     Room # 33/033A    Pt Name: Javed Nina  MRN: [id number]  [place] 1970  Date of evaluation: 6/21/2023  Provider: Rosario Contreras MD    CHIEF COMPLAINT       Chief Complaint   Patient presents with    Leg Pain     HISTORY OF PRESENT ILLNESS   Javed Nina is a pleasant 46 y.o. female who presents to the emergency department from  Urgent Care , by private vehicle for evaluation of leg cramps pain and swelling x 2 nights . Patient 2 nights ago patient had left leg cramps while in bed and has been hurting on the left leg since then. Today the patient went shopping and having more pain on both legs especially on the left for the right. Denies any shortness of breath or chest pain no nausea or vomiting. Patient admitted that she had hypertension takes lisinopril Lasix and lately was placed on prednisone 5 mg daily. Patient went to the urgent care and was sent here to be evaluated because of concern of DVT.     PASTMEDICAL HISTORY     Past Medical History:   Diagnosis Date    Hepatitis     auto immune    Hx of blood clots 1997    during preg    Hypertension     Kidney stone     Nausea & vomiting     Thyroid disease        Patient Active Problem List   Diagnosis Code    Abdominal pain, right lower quadrant R10.31    Puncture wound of heel without complication P69.476L    Diarrhea R19.7    Intractable nausea and vomiting R11.2    Transaminitis R74.01    Severe malnutrition ([address] E43     SURGICAL HISTORY       Past Surgical History:   Procedure Laterality Date    BACK SURGERY  1993???    fusion and disk removed    [address]  1990's    COLONOSCOPY Left 1/7/2021    COLONOSCOPY POLYPECTOMY HOT BIOPSY performed by Gagandeep Alicea MD at [facility]    COLONOSCOPY  1/7/2021    COLONOSCOPY WITH BIOPSY performed by Gagandeep Alicea MD at [facility]    EGD  2021    ERCP N/A 8/6/2021    ERCP

## 2023-07-17 ENCOUNTER — HOSPITAL ENCOUNTER (OUTPATIENT)
Age: 53
Discharge: HOME | End: 2023-07-17
Payer: COMMERCIAL

## 2023-07-17 LAB
ALBUMIN SERPL BCG-MCNC: 3.9 G/DL (ref 3.5–5.1)
ALP SERPL-CCNC: 123 U/L (ref 38–126)
ALT SERPL W/O P-5'-P-CCNC: 20 U/L (ref 11–66)
AST SERPL-CCNC: 18 U/L (ref 5–40)
BILIRUB CONJ SERPL-MCNC: < 0.2 MG/DL (ref 0–0.3)
BILIRUB SERPL-MCNC: 0.3 MG/DL (ref 0.3–1.2)
PROT SERPL-MCNC: 7.3 G/DL (ref 6.1–8)

## 2023-07-17 PROCEDURE — 36415 COLL VENOUS BLD VENIPUNCTURE: CPT

## 2023-07-17 PROCEDURE — 80076 HEPATIC FUNCTION PANEL: CPT

## 2023-08-30 NOTE — PROCEDURES
The skin was prepped and a 48 hour holter monitor was applied. The patient was instructed on the documentation of symptoms and the purpose of the holter as well as the things to avoid while wearing the holter.    The patient was instructed to remove and return the holter on 02/18/2023  The serial number of the holter that was applied is [id number]
Private car

## 2023-09-27 ENCOUNTER — HOSPITAL ENCOUNTER (OUTPATIENT)
Age: 53
Discharge: HOME | End: 2023-09-27
Payer: COMMERCIAL

## 2023-09-27 LAB
ALBUMIN SERPL BCG-MCNC: 4.1 G/DL (ref 3.5–5.1)
ALP SERPL-CCNC: 113 U/L (ref 38–126)
ALT SERPL W/O P-5'-P-CCNC: 23 U/L (ref 11–66)
AST SERPL-CCNC: 21 U/L (ref 5–40)
BILIRUB CONJ SERPL-MCNC: < 0.2 MG/DL (ref 0–0.3)
BILIRUB SERPL-MCNC: 0.5 MG/DL (ref 0.3–1.2)
PROT SERPL-MCNC: 6.9 G/DL (ref 6.1–8)

## 2023-09-27 PROCEDURE — 80076 HEPATIC FUNCTION PANEL: CPT

## 2023-09-27 PROCEDURE — 36415 COLL VENOUS BLD VENIPUNCTURE: CPT

## 2023-11-09 ENCOUNTER — HOSPITAL ENCOUNTER (OUTPATIENT)
Age: 53
Discharge: HOME | End: 2023-11-09
Payer: COMMERCIAL

## 2023-11-09 LAB
ALBUMIN SERPL BCG-MCNC: 4.1 G/DL (ref 3.5–5.1)
ALP SERPL-CCNC: 129 U/L (ref 38–126)
ALT SERPL W/O P-5'-P-CCNC: 25 U/L (ref 11–66)
AST SERPL-CCNC: 22 U/L (ref 5–40)
BILIRUB CONJ SERPL-MCNC: < 0.2 MG/DL (ref 0–0.3)
BILIRUB SERPL-MCNC: 0.4 MG/DL (ref 0.3–1.2)
PROT SERPL-MCNC: 7.5 G/DL (ref 6.1–8)

## 2023-11-09 PROCEDURE — 36415 COLL VENOUS BLD VENIPUNCTURE: CPT

## 2023-11-09 PROCEDURE — 80076 HEPATIC FUNCTION PANEL: CPT

## 2023-12-30 ENCOUNTER — HOSPITAL ENCOUNTER (OUTPATIENT)
Age: 53
Discharge: HOME | End: 2023-12-30
Payer: COMMERCIAL

## 2023-12-30 LAB — MAGNESIUM SERPL-MCNC: 2.2 MG/DL (ref 1.6–2.4)

## 2023-12-30 PROCEDURE — 83735 ASSAY OF MAGNESIUM: CPT

## 2023-12-30 PROCEDURE — 36415 COLL VENOUS BLD VENIPUNCTURE: CPT

## 2024-03-06 ENCOUNTER — HOSPITAL ENCOUNTER (OUTPATIENT)
Dept: PHYSICAL THERAPY | Age: 54
Setting detail: THERAPIES SERIES
Discharge: HOME | End: 2024-03-06
Payer: COMMERCIAL

## 2024-03-06 PROCEDURE — 97110 THERAPEUTIC EXERCISES: CPT

## 2024-03-06 PROCEDURE — 97162 PT EVAL MOD COMPLEX 30 MIN: CPT

## 2024-03-06 NOTE — PROGRESS NOTES
Independent increased pain sit to stand, in/out of car   Recreation Independent Dependent/Unable unable to shop due to pain with walking   Community Integration Independent Modified Independent   Driving Active  Active  after driving 30 minutes L LE calf to ankle pain 10/10, tingling L thigh with driving 4/10, denies R LE s/s when drive    Work Full-Time.  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         OBJECTIVE:  Pain L LE thigh to knee tingling/pain: intermittent 5/10, average 4/10  L calf to ankle: 100% of the day, tightness/achiness: high 10/10, average   L foot intermittent: high 4/10, average 3/10- new within last 2 months  R LE calf to ankle : intermittent muscle spasm: high 6/10, average 4/10  LBP intermittent in morning    Palpation Equal PSIS and mobility noted   Observation Moderate spasm noted B lumbar paraspinal   Posture fair        Range of Motion Back: lumbar flexion 75%, extension 50%, R lateral flexion 25%, L lateral flexion 50% all with pain  Hip: L hip flexion 50,  L hip abduction 15, R hip flexion 60, hip abduction 15  Knee: R knee 2- 90 degrees, L knee 2- 100 degrees  Ankle: WFL B   Strength Right Lower Extremity:  Impaired - hip 3/5 with pain, knee and ankle 3+/5  Left Lower Extremity:   Impaired - hip 3/5 with pain, knee ankle 4-/5  Poor abdominal strength   Coordination WFL   Sensation Impaired - L calf tingling constant x 1 year, L foot numbness intermittent x 2 months   Bed Mobility WFL   Transfers Impaired - difficulty standing erect    Ambulation Modified Independent  Distance: 50  feet  Surface: Level Tile  Device:No Device  Gait Deviations:  Decreased Trunk Rotation, Wide Base of Support, Mild Path Deviations, and Decreased Terminal Knee Extension   Balance Tinetti: 22/28   Special Tests + pain with repeated standing lumbar flexion and extension 1 x 10, + L SLR test, negative B hip scour, B + pain for PETERSON           TREATMENT   Precautions:

## 2024-03-14 ENCOUNTER — HOSPITAL ENCOUNTER (OUTPATIENT)
Dept: PHYSICAL THERAPY | Age: 54
Setting detail: THERAPIES SERIES
Discharge: HOME | End: 2024-03-14
Payer: COMMERCIAL

## 2024-03-14 PROCEDURE — 97110 THERAPEUTIC EXERCISES: CPT

## 2024-03-14 PROCEDURE — 97032 APPL MODALITY 1+ESTIM EA 15: CPT

## 2024-03-14 NOTE — PROGRESS NOTES
strengthening    Activity/Treatment Tolerance:  []  Patient tolerated treatment well  []  Patient limited by fatigue  [x]  Patient limited by pain   []  Patient limited by medical complications  []  Other:     Assessment: Initiated estim with patient tolerating well.  Made progressions as able with patient having fair tolerance but was limited by calf pain. LBP 5/10 at end of session and calf 8/10.     GOALS:  Patient Goal: to get my pain to go away    Short Term Goals:  Time Frame: deferred to LTG's    Long Term Goals:  Time Frame: 6 weeks  Increase AROM lumbar extension to 50%, B hip flexion to 60, knee flexion to 105 degrees to allow patient to report compliant with HEP as prescribed  Increase abdominal strength to fair, LE to 4/5 to allow patient to report walk x 15 minutes with decreased pain to 2/10  I with HEP to allow patient to allow patient to sleep through night without awakening throughout session    Patient Education:   [x]  HEP/Education Completed:estim, and gentle progressions.  TapS[employer] Access Code:  []  No new Education completed  []  Reviewed Prior HEP      []  Patient verbalized and/or demonstrated understanding of education provided.  []  Patient unable to verbalize and/or demonstrate understanding of education provided.  Will continue education.  [x]  Barriers to learning: none    PLAN:  Treatment Recommendations: Strengthening, Range of Motion, Gait Training, Pain Management, Home Exercise Program, Patient Education, Integrative Dry Needling, and Modalities    []  Plan of care initiated.  Plan to see patient 2 times per week for 6 weeks to address the treatment planned outlined above.  [x]  Continue with current plan of care  []  Modify plan of care as follows:    []  Hold pending physician visit  []  Discharge    Time In 1102   Time Out 1145   Timed Code Minutes: 43 min   Total Treatment Time: 43 min       Electronically Signed by: Lizeth Lawler, PTA [id number]

## 2024-03-18 ENCOUNTER — APPOINTMENT (OUTPATIENT)
Dept: PHYSICAL THERAPY | Age: 54
End: 2024-03-18
Payer: COMMERCIAL

## 2024-03-25 ENCOUNTER — HOSPITAL ENCOUNTER (OUTPATIENT)
Dept: PHYSICAL THERAPY | Age: 54
Setting detail: THERAPIES SERIES
Discharge: HOME | End: 2024-03-25
Payer: COMMERCIAL

## 2024-03-25 PROCEDURE — 97110 THERAPEUTIC EXERCISES: CPT

## 2024-03-25 PROCEDURE — 97032 APPL MODALITY 1+ESTIM EA 15: CPT

## 2024-03-25 NOTE — PROGRESS NOTES
Patient tolerated treatment well  []  Patient limited by fatigue  [x]  Patient limited by pain   []  Patient limited by medical complications  []  Other:     Assessment: patient does not was to try dry needling yet \"I'm a little afraid of needles.\"  So did IFC and exercises as noted,  did schedule for 4 weeks as patient has cancelled a few  appointments  Added hamstring stretch to HEP with handout, try a piriformis stretch next visit  GOALS:  Patient Goal: to get my pain to go away    Short Term Goals:  Time Frame: deferred to LTG's    Long Term Goals:  Time Frame: 6 weeks  Increase AROM lumbar extension to 50%, B hip flexion to 60, knee flexion to 105 degrees to allow patient to report compliant with HEP as prescribed  Increase abdominal strength to fair, LE to 4/5 to allow patient to report walk x 15 minutes with decreased pain to 2/10  I with HEP to allow patient to allow patient to sleep through night without awakening throughout session    Patient Education:   [x]  HEP/Education Completed:estim, and gentle progressions.  BDA Access Code:  []  No new Education completed  []  Reviewed Prior HEP      []  Patient verbalized and/or demonstrated understanding of education provided.  []  Patient unable to verbalize and/or demonstrate understanding of education provided.  Will continue education.  [x]  Barriers to learning: none    PLAN:  Treatment Recommendations: Strengthening, Range of Motion, Gait Training, Pain Management, Home Exercise Program, Patient Education, Integrative Dry Needling, and Modalities    []  Plan of care initiated.  Plan to see patient 2 times per week for 6 weeks to address the treatment planned outlined above.  [x]  Continue with current plan of care  []  Modify plan of care as follows:    []  Hold pending physician visit  []  Discharge    Time In 835   Time Out 915   Timed Code Minutes: 40 min   Total Treatment Time: 40 min       Electronically Signed by: Valencia Simons, PT [id number]

## 2024-03-26 ENCOUNTER — HOSPITAL ENCOUNTER (OUTPATIENT)
Dept: PHYSICAL THERAPY | Age: 54
Setting detail: THERAPIES SERIES
End: 2024-03-26
Payer: COMMERCIAL

## 2024-04-01 ENCOUNTER — HOSPITAL ENCOUNTER (OUTPATIENT)
Dept: PHYSICAL THERAPY | Age: 54
Setting detail: THERAPIES SERIES
Discharge: HOME | End: 2024-04-01
Payer: COMMERCIAL

## 2024-04-01 PROCEDURE — 97110 THERAPEUTIC EXERCISES: CPT

## 2024-04-01 PROCEDURE — 97032 APPL MODALITY 1+ESTIM EA 15: CPT

## 2024-04-01 NOTE — PROGRESS NOTES
Patient tolerated treatment well  []  Patient limited by fatigue  [x]  Patient limited by pain   []  Patient limited by medical complications  []  Other:     Assessment: Pt tolerated session fair, discussed using TENs unit at work- can have coworker help place patches.  Pt to cont with HEP. Dicussed more with encouraging pt to try dry needling with PT.     GOALS:  Patient Goal: to get my pain to go away    Short Term Goals:  Time Frame: deferred to LTG's    Long Term Goals:  Time Frame: 6 weeks  Increase AROM lumbar extension to 50%, B hip flexion to 60, knee flexion to 105 degrees to allow patient to report compliant with HEP as prescribed  Increase abdominal strength to fair, LE to 4/5 to allow patient to report walk x 15 minutes with decreased pain to 2/10  I with HEP to allow patient to allow patient to sleep through night without awakening throughout session    Patient Education:   []  HEP/Education Completed:estim, and gentle progressions.  Miira Access Code:  []  No new Education completed  [x]  Reviewed Prior HEP      [x]  Patient verbalized and/or demonstrated understanding of education provided.  []  Patient unable to verbalize and/or demonstrate understanding of education provided.  Will continue education.  [x]  Barriers to learning: none    PLAN:  Treatment Recommendations: Strengthening, Range of Motion, Gait Training, Pain Management, Home Exercise Program, Patient Education, Integrative Dry Needling, and Modalities    []  Plan of care initiated.  Plan to see patient 2 times per week for 6 weeks to address the treatment planned outlined above.  [x]  Continue with current plan of care  []  Modify plan of care as follows:    []  Hold pending physician visit  []  Discharge    Time In 1035   Time Out 1120   Timed Code Minutes: 45 min   Total Treatment Time: 45 min       Electronically Signed by: Izabel Linares, REBECCA [id number]

## 2024-04-05 ENCOUNTER — HOSPITAL ENCOUNTER (OUTPATIENT)
Dept: PHYSICAL THERAPY | Age: 54
Setting detail: THERAPIES SERIES
End: 2024-04-05
Payer: COMMERCIAL

## 2024-04-10 ENCOUNTER — HOSPITAL ENCOUNTER (OUTPATIENT)
Dept: PHYSICAL THERAPY | Age: 54
Setting detail: THERAPIES SERIES
Discharge: HOME | End: 2024-04-10
Payer: COMMERCIAL

## 2024-04-10 PROCEDURE — 97110 THERAPEUTIC EXERCISES: CPT

## 2024-04-10 PROCEDURE — 97032 APPL MODALITY 1+ESTIM EA 15: CPT

## 2024-04-10 NOTE — PROGRESS NOTES
[facility]  PHYSICAL THERAPY  [] EVALUATION  [x] DAILY NOTE (LAND) [] DAILY NOTE (AQUATIC ) [] PROGRESS NOTE [] DISCHARGE NOTE    [] [facility] [place]   [x] [place] [facility]    [] [facility]   [] [facility]    Date: 4/10/2024  Patient Name:  Nikki Wang  : 1970  MRN: [id number]  CSN: [id number]    Referring Practitioner David Espitia PA-C    Diagnosis Radiculopathy, lumbar region    Treatment Diagnosis R53.1 Weakness  M62.81 Generalized Muscle Weakness  R26.2 Difficulty in walking and M54.59  Other Low Back Pain  R53.1 Weakness  M62.81 Generalized Muscle Weakness   Date of Evaluation 3/6/24    Additional Pertinent History HTN, kidney stone, obesity, arthritis, osteoporosis      Functional Outcome Measure Used Oswestry back disability   Functional Outcome Score Eval 16 (3/6/24)       Insurance: Primary: Payor: R /  /  / ,   Secondary:    Authorization Information:   DEDUCTIBLE: $1000 MET $0                        OUT OF POCKET: $2000 MET $0              INSURANCE PAYS AT: 85% AFTER DED               PATIENT RESPONSIBILITY AND/OR CO-PAY: 15% AFTER DED  SECONDARY INSURANCE COMPANY:  NA      PRE CERTIFICATION REQUIRED: NO  INSURANCE THERAPY BENEFIT:  30 VISITS SOFTMAX  AQUATIC THERAPY COVERED: YES  MODALITIES COVERED:  YES, NO MASSAGE  TELEHEALTH COVERED:   REFERENCE NUMBER: [facility] WEBSITE   Approved Procedure Codes: 44988 - Therapeutic Exercise  , 20423 - Manual Therapy , 06227 - Gait Training, 11751 - Ultrasound , and 23594 - Manual Electrical Stimulation  (Codes requested indicated by red font, codes approved indicated by black font)   Visit # 5, 5/10 for progress note (Reporting Period: 3/6/24 to     )   Visits Allowed: 30   Recertification Date: 24   Physician Follow-Up: 2024 f/u with Dr. Tao   Physician Orders:    History of Present Illness: 1 year history of L> R leg pain buttock, L LE foot  , does have mild LBP in

## 2024-04-17 ENCOUNTER — HOSPITAL ENCOUNTER (OUTPATIENT)
Dept: PHYSICAL THERAPY | Age: 54
Setting detail: THERAPIES SERIES
Discharge: HOME | End: 2024-04-17
Payer: COMMERCIAL

## 2024-04-17 PROCEDURE — 97032 APPL MODALITY 1+ESTIM EA 15: CPT

## 2024-04-17 PROCEDURE — 97110 THERAPEUTIC EXERCISES: CPT

## 2024-04-17 NOTE — PROGRESS NOTES
10      Calf stretch with blue belt vended 5 x 10 seconds  x      Specific Interventions Next Treatment: modalities for pain control to try IFC manual to lumbar spine, US L2-5 region or dry needling as needed for pain control.  DLSP, gentle AROM, stretching, strengthening    Activity/Treatment Tolerance:  []  Patient tolerated treatment well  []  Patient limited by fatigue  [x]  Patient limited by pain   []  Patient limited by medical complications  []  Other:     Assessment: added calf stretch and blue belt today.  If no change in s/s , try US lumbar spine 100%, 8 minutes L2-5 prone.  Added calf belt stretch with belt and handout vended, 5 minutes on NUSte pain in knee increased- try less time next visit    GOALS:  Patient Goal: to get my pain to go away    Short Term Goals:  Time Frame: deferred to LTG's    Long Term Goals:  Time Frame: 6 weeks  Increase AROM lumbar extension to 50%, B hip flexion to 60, knee flexion to 105 degrees to allow patient to report compliant with HEP as prescribed  Increase abdominal strength to fair, LE to 4/5 to allow patient to report walk x 15 minutes with decreased pain to 2/10  I with HEP to allow patient to allow patient to sleep through night without awakening throughout session    Patient Education:   [x]  HEP/Education Completed:blue belt and calf stretch with  handout vended  []  No new Education completed  [x]  Reviewed Prior HEP      [x]  Patient verbalized and/or demonstrated understanding of education provided.  []  Patient unable to verbalize and/or demonstrate understanding of education provided.  Will continue education.  [x]  Barriers to learning: none    PLAN:  Treatment Recommendations: Strengthening, Range of Motion, Gait Training, Pain Management, Home Exercise Program, Patient Education, Integrative Dry Needling, and Modalities    []  Plan of care initiated.  Plan to see patient 2 times per week for 6 weeks to address the treatment planned outlined above.  [x]

## 2024-04-22 ENCOUNTER — HOSPITAL ENCOUNTER (OUTPATIENT)
Dept: PHYSICAL THERAPY | Age: 54
Setting detail: THERAPIES SERIES
Discharge: HOME | End: 2024-04-22
Payer: COMMERCIAL

## 2024-04-22 PROCEDURE — 97110 THERAPEUTIC EXERCISES: CPT

## 2024-04-22 PROCEDURE — 97032 APPL MODALITY 1+ESTIM EA 15: CPT

## 2024-04-22 NOTE — PROGRESS NOTES
Nerve glide  3x 5 ankle pumps ea LE      HS curl 10      Calf stretch with blue belt vended 5 x 10 seconds  x      Specific Interventions Next Treatment: modalities for pain control to try IFC manual to lumbar spine, US L2-5 region or dry needling as needed for pain control.  DLSP, gentle AROM, stretching, strengthening    Activity/Treatment Tolerance:  []  Patient tolerated treatment well  []  Patient limited by fatigue  [x]  Patient limited by pain   []  Patient limited by medical complications  []  Other:     Assessment: Will try ultrasound next session. Progression made with reps without increased pain but patient reporting left leg feeling weaker still.     GOALS:  Patient Goal: to get my pain to go away    Short Term Goals:  Time Frame: deferred to [facility]'s    Long Term Goals:  Time Frame: 6 weeks  Increase AROM lumbar extension to 50%, B hip flexion to 60, knee flexion to 105 degrees to allow patient to report compliant with HEP as prescribed  Increase abdominal strength to fair, LE to 4/5 to allow patient to report walk x 15 minutes with decreased pain to 2/10  I with HEP to allow patient to allow patient to sleep through night without awakening throughout session    Patient Education:   [x]  HEP/Education Completed:added reps.  []  No new Education completed  [x]  Reviewed Prior HEP      [x]  Patient verbalized and/or demonstrated understanding of education provided.  []  Patient unable to verbalize and/or demonstrate understanding of education provided.  Will continue education.  [x]  Barriers to learning: none    PLAN:  Treatment Recommendations: Strengthening, Range of Motion, Gait Training, Pain Management, Home Exercise Program, Patient Education, Integrative Dry Needling, and Modalities    []  Plan of care initiated.  Plan to see patient 2 times per week for 6 weeks to address the treatment planned outlined above.  [x]  Continue with current plan of care  []  Modify plan of care as follows:    []  Hold

## 2024-04-29 ENCOUNTER — PATIENT MESSAGE (OUTPATIENT)
Dept: FAMILY MEDICINE CLINIC | Age: 54
End: 2024-04-29

## 2024-04-29 ENCOUNTER — OFFICE VISIT (OUTPATIENT)
Dept: FAMILY MEDICINE CLINIC | Age: 54
End: 2024-04-29
Payer: COMMERCIAL

## 2024-04-29 ENCOUNTER — HOSPITAL ENCOUNTER (OUTPATIENT)
Dept: PHYSICAL THERAPY | Age: 54
Setting detail: THERAPIES SERIES
Discharge: HOME | End: 2024-04-29
Payer: COMMERCIAL

## 2024-04-29 VITALS
HEART RATE: 68 BPM | RESPIRATION RATE: 16 BRPM | DIASTOLIC BLOOD PRESSURE: 92 MMHG | BODY MASS INDEX: 45.35 KG/M2 | SYSTOLIC BLOOD PRESSURE: 146 MMHG | TEMPERATURE: 98.3 F | WEIGHT: 256 LBS

## 2024-04-29 DIAGNOSIS — I10 PRIMARY HYPERTENSION: ICD-10-CM

## 2024-04-29 DIAGNOSIS — E04.1 THYROID NODULE: ICD-10-CM

## 2024-04-29 DIAGNOSIS — E03.9 HYPOTHYROIDISM, UNSPECIFIED TYPE: Primary | ICD-10-CM

## 2024-04-29 DIAGNOSIS — F32.9 REACTIVE DEPRESSION (SITUATIONAL): ICD-10-CM

## 2024-04-29 DIAGNOSIS — M79.604 PAIN IN BOTH LOWER EXTREMITIES: ICD-10-CM

## 2024-04-29 DIAGNOSIS — M79.605 PAIN IN BOTH LOWER EXTREMITIES: ICD-10-CM

## 2024-04-29 DIAGNOSIS — Z13.220 LIPID SCREENING: ICD-10-CM

## 2024-04-29 PROBLEM — R11.2 INTRACTABLE NAUSEA AND VOMITING: Status: RESOLVED | Noted: 2021-01-09 | Resolved: 2024-04-29

## 2024-04-29 PROBLEM — E43 SEVERE MALNUTRITION (HCC): Status: RESOLVED | Noted: 2021-08-09 | Resolved: 2024-04-29

## 2024-04-29 PROBLEM — R74.01 TRANSAMINITIS: Status: RESOLVED | Noted: 2021-08-03 | Resolved: 2024-04-29

## 2024-04-29 PROCEDURE — 3077F SYST BP >= 140 MM HG: CPT | Performed by: NURSE PRACTITIONER

## 2024-04-29 PROCEDURE — 97110 THERAPEUTIC EXERCISES: CPT

## 2024-04-29 PROCEDURE — 97035 APP MDLTY 1+ULTRASOUND EA 15: CPT

## 2024-04-29 PROCEDURE — 99204 OFFICE O/P NEW MOD 45 MIN: CPT | Performed by: NURSE PRACTITIONER

## 2024-04-29 PROCEDURE — 3080F DIAST BP >= 90 MM HG: CPT | Performed by: NURSE PRACTITIONER

## 2024-04-29 RX ORDER — GABAPENTIN 100 MG/1
100 CAPSULE ORAL
COMMUNITY
End: 2024-04-29 | Stop reason: SDUPTHER

## 2024-04-29 RX ORDER — LISINOPRIL 20 MG/1
20 TABLET ORAL DAILY
Qty: 90 TABLET | Refills: 1 | Status: SHIPPED | OUTPATIENT
Start: 2024-04-29

## 2024-04-29 RX ORDER — AMLODIPINE BESYLATE 10 MG/1
10 TABLET ORAL DAILY
COMMUNITY
End: 2024-04-29 | Stop reason: SDUPTHER

## 2024-04-29 RX ORDER — HYDROCODONE BITARTRATE AND ACETAMINOPHEN 5; 325 MG/1; MG/1
TABLET ORAL
COMMUNITY
Start: 2024-01-26

## 2024-04-29 RX ORDER — GABAPENTIN 100 MG/1
100 CAPSULE ORAL 2 TIMES DAILY
Qty: 60 CAPSULE | Refills: 2 | Status: SHIPPED | OUTPATIENT
Start: 2024-04-29 | End: 2024-07-28

## 2024-04-29 RX ORDER — MELOXICAM 15 MG/1
TABLET ORAL
COMMUNITY
Start: 2022-10-26 | End: 2024-04-30 | Stop reason: SDUPTHER

## 2024-04-29 RX ORDER — AMLODIPINE BESYLATE 10 MG/1
10 TABLET ORAL DAILY
Qty: 90 TABLET | Refills: 1 | Status: SHIPPED | OUTPATIENT
Start: 2024-04-29

## 2024-04-29 RX ORDER — FAMOTIDINE 20 MG/1
20 TABLET, FILM COATED ORAL DAILY PRN
COMMUNITY
Start: 2023-07-25 | End: 2024-04-30 | Stop reason: SDUPTHER

## 2024-04-29 RX ORDER — FLUOXETINE 10 MG/1
10 CAPSULE ORAL DAILY
Qty: 30 CAPSULE | Refills: 3 | Status: SHIPPED | OUTPATIENT
Start: 2024-04-29

## 2024-04-29 RX ORDER — HYDROXYZINE HYDROCHLORIDE 10 MG/1
10-20 TABLET, FILM COATED ORAL EVERY 6 HOURS PRN
Qty: 40 TABLET | Refills: 0 | Status: SHIPPED | OUTPATIENT
Start: 2024-04-29

## 2024-04-29 SDOH — HEALTH STABILITY: PHYSICAL HEALTH: ON AVERAGE, HOW MANY DAYS PER WEEK DO YOU ENGAGE IN MODERATE TO STRENUOUS EXERCISE (LIKE A BRISK WALK)?: 2 DAYS

## 2024-04-29 SDOH — ECONOMIC STABILITY: FOOD INSECURITY: WITHIN THE PAST 12 MONTHS, YOU WORRIED THAT YOUR FOOD WOULD RUN OUT BEFORE YOU GOT MONEY TO BUY MORE.: NEVER TRUE

## 2024-04-29 SDOH — ECONOMIC STABILITY: INCOME INSECURITY: HOW HARD IS IT FOR YOU TO PAY FOR THE VERY BASICS LIKE FOOD, HOUSING, MEDICAL CARE, AND HEATING?: NOT HARD AT ALL

## 2024-04-29 SDOH — ECONOMIC STABILITY: FOOD INSECURITY: WITHIN THE PAST 12 MONTHS, THE FOOD YOU BOUGHT JUST DIDN'T LAST AND YOU DIDN'T HAVE MONEY TO GET MORE.: NEVER TRUE

## 2024-04-29 SDOH — ECONOMIC STABILITY: HOUSING INSECURITY
IN THE LAST 12 MONTHS, WAS THERE A TIME WHEN YOU DID NOT HAVE A STEADY PLACE TO SLEEP OR SLEPT IN A SHELTER (INCLUDING NOW)?: NO

## 2024-04-29 SDOH — HEALTH STABILITY: PHYSICAL HEALTH: ON AVERAGE, HOW MANY MINUTES DO YOU ENGAGE IN EXERCISE AT THIS LEVEL?: 20 MIN

## 2024-04-29 ASSESSMENT — PATIENT HEALTH QUESTIONNAIRE - PHQ9
2. FEELING DOWN, DEPRESSED OR HOPELESS: NOT AT ALL
1. LITTLE INTEREST OR PLEASURE IN DOING THINGS: NOT AT ALL
SUM OF ALL RESPONSES TO PHQ QUESTIONS 1-9: 0
SUM OF ALL RESPONSES TO PHQ9 QUESTIONS 1 & 2: 0

## 2024-04-29 ASSESSMENT — ENCOUNTER SYMPTOMS
SHORTNESS OF BREATH: 0
TROUBLE SWALLOWING: 0
COLOR CHANGE: 0
NAUSEA: 0
WHEEZING: 0
ABDOMINAL PAIN: 0
FACIAL SWELLING: 0
DIARRHEA: 0
VOMITING: 0
SINUS PAIN: 0
SORE THROAT: 0
EYE PAIN: 0
COUGH: 0

## 2024-04-29 NOTE — PROGRESS NOTES
11/09/2023        Hypothyroidism: Recent symptoms: fatigue. She denies weight gain, weight loss, cold intolerance, and heat intolerance. Patient is  taking her medication consistently on an empty stomach.    No results found for: \"TSHREFLEX\"  Lab Results   Component Value Date    TSH 3.010 06/21/2023    TSH 4.160 12/14/2021     Pt presents to the office today for depression/anxiety.    Pt currently taking no mediations, but her daughter is getting  in a few months and she has had a lot of stress from that. Pt lost her  2 years ago and this is causing some sadness with the wedding and she would like to have something to help get her through.       Sleep-up and down.   Interest- OK  Guilt- OK  Energy- less  Concentration- OK  Appetite- OK  Psychomotor Retardation- NO  Suicidal/ Homicidal Ideations- NO  Anxiety-increased    Employer? Lost work days?- [employer], no missed days.       HX from Previous provider:  Anxiety/stress reaction/grief- recent death of her , on medications/in counseling and has improved, she has recently gone back to work full time and is building a new home  Insomnia- improved with use of trazodone   PAF- follows with cardiology, Dr. Turner, no AC, on BB and rate controlled today  Hypokalemia- recent labs stable, 9/22, yearly labs due  Hypothyroidism- supplemented, labs due  Autoimmune hepatitis- follows with hepatology and GI, Dr. Rodriguez at [facility], no concerns  Lymphocytic colitis- last scope on 5 year recall  Inflammatory polyp- last scope on 5 year recall  BRENDA- follows with sleep clinic, On cpap- using off and on      Review of Systems   Constitutional:  Positive for fatigue. Negative for chills and fever.   HENT:  Negative for congestion, facial swelling, sinus pain, sore throat and trouble swallowing.    Eyes:  Negative for pain and visual disturbance.   Respiratory:  Negative for cough, shortness of breath and wheezing.    Cardiovascular:  Negative for chest pain and

## 2024-04-29 NOTE — PROGRESS NOTES
[facility]  PHYSICAL THERAPY  [] EVALUATION  [x] DAILY NOTE (LAND) [] DAILY NOTE (AQUATIC ) [] PROGRESS NOTE [] DISCHARGE NOTE    [] [facility] [place]   [x] [place] [facility]    [] [facility]   [] [facility]    Date: 2024  Patient Name:  Nikki Wang  : 1970  MRN: [id number]  CSN: [id number]    Referring Practitioner David Espitia PA-C    Diagnosis Radiculopathy, lumbar region    Treatment Diagnosis R53.1 Weakness  M62.81 Generalized Muscle Weakness  R26.2 Difficulty in walking and M54.59  Other Low Back Pain  R53.1 Weakness  M62.81 Generalized Muscle Weakness   Date of Evaluation 3/6/24    Additional Pertinent History HTN, kidney stone, obesity, arthritis, osteoporosis      Functional Outcome Measure Used Oswestry back disability   Functional Outcome Score Eval 16 (3/6/24)       Insurance: Primary: Payor: R /  /  / ,   Secondary:    Authorization Information:   DEDUCTIBLE: $1000 MET $0                        OUT OF POCKET: $2000 MET $0              INSURANCE PAYS AT: 85% AFTER DED               PATIENT RESPONSIBILITY AND/OR CO-PAY: 15% AFTER DED  SECONDARY INSURANCE COMPANY:  NA      PRE CERTIFICATION REQUIRED: NO  INSURANCE THERAPY BENEFIT:  30 VISITS SOFTMAX  AQUATIC THERAPY COVERED: YES  MODALITIES COVERED:  YES, NO MASSAGE  TELEHEALTH COVERED:   REFERENCE NUMBER: [facility] WEBSITE   Approved Procedure Codes: 45087 - Therapeutic Exercise  , 70232 - Manual Therapy , 04596 - Gait Training, 47468 - Ultrasound , and 39795 - Manual Electrical Stimulation  (Codes requested indicated by red font, codes approved indicated by black font)   Visit # 8, 8/10 for progress note (Reporting Period: 3/6/24 to     )   Visits Allowed: 30   Recertification Date: 24   Physician Follow-Up: MAY 2024 f/u with Dr. Tao   Physician Orders:    History of Present Illness: 1 year history of L> R leg pain buttock, L LE foot  , does have mild LBP in

## 2024-04-30 ENCOUNTER — HOSPITAL ENCOUNTER (OUTPATIENT)
Age: 54
Discharge: HOME | End: 2024-04-30
Payer: COMMERCIAL

## 2024-04-30 DIAGNOSIS — E03.9 HYPOTHYROIDISM, UNSPECIFIED TYPE: ICD-10-CM

## 2024-04-30 DIAGNOSIS — Z13.220 LIPID SCREENING: ICD-10-CM

## 2024-04-30 LAB
ALBUMIN SERPL BCG-MCNC: 4.3 G/DL (ref 3.5–5.1)
ALP SERPL-CCNC: 101 U/L (ref 38–126)
ALT SERPL W/O P-5'-P-CCNC: 23 U/L (ref 11–66)
ANION GAP SERPL CALC-SCNC: 11 MEQ/L (ref 8–16)
AST SERPL-CCNC: 22 U/L (ref 5–40)
BILIRUB SERPL-MCNC: 0.3 MG/DL (ref 0.3–1.2)
BUN SERPL-MCNC: 12 MG/DL (ref 7–22)
CALCIUM SERPL-MCNC: 9.7 MG/DL (ref 8.5–10.5)
CHLORIDE SERPL-SCNC: 100 MEQ/L (ref 98–111)
CHOLEST SERPL-MCNC: 171 MG/DL (ref 100–199)
CO2 SERPL-SCNC: 28 MEQ/L (ref 23–33)
CREAT SERPL-MCNC: 0.7 MG/DL (ref 0.4–1.2)
GFR SERPL CREATININE-BSD FRML MDRD: > 90 ML/MIN/1.73M2
GLUCOSE SERPL-MCNC: 83 MG/DL (ref 70–108)
HDLC SERPL-MCNC: 46 MG/DL
LDLC SERPL CALC-MCNC: 102 MG/DL
POTASSIUM SERPL-SCNC: 3.8 MEQ/L (ref 3.5–5.2)
PROT SERPL-MCNC: 7 G/DL (ref 6.1–8)
SODIUM SERPL-SCNC: 139 MEQ/L (ref 135–145)
T4 FREE SERPL-MCNC: 1.33 NG/DL (ref 0.93–1.68)
TRIGL SERPL-MCNC: 116 MG/DL (ref 0–199)
TSH SERPL DL<=0.005 MIU/L-ACNC: 3.83 UIU/ML (ref 0.4–4.2)

## 2024-04-30 PROCEDURE — 36415 COLL VENOUS BLD VENIPUNCTURE: CPT

## 2024-04-30 PROCEDURE — 80061 LIPID PANEL: CPT

## 2024-04-30 PROCEDURE — 80053 COMPREHEN METABOLIC PANEL: CPT

## 2024-04-30 PROCEDURE — 84443 ASSAY THYROID STIM HORMONE: CPT

## 2024-04-30 PROCEDURE — 84439 ASSAY OF FREE THYROXINE: CPT

## 2024-04-30 RX ORDER — MELOXICAM 15 MG/1
TABLET ORAL
Qty: 90 TABLET | Refills: 1 | Status: SHIPPED | OUTPATIENT
Start: 2024-04-30

## 2024-04-30 RX ORDER — FAMOTIDINE 20 MG/1
20 TABLET, FILM COATED ORAL DAILY PRN
Qty: 90 TABLET | Refills: 1 | Status: SHIPPED | OUTPATIENT
Start: 2024-04-30

## 2024-04-30 ASSESSMENT — ENCOUNTER SYMPTOMS: BACK PAIN: 1

## 2024-04-30 NOTE — TELEPHONE ENCOUNTER
From: Nikki Wang  To: Leslie Storm  Sent: 4/29/2024 5:08 PM EDT  Subject: Meds     Hi, I was wondering if I could also get refills on the following meds     Meloxicam - 15 mg tab 1 daily   Famotidine - 20 mg tab 1 daily     Thank you!  Nikki

## 2024-05-01 ENCOUNTER — TELEPHONE (OUTPATIENT)
Dept: FAMILY MEDICINE CLINIC | Age: 54
End: 2024-05-01

## 2024-05-01 NOTE — TELEPHONE ENCOUNTER
----- Message from FREDRICK Aguilera CNP sent at 5/1/2024  7:42 AM EDT -----  Please let pt know that labs look good.  Continue with plan of care and follow up in office as planned. Call office with any questions or concerns, or if symptoms are getting worse or changing. -WS

## 2024-05-06 ENCOUNTER — HOSPITAL ENCOUNTER (OUTPATIENT)
Dept: PHYSICAL THERAPY | Age: 54
Setting detail: THERAPIES SERIES
Discharge: HOME | End: 2024-05-06
Payer: COMMERCIAL

## 2024-05-06 ENCOUNTER — APPOINTMENT (OUTPATIENT)
Dept: PHYSICAL THERAPY | Age: 54
End: 2024-05-06
Payer: COMMERCIAL

## 2024-05-06 PROCEDURE — 97110 THERAPEUTIC EXERCISES: CPT

## 2024-05-06 NOTE — PROGRESS NOTES
[facility]  PHYSICAL THERAPY  [] EVALUATION  [] DAILY NOTE (LAND) [] DAILY NOTE (AQUATIC ) [x] PROGRESS NOTE [] DISCHARGE NOTE    [] [facility] [place]   [x] [place] [facility]    [] [facility]   [] [facility]    Date: 2024  Patient Name:  Nikki Wang  : 1970  MRN: [id number]  CSN: [id number]    Referring Practitioner David Espitia PA-C    Diagnosis Radiculopathy, lumbar region    Treatment Diagnosis R53.1 Weakness  M62.81 Generalized Muscle Weakness  R26.2 Difficulty in walking and M54.59  Other Low Back Pain  R53.1 Weakness  M62.81 Generalized Muscle Weakness   Date of Evaluation 3/6/24    Additional Pertinent History HTN, kidney stone, obesity, arthritis, osteoporosis      Functional Outcome Measure Used Oswestry back disability   Functional Outcome Score Eval 16 (3/6/24)       Insurance: Primary: Payor: R /  /  / ,   Secondary:    Authorization Information:   DEDUCTIBLE: $1000 MET $0                        OUT OF POCKET: $2000 MET $0              INSURANCE PAYS AT: 85% AFTER DED               PATIENT RESPONSIBILITY AND/OR CO-PAY: 15% AFTER DED  SECONDARY INSURANCE COMPANY:  NA      PRE CERTIFICATION REQUIRED: NO  INSURANCE THERAPY BENEFIT:  30 VISITS SOFTMAX  AQUATIC THERAPY COVERED: YES  MODALITIES COVERED:  YES, NO MASSAGE  TELEHEALTH COVERED:   REFERENCE NUMBER: [facility] WEBSITE   Approved Procedure Codes: 77098 - Therapeutic Exercise  , 81880 - Manual Therapy , 62595 - Gait Training, 15531 - Ultrasound , and 31509 - Manual Electrical Stimulation  (Codes requested indicated by red font, codes approved indicated by black font)   Visit # 9, 910 for progress note (Reporting Period: 3/6/24 to     )   Visits Allowed: 30   Recertification Date: 24   Physician Follow-Up: MAY 24, 2024 f/u with Dr. Tao   Physician Orders:    History of Present Illness: 1 year history of L> R leg pain buttock, L LE foot  , does have mild LBP

## 2024-05-07 ENCOUNTER — APPOINTMENT (OUTPATIENT)
Dept: PHYSICAL THERAPY | Age: 54
End: 2024-05-07
Payer: COMMERCIAL

## 2024-05-08 ENCOUNTER — HOSPITAL ENCOUNTER (OUTPATIENT)
Dept: PHYSICAL THERAPY | Age: 54
Setting detail: THERAPIES SERIES
Discharge: HOME | End: 2024-05-08
Payer: COMMERCIAL

## 2024-05-08 PROCEDURE — 97110 THERAPEUTIC EXERCISES: CPT

## 2024-05-08 PROCEDURE — 97035 APP MDLTY 1+ULTRASOUND EA 15: CPT

## 2024-05-08 NOTE — PROGRESS NOTES
[facility]  PHYSICAL THERAPY  [] EVALUATION  [x] DAILY NOTE (LAND) [] DAILY NOTE (AQUATIC ) [] PROGRESS NOTE [] DISCHARGE NOTE    [] [facility] [place]   [x] [place] [facility]    [] [facility]   [] [facility]    Date: 2024  Patient Name:  Nikki Wang  : 1970  MRN: [id number]  CSN: [id number]    Referring Practitioner David Espitia PA-C    Diagnosis Radiculopathy, lumbar region    Treatment Diagnosis R53.1 Weakness  M62.81 Generalized Muscle Weakness  R26.2 Difficulty in walking and M54.59  Other Low Back Pain  R53.1 Weakness  M62.81 Generalized Muscle Weakness   Date of Evaluation 3/6/24    Additional Pertinent History HTN, kidney stone, obesity, arthritis, osteoporosis      Functional Outcome Measure Used Oswestry back disability   Functional Outcome Score Eval 16 (3/6/24)       Insurance: Primary: Payor: R /  /  / ,   Secondary:    Authorization Information:   DEDUCTIBLE: $1000 MET $0                        OUT OF POCKET: $2000 MET $0              INSURANCE PAYS AT: 85% AFTER DED               PATIENT RESPONSIBILITY AND/OR CO-PAY: 15% AFTER DED  SECONDARY INSURANCE COMPANY:  NA      PRE CERTIFICATION REQUIRED: NO  INSURANCE THERAPY BENEFIT:  30 VISITS SOFTMAX  AQUATIC THERAPY COVERED: YES  MODALITIES COVERED:  YES, NO MASSAGE  TELEHEALTH COVERED:   REFERENCE NUMBER: [facility] WEBSITE   Approved Procedure Codes: 24809 - Therapeutic Exercise  , 81949 - Manual Therapy , 29085 - Gait Training, 18865 - Ultrasound , and 93562 - Manual Electrical Stimulation  (Codes requested indicated by red font, codes approved indicated by black font)   Visit # 10, 1/10 for progress note (Reporting Period: 3/6/24 to     )   Visits Allowed: 30   Recertification Date: 24   Physician Follow-Up: MAY 24, 2024 f/u with Dr. Tao   Physician Orders:    History of Present Illness: 1 year history of L> R leg pain buttock, L LE foot  , does have mild LBP

## 2024-07-08 ENCOUNTER — HOSPITAL ENCOUNTER (EMERGENCY)
Age: 54
Discharge: HOME | End: 2024-07-08
Attending: EMERGENCY MEDICINE
Payer: COMMERCIAL

## 2024-07-08 ENCOUNTER — APPOINTMENT (OUTPATIENT)
Dept: CT IMAGING | Age: 54
End: 2024-07-08
Payer: COMMERCIAL

## 2024-07-08 VITALS
DIASTOLIC BLOOD PRESSURE: 84 MMHG | SYSTOLIC BLOOD PRESSURE: 131 MMHG | WEIGHT: 256 LBS | OXYGEN SATURATION: 99 % | TEMPERATURE: 98.2 F | BODY MASS INDEX: 45.35 KG/M2 | HEART RATE: 75 BPM | RESPIRATION RATE: 16 BRPM

## 2024-07-08 DIAGNOSIS — N20.1 URETEROLITHIASIS: Primary | ICD-10-CM

## 2024-07-08 LAB
ALBUMIN SERPL BCG-MCNC: 4.1 G/DL (ref 3.5–5.1)
ALP SERPL-CCNC: 92 U/L (ref 38–126)
ALT SERPL W/O P-5'-P-CCNC: 15 U/L (ref 11–66)
ANION GAP SERPL CALC-SCNC: 11 MEQ/L (ref 8–16)
AST SERPL-CCNC: 18 U/L (ref 5–40)
BACTERIA URNS QL MICRO: ABNORMAL /HPF
BASOPHILS ABSOLUTE: 0 THOU/MM3 (ref 0–0.1)
BASOPHILS NFR BLD AUTO: 0.5 %
BILIRUB CONJ SERPL-MCNC: < 0.1 MG/DL (ref 0.1–13.8)
BILIRUB SERPL-MCNC: 0.4 MG/DL (ref 0.3–1.2)
BILIRUB UR QL STRIP.AUTO: NEGATIVE
BUN SERPL-MCNC: 12 MG/DL (ref 7–22)
CALCIUM SERPL-MCNC: 9.2 MG/DL (ref 8.5–10.5)
CASTS #/AREA URNS LPF: ABNORMAL /LPF
CASTS 2: ABNORMAL /LPF
CHARACTER UR: ABNORMAL
CHLORIDE SERPL-SCNC: 104 MEQ/L (ref 98–111)
CO2 SERPL-SCNC: 26 MEQ/L (ref 23–33)
COLOR, UA: YELLOW
CREAT SERPL-MCNC: 0.7 MG/DL (ref 0.4–1.2)
CRYSTALS URNS MICRO: ABNORMAL
DEPRECATED RDW RBC AUTO: 47.8 FL (ref 35–45)
EKG ATRIAL RATE: 73 BPM
EKG P AXIS: 28 DEGREES
EKG P-R INTERVAL: 160 MS
EKG Q-T INTERVAL: 348 MS
EKG QRS DURATION: 76 MS
EKG QTC CALCULATION (BAZETT): 383 MS
EKG R AXIS: 3 DEGREES
EKG T AXIS: 29 DEGREES
EKG VENTRICULAR RATE: 73 BPM
EOSINOPHIL NFR BLD AUTO: 2.1 %
EOSINOPHILS ABSOLUTE: 0.2 THOU/MM3 (ref 0–0.4)
EPITHELIAL CELLS, UA: ABNORMAL /HPF
ERYTHROCYTE [DISTWIDTH] IN BLOOD BY AUTOMATED COUNT: 14.3 % (ref 11.5–14.5)
GFR SERPL CREATININE-BSD FRML MDRD: > 90 ML/MIN/1.73M2
GLUCOSE SERPL-MCNC: 90 MG/DL (ref 70–108)
GLUCOSE UR QL STRIP.AUTO: NEGATIVE MG/DL
HCT VFR BLD AUTO: 46.4 % (ref 37–47)
HGB BLD-MCNC: 15 GM/DL (ref 12–16)
HGB UR QL STRIP.AUTO: NEGATIVE
IMM GRANULOCYTES # BLD AUTO: 0.02 THOU/MM3 (ref 0–0.07)
IMM GRANULOCYTES NFR BLD AUTO: 0.2 %
KETONES UR QL STRIP.AUTO: NEGATIVE
LIPASE SERPL-CCNC: 27.3 U/L (ref 5.6–51.3)
LYMPHOCYTES ABSOLUTE: 2.3 THOU/MM3 (ref 1–4.8)
LYMPHOCYTES NFR BLD AUTO: 25.9 %
MCH RBC QN AUTO: 29.5 PG (ref 26–33)
MCHC RBC AUTO-ENTMCNC: 32.3 GM/DL (ref 32.2–35.5)
MCV RBC AUTO: 91.2 FL (ref 81–99)
MISCELLANEOUS 2: ABNORMAL
MONOCYTES ABSOLUTE: 0.6 THOU/MM3 (ref 0.4–1.3)
MONOCYTES NFR BLD AUTO: 6.9 %
NEUTROPHILS ABSOLUTE: 5.6 THOU/MM3 (ref 1.8–7.7)
NEUTROPHILS NFR BLD AUTO: 64.4 %
NITRITE UR QL STRIP: NEGATIVE
NRBC BLD AUTO-RTO: 0 /100 WBC
OSMOLALITY SERPL CALC.SUM OF ELEC: 280.5 MOSMOL/KG (ref 275–300)
PH UR STRIP.AUTO: 7 [PH] (ref 5–9)
PLATELET # BLD AUTO: 375 THOU/MM3 (ref 130–400)
PMV BLD AUTO: 10.6 FL (ref 9.4–12.4)
POTASSIUM SERPL-SCNC: 3.9 MEQ/L (ref 3.5–5.2)
PROT SERPL-MCNC: 6.9 G/DL (ref 6.1–8)
PROT UR STRIP.AUTO-MCNC: ABNORMAL MG/DL
RBC # BLD AUTO: 5.09 MILL/MM3 (ref 4.2–5.4)
RBC URINE: ABNORMAL /HPF
REASON FOR REJECTION: NORMAL
REJECTED TEST: NORMAL
RENAL EPI CELLS #/AREA URNS HPF: ABNORMAL /[HPF]
SODIUM SERPL-SCNC: 141 MEQ/L (ref 135–145)
SP GR UR REFRACT.AUTO: 1.03 (ref 1–1.03)
UROBILINOGEN, URINE: 0.2 EU/DL (ref 0–1)
WBC # BLD AUTO: 8.7 THOU/MM3 (ref 4.8–10.8)
WBC #/AREA URNS HPF: ABNORMAL /HPF
WBC #/AREA URNS HPF: NEGATIVE /[HPF]
YEAST LIKE FUNGI URNS QL MICRO: ABNORMAL

## 2024-07-08 PROCEDURE — 99284 EMERGENCY DEPT VISIT MOD MDM: CPT

## 2024-07-08 PROCEDURE — 36415 COLL VENOUS BLD VENIPUNCTURE: CPT

## 2024-07-08 PROCEDURE — 85025 COMPLETE CBC W/AUTO DIFF WBC: CPT

## 2024-07-08 PROCEDURE — 6360000002 HC RX W HCPCS: Performed by: EMERGENCY MEDICINE

## 2024-07-08 PROCEDURE — 83690 ASSAY OF LIPASE: CPT

## 2024-07-08 PROCEDURE — 87086 URINE CULTURE/COLONY COUNT: CPT

## 2024-07-08 PROCEDURE — 2580000003 HC RX 258: Performed by: EMERGENCY MEDICINE

## 2024-07-08 PROCEDURE — 93005 ELECTROCARDIOGRAM TRACING: CPT | Performed by: EMERGENCY MEDICINE

## 2024-07-08 PROCEDURE — 96374 THER/PROPH/DIAG INJ IV PUSH: CPT

## 2024-07-08 PROCEDURE — 96375 TX/PRO/DX INJ NEW DRUG ADDON: CPT

## 2024-07-08 PROCEDURE — 93010 ELECTROCARDIOGRAM REPORT: CPT | Performed by: INTERNAL MEDICINE

## 2024-07-08 PROCEDURE — 80053 COMPREHEN METABOLIC PANEL: CPT

## 2024-07-08 PROCEDURE — 81001 URINALYSIS AUTO W/SCOPE: CPT

## 2024-07-08 PROCEDURE — 74176 CT ABD & PELVIS W/O CONTRAST: CPT

## 2024-07-08 RX ORDER — ONDANSETRON 4 MG/1
4 TABLET, ORALLY DISINTEGRATING ORAL 3 TIMES DAILY PRN
Qty: 21 TABLET | Refills: 0 | Status: SHIPPED | OUTPATIENT
Start: 2024-07-08

## 2024-07-08 RX ORDER — NAPROXEN 500 MG/1
500 TABLET ORAL 2 TIMES DAILY WITH MEALS
Qty: 10 TABLET | Refills: 0 | Status: SHIPPED | OUTPATIENT
Start: 2024-07-08

## 2024-07-08 RX ORDER — HYDROCODONE BITARTRATE AND ACETAMINOPHEN 5; 325 MG/1; MG/1
1 TABLET ORAL EVERY 6 HOURS PRN
Qty: 9 TABLET | Refills: 0 | Status: SHIPPED | OUTPATIENT
Start: 2024-07-08 | End: 2024-07-11

## 2024-07-08 RX ORDER — KETOROLAC TROMETHAMINE 30 MG/ML
15 INJECTION, SOLUTION INTRAMUSCULAR; INTRAVENOUS ONCE
Status: COMPLETED | OUTPATIENT
Start: 2024-07-08 | End: 2024-07-08

## 2024-07-08 RX ORDER — TAMSULOSIN HYDROCHLORIDE 0.4 MG/1
0.4 CAPSULE ORAL DAILY
Qty: 5 CAPSULE | Refills: 0 | Status: SHIPPED | OUTPATIENT
Start: 2024-07-08 | End: 2024-07-11

## 2024-07-08 RX ORDER — ONDANSETRON 2 MG/ML
4 INJECTION INTRAMUSCULAR; INTRAVENOUS ONCE
Status: COMPLETED | OUTPATIENT
Start: 2024-07-08 | End: 2024-07-08

## 2024-07-08 RX ORDER — 0.9 % SODIUM CHLORIDE 0.9 %
1000 INTRAVENOUS SOLUTION INTRAVENOUS ONCE
Status: COMPLETED | OUTPATIENT
Start: 2024-07-08 | End: 2024-07-08

## 2024-07-08 RX ADMIN — SODIUM CHLORIDE 1000 ML: 9 INJECTION, SOLUTION INTRAVENOUS at 17:13

## 2024-07-08 RX ADMIN — ONDANSETRON 4 MG: 2 INJECTION INTRAMUSCULAR; INTRAVENOUS at 14:53

## 2024-07-08 RX ADMIN — KETOROLAC TROMETHAMINE 15 MG: 30 INJECTION, SOLUTION INTRAMUSCULAR at 14:53

## 2024-07-08 ASSESSMENT — PAIN SCALES - GENERAL: PAINLEVEL_OUTOF10: 6

## 2024-07-08 NOTE — ED NOTES
Leopoldo LPN in to round. Fluid bolus hung per mar. VS assessed.  
Pt resting in bed with even and unlabored respirations. States slight improvement of pain, call light within reach and lights dimmed for comfort.   
Pt to the ED via self. Patient presents with complaints of foul smelling frequent urination and yellowing of her eyes. Patient also expresses that she is concerned for a kidney stone. Patient states that she does have some liver issues and has a doctor she sees for it. EKG done. Patient is alert and oriented x 4. Respirations are regular and unlabored. Call light within reach.  
This RN in to round. RR regular and unlabored. Lab at bedside attempting specimen collection.  
Stroke

## 2024-07-08 NOTE — ED PROVIDER NOTES
[facility] EMERGENCY DEPT      EMERGENCY MEDICINE     Pt Name: Nikki Wang  MRN: [id number]  Birthdate 1970  Date of evaluation: 2024  Provider: SHARONDA GRIFFIN DO, FACEP    CHIEF COMPLAINT       Chief Complaint   Patient presents with    Flank Pain    Urinary Frequency     HISTORY OF PRESENT ILLNESS   Nikki Wang is a pleasant 53 y.o. female who presents to the emergency department for evaluation of R flank pain x1 week.  Also fatigue, nausea, dry mouth, feels like she may have hepatitis exacerbation again.  +freq of urination, some odor and some darkening.  No fever or chills.  No dysuria.  No vomiting although she has had some nausea.  She has a history of autoimmune hepatitis as well as hep C, she states she feels similar to the last time she had hepatitis exacerbation is concerned that this may be a possibility today.  She feels like her skin is  and the symptoms are matching symptoms when she had a previous exacerbation.    PASTMEDICAL HISTORY/Co-Morbid Conditions:     Past Medical History:   Diagnosis Date    Hepatitis     auto immune    Hx of blood clots     during preg    Hypertension     Kidney stone     Nausea & vomiting     Obesity Years    Osteoarthritis     Restless legs syndrome     Thyroid disease        Patient Active Problem List   Diagnosis Code    Abdominal pain, right lower quadrant R10.31    Puncture wound of heel without complication S91.339A     SURGICAL HISTORY       Past Surgical History:   Procedure Laterality Date    BACK SURGERY  ???    fusion and disk removed     SECTION       SECTION      CHOLECYSTECTOMY      COLONOSCOPY Left 2021    COLONOSCOPY POLYPECTOMY HOT BIOPSY performed by Chemo Wiley MD at [facility]    COLONOSCOPY  2021    COLONOSCOPY WITH BIOPSY performed by Chemo Wiley MD at [facility]    EGD      ERCP N/A 2021    ERCP SPHINCTER/PAPILLOTOMY performed by Chemo Wiley MD at 
Course as of 07/08/24 1832   Mon Jul 08, 2024   1657 RUQ pain, ho Hepatitis. Awaiting Labs and re-evaluation.  [DD]      ED Course User Index  [DD] Aly Mclain DO   Significant delay obtaining labs. Pain controlled with Toradol.   UA. No UTI.   CT scan with partially obstructive stone at [facility].   Plan DC home. Follow-up with urology.         Clinical Impression      1. Ureterolithiasis          FINAL DISPOSITION    DISPOSITION    Home     PATIENT REFERRED TO:  Leslie Storm, APRN - CNP  [address] [place] [facility] [address]  [phone]    Call in 1 day      [facility]  [address]  [facility] [address]  Go on 7/11/2024  appointment as scheduled at 800a.      DISCHARGE MEDICATIONS:  Discharge Medication List as of 7/8/2024  8:16 PM        START taking these medications    Details   naproxen (NAPROSYN) 500 MG tablet Take 1 tablet by mouth 2 times daily (with meals), Disp-10 tablet, R-0Normal      tamsulosin (FLOMAX) 0.4 MG capsule Take 1 capsule by mouth daily for 5 doses, Disp-5 capsule, R-0Normal      !! HYDROcodone-acetaminophen (NORCO) 5-325 MG per tablet Take 1 tablet by mouth every 6 hours as needed for Pain for up to 3 days. Intended supply: 3 days. Take lowest dose possible to manage pain Max Daily Amount: 4 tablets, Disp-9 tablet, R-0Normal      ondansetron (ZOFRAN-ODT) 4 MG disintegrating tablet Take 1 tablet by mouth 3 times daily as needed for Nausea or Vomiting, Disp-21 tablet, R-0Normal       !! - Potential duplicate medications found. Please discuss with provider.                 (Please note that portions of this note were completed with a voice recognition program.  Efforts were made to edit the dictations but occasionally words are mis-transcribed.)      Aly Mclain DO, FACEP  Attending Emergency Physician  [facility] EMERGENCY DEPT      Aly Mclain DO  07/10/24 [id number]

## 2024-07-09 ENCOUNTER — CARE COORDINATION (OUTPATIENT)
Dept: OTHER | Facility: CLINIC | Age: 54
End: 2024-07-09

## 2024-07-09 LAB
BACTERIA UR CULT: ABNORMAL
ORGANISM: ABNORMAL

## 2024-07-09 NOTE — CARE COORDINATION
ED Follow Up Note    Initial Call - Call within 2 business days of discharge: Yes     Attempted to reach patient to return her call for for ED follow up assessment. Unable to reach patient.    Outreach Attempts:   HIPAA compliant voicemail left for patient.     Patient: Nikki Wang    Patient : 1970   MRN: [id number]    Reason for Admission: Kidney stone  Discharge Date: 24  RURS: Readmission Risk              Risk of Unplanned Readmission:  0          Last Discharge Facility       Date Complaint Diagnosis Description Type Department Provider    24 Flank Pain; Urinary Frequency Ureterolithiasis ED (DISCHARGE) [facility] Aly Mclain, ; Saleem Barboza...     Was this an external facility discharge? No    Follow Up Appointment:   Patient has hospital follow up appointment scheduled within 7 days of discharge.    Future Appointments         Provider Specialty Dept Phone    2024 8:00 AM [facility] ED FOLLOWUP Urology [phone]    2024 12:30 PM (Arrive by 12:00 PM) [facility] RM[facility] Radiology [phone]    2024 1:00 PM (Arrive by 12:45 PM) [facility] RM 2 Radiology [phone]            Plan for follow-up on next business day.      Mila Solomon RN

## 2024-07-09 NOTE — DISCHARGE INSTRUCTIONS
Return to the ED if you have any new or changing symptoms such as fever, vomiting, unable to tolerate medications, flank pain, or you have any other concerns.

## 2024-07-09 NOTE — CARE COORDINATION
ED Follow Up Note    Initial Call - Call within 2 business days of discharge: Yes     Attempted to reach patient for ED follow up assessment. Unable to reach patient.    Outreach Attempts:   HIPAA compliant voicemail left for patient.   [employer] message sent.     Patient: Nikki Wang    Patient : 1970   MRN: [id number]    Reason for Admission: Kidney stone  Discharge Date: 24  RURS: Readmission Risk              Risk of Unplanned Readmission:  0          Last Discharge Facility       Date Complaint Diagnosis Description Type Department Provider    24 Flank Pain; Urinary Frequency Ureterolithiasis ED (DISCHARGE) [facility] Aly Mclain, ; Saleem Barboza...     Was this an external facility discharge? No    Follow Up Appointment:   Patient has hospital follow up appointment scheduled within 7 days of discharge.    Future Appointments         Provider Specialty Dept Phone    2024 8:00 AM [facility] ED FOLLOWUP Urology [phone]    2024 12:30 PM (Arrive by 12:00 PM) [facility] Radiology [phone]    2024 1:00 PM (Arrive by 12:45 PM) [facility] RM 2 Radiology [phone]            Plan for follow-up on next business day.      Mila Solomon RN

## 2024-07-10 ENCOUNTER — CARE COORDINATION (OUTPATIENT)
Dept: OTHER | Facility: CLINIC | Age: 54
End: 2024-07-10

## 2024-07-10 NOTE — CARE COORDINATION
on severity of symptoms and risk factors.  Plan for next call: follow-up appointment-Urology appt 7/11/24/ discuss plan of care      Mila Solomon RN

## 2024-07-10 NOTE — PROGRESS NOTES
[facility]  [facility]  [address]  [address]  [facility] [address]  Dept: [phone]  Loc: [phone]     Patient:  Nikki Wang  YOB: 1970  Date: 7/11/2024    HISTORY OF PRESENT ILLNESS:   The patient is a 53 y.o. female who presents today for evaluation of the following problems: ER follow up, flank pain     1. Left ureteral calculus    2. Hydronephrosis with urinary obstruction due to ureteral calculus    3. Left flank pain    4. Obesity, Class III, BMI 40-49.9 (morbid obesity) ([facility])        Seen in ER at [facility] 7/8/24 due to right and left flank pain to right lower abdomen. CT 7/8/24 There is a 2 mm and a 3 mm nonobstructing calculi in the right kidney. A 2 mm nonobstructing minor caliceal calculus is present in the left kidney.There is mild hydronephrosis of the left kidney collecting system due to a 5 mm calculus at the left ureterovesicular junction. Cre 0.7.  WBC 8.7.  UA neg leuk, neg nitrites. Urine cx GOC  Pt was given naproxsyn, flomax, norco, and zofran.     Seen today 7/11/24 in consult.  UA negative.  PVR 30ml.     Overall the problem(s) : are worsening.  Associated Symptoms: No dysuria, no gross hematuria.  Pain Severity:  6/10 now.  Was 8/10 in ER.   Onset about 1 week ago. right flank pain and left flank pain.  Took norco and pain eased up.  Still has left flank int.  Right flank pain resolved.  No fevers. Chills maybe one night but did not have fever. Unsure what makes worse.  Pain medication helps.  Used ibuprofen 3 times.  Used norco once. On flomax.  Some sensation like has to urinate. Had stones few years  one time. No surgery required.      Summary of old records: Er notes, imaging, labs  (Patient's old records, notes and chart reviewed and summarized above.)        Family hx bladder cancer:no  Personal hx tobacco use:no   surgical Hx: Hx choleycytectomy and c-sections times 2.  Previous cystoscopy: no  Personal hx

## 2024-07-11 ENCOUNTER — TELEPHONE (OUTPATIENT)
Dept: UROLOGY | Age: 54
End: 2024-07-11

## 2024-07-11 ENCOUNTER — CARE COORDINATION (OUTPATIENT)
Dept: OTHER | Facility: CLINIC | Age: 54
End: 2024-07-11

## 2024-07-11 ENCOUNTER — OFFICE VISIT (OUTPATIENT)
Dept: UROLOGY | Age: 54
End: 2024-07-11
Payer: COMMERCIAL

## 2024-07-11 VITALS — WEIGHT: 250 LBS | BODY MASS INDEX: 44.3 KG/M2 | HEIGHT: 63 IN | TEMPERATURE: 97.5 F

## 2024-07-11 DIAGNOSIS — N13.2 HYDRONEPHROSIS WITH URINARY OBSTRUCTION DUE TO URETERAL CALCULUS: ICD-10-CM

## 2024-07-11 DIAGNOSIS — N20.1 LEFT URETERAL CALCULUS: Primary | ICD-10-CM

## 2024-07-11 DIAGNOSIS — E66.01 OBESITY, CLASS III, BMI 40-49.9 (MORBID OBESITY) (HCC): ICD-10-CM

## 2024-07-11 DIAGNOSIS — R10.9 LEFT FLANK PAIN: ICD-10-CM

## 2024-07-11 PROBLEM — F33.9 MAJOR DEPRESSIVE DISORDER, RECURRENT, UNSPECIFIED (HCC): Status: ACTIVE | Noted: 2024-07-11

## 2024-07-11 PROBLEM — F33.1 MAJOR DEPRESSIVE DISORDER, RECURRENT, MODERATE (HCC): Status: ACTIVE | Noted: 2024-07-11

## 2024-07-11 PROBLEM — F33.0 MAJOR DEPRESSIVE DISORDER, RECURRENT, MILD (HCC): Status: ACTIVE | Noted: 2024-07-11

## 2024-07-11 LAB
BILIRUBIN, URINE: NEGATIVE
BLOOD URINE, POC: NEGATIVE
CHARACTER, URINE: CLEAR
COLOR, UA: YELLOW
GLUCOSE URINE: NEGATIVE MG/DL
KETONES, URINE: NEGATIVE
LEUKOCYTE CLUMPS, URINE: NEGATIVE
NITRITE, URINE: NEGATIVE
PH, URINE: 5.5 (ref 5–9)
POST VOID RESIDUAL (PVR): 30 ML
PROTEIN, URINE: NEGATIVE MG/DL
SPECIFIC GRAVITY UA: >= 1.03 (ref 1–1.03)
UROBILINOGEN, URINE: 0.2 EU/DL (ref 0–1)

## 2024-07-11 PROCEDURE — 81003 URINALYSIS AUTO W/O SCOPE: CPT | Performed by: NURSE PRACTITIONER

## 2024-07-11 PROCEDURE — 51798 US URINE CAPACITY MEASURE: CPT | Performed by: NURSE PRACTITIONER

## 2024-07-11 PROCEDURE — 99204 OFFICE O/P NEW MOD 45 MIN: CPT | Performed by: NURSE PRACTITIONER

## 2024-07-11 RX ORDER — TAMSULOSIN HYDROCHLORIDE 0.4 MG/1
0.4 CAPSULE ORAL DAILY
Qty: 30 CAPSULE | Refills: 0 | Status: SHIPPED | OUTPATIENT
Start: 2024-07-11

## 2024-07-11 RX ORDER — KETOROLAC TROMETHAMINE 10 MG/1
10 TABLET, FILM COATED ORAL EVERY 6 HOURS PRN
Qty: 20 TABLET | Refills: 0 | Status: SHIPPED | OUTPATIENT
Start: 2024-07-11

## 2024-07-11 ASSESSMENT — ENCOUNTER SYMPTOMS
SHORTNESS OF BREATH: 0
COUGH: 0
ABDOMINAL PAIN: 1

## 2024-07-11 NOTE — PATIENT INSTRUCTIONS
Discussed kidney stone prevention, including consuming plenty of water and adding lemon to water. Patient was advised to limit consumption of animal proteins and consume a low salt diet. Also discussed that foods high in oxalate should be avoided, such as beets, kale, spinach.      Continue flomax to facilitate the passage of the stone  Toradol PRN.  Dont take ibuprofen with toradol..   Take Zofran as needed for nausea   Increase fluids > 60oz daily.   Strain all urine.   Follow-up in 2 to 3 weeks with a KUB and US.  Discussed warning signs or fevers, chills, increased pain

## 2024-07-11 NOTE — TELEPHONE ENCOUNTER
Patient scheduled for US RENAL COMPLETE  at [facility] on 7/26/24.  Arrival of 815 AM for a 830 AM scan time.  Order mailed with instructions or given to the patient in the office   
No

## 2024-07-12 NOTE — CARE COORDINATION
Ambulatory Care Coordination Note     7/12/2024 12:10 PM     Patient outreach attempt by this ACM today to perform care management follow up . ACM was unable to reach the patient by telephone today; left voice message requesting a return phone call to this ACM.  ideelit message sent requesting patient to contact this ACM.     ACM: Mila Solomon RN     Care Summary Note: Per chart review, patient completed her NITESH appt with Urology and stone will be treated with MET with f/u US and appt scheduled.     PCP/Specialist follow up:   Future Appointments         Provider Specialty Dept Phone    7/26/2024 8:30 AM (Arrive by 8:15 AM) STR ULTRASOUND RM 2 Radiology [phone]    7/26/2024 12:30 PM (Arrive by 12:00 PM) [facility] RM[facility] Radiology [phone]    7/26/2024 1:00 PM (Arrive by 12:45 PM) STR ULTRASOUND RM 2 Radiology [phone]    8/2/2024 9:30 AM Katherine Miller, APRN - CNP Urology [phone]            Follow Up:   Plan for next ACM outreach in approximately 1 week to complete:  - CC Protocol assessments  - disease specific assessments  - medication review  Discuss progress and plan of care .

## 2024-07-18 ENCOUNTER — CARE COORDINATION (OUTPATIENT)
Dept: OTHER | Facility: CLINIC | Age: 54
End: 2024-07-18

## 2024-07-18 NOTE — CARE COORDINATION
Ambulatory Care Coordination Note     7/18/2024 2:13 PM     Patient outreach attempt by this ACM today to perform care management follow up . ACM was unable to reach the patient by telephone today; left voice message requesting a return phone call to this ACM.  [employer] message sent requesting patient to contact this ACM.     ACM: Mila Solomon RN     Care Summary Note: Per chart review patient has renal US scheduled for 7/26/24.     PCP/Specialist follow up:   Future Appointments         Provider Specialty Dept Phone    7/26/2024 8:30 AM (Arrive by 8:15 AM) STR ULTRASOUND RM 2 Radiology [phone]    7/26/2024 12:30 PM (Arrive by 12:00 PM) [facility] RM[facility] Radiology [phone]    7/26/2024 1:00 PM (Arrive by 12:45 PM) [facility] ULTRASOUND RM 2 Radiology [phone]    8/2/2024 9:30 AM Katherine Miller, APRN - CNP Urology [phone]            Follow Up:   Plan for next ACM outreach in approximately 1 week to complete:  - CC Protocol assessments  - disease specific assessments  - medication review  - goal progression  Symptom management/ Plan of care .

## 2024-07-26 ENCOUNTER — HOSPITAL ENCOUNTER (OUTPATIENT)
Dept: MRI IMAGING | Age: 54
Discharge: HOME | End: 2024-07-26
Payer: COMMERCIAL

## 2024-07-26 ENCOUNTER — CARE COORDINATION (OUTPATIENT)
Dept: OTHER | Facility: CLINIC | Age: 54
End: 2024-07-26

## 2024-07-26 ENCOUNTER — HOSPITAL ENCOUNTER (OUTPATIENT)
Dept: ULTRASOUND IMAGING | Age: 54
Discharge: HOME | End: 2024-07-26
Payer: COMMERCIAL

## 2024-07-26 DIAGNOSIS — E03.9 HYPOTHYROIDISM, UNSPECIFIED TYPE: ICD-10-CM

## 2024-07-26 DIAGNOSIS — E04.1 THYROID NODULE: ICD-10-CM

## 2024-07-26 DIAGNOSIS — N13.2 HYDRONEPHROSIS WITH URINARY OBSTRUCTION DUE TO URETERAL CALCULUS: ICD-10-CM

## 2024-07-26 DIAGNOSIS — N20.1 LEFT URETERAL CALCULUS: ICD-10-CM

## 2024-07-26 DIAGNOSIS — M51.36 OTHER INTERVERTEBRAL DISC DEGENERATION, LUMBAR REGION: ICD-10-CM

## 2024-07-26 PROCEDURE — 76770 US EXAM ABDO BACK WALL COMP: CPT

## 2024-07-26 PROCEDURE — 76536 US EXAM OF HEAD AND NECK: CPT

## 2024-07-26 PROCEDURE — 72148 MRI LUMBAR SPINE W/O DYE: CPT

## 2024-07-26 NOTE — CARE COORDINATION
Ambulatory Care Coordination Note     7/26/2024 11:14 AM    ACMsent [employer] message for Associate Care Management follow up related to follow up on progress and plan of care. See patient message for details and response (as appropriate).     ACM: Mila Solomon RN     Care Summary Note: Per chart review, patient is currently getting ordered testing for Renal US, MRI of L-spine, and thyroid US. Pt not available for outreach. [employer] message sent.     PCP/Specialist follow up:   Future Appointments         Provider Specialty Dept Phone    7/26/2024 12:30 PM (Arrive by 12:00 PM) [facility] RM[facility] Radiology [phone]    7/26/2024  1:00 PM (Arrive by 12:45 PM) STR ULTRASOUND RM 2 Radiology [phone]    8/2/2024 9:30 AM Katherine Miller, APRN - CNP Urology [phone]            Follow Up:   Plan for next ACM outreach in approximately 1 week to complete:  - CC Protocol assessments  - disease specific assessments  - follow-up appointment with Urology/ Discuss plan of care .

## 2024-07-30 ENCOUNTER — TELEPHONE (OUTPATIENT)
Dept: CARDIOLOGY CLINIC | Age: 54
End: 2024-07-30

## 2024-07-30 NOTE — TELEPHONE ENCOUNTER
Pt needs to be seen.   Very limited availability with Dr Tsang's August schedule.  Scheduled patient this Thurs.  She will need to check with work and call back if appointment won't work out.

## 2024-07-30 NOTE — TELEPHONE ENCOUNTER
Request for pre op clearance:  Requested by: Aislinn  Date of surgery 08-28-24 at [facility]  Procedure removal of hardware, and lumbar fusion  Office phone # [phone]  Fax #  [phone]     Is the patient on anti-coag medication? No   If yes, how many days does the surgeon want anti-coag medication held: 0    Date of last visit with cardiologist: 02-07-23 Sharan

## 2024-08-02 ENCOUNTER — CARE COORDINATION (OUTPATIENT)
Dept: OTHER | Facility: CLINIC | Age: 54
End: 2024-08-02

## 2024-08-02 NOTE — CARE COORDINATION
Ambulatory Care Coordination Note     2024 12:15 PM     Patient Current Location:  [place]     Patient contacted the patient by telephone. Verified name and  with patient as identifiers.     Patient graduated from the High Risk Care Management program on 2024.  Patient verbalizes confidence in the ability to self-manage at this time. has the ability to self manage at this time..  Care management goals have been completed. No further Ambulatory Care Manager follow up scheduled.      ACM: Mila Solomon RN     Challenges to be reviewed by the provider   Additional needs identified to be addressed with provider No         Method of communication with provider: none.    Care Summary Note: Patient states she is doing much better.  She thinks she has passed the some of the stones and follow up testing shows other stones have shrunk.  She is to follow up in a few weeks with Urology.  She has her PCP follow up in a few weeks also.  She is no longer having any symptoms.  She feels she is doing very well and has no ongoing ACM needs at his time.     Assessments Completed:   Ambulatory Care Coordination Assessment    Care Coordination Protocol  Referral from Primary Care Provider: No  Week 1 - Initial Assessment     Do you have all of your prescriptions and are they filled?: Yes  Barriers to medication adherence: None  Are you able to afford your medications?: Yes  How often do you have trouble taking your medications the way you have been told to take them?: I always take them as prescribed.     Do you have Home O2 Therapy?: No      Ability to seek help/take action for Emergent Urgent situations i.e. fire, crime, inclement weather or health crisis.: Independent  Ability to ambulate to restroom: Independent  Ability handle personal hygeine needs (bathing/dressing/grooming): Independent  Ability to manage Medications: Independent  Ability to prepare Food Preparation: Independent  Ability to maintain home (clean home,

## 2024-08-07 RX ORDER — TAMSULOSIN HYDROCHLORIDE 0.4 MG/1
0.4 CAPSULE ORAL DAILY
Qty: 30 CAPSULE | Refills: 0 | Status: SHIPPED | OUTPATIENT
Start: 2024-08-07

## 2024-08-07 NOTE — TELEPHONE ENCOUNTER
Nikki Wang called requesting a refill on the following medications:  Requested Prescriptions     Pending Prescriptions Disp Refills    tamsulosin (FLOMAX) 0.4 MG capsule [Pharmacy Med Name: Tamsulosin HCl 0.4 MG Oral Capsule] 30 capsule 0     Sig: Take 1 capsule by mouth once daily     Pharmacy verified:  [email or web address]      Date of last visit: 07/11/2024  Date of next visit (if applicable): 9/3/2024

## 2024-08-08 ENCOUNTER — TELEPHONE (OUTPATIENT)
Dept: FAMILY MEDICINE CLINIC | Age: 54
End: 2024-08-08

## 2024-08-08 NOTE — TELEPHONE ENCOUNTER
Condolences previously provided to pt. She was instructed to call office with further needs/concerns.

## 2024-08-08 NOTE — TELEPHONE ENCOUNTER
Noted. I am so sorry about her mother.  Keep us posted and let me know if we can help with anything.  Follow up as needed. -WS

## 2024-08-08 NOTE — TELEPHONE ENCOUNTER
Patient notified of results/recommendations. Agreeable to the 1 year follow up. Order placed in Epic, scheduling will call her to set up.   Patient states that b/p \"is better\" but she is feeling tired. She also mentioned that her mother is on hospice so her situation may be contributing.   She is currently scheduled in office next week for pre-op but not sure if she will keep this. Will keep the office updated.

## 2024-08-08 NOTE — TELEPHONE ENCOUNTER
----- Message from FREDRICK Aguilera CNP sent at 7/29/2024  7:35 AM EDT -----  Please let pt know that her Thyroid US is stable from previous and small nodules are noted.  Repeat US recommended in 1 year.  Please see how her BP has been at home.  She is due for follow up in office in October 2024.  Thanks -WS

## 2024-08-19 ENCOUNTER — HOSPITAL ENCOUNTER (OUTPATIENT)
Age: 54
Discharge: HOME | End: 2024-08-19
Payer: COMMERCIAL

## 2024-08-19 ENCOUNTER — HOSPITAL ENCOUNTER (OUTPATIENT)
Dept: GENERAL RADIOLOGY | Age: 54
Discharge: HOME | End: 2024-08-19
Payer: COMMERCIAL

## 2024-08-19 DIAGNOSIS — N20.1 LEFT URETERAL CALCULUS: ICD-10-CM

## 2024-08-19 DIAGNOSIS — N13.2 HYDRONEPHROSIS WITH URINARY OBSTRUCTION DUE TO URETERAL CALCULUS: ICD-10-CM

## 2024-08-19 LAB — FASTING: YES

## 2024-08-19 PROCEDURE — 74018 RADEX ABDOMEN 1 VIEW: CPT

## 2024-09-03 ENCOUNTER — OFFICE VISIT (OUTPATIENT)
Dept: UROLOGY | Age: 54
End: 2024-09-03
Payer: COMMERCIAL

## 2024-09-03 VITALS — RESPIRATION RATE: 16 BRPM | HEIGHT: 63 IN | BODY MASS INDEX: 44.33 KG/M2 | WEIGHT: 250.2 LBS

## 2024-09-03 DIAGNOSIS — R10.9 LEFT FLANK PAIN: ICD-10-CM

## 2024-09-03 DIAGNOSIS — N13.2 HYDRONEPHROSIS WITH URINARY OBSTRUCTION DUE TO URETERAL CALCULUS: ICD-10-CM

## 2024-09-03 DIAGNOSIS — N20.1 LEFT URETERAL CALCULUS: Primary | ICD-10-CM

## 2024-09-03 DIAGNOSIS — E66.01 OBESITY, CLASS III, BMI 40-49.9 (MORBID OBESITY) (HCC): ICD-10-CM

## 2024-09-03 LAB
BILIRUBIN, URINE: NEGATIVE
BLOOD URINE, POC: NEGATIVE
CHARACTER, URINE: CLEAR
COLOR, UA: YELLOW
GLUCOSE URINE: NEGATIVE MG/DL
KETONES, URINE: NEGATIVE
LEUKOCYTE CLUMPS, URINE: NEGATIVE
NITRITE, URINE: NEGATIVE
PH, URINE: 5.5 (ref 5–9)
PROTEIN, URINE: NEGATIVE MG/DL
SPECIFIC GRAVITY UA: <= 1.005 (ref 1–1.03)
UROBILINOGEN, URINE: 0.2 EU/DL (ref 0–1)

## 2024-09-03 PROCEDURE — 99214 OFFICE O/P EST MOD 30 MIN: CPT | Performed by: NURSE PRACTITIONER

## 2024-09-03 PROCEDURE — 81003 URINALYSIS AUTO W/O SCOPE: CPT | Performed by: NURSE PRACTITIONER

## 2024-09-06 ENCOUNTER — HOSPITAL ENCOUNTER (OUTPATIENT)
Age: 54
Discharge: HOME | End: 2024-09-06
Payer: COMMERCIAL

## 2024-09-06 ENCOUNTER — HOSPITAL ENCOUNTER (OUTPATIENT)
Dept: GENERAL RADIOLOGY | Age: 54
Discharge: HOME | End: 2024-09-06
Payer: COMMERCIAL

## 2024-09-06 DIAGNOSIS — Z01.811 PRE-OP CHEST EXAM: ICD-10-CM

## 2024-09-06 LAB
ALBUMIN SERPL BCG-MCNC: 4.2 G/DL (ref 3.5–5.1)
ANION GAP SERPL CALC-SCNC: 10 MEQ/L (ref 8–16)
BUN SERPL-MCNC: 10 MG/DL (ref 7–22)
CALCIUM SERPL-MCNC: 9.2 MG/DL (ref 8.5–10.5)
CHLORIDE SERPL-SCNC: 103 MEQ/L (ref 98–111)
CO2 SERPL-SCNC: 26 MEQ/L (ref 23–33)
CREAT SERPL-MCNC: 0.7 MG/DL (ref 0.4–1.2)
EKG ATRIAL RATE: 61 BPM
EKG P AXIS: 61 DEGREES
EKG P-R INTERVAL: 182 MS
EKG Q-T INTERVAL: 404 MS
EKG QRS DURATION: 84 MS
EKG QTC CALCULATION (BAZETT): 406 MS
EKG R AXIS: 52 DEGREES
EKG T AXIS: 57 DEGREES
EKG VENTRICULAR RATE: 61 BPM
GFR SERPL CREATININE-BSD FRML MDRD: > 90 ML/MIN/1.73M2
GLUCOSE SERPL-MCNC: 88 MG/DL (ref 70–108)
POTASSIUM SERPL-SCNC: 4 MEQ/L (ref 3.5–5.2)
PREALB SERPL-MCNC: 18 MG/DL (ref 20–40)
SODIUM SERPL-SCNC: 139 MEQ/L (ref 135–145)

## 2024-09-06 PROCEDURE — 87641 MR-STAPH DNA AMP PROBE: CPT

## 2024-09-06 PROCEDURE — 36415 COLL VENOUS BLD VENIPUNCTURE: CPT

## 2024-09-06 PROCEDURE — 84134 ASSAY OF PREALBUMIN: CPT

## 2024-09-06 PROCEDURE — 80048 BASIC METABOLIC PNL TOTAL CA: CPT

## 2024-09-06 PROCEDURE — 93005 ELECTROCARDIOGRAM TRACING: CPT | Performed by: ORTHOPAEDIC SURGERY

## 2024-09-06 PROCEDURE — 82040 ASSAY OF SERUM ALBUMIN: CPT

## 2024-09-06 PROCEDURE — 93010 ELECTROCARDIOGRAM REPORT: CPT | Performed by: INTERNAL MEDICINE

## 2024-09-06 PROCEDURE — 71046 X-RAY EXAM CHEST 2 VIEWS: CPT

## 2024-09-07 LAB — MRSA DNA SPEC QL NAA+PROBE: NEGATIVE

## 2024-09-17 ASSESSMENT — ENCOUNTER SYMPTOMS
COUGH: 0
SHORTNESS OF BREATH: 0
ABDOMINAL PAIN: 0

## 2024-09-18 ENCOUNTER — OFFICE VISIT (OUTPATIENT)
Dept: FAMILY MEDICINE CLINIC | Age: 54
End: 2024-09-18
Payer: COMMERCIAL

## 2024-09-18 VITALS
WEIGHT: 248.4 LBS | BODY MASS INDEX: 42.41 KG/M2 | HEART RATE: 76 BPM | DIASTOLIC BLOOD PRESSURE: 80 MMHG | TEMPERATURE: 98 F | RESPIRATION RATE: 14 BRPM | SYSTOLIC BLOOD PRESSURE: 126 MMHG | HEIGHT: 64 IN

## 2024-09-18 DIAGNOSIS — K75.4 AUTOIMMUNE HEPATITIS (HCC): ICD-10-CM

## 2024-09-18 DIAGNOSIS — Z01.818 PRE-OP EVALUATION: Primary | ICD-10-CM

## 2024-09-18 DIAGNOSIS — F33.42 RECURRENT MAJOR DEPRESSIVE DISORDER, IN FULL REMISSION (HCC): ICD-10-CM

## 2024-09-18 DIAGNOSIS — I10 PRIMARY HYPERTENSION: ICD-10-CM

## 2024-09-18 DIAGNOSIS — E03.9 HYPOTHYROIDISM, UNSPECIFIED TYPE: ICD-10-CM

## 2024-09-18 PROBLEM — I50.32 CHRONIC HEART FAILURE WITH PRESERVED EJECTION FRACTION (HFPEF) (HCC): Status: ACTIVE | Noted: 2024-09-18

## 2024-09-18 PROBLEM — I48.91 NEW ONSET A-FIB (HCC): Status: RESOLVED | Noted: 2024-09-18 | Resolved: 2024-09-18

## 2024-09-18 PROBLEM — G89.29 CHRONIC LEFT-SIDED LOW BACK PAIN WITH RIGHT-SIDED SCIATICA: Status: ACTIVE | Noted: 2019-08-13

## 2024-09-18 PROBLEM — I50.32 CHRONIC HEART FAILURE WITH PRESERVED EJECTION FRACTION (HFPEF) (HCC): Status: RESOLVED | Noted: 2024-09-18 | Resolved: 2024-09-18

## 2024-09-18 PROBLEM — D68.69 SECONDARY HYPERCOAGULABLE STATE (HCC): Status: ACTIVE | Noted: 2024-09-18

## 2024-09-18 PROBLEM — E66.01 OBESITY, MORBID, BMI 40.0-49.9: Status: ACTIVE | Noted: 2018-11-28

## 2024-09-18 PROBLEM — F33.0 MAJOR DEPRESSIVE DISORDER, RECURRENT, MILD (HCC): Status: RESOLVED | Noted: 2024-07-11 | Resolved: 2024-09-18

## 2024-09-18 PROBLEM — D68.69 SECONDARY HYPERCOAGULABLE STATE (HCC): Status: RESOLVED | Noted: 2024-09-18 | Resolved: 2024-09-18

## 2024-09-18 PROBLEM — I20.89 OTHER FORMS OF ANGINA PECTORIS (HCC): Status: RESOLVED | Noted: 2024-09-18 | Resolved: 2024-09-18

## 2024-09-18 PROBLEM — F33.9 MAJOR DEPRESSIVE DISORDER, RECURRENT, UNSPECIFIED (HCC): Status: RESOLVED | Noted: 2024-07-11 | Resolved: 2024-09-18

## 2024-09-18 PROBLEM — I48.91 NEW ONSET A-FIB (HCC): Status: ACTIVE | Noted: 2024-09-18

## 2024-09-18 PROBLEM — M54.41 CHRONIC LEFT-SIDED LOW BACK PAIN WITH RIGHT-SIDED SCIATICA: Status: ACTIVE | Noted: 2019-08-13

## 2024-09-18 PROBLEM — I20.89 OTHER FORMS OF ANGINA PECTORIS (HCC): Status: ACTIVE | Noted: 2024-09-18

## 2024-09-18 PROBLEM — F33.1 MAJOR DEPRESSIVE DISORDER, RECURRENT, MODERATE (HCC): Status: RESOLVED | Noted: 2024-07-11 | Resolved: 2024-09-18

## 2024-09-18 PROCEDURE — 3079F DIAST BP 80-89 MM HG: CPT | Performed by: NURSE PRACTITIONER

## 2024-09-18 PROCEDURE — 3074F SYST BP LT 130 MM HG: CPT | Performed by: NURSE PRACTITIONER

## 2024-09-18 PROCEDURE — 99214 OFFICE O/P EST MOD 30 MIN: CPT | Performed by: NURSE PRACTITIONER

## 2024-09-18 ASSESSMENT — ENCOUNTER SYMPTOMS
WHEEZING: 0
SHORTNESS OF BREATH: 0
TROUBLE SWALLOWING: 0
ABDOMINAL PAIN: 0
SINUS PAIN: 0
SORE THROAT: 0
NAUSEA: 0
FACIAL SWELLING: 0
EYE PAIN: 0
DIARRHEA: 0
COUGH: 0
VOMITING: 0
BACK PAIN: 1
COLOR CHANGE: 0

## 2024-09-24 LAB — FASTING: YES

## 2024-09-26 NOTE — PROGRESS NOTES
Follow all instructions given by your physician  Do not eat or drink anything after midnight prior to surgery(includes water, chewing gum, mints and ice chips)  Sips of water am of surgery with allowed medications  May brush teeth   Do not smoke or chew tobacco, drink alcoholic beverages or use any illicit drugs for 24 hours prior to surgery  Bring insurance info and photo ID  Bring pertinent paperwork with you from Doctor or surgeons's office  Wear clean comfortable, loose-fitting clothing  No make-up, nail polish, jewelry, piercings, or contact lenses to be worn day of surgery  No glue on dentures morning of surgery; you will be asked to remove them for surgery. Case for glasses.  Shower the night before and the morning of surgery with cleansing soap provided or a liquid antibacterial soap, dry with new fresh clean towel after each shower, no lotions, creams or powder.  Clean sheets and pillowcase on bed night before surgery  Bring medications in original bottles, Bring rescue inhalers with you  Bring CPAP/BIPAP machine if you have one ( you may be charged if one is needed in recovery room )    Do you have a DNR? YES  Please Bring Healthcare Directive or Healthcare Power of  in so we can scan it into your chart.    Our pharmacy has a Meds to Beds program where they will deliver any new prescriptions you may have to your room before you leave. Our Pharmacy will clear it through your insurance; for example (same co pay). This enables you to take your new RX as soon as you need when you get home and avoids stop/wait delays on the way home.  Please have a form of payment with you and have someone designated as your Pharmacy contact with their phone # as you may not feel well or still be under the influence of anesthesia.    Please refer to the SSI-Surgical Site Infection Flyer you hopefully received in the mail-together we can prevent infections; signs and symptoms reviewed.  When discharged be sure you  understand how to care for your wound and that you have the Dr./office phone # to call if you have any concerns or questions about your wound.     needed at discharge and someone over 18 to stay with you for 24 hours overnight (surgery may be cancelled if you don't have this)  Report to SDS on 2nd floor  If you would become ill prior to surgery, please call the surgeon  May have a visitor with you, we request that you limit to 2 visitors in pre-op area  Masks are recommended but not required, new masks at entrance desk  Call [phone] for any questions

## 2024-09-26 NOTE — PROGRESS NOTES
Preliminary Discharge Planning Questionnaire  Date of Surgery 10/2/24   Surgeon Aislinn      Having the proper help and care after surgery is very important to your recovery. Who will be able to help you at home when you are discharged from the hospital?    Daughters  (Lives alone- Daughters will be in to help)   Name(s) Sandra    How many steps to enter your home?  0    Bathroom on first floor?  Yes    Bedroom on the first floor?  Yes    Do you have an elevated toilet seat to use at home?  Yes    Do you have a walker to use at home?    Total Joints - with wheels N/A   Spine - with wheels  No \"tennis balls\"    Have you been doing home exercises?yes    *You will go home with some outpatient physical therapy, where do you prefer to go? [facility]    This typically will not start until after your post visit to your Dr. (3-4 weeks after surgery)    * What home health agency would you like to use? No preference           Please have 2 preferences when you come for surgery- 1st and 2nd choice                                                               *Cardiac Rehab plans NA

## 2024-09-26 NOTE — PROGRESS NOTES
PAT call attempted, patient unavailable, left message to please call us back at your earliest convenience; [phone]

## 2024-09-26 NOTE — PROGRESS NOTES
PAT  reminder left with DR Tao's MA to make sure a potassium is ordered for the day of surgery due to pt on Lisinopril

## 2024-09-27 ENCOUNTER — OFFICE VISIT (OUTPATIENT)
Dept: CARDIOLOGY CLINIC | Age: 54
End: 2024-09-27
Payer: COMMERCIAL

## 2024-09-27 VITALS
HEIGHT: 64 IN | WEIGHT: 245 LBS | BODY MASS INDEX: 41.83 KG/M2 | HEART RATE: 71 BPM | SYSTOLIC BLOOD PRESSURE: 135 MMHG | DIASTOLIC BLOOD PRESSURE: 78 MMHG

## 2024-09-27 DIAGNOSIS — Z01.818 PREOPERATIVE CLEARANCE: Primary | ICD-10-CM

## 2024-09-27 PROCEDURE — 3078F DIAST BP <80 MM HG: CPT | Performed by: INTERNAL MEDICINE

## 2024-09-27 PROCEDURE — 99214 OFFICE O/P EST MOD 30 MIN: CPT | Performed by: INTERNAL MEDICINE

## 2024-09-27 PROCEDURE — 3075F SYST BP GE 130 - 139MM HG: CPT | Performed by: INTERNAL MEDICINE

## 2024-09-30 ENCOUNTER — PATIENT MESSAGE (OUTPATIENT)
Dept: FAMILY MEDICINE CLINIC | Age: 54
End: 2024-09-30

## 2024-09-30 RX ORDER — FAMOTIDINE 20 MG/1
20 TABLET, FILM COATED ORAL DAILY PRN
Qty: 90 TABLET | Refills: 1 | Status: SHIPPED | OUTPATIENT
Start: 2024-09-30

## 2024-09-30 NOTE — TELEPHONE ENCOUNTER
This medication refill is regarding a electronic request. Refill requested by patient.    Requested Prescriptions     Pending Prescriptions Disp Refills    famotidine (PEPCID) 20 MG tablet 90 tablet 1     Sig: Take 1 tablet by mouth daily as needed (GERD)     Date of last visit: 9/18/2024   Date of next visit: Visit date not found  Date of last refill: 04/30/2024 #90/  Pharmacy Name: [facility] [place]     Last Lipid Panel:    Lab Results   Component Value Date/Time    CHOL 171 04/30/2024 07:52 AM    TRIG 116 04/30/2024 07:52 AM    HDL 46 04/30/2024 07:52 AM     Last CMP:   Lab Results   Component Value Date     09/06/2024    K 4.0 09/06/2024     09/06/2024    CO2 26 09/06/2024    BUN 10 09/06/2024    CREATININE 0.7 09/06/2024    GLUCOSE 88 09/06/2024    CALCIUM 9.2 09/06/2024    BILITOT 0.4 07/08/2024    ALKPHOS 92 07/08/2024    AST 18 07/08/2024    ALT 15 07/08/2024    LABGLOM > 90 09/06/2024    GLOB 3.0 01/29/2023       Last Thyroid:    Lab Results   Component Value Date    TSH 3.830 04/30/2024    T4FREE 1.33 04/30/2024     Last Hemoglobin A1C:  No results found for: \"LABA1C\"    Rx verified, ordered and set to EP.

## 2024-10-01 RX ORDER — LEVOTHYROXINE SODIUM 150 UG/1
150 TABLET ORAL DAILY
Qty: 90 TABLET | Refills: 1 | Status: SHIPPED | OUTPATIENT
Start: 2024-10-01

## 2024-10-02 ENCOUNTER — APPOINTMENT (OUTPATIENT)
Dept: GENERAL RADIOLOGY | Age: 54
End: 2024-10-02
Attending: ORTHOPAEDIC SURGERY
Payer: COMMERCIAL

## 2024-10-02 ENCOUNTER — ANESTHESIA EVENT (OUTPATIENT)
Dept: OPERATING ROOM | Age: 54
End: 2024-10-02
Payer: COMMERCIAL

## 2024-10-02 ENCOUNTER — ANESTHESIA (OUTPATIENT)
Dept: OPERATING ROOM | Age: 54
End: 2024-10-02
Payer: COMMERCIAL

## 2024-10-02 ENCOUNTER — HOSPITAL ENCOUNTER (INPATIENT)
Age: 54
LOS: 3 days | Discharge: HOME HEALTH | End: 2024-10-05
Attending: ORTHOPAEDIC SURGERY | Admitting: ORTHOPAEDIC SURGERY
Payer: COMMERCIAL

## 2024-10-02 DIAGNOSIS — Z98.1 S/P LUMBAR FUSION: Primary | ICD-10-CM

## 2024-10-02 PROBLEM — M48.062 SPINAL STENOSIS OF LUMBAR REGION WITH NEUROGENIC CLAUDICATION: Status: ACTIVE | Noted: 2024-10-02

## 2024-10-02 LAB
ABO: NORMAL
ANTIBODY SCREEN: NORMAL
RH FACTOR: NORMAL

## 2024-10-02 PROCEDURE — 6370000000 HC RX 637 (ALT 250 FOR IP): Performed by: PHYSICIAN ASSISTANT

## 2024-10-02 PROCEDURE — 0SG10AJ FUSION OF 2 OR MORE LUMBAR VERTEBRAL JOINTS WITH INTERBODY FUSION DEVICE, POSTERIOR APPROACH, ANTERIOR COLUMN, OPEN APPROACH: ICD-10-PCS | Performed by: ORTHOPAEDIC SURGERY

## 2024-10-02 PROCEDURE — 2580000003 HC RX 258: Performed by: PHYSICIAN ASSISTANT

## 2024-10-02 PROCEDURE — 2500000003 HC RX 250 WO HCPCS

## 2024-10-02 PROCEDURE — 01NR0ZZ RELEASE SACRAL NERVE, OPEN APPROACH: ICD-10-PCS | Performed by: ORTHOPAEDIC SURGERY

## 2024-10-02 PROCEDURE — 01NB0ZZ RELEASE LUMBAR NERVE, OPEN APPROACH: ICD-10-PCS | Performed by: ORTHOPAEDIC SURGERY

## 2024-10-02 PROCEDURE — 0QP004Z REMOVAL OF INTERNAL FIXATION DEVICE FROM LUMBAR VERTEBRA, OPEN APPROACH: ICD-10-PCS | Performed by: ORTHOPAEDIC SURGERY

## 2024-10-02 PROCEDURE — 6360000002 HC RX W HCPCS: Performed by: ANESTHESIOLOGY

## 2024-10-02 PROCEDURE — 3700000001 HC ADD 15 MINUTES (ANESTHESIA): Performed by: ORTHOPAEDIC SURGERY

## 2024-10-02 PROCEDURE — 3600000014 HC SURGERY LEVEL 4 ADDTL 15MIN: Performed by: ORTHOPAEDIC SURGERY

## 2024-10-02 PROCEDURE — 6360000002 HC RX W HCPCS: Performed by: PHYSICIAN ASSISTANT

## 2024-10-02 PROCEDURE — 6360000002 HC RX W HCPCS

## 2024-10-02 PROCEDURE — 86850 RBC ANTIBODY SCREEN: CPT

## 2024-10-02 PROCEDURE — 3700000000 HC ANESTHESIA ATTENDED CARE: Performed by: ORTHOPAEDIC SURGERY

## 2024-10-02 PROCEDURE — 36415 COLL VENOUS BLD VENIPUNCTURE: CPT

## 2024-10-02 PROCEDURE — 7100000001 HC PACU RECOVERY - ADDTL 15 MIN: Performed by: ORTHOPAEDIC SURGERY

## 2024-10-02 PROCEDURE — P9045 ALBUMIN (HUMAN), 5%, 250 ML: HCPCS

## 2024-10-02 PROCEDURE — C1713 ANCHOR/SCREW BN/BN,TIS/BN: HCPCS | Performed by: ORTHOPAEDIC SURGERY

## 2024-10-02 PROCEDURE — 0SG3071 FUSION OF LUMBOSACRAL JOINT WITH AUTOLOGOUS TISSUE SUBSTITUTE, POSTERIOR APPROACH, POSTERIOR COLUMN, OPEN APPROACH: ICD-10-PCS | Performed by: ORTHOPAEDIC SURGERY

## 2024-10-02 PROCEDURE — 86900 BLOOD TYPING SEROLOGIC ABO: CPT

## 2024-10-02 PROCEDURE — 3600000004 HC SURGERY LEVEL 4 BASE: Performed by: ORTHOPAEDIC SURGERY

## 2024-10-02 PROCEDURE — 2580000003 HC RX 258

## 2024-10-02 PROCEDURE — 72020 X-RAY EXAM OF SPINE 1 VIEW: CPT

## 2024-10-02 PROCEDURE — 0QP104Z REMOVAL OF INTERNAL FIXATION DEVICE FROM SACRUM, OPEN APPROACH: ICD-10-PCS | Performed by: ORTHOPAEDIC SURGERY

## 2024-10-02 PROCEDURE — 2720000010 HC SURG SUPPLY STERILE: Performed by: ORTHOPAEDIC SURGERY

## 2024-10-02 PROCEDURE — 72100 X-RAY EXAM L-S SPINE 2/3 VWS: CPT

## 2024-10-02 PROCEDURE — C1889 IMPLANT/INSERT DEVICE, NOC: HCPCS | Performed by: ORTHOPAEDIC SURGERY

## 2024-10-02 PROCEDURE — 1200000000 HC SEMI PRIVATE

## 2024-10-02 PROCEDURE — 2709999900 HC NON-CHARGEABLE SUPPLY: Performed by: ORTHOPAEDIC SURGERY

## 2024-10-02 PROCEDURE — 7100000000 HC PACU RECOVERY - FIRST 15 MIN: Performed by: ORTHOPAEDIC SURGERY

## 2024-10-02 PROCEDURE — 0SG1071 FUSION OF 2 OR MORE LUMBAR VERTEBRAL JOINTS WITH AUTOLOGOUS TISSUE SUBSTITUTE, POSTERIOR APPROACH, POSTERIOR COLUMN, OPEN APPROACH: ICD-10-PCS | Performed by: ORTHOPAEDIC SURGERY

## 2024-10-02 PROCEDURE — 86901 BLOOD TYPING SEROLOGIC RH(D): CPT

## 2024-10-02 DEVICE — IMPLANTABLE DEVICE
Type: IMPLANTABLE DEVICE | Site: SPINE LUMBAR | Status: FUNCTIONAL
Brand: FORTILINK CAGES WITH TIPLUS TECHNOLOGY

## 2024-10-02 DEVICE — ALLOGRAFT BNE BRIDGE 50X25 MM 24 CC BIOADAPT: Type: IMPLANTABLE DEVICE | Site: SPINE LUMBAR | Status: FUNCTIONAL

## 2024-10-02 DEVICE — SCREW, POLY, SOLID, 6.5X50
Type: IMPLANTABLE DEVICE | Site: SPINE LUMBAR | Status: FUNCTIONAL
Brand: CORTERA

## 2024-10-02 DEVICE — GRAFT BNE 5 CC DBM FIBREX: Type: IMPLANTABLE DEVICE | Site: SPINE LUMBAR | Status: FUNCTIONAL

## 2024-10-02 DEVICE — ROD, TI, PREBENT, 5.5X120
Type: IMPLANTABLE DEVICE | Site: SPINE LUMBAR | Status: FUNCTIONAL
Brand: CORTERA

## 2024-10-02 DEVICE — SCREW, POLY, SOLID, 7.5X50
Type: IMPLANTABLE DEVICE | Site: SPINE LUMBAR | Status: FUNCTIONAL
Brand: CORTERA

## 2024-10-02 DEVICE — SET SCREW, 5.5-6.0
Type: IMPLANTABLE DEVICE | Site: SPINE LUMBAR | Status: FUNCTIONAL
Brand: CORTERA

## 2024-10-02 DEVICE — GRAFT BNE LG: Type: IMPLANTABLE DEVICE | Site: SPINE LUMBAR | Status: FUNCTIONAL

## 2024-10-02 RX ORDER — BISACODYL 10 MG
10 SUPPOSITORY, RECTAL RECTAL DAILY PRN
Status: DISCONTINUED | OUTPATIENT
Start: 2024-10-02 | End: 2024-10-05 | Stop reason: HOSPADM

## 2024-10-02 RX ORDER — SODIUM CHLORIDE 9 MG/ML
INJECTION, SOLUTION INTRAVENOUS CONTINUOUS
Status: DISCONTINUED | OUTPATIENT
Start: 2024-10-02 | End: 2024-10-02 | Stop reason: HOSPADM

## 2024-10-02 RX ORDER — ROCURONIUM BROMIDE 10 MG/ML
INJECTION, SOLUTION INTRAVENOUS
Status: DISCONTINUED | OUTPATIENT
Start: 2024-10-02 | End: 2024-10-02 | Stop reason: SDUPTHER

## 2024-10-02 RX ORDER — ONDANSETRON 2 MG/ML
4 INJECTION INTRAMUSCULAR; INTRAVENOUS
Status: DISCONTINUED | OUTPATIENT
Start: 2024-10-02 | End: 2024-10-02 | Stop reason: HOSPADM

## 2024-10-02 RX ORDER — PROPOFOL 10 MG/ML
INJECTION, EMULSION INTRAVENOUS
Status: DISCONTINUED | OUTPATIENT
Start: 2024-10-02 | End: 2024-10-02 | Stop reason: SDUPTHER

## 2024-10-02 RX ORDER — TRANEXAMIC ACID 100 MG/ML
INJECTION, SOLUTION INTRAVENOUS
Status: DISCONTINUED | OUTPATIENT
Start: 2024-10-02 | End: 2024-10-02 | Stop reason: SDUPTHER

## 2024-10-02 RX ORDER — FAMOTIDINE 20 MG/1
20 TABLET, FILM COATED ORAL DAILY PRN
Status: DISCONTINUED | OUTPATIENT
Start: 2024-10-02 | End: 2024-10-05 | Stop reason: HOSPADM

## 2024-10-02 RX ORDER — ACETAMINOPHEN 325 MG/1
650 TABLET ORAL EVERY 4 HOURS PRN
Status: DISCONTINUED | OUTPATIENT
Start: 2024-10-02 | End: 2024-10-05 | Stop reason: HOSPADM

## 2024-10-02 RX ORDER — FENTANYL CITRATE 50 UG/ML
50 INJECTION, SOLUTION INTRAMUSCULAR; INTRAVENOUS EVERY 5 MIN PRN
Status: COMPLETED | OUTPATIENT
Start: 2024-10-02 | End: 2024-10-02

## 2024-10-02 RX ORDER — LIDOCAINE HCL/PF 100 MG/5ML
SYRINGE (ML) INJECTION
Status: DISCONTINUED | OUTPATIENT
Start: 2024-10-02 | End: 2024-10-02 | Stop reason: SDUPTHER

## 2024-10-02 RX ORDER — SODIUM CHLORIDE 0.9 % (FLUSH) 0.9 %
5-40 SYRINGE (ML) INJECTION PRN
Status: DISCONTINUED | OUTPATIENT
Start: 2024-10-02 | End: 2024-10-05 | Stop reason: HOSPADM

## 2024-10-02 RX ORDER — SODIUM CHLORIDE 0.9 % (FLUSH) 0.9 %
5-40 SYRINGE (ML) INJECTION EVERY 12 HOURS SCHEDULED
Status: DISCONTINUED | OUTPATIENT
Start: 2024-10-02 | End: 2024-10-02 | Stop reason: HOSPADM

## 2024-10-02 RX ORDER — ONDANSETRON 2 MG/ML
INJECTION INTRAMUSCULAR; INTRAVENOUS
Status: DISCONTINUED | OUTPATIENT
Start: 2024-10-02 | End: 2024-10-02 | Stop reason: SDUPTHER

## 2024-10-02 RX ORDER — MEPERIDINE HYDROCHLORIDE 25 MG/ML
12.5 INJECTION INTRAMUSCULAR; INTRAVENOUS; SUBCUTANEOUS EVERY 5 MIN PRN
Status: DISCONTINUED | OUTPATIENT
Start: 2024-10-02 | End: 2024-10-02 | Stop reason: HOSPADM

## 2024-10-02 RX ORDER — SODIUM CHLORIDE 9 MG/ML
INJECTION, SOLUTION INTRAVENOUS CONTINUOUS
Status: DISCONTINUED | OUTPATIENT
Start: 2024-10-02 | End: 2024-10-05 | Stop reason: HOSPADM

## 2024-10-02 RX ORDER — METOPROLOL TARTRATE 25 MG/1
25 TABLET, FILM COATED ORAL 2 TIMES DAILY
Status: DISCONTINUED | OUTPATIENT
Start: 2024-10-02 | End: 2024-10-05 | Stop reason: HOSPADM

## 2024-10-02 RX ORDER — FENTANYL CITRATE 50 UG/ML
INJECTION, SOLUTION INTRAMUSCULAR; INTRAVENOUS
Status: DISCONTINUED | OUTPATIENT
Start: 2024-10-02 | End: 2024-10-02 | Stop reason: SDUPTHER

## 2024-10-02 RX ORDER — LEVOTHYROXINE SODIUM 150 UG/1
150 TABLET ORAL DAILY
Status: DISCONTINUED | OUTPATIENT
Start: 2024-10-02 | End: 2024-10-05 | Stop reason: HOSPADM

## 2024-10-02 RX ORDER — SODIUM CHLORIDE 9 MG/ML
INJECTION, SOLUTION INTRAVENOUS PRN
Status: DISCONTINUED | OUTPATIENT
Start: 2024-10-02 | End: 2024-10-05 | Stop reason: HOSPADM

## 2024-10-02 RX ORDER — PHENYLEPHRINE HCL IN 0.9% NACL 1 MG/10 ML
SYRINGE (ML) INTRAVENOUS
Status: DISCONTINUED | OUTPATIENT
Start: 2024-10-02 | End: 2024-10-02 | Stop reason: SDUPTHER

## 2024-10-02 RX ORDER — ONDANSETRON 2 MG/ML
4 INJECTION INTRAMUSCULAR; INTRAVENOUS EVERY 6 HOURS PRN
Status: DISCONTINUED | OUTPATIENT
Start: 2024-10-02 | End: 2024-10-05 | Stop reason: HOSPADM

## 2024-10-02 RX ORDER — LISINOPRIL 20 MG/1
20 TABLET ORAL DAILY
Status: DISCONTINUED | OUTPATIENT
Start: 2024-10-02 | End: 2024-10-05 | Stop reason: HOSPADM

## 2024-10-02 RX ORDER — ONDANSETRON 4 MG/1
4 TABLET, ORALLY DISINTEGRATING ORAL EVERY 8 HOURS PRN
Status: DISCONTINUED | OUTPATIENT
Start: 2024-10-02 | End: 2024-10-05 | Stop reason: HOSPADM

## 2024-10-02 RX ORDER — MIDAZOLAM HYDROCHLORIDE 1 MG/ML
INJECTION INTRAMUSCULAR; INTRAVENOUS
Status: DISCONTINUED | OUTPATIENT
Start: 2024-10-02 | End: 2024-10-02 | Stop reason: SDUPTHER

## 2024-10-02 RX ORDER — MULTIVITAMIN WITH IRON
1 TABLET ORAL DAILY
Status: DISCONTINUED | OUTPATIENT
Start: 2024-10-02 | End: 2024-10-05 | Stop reason: HOSPADM

## 2024-10-02 RX ORDER — SODIUM CHLORIDE 9 MG/ML
INJECTION, SOLUTION INTRAVENOUS PRN
Status: DISCONTINUED | OUTPATIENT
Start: 2024-10-02 | End: 2024-10-02 | Stop reason: HOSPADM

## 2024-10-02 RX ORDER — OXYCODONE HYDROCHLORIDE 5 MG/1
5 TABLET ORAL EVERY 4 HOURS PRN
Status: DISCONTINUED | OUTPATIENT
Start: 2024-10-02 | End: 2024-10-05 | Stop reason: HOSPADM

## 2024-10-02 RX ORDER — ALBUMIN, HUMAN INJ 5% 5 %
SOLUTION INTRAVENOUS
Status: DISCONTINUED | OUTPATIENT
Start: 2024-10-02 | End: 2024-10-02 | Stop reason: SDUPTHER

## 2024-10-02 RX ORDER — POLYETHYLENE GLYCOL 3350 17 G/17G
17 POWDER, FOR SOLUTION ORAL DAILY
Status: DISCONTINUED | OUTPATIENT
Start: 2024-10-02 | End: 2024-10-05 | Stop reason: HOSPADM

## 2024-10-02 RX ORDER — NALOXONE HYDROCHLORIDE 0.4 MG/ML
INJECTION, SOLUTION INTRAMUSCULAR; INTRAVENOUS; SUBCUTANEOUS PRN
Status: DISCONTINUED | OUTPATIENT
Start: 2024-10-02 | End: 2024-10-02 | Stop reason: HOSPADM

## 2024-10-02 RX ORDER — SODIUM CHLORIDE 0.9 % (FLUSH) 0.9 %
5-40 SYRINGE (ML) INJECTION PRN
Status: DISCONTINUED | OUTPATIENT
Start: 2024-10-02 | End: 2024-10-02 | Stop reason: HOSPADM

## 2024-10-02 RX ORDER — GLYCOPYRROLATE 0.2 MG/ML
INJECTION INTRAMUSCULAR; INTRAVENOUS
Status: DISCONTINUED | OUTPATIENT
Start: 2024-10-02 | End: 2024-10-02 | Stop reason: SDUPTHER

## 2024-10-02 RX ORDER — SODIUM CHLORIDE 0.9 % (FLUSH) 0.9 %
5-40 SYRINGE (ML) INJECTION EVERY 12 HOURS SCHEDULED
Status: DISCONTINUED | OUTPATIENT
Start: 2024-10-02 | End: 2024-10-05 | Stop reason: HOSPADM

## 2024-10-02 RX ORDER — OXYCODONE HYDROCHLORIDE 5 MG/1
10 TABLET ORAL EVERY 4 HOURS PRN
Status: DISCONTINUED | OUTPATIENT
Start: 2024-10-02 | End: 2024-10-05 | Stop reason: HOSPADM

## 2024-10-02 RX ORDER — CYCLOBENZAPRINE HCL 10 MG
10 TABLET ORAL 3 TIMES DAILY PRN
Status: DISCONTINUED | OUTPATIENT
Start: 2024-10-02 | End: 2024-10-05 | Stop reason: HOSPADM

## 2024-10-02 RX ORDER — AMLODIPINE BESYLATE 10 MG/1
10 TABLET ORAL DAILY
Status: DISCONTINUED | OUTPATIENT
Start: 2024-10-02 | End: 2024-10-05 | Stop reason: HOSPADM

## 2024-10-02 RX ORDER — DEXAMETHASONE SODIUM PHOSPHATE 10 MG/ML
INJECTION, EMULSION INTRAMUSCULAR; INTRAVENOUS
Status: DISCONTINUED | OUTPATIENT
Start: 2024-10-02 | End: 2024-10-02 | Stop reason: SDUPTHER

## 2024-10-02 RX ADMIN — Medication 200 MCG: at 13:32

## 2024-10-02 RX ADMIN — PROPOFOL 50 MG: 10 INJECTION, EMULSION INTRAVENOUS at 13:47

## 2024-10-02 RX ADMIN — METOPROLOL TARTRATE 25 MG: 25 TABLET, FILM COATED ORAL at 19:11

## 2024-10-02 RX ADMIN — ROCURONIUM BROMIDE 10 MG: 10 INJECTION INTRAVENOUS at 12:02

## 2024-10-02 RX ADMIN — DEXAMETHASONE SODIUM PHOSPHATE 10 MG: 10 INJECTION, EMULSION INTRAMUSCULAR; INTRAVENOUS at 09:44

## 2024-10-02 RX ADMIN — CYCLOBENZAPRINE 10 MG: 10 TABLET, FILM COATED ORAL at 16:36

## 2024-10-02 RX ADMIN — Medication 200 MCG: at 11:59

## 2024-10-02 RX ADMIN — Medication 200 MCG: at 11:46

## 2024-10-02 RX ADMIN — LISINOPRIL 20 MG: 20 TABLET ORAL at 19:11

## 2024-10-02 RX ADMIN — PROPOFOL 150 MG: 10 INJECTION, EMULSION INTRAVENOUS at 09:36

## 2024-10-02 RX ADMIN — PROPOFOL 50 MG: 10 INJECTION, EMULSION INTRAVENOUS at 10:08

## 2024-10-02 RX ADMIN — GLYCOPYRROLATE 0.2 MG: 0.2 INJECTION INTRAMUSCULAR; INTRAVENOUS at 10:28

## 2024-10-02 RX ADMIN — SODIUM CHLORIDE: 9 INJECTION, SOLUTION INTRAVENOUS at 10:53

## 2024-10-02 RX ADMIN — FENTANYL CITRATE 50 MCG: 50 INJECTION, SOLUTION INTRAMUSCULAR; INTRAVENOUS at 10:08

## 2024-10-02 RX ADMIN — PHENYLEPHRINE HYDROCHLORIDE 20 MCG/MIN: 10 INJECTION INTRAVENOUS at 12:33

## 2024-10-02 RX ADMIN — ROCURONIUM BROMIDE 30 MG: 10 INJECTION INTRAVENOUS at 10:35

## 2024-10-02 RX ADMIN — OXYCODONE 10 MG: 5 TABLET ORAL at 14:39

## 2024-10-02 RX ADMIN — ALBUMIN (HUMAN) 12.5 G: 12.5 INJECTION, SOLUTION INTRAVENOUS at 10:43

## 2024-10-02 RX ADMIN — ROCURONIUM BROMIDE 50 MG: 10 INJECTION INTRAVENOUS at 09:36

## 2024-10-02 RX ADMIN — HYDROMORPHONE HYDROCHLORIDE 0.5 MG: 1 INJECTION, SOLUTION INTRAMUSCULAR; INTRAVENOUS; SUBCUTANEOUS at 20:43

## 2024-10-02 RX ADMIN — Medication 200 MCG: at 12:03

## 2024-10-02 RX ADMIN — LEVOTHYROXINE SODIUM 150 MCG: 0.15 TABLET ORAL at 19:11

## 2024-10-02 RX ADMIN — FENTANYL CITRATE 50 MCG: 50 INJECTION, SOLUTION INTRAMUSCULAR; INTRAVENOUS at 14:40

## 2024-10-02 RX ADMIN — FENTANYL CITRATE 50 MCG: 50 INJECTION, SOLUTION INTRAMUSCULAR; INTRAVENOUS at 13:47

## 2024-10-02 RX ADMIN — Medication 200 MCG: at 11:11

## 2024-10-02 RX ADMIN — FENTANYL CITRATE 50 MCG: 50 INJECTION, SOLUTION INTRAMUSCULAR; INTRAVENOUS at 14:45

## 2024-10-02 RX ADMIN — GLYCOPYRROLATE 0.2 MG: 0.2 INJECTION INTRAMUSCULAR; INTRAVENOUS at 13:09

## 2024-10-02 RX ADMIN — Medication 200 MCG: at 10:36

## 2024-10-02 RX ADMIN — FENTANYL CITRATE 50 MCG: 50 INJECTION, SOLUTION INTRAMUSCULAR; INTRAVENOUS at 14:00

## 2024-10-02 RX ADMIN — ROCURONIUM BROMIDE 10 MG: 10 INJECTION INTRAVENOUS at 11:09

## 2024-10-02 RX ADMIN — SODIUM CHLORIDE: 9 INJECTION, SOLUTION INTRAVENOUS at 07:24

## 2024-10-02 RX ADMIN — Medication 200 MCG: at 12:09

## 2024-10-02 RX ADMIN — WATER 2000 MG: 1 INJECTION INTRAMUSCULAR; INTRAVENOUS; SUBCUTANEOUS at 19:16

## 2024-10-02 RX ADMIN — SUGAMMADEX 400 MG: 100 INJECTION, SOLUTION INTRAVENOUS at 13:52

## 2024-10-02 RX ADMIN — Medication 200 MCG: at 10:48

## 2024-10-02 RX ADMIN — PROPOFOL 50 MCG/KG/MIN: 10 INJECTION, EMULSION INTRAVENOUS at 10:09

## 2024-10-02 RX ADMIN — OXYCODONE 10 MG: 5 TABLET ORAL at 18:56

## 2024-10-02 RX ADMIN — Medication 200 MCG: at 12:15

## 2024-10-02 RX ADMIN — Medication 1 TABLET: at 19:11

## 2024-10-02 RX ADMIN — AMLODIPINE BESYLATE 10 MG: 10 TABLET ORAL at 19:11

## 2024-10-02 RX ADMIN — SODIUM CHLORIDE 100 ML: 9 INJECTION, SOLUTION INTRAVENOUS at 19:04

## 2024-10-02 RX ADMIN — Medication 100 MG: at 09:36

## 2024-10-02 RX ADMIN — ONDANSETRON 4 MG: 2 INJECTION INTRAMUSCULAR; INTRAVENOUS at 13:04

## 2024-10-02 RX ADMIN — ALBUMIN (HUMAN) 12.5 G: 12.5 INJECTION, SOLUTION INTRAVENOUS at 10:59

## 2024-10-02 RX ADMIN — WATER 2000 MG: 1 INJECTION INTRAMUSCULAR; INTRAVENOUS; SUBCUTANEOUS at 09:47

## 2024-10-02 RX ADMIN — Medication 100 MCG: at 11:53

## 2024-10-02 RX ADMIN — Medication 200 MCG: at 10:59

## 2024-10-02 RX ADMIN — FENTANYL CITRATE 50 MCG: 50 INJECTION, SOLUTION INTRAMUSCULAR; INTRAVENOUS at 09:36

## 2024-10-02 RX ADMIN — SODIUM CHLORIDE: 9 INJECTION, SOLUTION INTRAVENOUS at 19:28

## 2024-10-02 RX ADMIN — ROCURONIUM BROMIDE 10 MG: 10 INJECTION INTRAVENOUS at 11:34

## 2024-10-02 RX ADMIN — MIDAZOLAM 2 MG: 1 INJECTION INTRAMUSCULAR; INTRAVENOUS at 09:32

## 2024-10-02 RX ADMIN — SODIUM CHLORIDE: 9 INJECTION, SOLUTION INTRAVENOUS at 13:24

## 2024-10-02 RX ADMIN — TRANEXAMIC ACID 1000 MG: 100 INJECTION, SOLUTION INTRAVENOUS at 10:12

## 2024-10-02 ASSESSMENT — PAIN DESCRIPTION - ONSET
ONSET: ON-GOING
ONSET: ON-GOING

## 2024-10-02 ASSESSMENT — PAIN DESCRIPTION - ORIENTATION
ORIENTATION: LOWER
ORIENTATION: MID
ORIENTATION: MID
ORIENTATION: LOWER

## 2024-10-02 ASSESSMENT — PAIN SCALES - GENERAL
PAINLEVEL_OUTOF10: 8
PAINLEVEL_OUTOF10: 9
PAINLEVEL_OUTOF10: 7
PAINLEVEL_OUTOF10: 8

## 2024-10-02 ASSESSMENT — PAIN DESCRIPTION - PAIN TYPE
TYPE: SURGICAL PAIN
TYPE: CHRONIC PAIN
TYPE: SURGICAL PAIN

## 2024-10-02 ASSESSMENT — PAIN - FUNCTIONAL ASSESSMENT
PAIN_FUNCTIONAL_ASSESSMENT: ACTIVITIES ARE NOT PREVENTED
PAIN_FUNCTIONAL_ASSESSMENT: PREVENTS OR INTERFERES SOME ACTIVE ACTIVITIES AND ADLS

## 2024-10-02 ASSESSMENT — PAIN DESCRIPTION - LOCATION
LOCATION: BACK

## 2024-10-02 ASSESSMENT — PAIN DESCRIPTION - DESCRIPTORS
DESCRIPTORS: ACHING;DISCOMFORT
DESCRIPTORS: TIGHTNESS;DISCOMFORT
DESCRIPTORS: DISCOMFORT
DESCRIPTORS: ACHING
DESCRIPTORS: CRAMPING

## 2024-10-02 ASSESSMENT — PAIN DESCRIPTION - FREQUENCY
FREQUENCY: CONTINUOUS
FREQUENCY: CONTINUOUS

## 2024-10-02 ASSESSMENT — PAIN DESCRIPTION - DIRECTION: RADIATING_TOWARDS: BILATERAL LEGS

## 2024-10-02 NOTE — ANESTHESIA PRE PROCEDURE
Department of Anesthesiology  Preprocedure Note       Name:  Nikki Wang   Age:  53 y.o.  :  1970                                          MRN:  [id number]         Date:  10/2/2024      Surgeon: Surgeon(s):  Jesse Tao MD    Procedure: Procedure(s):  REMOVAL OF HARDWARE L5-S1, L3-L4, L4-L5 PLIF WITH HARDWARE REMOVAL/CORTERA    Medications prior to admission:   Prior to Admission medications    Medication Sig Start Date End Date Taking? Authorizing Provider   levothyroxine (SYNTHROID) 150 MCG tablet Take 1 tablet by mouth Daily 10/1/24  Yes Leslie Storm APRN - CNP   metoprolol tartrate (LOPRESSOR) 25 MG tablet Take 1 tablet by mouth 2 times daily 24  Yes Leslie Storm APRN - CNP   lisinopril (PRINIVIL;ZESTRIL) 20 MG tablet Take 1 tablet by mouth daily 24  Yes Leslie Storm APRN - CNP   amLODIPine (NORVASC) 10 MG tablet Take 1 tablet by mouth daily 24  Yes Leslie Storm APRN - CNP   famotidine (PEPCID) 20 MG tablet Take 1 tablet by mouth daily as needed (GERD) 24   Leslie Storm APRN - CNP   Multiple Vitamins-Minerals (THERAPEUTIC MULTIVITAMIN-MINERALS) tablet Take 1 tablet by mouth daily    Provider, MD [place]       Current medications:    Current Facility-Administered Medications   Medication Dose Route Frequency Provider Last Rate Last Admin   • sodium chloride flush 0.9 % injection 5-40 mL  5-40 mL IntraVENous 2 times per day Paco Krishnamurthy PA-C       • sodium chloride flush 0.9 % injection 5-40 mL  5-40 mL IntraVENous PRN Paco Krishnamurthy PA-C       • 0.9 % sodium chloride infusion   IntraVENous Continuous Paco Krishnamurthy PA-C       • ceFAZolin (ANCEF) 2,000 mg in sterile water 20 mL IV syringe  2,000 mg IntraVENous On Call to OR Paco Krishnamurthy PA-C           Allergies:    Allergies   Allergen Reactions   • Gluten Meal    • Lactose Intolerance (Gi)    • Pcn [Penicillins] Rash       Problem List:    Patient Active Problem List   Diagnosis Code   •

## 2024-10-02 NOTE — BRIEF OP NOTE
Brief Postoperative Note      Patient: Nikki Wang  YOB: 1970  MRN: [id number]    Date of Procedure: 10/2/2024    Pre-Op Diagnosis Codes:      * Spinal stenosis, lumbar region with neurogenic claudication [M48.062]    Post-Op Diagnosis: Same  CLIFTON and assessment of L5-S1  L2-L3, L3-L4, L4-L5 PLIF  L2-S1 instrumentation    Surgeon(s):  Jesse Tao MD    Assistant:  David Espitia PAC    Anesthesia: General    Estimated Blood Loss (mL): 1300 ml with 463 ml cell saver    Complications: None    Specimens:   * No specimens in log *    Implants:  Implant Name Type Inv. Item Serial No.  Lot No. LRB No. Used Action   GRAFT BNE LG - [id number]  GRAFT BNE LG [id number] BIOLOGICA  N/A 1 Implanted   GRAFT BNE 5 CC DBM FIBREX - [id number]  GRAFT BNE 5 CC DBM FIBREX  SURG[employer]  N/A 1 Implanted   CAGE SPNL 6 DEG [id number] MM TIPLUS TECHNOLOGY [employer] - [id number]  CAGE SPNL 6 DEG [id number] MM TIPLUS TECHNOLOGY FORTILINK-Wild Needle  SURG[employer]Pointer [employer] [id number] N/A 1 Implanted   CAGE SPNL 6 DEG [id number] MM TIPLUS TECHNOLOGY [employer] - [id number]  CAGE SPNL 6 DEG [id number] MM TIPLUS TECHNOLOGY [employer]TIL[employer]-Wild Needle  SURGALIGN [employer] [id number] N/A 1 Implanted   CAGE SPNL 6 DEG [id number] MM TIPLUS TECHNOLOGY [employer] - [id number]  CAGE SPNL 6 DEG [id number] MM TIPLUS TECHNOLOGY FORTILINK-Wild Needle  SUR[employer]IGN [employer] [id number] N/A 1 Implanted   GRAFT BNE LG - [id number]  GRAFT BNE LG [id number] BIOLOGICA  N/A 1 Implanted   GRAFT BNE 5 CC DBM FIBREX - [id number]  GRAFT BNE 5 CC DBM FIBREX  SURG[employer]  N/A 1 Implanted   ALLOGRAFT BNE BRIDGE 50X25 MM 24 CC BIOADAPT - [id number]  ALLOGRAFT BNE BRIDGE 50X25 MM 24 CC BIOADAPT [id number] SURGNullPointer [employer] [id number] N/A 1 Implanted   SCREW POLY SOLID 6.5X50 - [id number]  SCREW POLY SOLID 6.5X50  [facility]  N/A 8 Implanted   SCREW POLY SOLID 7.5X50 -  [id number]  SCREW POLY SOLID 7.5X50  [employer]UR[employer]IGN [employer]  N/A 2 Implanted   EUNICE TI PREBENT 5.5X120 - [id number]  EUNICE TI PREBENT 5.5X120  [employer]  N/A 2 Implanted   SET SCREW 5.5-6.0 - [id number]  SET SCREW 5.5-6.0  [employer]  N/A 10 Implanted         Drains:   Closed/Suction Drain Left;Inferior Back Accordion (Active)       Closed/Suction Drain Inferior;Lateral;Right Back Accordion (Active)       Negative Pressure Wound Therapy Back Lower;Medial (Active)   Dressing Type Other (Comment) 10/02/24 1318   Cycle On 10/02/24 1318       Urinary Catheter 10/02/24 Lao-Temperature (Active)       Findings:  Infection Present At Time Of Surgery (PATOS) (choose all levels that have infection present):  No infection present  Other Findings: same    Electronically signed by Jesse Tao MD on 10/2/2024 at 1:27 PM

## 2024-10-02 NOTE — ANESTHESIA PROCEDURE NOTES
Arterial Line:    An arterial line was placed using surface landmarks, in the OR for the following indication(s): continuous blood pressure monitoring.    A 20 gauge (size), 1 and 1/4 inch (length), Angiocath (type) catheter was placed, Seldinger technique used, into the left radial artery, secured by tape and Tegaderm.  Anesthesia type: General    Events:  patient tolerated procedure well with no complications.10/2/2024 9:42 AM10/2/2024 9:46 AM  Anesthesiologist: Bassem Thayer DO  Performed: Anesthesiologist   Preanesthetic Checklist  Completed: patient identified, IV checked, site marked, risks and benefits discussed, surgical/procedural consents, equipment checked, pre-op evaluation, timeout performed, anesthesia consent given, oxygen available, monitors applied/VS acknowledged, fire risk safety assessment completed and verbalized and blood product R/B/A discussed and consented

## 2024-10-02 NOTE — H&P
I have reviewed the history and physical and examined the patient.  I find no relevant changes.  I have reviewed with the patient and/or family members, during the preoperative office visit the risks, benefits, and alternatives to the procedure.    Jesse Tao MD

## 2024-10-02 NOTE — PROGRESS NOTES
Pt admitted to  7K10 via bed from PACU.     Complaints: surgery.      IV normal saline infusing into the hand right, condition patent and no redness at a rate of 125 mls/ hour with about 400 mls in the bag still. IV site free of s/s of infection or infiltration. Vital signs obtained. Assessment and data collection initiated.     Two nurse skin assessment performed by Darline SALINAS and Tigist SOTO. Oriented to room.     Explained patients right to have family, representative or physician notified of their admission.  Patient has Declined for physician to be notified.  Patient has Declined for family/representative to be notified.    The patient is interested in [facility]’s meds to beds program?:  No    Policies and procedures for  explained. All questions answered with no further questions at this time. Fall prevention and safety brochure discussed with patient.  Bed alarm on. Call light in reach.

## 2024-10-02 NOTE — H&P
[address] [address]                            PREOPERATIVE H&P      PATIENT NAME: JEFE MONTGOMERY              : 1970  MED REC NO: [id number]                       ROOM:   ACCOUNT NO: [id number]                       ADMIT DATE: 10/02/2024  PROVIDER: Josette Berg CNP      PRIMARY CARE PHYSICIAN:  Leslie Storm CNP    Patient of Dr. Jesse Tao, undergoing surgery on the date of 10/02/2024 at [facility] for removal of hardware at L5-S1; L3-L4, L4-L5 PLIF.    CHIEF COMPLAINT:  Low back pain and bilateral lower extremity pain.    HISTORY OF PRESENT ILLNESS:  The patient is a 53-year-old who has been struggling with significant back pain and bilateral lower extremity radicular symptoms.  She is a patient who complains of bilateral lower extremity leg pain in which significant numbness and tingling in the anterior thighs and down to the lateral thigh and lateral calf.  She does feel weakness in her legs with standing and walking, in which she feels significantly limited with only ability to stand to 10-15 minutes at a time in which she notes sitting for relief of her symptoms, which do improve.  She feels she is significantly limited with her functional ability on a day-to-day basis due to her sensation of leg heaviness and the weakness.  She is a patient who has been through a previous L5-S1 fusion back in  in which she noted significant improvement of her preoperative radicular symptoms at that time.  She notes these symptoms have progressively worsened dating back since May 2024.  She has attempted multiple conservative treatments including gabapentin, formal physical therapy, over-the-counter anti-inflammatories, and formal physical therapy, all with minimal to no substantial relief.  Due to failed conservative treatments and significant findings seen on the MRI with evidence of adjacent level stenosis  and walking for periods of time, in which she noted significant relief with sitting.  Due to ongoing symptoms and failed conservative treatment and significant decline in her activities of daily living due to her symptoms, she has decided to proceed with definitive treatment with surgical intervention with Dr. Jesse Tao.  She has been cleared by her PCP, Leslie Storm CNP; and her cardiologist, Dr. Tsang.  Informed consent obtained.  Risks explained including postop pain, wound infection, blood loss requiring transfusion, nerve injury, CSF leak.  Questions and concerns have been addressed.  No guarantees given for the results of surgery.          HERNAN ALMARAZ CNP      D:  10/01/2024 19:22:48     T:  10/01/2024 22:34:30     ALVARO/RICK  Job #:  [id number]     Doc#:  [id number]

## 2024-10-02 NOTE — ANESTHESIA POSTPROCEDURE EVALUATION
Department of Anesthesiology  Postprocedure Note    Patient: Nikki Wang  MRN: [id number]  YOB: 1970  Date of evaluation: 10/2/2024    Procedure Summary       Date: 10/02/24 Room / Location: [facility] OR [address] [place] [address] OR    Anesthesia Start: 0932 Anesthesia Stop: 1408    Procedure: REMOVAL OF HARDWARE L5-S1, L3-L4 and L4-L5 Posterior Lateral Interbody Fusion with Local Bone Graft, and Demineralized Bone Matrix and Instrumentation (Spine Lumbar) Diagnosis:       Spinal stenosis, lumbar region with neurogenic claudication      (Spinal stenosis, lumbar region with neurogenic claudication [M48.062])    Surgeons: Jesse Tao MD Responsible Provider: Bassem Thayer DO    Anesthesia Type: general ASA Status: 3            Anesthesia Type: No value filed.    Brant Phase I: Brant Score: 8    Brant Phase II:      Anesthesia Post Evaluation    Patient location during evaluation: PACU  Patient participation: complete - patient participated  Level of consciousness: awake and alert  Pain score: 4  Airway patency: patent  Nausea & Vomiting: no nausea and no vomiting  Cardiovascular status: hemodynamically stable and blood pressure returned to baseline  Respiratory status: spontaneous ventilation, acceptable and nasal cannula  Hydration status: stable  Pain management: adequate and satisfactory to patient    No notable events documented.

## 2024-10-02 NOTE — PROGRESS NOTES
1404: pt arrives to pacu, respirations unlabored on simple mask 8L. Pt awakens to voice but quickly drifts back to sleep. States pain 8/10, medicated per CRNA.   1420: weaned to room air.   1434: discussed pt room with David ABERNATHY and Dr Thayer. Pt is good to go to 7.   1439: medicated with 10 mg oxycodone.  1440: 50 mcg fentanyl for pain  1445: 50 mcg fentanyl  1455: pt resting comfortably in bed.   1509: pt meets criteria for discharge from pacu at this time. Left radial ART line removed  1524: Report called to Darline SALINAS on 7k. Pt placed for transport.  1537: pt transported to 7 in stable condition

## 2024-10-02 NOTE — PROGRESS NOTES
Patient oriented to Same Day department and admitted to Same Day Surgery room 15.   Patient verbalized approval for first name, last initial with physician name on unit whiteboard.     Plan of care reviewed with patient.   Patient room whiteboard filled out and discussed with patient and responsible adult.   Patient and responsible adult offered Same Day Welcome Packet to review.    Call light in reach.   Bed in lowest position, locked, with one bed rail up.   SCDs and warming blanket in place.  Appropriate arm bands on patient.   Bathroom offered.   All questions and concerns of patient addressed.        Meds to Beds:   Patient informed of [facility] Meds to Beds program during admission. Patient is agreeable to program.   Contact information for the pharmacy and the Meds to Beds program:   Name: Sandra   Relationship to patient:child   Phone number: [phone]

## 2024-10-03 LAB
BASOPHILS ABSOLUTE: 0 THOU/MM3 (ref 0–0.1)
BASOPHILS NFR BLD AUTO: 0.1 %
DEPRECATED RDW RBC AUTO: 44.4 FL (ref 35–45)
EOSINOPHIL NFR BLD AUTO: 0 %
EOSINOPHILS ABSOLUTE: 0 THOU/MM3 (ref 0–0.4)
ERYTHROCYTE [DISTWIDTH] IN BLOOD BY AUTOMATED COUNT: 12.7 % (ref 11.5–14.5)
HCT VFR BLD AUTO: 34 % (ref 37–47)
HGB BLD-MCNC: 10.6 GM/DL (ref 12–16)
IMM GRANULOCYTES # BLD AUTO: 0.04 THOU/MM3 (ref 0–0.07)
IMM GRANULOCYTES NFR BLD AUTO: 0.3 %
LYMPHOCYTES ABSOLUTE: 1.3 THOU/MM3 (ref 1–4.8)
LYMPHOCYTES NFR BLD AUTO: 10.5 %
MCH RBC QN AUTO: 29.9 PG (ref 26–33)
MCHC RBC AUTO-ENTMCNC: 31.2 GM/DL (ref 32.2–35.5)
MCV RBC AUTO: 95.8 FL (ref 81–99)
MONOCYTES ABSOLUTE: 0.6 THOU/MM3 (ref 0.4–1.3)
MONOCYTES NFR BLD AUTO: 4.9 %
NEUTROPHILS ABSOLUTE: 10.2 THOU/MM3 (ref 1.8–7.7)
NEUTROPHILS NFR BLD AUTO: 84.2 %
NRBC BLD AUTO-RTO: 0 /100 WBC
PLATELET # BLD AUTO: 256 THOU/MM3 (ref 130–400)
PMV BLD AUTO: 10.7 FL (ref 9.4–12.4)
RBC # BLD AUTO: 3.55 MILL/MM3 (ref 4.2–5.4)
WBC # BLD AUTO: 12.1 THOU/MM3 (ref 4.8–10.8)

## 2024-10-03 PROCEDURE — 0QP004Z REMOVAL OF INTERNAL FIXATION DEVICE FROM LUMBAR VERTEBRA, OPEN APPROACH: ICD-10-PCS | Performed by: ORTHOPAEDIC SURGERY

## 2024-10-03 PROCEDURE — 0QP104Z REMOVAL OF INTERNAL FIXATION DEVICE FROM SACRUM, OPEN APPROACH: ICD-10-PCS | Performed by: ORTHOPAEDIC SURGERY

## 2024-10-03 PROCEDURE — 97535 SELF CARE MNGMENT TRAINING: CPT

## 2024-10-03 PROCEDURE — 0SG10AJ FUSION OF 2 OR MORE LUMBAR VERTEBRAL JOINTS WITH INTERBODY FUSION DEVICE, POSTERIOR APPROACH, ANTERIOR COLUMN, OPEN APPROACH: ICD-10-PCS | Performed by: ORTHOPAEDIC SURGERY

## 2024-10-03 PROCEDURE — 0ST20ZZ RESECTION OF LUMBAR VERTEBRAL DISC, OPEN APPROACH: ICD-10-PCS | Performed by: ORTHOPAEDIC SURGERY

## 2024-10-03 PROCEDURE — 6370000000 HC RX 637 (ALT 250 FOR IP): Performed by: PHYSICIAN ASSISTANT

## 2024-10-03 PROCEDURE — 01NB0ZZ RELEASE LUMBAR NERVE, OPEN APPROACH: ICD-10-PCS | Performed by: ORTHOPAEDIC SURGERY

## 2024-10-03 PROCEDURE — 2580000003 HC RX 258: Performed by: PHYSICIAN ASSISTANT

## 2024-10-03 PROCEDURE — 01NR0ZZ RELEASE SACRAL NERVE, OPEN APPROACH: ICD-10-PCS | Performed by: ORTHOPAEDIC SURGERY

## 2024-10-03 PROCEDURE — 85025 COMPLETE CBC W/AUTO DIFF WBC: CPT

## 2024-10-03 PROCEDURE — 1200000000 HC SEMI PRIVATE

## 2024-10-03 PROCEDURE — 97162 PT EVAL MOD COMPLEX 30 MIN: CPT

## 2024-10-03 PROCEDURE — 6360000002 HC RX W HCPCS: Performed by: PHYSICIAN ASSISTANT

## 2024-10-03 PROCEDURE — 97110 THERAPEUTIC EXERCISES: CPT

## 2024-10-03 PROCEDURE — 97166 OT EVAL MOD COMPLEX 45 MIN: CPT

## 2024-10-03 PROCEDURE — 97116 GAIT TRAINING THERAPY: CPT

## 2024-10-03 PROCEDURE — 36415 COLL VENOUS BLD VENIPUNCTURE: CPT

## 2024-10-03 PROCEDURE — 0SG30K1 FUSION OF LUMBOSACRAL JOINT WITH NONAUTOLOGOUS TISSUE SUBSTITUTE, POSTERIOR APPROACH, POSTERIOR COLUMN, OPEN APPROACH: ICD-10-PCS | Performed by: ORTHOPAEDIC SURGERY

## 2024-10-03 PROCEDURE — 97530 THERAPEUTIC ACTIVITIES: CPT

## 2024-10-03 PROCEDURE — 01N10ZZ RELEASE CERVICAL NERVE, OPEN APPROACH: ICD-10-PCS | Performed by: ORTHOPAEDIC SURGERY

## 2024-10-03 RX ADMIN — Medication 12.5 MG: at 08:33

## 2024-10-03 RX ADMIN — OXYCODONE 10 MG: 5 TABLET ORAL at 06:06

## 2024-10-03 RX ADMIN — LEVOTHYROXINE SODIUM 150 MCG: 0.15 TABLET ORAL at 06:07

## 2024-10-03 RX ADMIN — CYCLOBENZAPRINE 10 MG: 10 TABLET, FILM COATED ORAL at 06:06

## 2024-10-03 RX ADMIN — Medication 1 TABLET: at 08:58

## 2024-10-03 RX ADMIN — OXYCODONE 10 MG: 5 TABLET ORAL at 00:27

## 2024-10-03 RX ADMIN — ONDANSETRON 4 MG: 2 INJECTION INTRAMUSCULAR; INTRAVENOUS at 08:37

## 2024-10-03 RX ADMIN — CYCLOBENZAPRINE 10 MG: 10 TABLET, FILM COATED ORAL at 15:57

## 2024-10-03 RX ADMIN — AMLODIPINE BESYLATE 10 MG: 10 TABLET ORAL at 08:34

## 2024-10-03 RX ADMIN — OXYCODONE 10 MG: 5 TABLET ORAL at 16:22

## 2024-10-03 RX ADMIN — METOPROLOL TARTRATE 25 MG: 25 TABLET, FILM COATED ORAL at 08:34

## 2024-10-03 RX ADMIN — WATER 2000 MG: 1 INJECTION INTRAMUSCULAR; INTRAVENOUS; SUBCUTANEOUS at 03:32

## 2024-10-03 RX ADMIN — POLYETHYLENE GLYCOL 3350 17 G: 17 POWDER, FOR SOLUTION ORAL at 08:34

## 2024-10-03 RX ADMIN — OXYCODONE 10 MG: 5 TABLET ORAL at 20:41

## 2024-10-03 RX ADMIN — LISINOPRIL 20 MG: 20 TABLET ORAL at 08:34

## 2024-10-03 RX ADMIN — OXYCODONE 10 MG: 5 TABLET ORAL at 12:18

## 2024-10-03 RX ADMIN — SODIUM CHLORIDE, PRESERVATIVE FREE 10 ML: 5 INJECTION INTRAVENOUS at 08:37

## 2024-10-03 ASSESSMENT — PAIN DESCRIPTION - DESCRIPTORS
DESCRIPTORS: ACHING;DISCOMFORT
DESCRIPTORS: ACHING;DISCOMFORT
DESCRIPTORS: DISCOMFORT

## 2024-10-03 ASSESSMENT — PAIN DESCRIPTION - FREQUENCY: FREQUENCY: CONTINUOUS

## 2024-10-03 ASSESSMENT — PAIN SCALES - GENERAL
PAINLEVEL_OUTOF10: 7
PAINLEVEL_OUTOF10: 8
PAINLEVEL_OUTOF10: 4
PAINLEVEL_OUTOF10: 8
PAINLEVEL_OUTOF10: 3
PAINLEVEL_OUTOF10: 9
PAINLEVEL_OUTOF10: 7
PAINLEVEL_OUTOF10: 7
PAINLEVEL_OUTOF10: 8

## 2024-10-03 ASSESSMENT — PAIN DESCRIPTION - LOCATION
LOCATION: BACK

## 2024-10-03 ASSESSMENT — PAIN DESCRIPTION - PAIN TYPE: TYPE: SURGICAL PAIN

## 2024-10-03 ASSESSMENT — PAIN DESCRIPTION - ORIENTATION
ORIENTATION: MID;LOWER
ORIENTATION: POSTERIOR;LOWER
ORIENTATION: LOWER
ORIENTATION: POSTERIOR;LOWER

## 2024-10-03 ASSESSMENT — PAIN - FUNCTIONAL ASSESSMENT
PAIN_FUNCTIONAL_ASSESSMENT: ACTIVITIES ARE NOT PREVENTED

## 2024-10-03 ASSESSMENT — PAIN DESCRIPTION - ONSET: ONSET: ON-GOING

## 2024-10-03 NOTE — PLAN OF CARE
Problem: Discharge Planning  Goal: Discharge to home or other facility with appropriate resources  Outcome: Progressing  Flowsheets (Taken 10/3/2024 0811)  Discharge to home or other facility with appropriate resources: Identify barriers to discharge with patient and caregiver     Problem: Pain  Goal: Verbalizes/displays adequate comfort level or baseline comfort level  Outcome: Progressing  Flowsheets (Taken 10/3/2024 0811)  Verbalizes/displays adequate comfort level or baseline comfort level:   Encourage patient to monitor pain and request assistance   Administer analgesics based on type and severity of pain and evaluate response   Assess pain using appropriate pain scale   Implement non-pharmacological measures as appropriate and evaluate response     Problem: Safety - Adult  Goal: Free from fall injury  Outcome: Progressing  Flowsheets (Taken 10/3/2024 0811)  Free From Fall Injury: Instruct family/caregiver on patient safety     Problem: Skin/Tissue Integrity - Adult  Goal: Skin integrity remains intact  Outcome: Progressing  Note: No new skin breakdown this shift     Problem: Gastrointestinal - Adult  Goal: Maintains or returns to baseline bowel function  Outcome: Progressing  Flowsheets (Taken 10/3/2024 0811)  Maintains or returns to baseline bowel function:   Assess bowel function   Administer ordered medications as needed     Problem: Genitourinary - Adult  Goal: Absence of urinary retention  Outcome: Progressing  Flowsheets (Taken 10/3/2024 0811)  Absence of urinary retention:   Assess patient’s ability to void and empty bladder   Monitor intake/output and perform bladder scan as needed     Problem: Metabolic/Fluid and Electrolytes - Adult  Goal: Electrolytes maintained within normal limits  Outcome: Progressing  Flowsheets (Taken 10/3/2024 0811)  Electrolytes maintained within normal limits: Monitor labs and assess patient for signs and symptoms of electrolyte imbalances   Care plan reviewed with

## 2024-10-03 NOTE — PROGRESS NOTES
Department of Orthopedic Surgery  Spine Service  Progress Note        Subjective:   10/3/24  Nikki is resting in the chair. Surgical pain as expected. Improved leg radiculopathy. Reports decreased sensation to left foot, most likely nerve irritation, will monitor. Ready to work with therapy. Remove ramos today. Soft BP, monitor. Hold BP meds if SBP <110    Vitals  VITALS:  BP 96/64   Pulse 61   Temp 97.7 °F (36.5 °C) (Oral)   Resp 15   Ht 1.6 m (5' 3\")   Wt 111.9 kg (246 lb 9.6 oz)   SpO2 98%   BMI 43.68 kg/m²   24HR INTAKE/OUTPUT:    Intake/Output Summary (Last 24 hours) at 10/3/2024 0648  Last data filed at 10/3/2024 0456  Gross per 24 hour   Intake 3963 ml   Output 3520 ml   Net 443 ml     URINARY CATHETER OUTPUT (Ramos):  Urinary Catheter 10/02/24 Xbmro-Vnqmbylllsb-Aduqxj (mL): 700 mL  DRAIN/TUBE OUTPUT:  Closed/Suction Drain Left;Inferior Back Accordion-Output (ml): 0 ml  Closed/Suction Drain Inferior;Lateral;Right Back Accordion-Output (ml): 150 ml  VENT SETTINGS:     Additional Respiratory Assessments  Pulse: 61  Respirations: 15  SpO2: 98 %      PHYSICAL EXAM:    Orientation:  alert and oriented to person, place and time    Incision:  dressing in place, clean, dry, and intact    Lower Extremity Motor :  quadriceps, extensor hallucis longus, dorsiflexion, plantarflexion 5/5 bilaterally  Lower Extremity Sensory:  Abnormal:  decreased sensation to dorsal left foot compared to right, otherwise intact to light touch    Flatus:  negative    ABNORMAL EXAM FINDINGS:  none    LABS:  No results for input(s): \"HGB\", \"HCT\" in the last 72 hours.    ASSESSMENT AND PLAN:    Post operative day 1    1:  Monitor labs, vitals and drain output  2:  Activity Level:  OOB with therapy and staff  3:  Pain Control:  controlled, continue current regimen. Hold is SBP <90  4:  Discharge Planning:  Pending clinical course  5:  Remove ramos today    Electronically signed by Paco Krishnamurthy PA-C on 10/3/2024 at 6:46 AM

## 2024-10-03 NOTE — CARE COORDINATION
Case Management Assessment Initial Evaluation    Date/Time of Evaluation: 10/3/2024 7:58 AM  Assessment Completed by: Shanna Guzman RN    If patient is discharged prior to next notation, then this note serves as note for discharge by case management.    Patient Name: Nikki Wang                   YOB: 1970  Diagnosis: Spinal stenosis, lumbar region with neurogenic claudication [M48.062]  Spinal stenosis of lumbar region with neurogenic claudication [M48.062]                   Date / Time: 10/2/2024  6:26 AM  Location: [address] [place] [address]     Patient Admission Status: Inpatient   Readmission Risk Low 0-14, Mod 15-19), High > 20: Readmission Risk Score: 7    Current PCP: Leslie Storm APRN - CNP  Health Care Decision Makers:     Additional Case Management Notes: planned surgery by Dr Tao    Procedures:   10/2: CLIFTON and assessment of L5-S1  L2-L3, L3-L4, L4-L5 PLIF  L2-S1 instrumentation with EBL 1300 ml with 463 ml cell saver with 2 lumbar drains placed and also has Prevena wound VAC in place.  Imaging:  na    Patient Goals/Plan/Treatment Preferences: Met  with Nikki, her daughter, and M-I-L.  She lives home alone, has family and friend support, HCDM verified. She requested RW and has some DME (see lsit).  She has HH list; first choice SR  not accepting pts at this time.    1:55 PM  Second choice: [facility]. Called referral to Kitty at  agency. Pt accepted.          Nikki Wang was evaluated today and a DME order was entered for a wheeled walker because she requires this to successfully complete daily living tasks of toileting, personal cares, and ambulating.  A wheeled walker is necessary due to the patient's unsteady gait, upper body weakness, and inability to  an ambulation device; and she can ambulate only by pushing a walker instead of a lesser assistive device such as a cane, crutch, or standard walker.  The need for this equipment was discussed with the patient and she  understands and is in agreement.             10/03/24 1136   Service Assessment   Patient Orientation Alert and Oriented   Cognition Alert   History Provided By Patient   Primary Caregiver Self   Accompanied By/Relationship daughter Sandra, and M-I-L   Support Systems Children;Family Members   Patient's Healthcare Decision Maker is: Patient Declined (Legal Next of Kin Remains as Decision Maker)   PCP Verified by CM Yes   Last Visit to PCP Within last 3 months   Prior Functional Level Independent in ADLs/IADLs   Current Functional Level Assistance with the following:   Can patient return to prior living arrangement Yes   Ability to make needs known: Good   Family able to assist with home care needs: Yes   Would you like for me to discuss the discharge plan with any other family members/significant others, and if so, who? No   Financial Resources Other (Comment)  ([facility])   Community Resources None   CM/SW Referral DME;ADLs/IADLs   Social/Functional History   Lives With Alone   Type of Home House   Home Layout One level   Active  Yes   Discharge Planning   Type of Residence House   Living Arrangements Alone   Current Services Prior To Admission Durable Medical Equipment   Current DME Prior to Arrival Shower Chair  (has reacher, handrails in shower and around toilet)   Potential Assistance Needed Home Care;Durable Medical Equipment   Potential DME Needed Walker   DME Ordered? Walker   Potential Assistance Purchasing Medications No   Type of Home Care Services None   Patient expects to be discharged to: House   Follow Up Appointment: Best Day/Time  Tuesday PM   One/Two Story Residence One story   History of falls? 1   Services At/After Discharge   Transition of Care Consult (CM Consult) Discharge Planning;Home Health   Services At/After Discharge Nursing services;DME;Home Health   Confirm Follow Up Transport Family   Condition of Participation: Discharge Planning   The Plan for Transition of Care is related to the  following treatment goals: rest, pain level controlled, BM

## 2024-10-03 NOTE — PROGRESS NOTES
[facility]  INPATIENT OCCUPATIONAL THERAPY  [facility] ORTHOPEDICS 7K  EVALUATION      Discharge Recommendations: Home with assist PRN (Pt reports her daughter is going to stay with her for a few days upon DC)  Equipment Recommendations: Yes        Time In: 0800  Time Out: 09  Timed Code Treatment Minutes: 53 Minutes  Minutes: 80     Variance: -15    Date: 10/3/2024  Patient Name: Nikki Wang,   Gender: female      MRN: [id number]  : 1970  (53 y.o.)  Referring Practitioner: David Espitia PA-C  Diagnosis: spinal stenosis, lumbar region with neurogenic claudication  Additional Pertinent Hx: Spinal stenosis, lumbar region with neurogenic claudication. INTERVENTION: L2-L3, L3-L4, L4-L5 PLIF    Restrictions/Precautions:  Restrictions/Precautions: Up as Tolerated, Fall Risk  Required Braces or Orthoses  Spinal: Lumbar Corset  Position Activity Restriction  Spinal Precautions: No Bending, No Lifting, No Twisting  Other position/activity restrictions: monitor BP    Subjective  Chart Reviewed: Yes, Orders, Progress Notes, History and Physical    Subjective: Nurse approved OT eval. Pt in bed on OT arrival, pleasant and cooperative. Pt is agreeable to OT eval. Pt reproting the back brace she received is too small. Nurse reports ortho spine okay'd abdominal binder until new brace arrived.    Pain: does not c/o pain throughout session     Vitals: Vitals not assessed per clinical judgement, see nursing flowsheet    Social/Functional History:  Lives With: Alone  Type of Home: House  Home Layout: One level  Home Access: Level entry  Home Equipment: Walker - Rolling, Reacher (bed cane)   Bathroom Shower/Tub: Walk-in shower  Bathroom Equipment: Shower chair, Safety frame       ADL Assistance: Independent  Homemaking Assistance: Independent  Ambulation Assistance: Independent  Transfer Assistance: Independent    Active : Yes  Occupation: Full time employment  Type of Occupation: works at [employer]

## 2024-10-03 NOTE — OP NOTE
[address] [address]                            OPERATIVE REPORT      PATIENT NAME: JEFE MONTGOMERY              : 1970  MED REC NO: [id number]                       ROOM: [facility]                                               (General) POOL                                               RM    ACCOUNT NO: [id number]                       ADMIT DATE: 10/02/2024  PROVIDER: Jesse Tao MD      DATE OF PROCEDURE:  10/02/2024    SURGEON:  Jesse Tao MD    PREOPERATIVE DIAGNOSES:    1. Lumbar spinal stenosis.  2. Status post previous L5-S1 laminectomy and fusion in [place], [place], year .  3. Lumbar radiculopathy, bilateral, left worse than right.  4. Obesity, body mass index of 43.68, as the patient is 5 feet 3 inches in height and 246 pounds in weight.    POSTOPERATIVE DIAGNOSES:    1. Lumbar spinal stenosis.  2. Status post previous L5-S1 laminectomy and fusion in [place], [place], year .  3. Lumbar radiculopathy, bilateral, left worse than right.  4. Obesity, body mass index of 43.68, as the patient is 5 feet 3 inches in height and 246 pounds in weight.    PROCEDURES:    1. Removal of hardware including pedicle screws, set screw cap, and bridging david, upper levels of L5-S1.  2. The assessment of fusion of L5-S1 with finding of solid bony union.  3. L4-L5 posterior lumbar interbody fusion and bilateral posterior fusion of the L4-5 level.  4. Insertion of interbody cage fixation of a 10 mm height x 26 mm with lordosis, placed through a left-hand side at L4-5 annulotomy.  5. L4 laminectomy, bilateral.  6. L3-L4 posterior lumbar interbody fusion and bilateral posterior fusion of the L3-4 level.  7. Interbody cage fixation within the L3-L4 level measuring 10 x 26 mm dimension with lordosis, placed through a left-hand side L3-4 annulotomy.  8. L3 laminectomy.  9. L2-L3 posterior lumbar interbody fusion and bilateral posterior fusion  Along the left levels of L2-L3, L3-L4, and L4-L5, left-sided facetectomies were completed to relieve the neurocompression of each of the opening foramina by removal of the inferior pars of L2, L3, L4, and superior facet of L3, L4, and L5 to relieve the neurocompression of the left L2-L3, L3-L4, and L4-L5 neural foramina respectively.  This decompressed the canal very thoroughly in the left-hand side, which was where she showed most neurocompression and symptoms of left-sided radiculopathy.  The annulotomies were then completed over left levels of L2-L3, L3-L4, and L4-L5 while gently protecting and retracting upon the traversing nerve root structures of each of these levels.  The disk could be incised with curette and shaved within each of these interbody surfaces and prepared for fusion.  Once thoroughly prepared in each of the 3 levels, interbody cage fixation was selected from the Xtant Fortilink system, and I used the interbody cage system by Xtant 10 mm in height, 26 mm in length with lordosis at each of these 3 levels with each of the cages filled with use of DBM, a ProteiOS growth factor and inserted the left-hand side L2-L3, L3-L4, and L4-L5 annulotomies with 3 cages being used, one in each of the 3 levels of fusion.  Each of these now well positioned and seated and they fit very well.  I would then go on to set the Spyder retractor after confirming x-ray position of all cage position.  With the use of a ernesto, the pedicles were opened over levels of L2, L3, L4, L5, and S1 were also pre-opened and recapped.  Cannulation was completed with use of the gearshift probe and then decortication complete after tapping over the levels of L2 through L4, bilateral.  We would use the Xtant system [employer] and this would include 6.5 x 50 mm screw used over levels of L2 through L5 and a 7.5 x 50 mm screw placed bilaterally at the S1 level.  All thresholds were tested, measuring better than 20 milliamps throughout and 2 of  the 120 mm rods were then chosen with one of them used per side bilateral.  Five set screw caps were used per side as well.  We found that these fit very well and were seated with appropriate torque.  Irrigation was complete with Irrisept and saline solutions before applying bone grafting and flushing the wound with all irrigation removed.  We would also apply bone graft to combination of the DBM bone graft followed by Xtant 5 cm in length and 10 mL of FibreX mixed with 10 mL of ProteiOS growth factor and the local bone grafting mixed together spanning levels of L2 through L5 and S1, bilateral.  Once completed, x-rays were reviewed.  Then, 2 drains were placed and   to be closed in layers and finish a layered closure, then apply dressings.  We went on to complete the closure, applied dressings and then turned the patient back to a supine position for awakening and extubation, then transfer back to recovery post extubation.    My first assistant has been David Espitia PA-C, throughout the operation without complication.    Due to BMI of over 43.68, we found that the technique and duration of operation also lasted more than 50% than usual because of body habitus.  All counts were correct as well.          TIM LE MD      D:  10/02/2024 21:30:25     T:  10/03/2024 03:28:38     FE/S  Job #:  [id number]     Doc#:  [id number]

## 2024-10-03 NOTE — PLAN OF CARE
Problem: Discharge Planning  Goal: Discharge to home or other facility with appropriate resources  10/3/2024 0814 by Darline Hayes RN  Outcome: Progressing  Flowsheets (Taken 10/3/2024 0811)  Discharge to home or other facility with appropriate resources: Identify barriers to discharge with patient and caregiver     Problem: Pain  Goal: Verbalizes/displays adequate comfort level or baseline comfort level  10/3/2024 0814 by Darline Hayes RN  Outcome: Progressing  Flowsheets (Taken 10/3/2024 0811)  Verbalizes/displays adequate comfort level or baseline comfort level:   Encourage patient to monitor pain and request assistance   Administer analgesics based on type and severity of pain and evaluate response   Assess pain using appropriate pain scale   Implement non-pharmacological measures as appropriate and evaluate response     Problem: Safety - Adult  Goal: Free from fall injury  10/3/2024 0814 by Darline Hayes RN  Outcome: Progressing  Flowsheets (Taken 10/3/2024 0811)  Free From Fall Injury: Instruct family/caregiver on patient safety     Problem: Skin/Tissue Integrity - Adult  Goal: Skin integrity remains intact  10/3/2024 0814 by Darline Hayes RN  Outcome: Progressing  Flowsheets (Taken 10/3/2024 0814)  Skin Integrity Remains Intact: Monitor for areas of redness and/or skin breakdown     Problem: Gastrointestinal - Adult  Goal: Maintains or returns to baseline bowel function  10/3/2024 0814 by Darline Hayes RN  Outcome: Progressing  Flowsheets (Taken 10/3/2024 0811)  Maintains or returns to baseline bowel function:   Assess bowel function   Administer ordered medications as needed     Problem: Genitourinary - Adult  Goal: Absence of urinary retention  10/3/2024 0814 by Darline Hayes RN  Outcome: Progressing  Flowsheets (Taken 10/3/2024 0811)  Absence of urinary retention:   Assess patient’s ability to void and empty bladder   Monitor intake/output and perform bladder scan as needed

## 2024-10-03 NOTE — PROGRESS NOTES
[facility]  INPATIENT PHYSICAL THERAPY  EVALUATION  [facility] ORTHOPEDICS 7K - 7K-10/010-A    Discharge Recommendations:Discharge Recommendations: Continue to assess pending progress  Equipment Recommendations: Yes     Mobility Devices: Walker  Walker: Rolling      Time In: 1020  Time Out: 1100  Timed Code Treatment Minutes: 29 Minutes  Minutes: 40          Date: 10/3/2024  Patient Name: Nikki Wang,  Gender:  female        MRN: [id number]  : 1970  (53 y.o.)      Referring Practitioner: David Espitia PA-C  Diagnosis: Spinal stenosis of lumbar region with neurogenic claudication  Additional Pertinent Hx: The patient is 53 years of age having significant low back and left lower extremity pain, unimproved despite conservative care. Lumbar spinal stenosis.   Status post previous L5-S1 laminectomy and fusion in [place], [place], year .  Lumbar radiculopathy, bilateral, left worse than right.  Obesity, body mass index of 43.68, as the patient is 5 feet 3 inches in height and 246 pounds in weight.  CLIFTON and assessment of L5-S1  L2-L3, L3-L4, L4-L5 PLIF  L2-S1 instrumentation on 10/2/24.     Restrictions/Precautions:  Restrictions/Precautions: Surgical protocol, Fall Risk  Required Braces or Orthoses  Spinal: Lumbar Corset  Position Activity Restriction  Spinal Precautions: No Bending, No Lifting, No Twisting    Subjective:  Chart Reviewed: Yes  Patient assessed for rehabilitation services?: Yes  Subjective: Nurse approved session.  Patient pleasant and cooperative and up in chair when arrived.  Patient states she has been up since 5 am and uncomfortable in bed.    General:  Overall Orientation Status: Within Normal Limits             Pain: 5/10: burning sensation in low back.  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    OBJECTIVE:  Range of Motion:  Right Lower Extremity: WNL  Left Lower Extremity: WFL    Strength:  Right Lower Extremity: WFL  Left Lower Extremity: Impaired - 4-/5    Balance:  Static Sitting Balance:  Independent  Static Standing Balance: Contact Guard Assistance    Bed Mobility:  Not Tested    Transfers:  Sit to Stand: Contact Guard Assistance, with increased time for completion, cues for hand placement  Stand to Sit:Contact Guard Assistance, cues for hand placement    Ambulation:  Contact Guard Assistance  Distance: 250 feet with patient requested WC follow d/t patient c/o (L) LE weakness and fear of buckling.  Instability of limb demonstrated and more significant with turns.  Surface: Level Tile  Device: Rolling Walker  Gait Deviations: Unsteady Gait     Stairs:  Not Tested    Exercise:  Patient was guided in 1 set(s) 15 reps of exercises to both lower extremities: ,Ankle pumps, Quad sets, Hip abduction/adduction, Seated marches, Seated heel/toe raises, and Long arc quads.  Exercises were completed for increased independence with functional mobility.    Functional Outcome Measures:  [facility] (6 CLICK) BASIC MOBILITY  AM-[facility] Inpatient Mobility Raw Score : 18  AM-[facility] Inpatient T-Scale Score : 43.63          Modified [place]:  Premorbid Functional Status: Not Applicable  Current Functional Status:  Not Applicable    ASSESSMENT:  Activity Tolerance:  Patient tolerance of treatment:Good.    Treatment Initiated: Treatment and education

## 2024-10-03 NOTE — PROGRESS NOTES
Pt is a 53y.o. female in 7K-10. Please refer to ACP note for context, re: AD's.     10/03/24 1330   Encounter Summary   Encounter Overview/Reason Advance Care Planning   Service Provided For Patient and family together   Referral/Consult From Multi-disciplinary team   Support System Children;Family members   Last Encounter  10/03/24   Complexity of Encounter Moderate   Begin Time 1330   End Time  1355   Total Time Calculated 25 min   Advance Care Planning   Type ACP conversation   Assessment/Intervention/Outcome   Assessment Calm;Compromised coping;Impaired resilience;Peaceful   Intervention Active listening;Prayer (assurance of)/[place];Explored/Affirmed feelings, thoughts, concerns;Discussed illness injury and it’s impact   Outcome Engaged in conversation;Expressed feelings, needs, and concerns;Expressed Gratitude;Receptive

## 2024-10-03 NOTE — ACP (ADVANCE CARE PLANNING)
Advance Care Planning     Advance Care Planning Inpatient Note  [facility]    Today's Date: 10/3/2024  Unit: [facility] ORTHOPED[facility] 7K    Received request from IDT Member.  Upon review of chart and communication with care team, patient's decision making abilities are not in question.. Patient, Child/Children, and Friends was/were present in the room during visit.    Goals of ACP Conversation:  Discuss advance care planning documents    Health Care Decision Makers:     No healthcare decision makers have been documented.    Summary:  No Decision Maker named by patient at this time    Advance Care Planning Documents (Patient Wishes):  Healthcare Power of /Advance Directive Appointment of Health Care Agent  Living Will/Advance Directive     Assessment:  Pt is a 53y.o. female, lying back in easychair visiting with family members, in 7K-10. Pt daughter and cousin are also in the room. Nikki shared that she has been familiar with AD's for some time, but with the passing of her  a couple years ago and her health challenges, desires to complete. It is unclear whether or not she has existent documentation; her daughter will look into this and bring in, tomorrow, if this is the case-for review and potential update or adding to pt chart. If not updated or she doesn't have them, new documents will then be completed. Following conversation on documents and her sharing about working through grief of losing her  with a support system,  prayed for pt to end the visit-met with gratitude by all concerned.    Interventions:  Requested patient/family to submit existing document for our records: Healthcare Power of /Advance Directive Appointment of Health Care Agent  Living Will/Advance Directive  Provided education on documents for clarity and greater understanding  Encouraged ongoing ACP conversation with future decision makers and loved ones    Care Preferences

## 2024-10-03 NOTE — PROGRESS NOTES
[facility]  OCCUPATIONAL THERAPY MISSED TREATMENT NOTE  [facility] ORTHOPEDICS 7K  7K-10/010-A      Date: 10/3/2024  Patient Name: Nikki Wang        CSN: [id number]   : 1970  (53 y.o.)  Gender: female   Diagnosis: spinal stenosis, lumbar region with neurogenic claudication      Nikki Wang was evaluated today and a DME order was entered for a wheeled walker because she requires this to successfully complete daily living tasks of toileting, personal cares, ambulating, grooming, hygiene, and meal preparation.  A wheeled walker is necessary due to the patient's unsteady gait, upper body weakness, and inability to  an ambulation device; and she can ambulate only by pushing a walker instead of a lesser assistive device such as a cane, crutch, or standard walker.  The need for this equipment was discussed with the patient and she understands and is in agreement.

## 2024-10-04 LAB
BASOPHILS ABSOLUTE: 0 THOU/MM3 (ref 0–0.1)
BASOPHILS NFR BLD AUTO: 0.3 %
DEPRECATED RDW RBC AUTO: 44.7 FL (ref 35–45)
EOSINOPHIL NFR BLD AUTO: 0.4 %
EOSINOPHILS ABSOLUTE: 0.1 THOU/MM3 (ref 0–0.4)
ERYTHROCYTE [DISTWIDTH] IN BLOOD BY AUTOMATED COUNT: 12.8 % (ref 11.5–14.5)
HCT VFR BLD AUTO: 32.6 % (ref 37–47)
HGB BLD-MCNC: 10.4 GM/DL (ref 12–16)
IMM GRANULOCYTES # BLD AUTO: 0.06 THOU/MM3 (ref 0–0.07)
IMM GRANULOCYTES NFR BLD AUTO: 0.5 %
LYMPHOCYTES ABSOLUTE: 2.4 THOU/MM3 (ref 1–4.8)
LYMPHOCYTES NFR BLD AUTO: 19.2 %
MCH RBC QN AUTO: 30.6 PG (ref 26–33)
MCHC RBC AUTO-ENTMCNC: 31.9 GM/DL (ref 32.2–35.5)
MCV RBC AUTO: 95.9 FL (ref 81–99)
MONOCYTES ABSOLUTE: 1 THOU/MM3 (ref 0.4–1.3)
MONOCYTES NFR BLD AUTO: 7.6 %
NEUTROPHILS ABSOLUTE: 9.1 THOU/MM3 (ref 1.8–7.7)
NEUTROPHILS NFR BLD AUTO: 72 %
NRBC BLD AUTO-RTO: 0 /100 WBC
PLATELET # BLD AUTO: 227 THOU/MM3 (ref 130–400)
PMV BLD AUTO: 10.6 FL (ref 9.4–12.4)
RBC # BLD AUTO: 3.4 MILL/MM3 (ref 4.2–5.4)
WBC # BLD AUTO: 12.6 THOU/MM3 (ref 4.8–10.8)

## 2024-10-04 PROCEDURE — 97530 THERAPEUTIC ACTIVITIES: CPT

## 2024-10-04 PROCEDURE — 36415 COLL VENOUS BLD VENIPUNCTURE: CPT

## 2024-10-04 PROCEDURE — 97110 THERAPEUTIC EXERCISES: CPT

## 2024-10-04 PROCEDURE — 6370000000 HC RX 637 (ALT 250 FOR IP): Performed by: PHYSICIAN ASSISTANT

## 2024-10-04 PROCEDURE — 1200000000 HC SEMI PRIVATE

## 2024-10-04 PROCEDURE — 2580000003 HC RX 258: Performed by: PHYSICIAN ASSISTANT

## 2024-10-04 PROCEDURE — 85025 COMPLETE CBC W/AUTO DIFF WBC: CPT

## 2024-10-04 PROCEDURE — 97116 GAIT TRAINING THERAPY: CPT

## 2024-10-04 PROCEDURE — 97535 SELF CARE MNGMENT TRAINING: CPT

## 2024-10-04 RX ADMIN — SODIUM CHLORIDE, PRESERVATIVE FREE 10 ML: 5 INJECTION INTRAVENOUS at 08:40

## 2024-10-04 RX ADMIN — ACETAMINOPHEN 650 MG: 325 TABLET ORAL at 16:13

## 2024-10-04 RX ADMIN — OXYCODONE 10 MG: 5 TABLET ORAL at 01:19

## 2024-10-04 RX ADMIN — OXYCODONE 10 MG: 5 TABLET ORAL at 08:38

## 2024-10-04 RX ADMIN — OXYCODONE 10 MG: 5 TABLET ORAL at 23:20

## 2024-10-04 RX ADMIN — ONDANSETRON 4 MG: 4 TABLET, ORALLY DISINTEGRATING ORAL at 13:56

## 2024-10-04 RX ADMIN — LEVOTHYROXINE SODIUM 150 MCG: 0.15 TABLET ORAL at 07:02

## 2024-10-04 RX ADMIN — LISINOPRIL 20 MG: 20 TABLET ORAL at 08:38

## 2024-10-04 RX ADMIN — AMLODIPINE BESYLATE 10 MG: 10 TABLET ORAL at 08:38

## 2024-10-04 RX ADMIN — Medication 12.5 MG: at 08:38

## 2024-10-04 RX ADMIN — Medication 1 TABLET: at 08:38

## 2024-10-04 RX ADMIN — POLYETHYLENE GLYCOL 3350 17 G: 17 POWDER, FOR SOLUTION ORAL at 08:38

## 2024-10-04 RX ADMIN — METOPROLOL TARTRATE 25 MG: 25 TABLET, FILM COATED ORAL at 08:38

## 2024-10-04 RX ADMIN — MAGNESIUM HYDROXIDE 30 ML: 1200 LIQUID ORAL at 08:38

## 2024-10-04 ASSESSMENT — PAIN DESCRIPTION - DESCRIPTORS
DESCRIPTORS: ACHING
DESCRIPTORS: TIGHTNESS

## 2024-10-04 ASSESSMENT — PAIN DESCRIPTION - LOCATION
LOCATION: BACK
LOCATION: BACK;LEG
LOCATION: BACK
LOCATION: BACK

## 2024-10-04 ASSESSMENT — PAIN DESCRIPTION - ORIENTATION
ORIENTATION: MID
ORIENTATION: LOWER
ORIENTATION: LOWER;LEFT

## 2024-10-04 ASSESSMENT — PAIN SCALES - GENERAL
PAINLEVEL_OUTOF10: 7
PAINLEVEL_OUTOF10: 6
PAINLEVEL_OUTOF10: 6
PAINLEVEL_OUTOF10: 8
PAINLEVEL_OUTOF10: 8
PAINLEVEL_OUTOF10: 6

## 2024-10-04 ASSESSMENT — PAIN DESCRIPTION - FREQUENCY: FREQUENCY: CONTINUOUS

## 2024-10-04 NOTE — PROGRESS NOTES
A follow-up visit for encouragement and prayer.    10/04/24 1618   Encounter Summary   Service Provided For Patient and family together   [facility]   Support System Family members   Last Encounter  10/04/24   Complexity of Encounter Low   Begin Time 1120   End Time  1145   Total Time Calculated 25 min   Spiritual/Emotional needs   Type Spiritual Support   Assessment/Intervention/Outcome   Assessment Hopeful   Intervention Empowerment        no

## 2024-10-04 NOTE — CARE COORDINATION
10/4/24, 2:21 PM EDT    DISCHARGE ON GOING EVALUATION    Nikki TAPIA [facility] day: 2  Location: -10/010-A Reason for admit: Spinal stenosis, lumbar region with neurogenic claudication [M48.062]  Spinal stenosis of lumbar region with neurogenic claudication [M48.062]     Procedures:   10/2: CLIFTON and assessment of L5-S1  L2-L3, L3-L4, L4-L5 PLIF  L2-S1 instrumentation with EBL 1300 ml with 463 ml cell saver with 2 lumbar drains placed and also has Prevena wound VAC in place.    Imaging since last note: none     Barriers to Discharge: Orthopedic Surgery following. PT/OT. POD 2. Bilateral drain care (R: 465ml L: 160ml out in 24hrs) Needs to have BM. MOM added. Movantik. Glycolax. PO pain control.     PCP: Leslie Storm APRN - CNP  Readmission Risk Score: 8.6    Patient Goals/Plan/Treatment Preferences: Nikki plans to return home alone. New RW at bedside. New [facility] for drain care. Nursing to call [facility] if discharged over the weekend! ([phone]) Updated primary RN Darline.     10/4/24, 2:21 PM EDT    Patient goals/plan/ treatment preferences discussed by  and .  Patient goals/plan/ treatment preferences reviewed with patient/ family.  Patient/ family verbalize understanding of discharge plan and are in agreement with goal/plan/treatment preferences.  Understanding was demonstrated using the teach back method.  AVS provided by RN at time of discharge, which includes all necessary medical information pertaining to the patients current course of illness, treatment, post-discharge goals of care, and treatment preferences.     Services At/After Discharge: DME and Home Health

## 2024-10-04 NOTE — PLAN OF CARE
Problem: Discharge Planning  Goal: Discharge to home or other facility with appropriate resources  Outcome: Progressing  Flowsheets (Taken 10/4/2024 1724)  Discharge to home or other facility with appropriate resources: Identify barriers to discharge with patient and caregiver     Problem: Pain  Goal: Verbalizes/displays adequate comfort level or baseline comfort level  Outcome: Progressing  Flowsheets (Taken 10/4/2024 1724)  Verbalizes/displays adequate comfort level or baseline comfort level:   Encourage patient to monitor pain and request assistance   Assess pain using appropriate pain scale   Implement non-pharmacological measures as appropriate and evaluate response   Administer analgesics based on type and severity of pain and evaluate response     Problem: Safety - Adult  Goal: Free from fall injury  Outcome: Progressing  Flowsheets (Taken 10/4/2024 1724)  Free From Fall Injury: Instruct family/caregiver on patient safety  Note: Patient remained free from falls on this shift     Problem: Skin/Tissue Integrity - Adult  Goal: Skin integrity remains intact  Outcome: Progressing  Flowsheets  Taken 10/4/2024 1724 by Ewa Clinton LPN  Skin Integrity Remains Intact: Monitor for areas of redness and/or skin breakdown  Taken 10/4/2024 1338 by Darline Hayes RN  Skin Integrity Remains Intact: Monitor for areas of redness and/or skin breakdown     Problem: Gastrointestinal - Adult  Goal: Maintains or returns to baseline bowel function  Outcome: Progressing  Flowsheets (Taken 10/4/2024 1724)  Maintains or returns to baseline bowel function:   Assess bowel function   Encourage oral fluids to ensure adequate hydration   Administer ordered medications as needed   Administer IV fluids as ordered to ensure adequate hydration   Encourage mobilization and activity     Problem: Genitourinary - Adult  Goal: Absence of urinary retention  Outcome: Progressing  Flowsheets (Taken 10/4/2024 1724)  Absence of urinary

## 2024-10-04 NOTE — PROGRESS NOTES
[facility]  INPATIENT PHYSICAL THERAPY  DAILY NOTE  [facility] ORTHOPEDICS 7K - 7K-10010-A      Discharge Recommendations: Continue to assess pending progress  Equipment Recommendations: Yes     Mobility Devices: Walker  Walker: Rolling      Time In: 0900  Time Out: 09  Timed Code Treatment Minutes: 38 Minutes  Minutes: 38          Date: 10/4/2024  Patient Name: Nikki Wang,  Gender:  female        MRN: [id number]  : 1970  (53 y.o.)     Referring Practitioner: David Espitia PA-C  Diagnosis: Spinal stenosis of lumbar region with neurogenic claudication  Additional Pertinent Hx: The patient is 53 years of age having significant low back and left lower extremity pain, unimproved despite conservative care. Lumbar spinal stenosis.   Status post previous L5-S1 laminectomy and fusion in [place], [place], year .  Lumbar radiculopathy, bilateral, left worse than right.  Obesity, body mass index of 43.68, as the patient is 5 feet 3 inches in height and 246 pounds in weight.  CLIFTON and assessment of L5-S1  L2-L3, L3-L4, L4-L5 PLIF  L2-S1 instrumentation on 10/2/24.     Prior Level of Function:  Lives With: Alone  Type of Home: House  Home Layout: One level  Home Access: Level entry  Home Equipment:  (She has her MIL walker and looking for one that fits her.)   Bathroom Shower/Tub: Walk-in shower  Bathroom Equipment: Shower chair, Safety frame    ADL Assistance: Independent  Homemaking Assistance: Independent  Ambulation Assistance: Independent  Transfer Assistance: Independent  Active : Yes  Additional Comments: indep with all ADLs and IADLs without AD.    Restrictions/Precautions:  Restrictions/Precautions: Surgical protocol, Fall Risk  Required Braces or Orthoses  Spinal: Lumbar Corset  Position Activity Restriction  Spinal Precautions: No Bending, No Lifting, No Twisting     SUBJECTIVE: RN approves therapy session. Patient resting in recliner, pleasant and agreeable to session. Prolonged period

## 2024-10-04 NOTE — PROGRESS NOTES
[facility] ORTHOPEDICS 7K  Occupational Therapy  Daily Note    Discharge Recommendations: Home with assist as needed  Equipment Recommendations: Yes        Time In: 1130  Time Out: 1223  Timed Code Treatment Minutes: 53 Minutes  Minutes: 53          Date: 10/4/2024  Patient Name: Nikki Wang,   Gender: female      Room: [facility]10/010-A  MRN: [id number]  : 1970  (53 y.o.)  Referring Practitioner: David Espitia PA-C  Diagnosis: spinal stenosis, lumbar region with neurogenic claudication  Additional Pertinent Hx: Spinal stenosis, lumbar region with neurogenic claudication. INTERVENTION: L2-L3, L3-L4, L4-L5 PLIF    Restrictions/Precautions:  Restrictions/Precautions: Surgical protocol, Fall Risk  Required Braces or Orthoses  Spinal: Lumbar Corset  Position Activity Restriction  Spinal Precautions: No Bending, No Lifting, No Twisting     Social/Functional History:  Lives With: Alone  Type of Home: House  Home Layout: One level  Home Access: Level entry  Home Equipment:  (She has her MIL walker and looking for one that fits her.)   Bathroom Shower/Tub: Walk-in shower  Bathroom Equipment: Shower chair, Safety frame       ADL Assistance: Independent  Homemaking Assistance: Independent  Ambulation Assistance: Independent  Transfer Assistance: Independent    Active : Yes  Occupation: Full time employment  Type of Occupation: works at [employer] in [place]  Additional Comments: indep with all ADLs and IADLs without AD.    SUBJECTIVE: RN okayed OT session.  Pt. In room seated in chair pleasant and agreeable to participate.     PAIN: 3/10: low back    Vitals: Orthostatic Blood Pressure: SITTING: Blood Pressure: 111/69, Heart Rate: 76  STANDING: Blood Pressure: 114/70, Heart Rate: 84  Oxygen:98%    COGNITION: WFL    ADL:   Grooming: Stand By Assistance.  To stand at sink and complete oral care, and to wash hair while standing with assistance to wash and rinse with  head held  downward while maintaining precautions.  Chair placed behind due to BP issues last treatment date.   Bathing: Stand By Assistance, with set-up, and with verbal cues .  To stand and complete sponge bath.  Total A required to wash posterior ludin area  Upper Extremity Dressing: Stand By Assistance.  To don corset and to doff   Lower Extremity Dressing: Minimal Assistance, with verbal cues , and with increased time for completion.  Due to not having a reacher available  Footwear Management: Stand By Assistance.  To slide slippers on and off .    IADL:   Not Tested    BALANCE:  Sitting Balance:  Independent. \  Standing Balance: Stand By Assistance.      BED MOBILITY:  Not Tested    TRANSFERS:  Sit to Stand:  Stand By Assistance.    Stand to Sit: Stand By Assistance.      FUNCTIONAL MOBILITY:  Assistive Device: Rolling Walker  Assist Level:  Stand By Assistance.   Distance: To and from bathroom  Slow pace no LOB     ADDITIONAL ACTIVITIES:  Precautions reviewed Pt. Able to recall 2/3 after vcs recalled the 3rd precaution.  Pt. Educated on incorporation of precautions into daily routine at home with tasks such as transport/access and meal prep.  Pt. Voices good understanding and has family support.     LHAE/DME needs reviewed Pt. Reports at this time does not need.        Modified Bannock Scale:  Not Applicable    ASSESSMENT:     Activity Tolerance:  Patient tolerance of  treatment: Good treatment tolerance      Plan: Times Per Week: 6x  Times Per Day: Once a day  Current Treatment Recommendations: Strengthening, Balance training, Functional mobility training, Endurance training, Self-Care / ADL, Safety education & training, Cognitive reorientation, Equipment evaluation, education, & procurement, Home management training    Education:  Learners: Patient  ADL's, IADL's, Precautions, Equipment Education, and Assistive Device Safety    Goals  Short Term Goals  Time Frame for Short Term Goals: until discharge  Short Term Goal

## 2024-10-04 NOTE — PROGRESS NOTES
Department of Orthopedic Surgery  Spine Service  Progress Note        Subjective:   10/3/24  Nikki is resting in the chair. Surgical pain as expected. Improved leg radiculopathy. Reports decreased sensation to left foot, most likely nerve irritation, will monitor. Ready to work with therapy. Remove ramos today. Soft BP, monitor. Hold BP meds if SBP <110    10/4/24  Nikki is resting in the chair. Continued left foot loss of sensation with weakness. Ambulated well with therapy yesterday. Eager to continue. Will work on BM and continue working with therapy today. Added MOM to regimen.     Vitals  VITALS:  BP (!) 110/50   Pulse 76   Temp 98.4 °F (36.9 °C) (Oral)   Resp 16   Ht 1.6 m (5' 3\")   Wt 111.9 kg (246 lb 9.6 oz)   SpO2 99%   BMI 43.68 kg/m²   24HR INTAKE/OUTPUT:    Intake/Output Summary (Last 24 hours) at 10/4/2024 0637  Last data filed at 10/4/2024 0611  Gross per 24 hour   Intake 1270 ml   Output 1875 ml   Net -605 ml     URINARY CATHETER OUTPUT (Ramos):  [REMOVED] Urinary Catheter 10/02/24 Kjzje-Yybtaibfeli-Eawuca (mL): 250 mL  DRAIN/TUBE OUTPUT:  Closed/Suction Drain Left;Inferior Back Accordion-Output (ml): 30 ml  Closed/Suction Drain Inferior;Lateral;Right Back Accordion-Output (ml): 180 ml  VENT SETTINGS:     Additional Respiratory Assessments  Pulse: 76  Respirations: 16  SpO2: 99 %      PHYSICAL EXAM:    Orientation:  alert and oriented to person, place and time    Incision:  dressing in place, clean, dry, and intact    Lower Extremity Motor :  quadriceps, extensor hallucis longus, dorsiflexion, plantarflexion 5/5 bilaterally  Lower Extremity Sensory:  Abnormal:  decreased sensation to dorsal left foot compared to right, otherwise intact to light touch    Flatus:  positive    ABNORMAL EXAM FINDINGS:  none    LABS:  Recent Labs     10/03/24  0550   HGB 10.6*   HCT 34.0*       ASSESSMENT AND PLAN:    Post operative day 2    1:  Monitor labs, vitals and drain output  2:  Activity Level:  OOB with  therapy and staff  3:  Pain Control:  controlled, continue current regimen. Hold is SBP <90  4:  Discharge Planning:  Pending clinical course; potential discharge over weekend pending clinical course  5:  Removed ramos     Electronically signed by Paco Krishnamurthy PA-C on 10/4/2024 at 6:37 AM

## 2024-10-05 VITALS
SYSTOLIC BLOOD PRESSURE: 135 MMHG | RESPIRATION RATE: 18 BRPM | HEIGHT: 63 IN | DIASTOLIC BLOOD PRESSURE: 72 MMHG | TEMPERATURE: 98.2 F | HEART RATE: 86 BPM | WEIGHT: 246.6 LBS | OXYGEN SATURATION: 98 % | BODY MASS INDEX: 43.7 KG/M2

## 2024-10-05 LAB
BASOPHILS ABSOLUTE: 0 THOU/MM3 (ref 0–0.1)
BASOPHILS NFR BLD AUTO: 0.3 %
DEPRECATED RDW RBC AUTO: 42.8 FL (ref 35–45)
EOSINOPHIL NFR BLD AUTO: 1.4 %
EOSINOPHILS ABSOLUTE: 0.1 THOU/MM3 (ref 0–0.4)
ERYTHROCYTE [DISTWIDTH] IN BLOOD BY AUTOMATED COUNT: 12.4 % (ref 11.5–14.5)
HCT VFR BLD AUTO: 30.2 % (ref 37–47)
HGB BLD-MCNC: 9.6 GM/DL (ref 12–16)
IMM GRANULOCYTES # BLD AUTO: 0.03 THOU/MM3 (ref 0–0.07)
IMM GRANULOCYTES NFR BLD AUTO: 0.3 %
LYMPHOCYTES ABSOLUTE: 2.6 THOU/MM3 (ref 1–4.8)
LYMPHOCYTES NFR BLD AUTO: 28.7 %
MCH RBC QN AUTO: 30.1 PG (ref 26–33)
MCHC RBC AUTO-ENTMCNC: 31.8 GM/DL (ref 32.2–35.5)
MCV RBC AUTO: 94.7 FL (ref 81–99)
MONOCYTES ABSOLUTE: 0.6 THOU/MM3 (ref 0.4–1.3)
MONOCYTES NFR BLD AUTO: 6.9 %
NEUTROPHILS ABSOLUTE: 5.6 THOU/MM3 (ref 1.8–7.7)
NEUTROPHILS NFR BLD AUTO: 62.4 %
NRBC BLD AUTO-RTO: 0 /100 WBC
PLATELET # BLD AUTO: 206 THOU/MM3 (ref 130–400)
PMV BLD AUTO: 10.5 FL (ref 9.4–12.4)
RBC # BLD AUTO: 3.19 MILL/MM3 (ref 4.2–5.4)
WBC # BLD AUTO: 9 THOU/MM3 (ref 4.8–10.8)

## 2024-10-05 PROCEDURE — 85025 COMPLETE CBC W/AUTO DIFF WBC: CPT

## 2024-10-05 PROCEDURE — 36415 COLL VENOUS BLD VENIPUNCTURE: CPT

## 2024-10-05 PROCEDURE — 6370000000 HC RX 637 (ALT 250 FOR IP): Performed by: PHYSICIAN ASSISTANT

## 2024-10-05 RX ORDER — HYDROXYZINE PAMOATE 25 MG/1
25 CAPSULE ORAL 3 TIMES DAILY PRN
Qty: 42 CAPSULE | Refills: 0 | Status: SHIPPED | OUTPATIENT
Start: 2024-10-05 | End: 2024-10-19

## 2024-10-05 RX ORDER — OXYCODONE HYDROCHLORIDE 5 MG/1
5 TABLET ORAL EVERY 4 HOURS PRN
Qty: 42 TABLET | Refills: 0 | Status: SHIPPED | OUTPATIENT
Start: 2024-10-05 | End: 2024-10-12

## 2024-10-05 RX ORDER — CYCLOBENZAPRINE HCL 10 MG
10 TABLET ORAL 3 TIMES DAILY PRN
Qty: 42 TABLET | Refills: 0 | Status: SHIPPED | OUTPATIENT
Start: 2024-10-05 | End: 2024-10-19

## 2024-10-05 RX ORDER — AMOXICILLIN 250 MG
1 CAPSULE ORAL DAILY PRN
Qty: 30 TABLET | Refills: 0 | Status: SHIPPED | OUTPATIENT
Start: 2024-10-05

## 2024-10-05 RX ORDER — HYDROXYZINE PAMOATE 25 MG/1
25 CAPSULE ORAL 3 TIMES DAILY PRN
Status: DISCONTINUED | OUTPATIENT
Start: 2024-10-05 | End: 2024-10-05 | Stop reason: HOSPADM

## 2024-10-05 RX ADMIN — Medication 1 TABLET: at 08:38

## 2024-10-05 RX ADMIN — LEVOTHYROXINE SODIUM 150 MCG: 0.15 TABLET ORAL at 05:02

## 2024-10-05 RX ADMIN — OXYCODONE 10 MG: 5 TABLET ORAL at 09:51

## 2024-10-05 RX ADMIN — OXYCODONE 10 MG: 5 TABLET ORAL at 04:31

## 2024-10-05 RX ADMIN — ONDANSETRON 4 MG: 4 TABLET, ORALLY DISINTEGRATING ORAL at 09:51

## 2024-10-05 RX ADMIN — METOPROLOL TARTRATE 25 MG: 25 TABLET, FILM COATED ORAL at 08:38

## 2024-10-05 RX ADMIN — LISINOPRIL 20 MG: 20 TABLET ORAL at 08:38

## 2024-10-05 RX ADMIN — AMLODIPINE BESYLATE 10 MG: 10 TABLET ORAL at 08:38

## 2024-10-05 ASSESSMENT — PAIN SCALES - GENERAL
PAINLEVEL_OUTOF10: 4
PAINLEVEL_OUTOF10: 5
PAINLEVEL_OUTOF10: 7
PAINLEVEL_OUTOF10: 7

## 2024-10-05 ASSESSMENT — PAIN DESCRIPTION - DESCRIPTORS
DESCRIPTORS: ACHING;BURNING
DESCRIPTORS: ACHING

## 2024-10-05 ASSESSMENT — PAIN DESCRIPTION - LOCATION
LOCATION: BACK
LOCATION: BACK

## 2024-10-05 ASSESSMENT — PAIN - FUNCTIONAL ASSESSMENT: PAIN_FUNCTIONAL_ASSESSMENT: ACTIVITIES ARE NOT PREVENTED

## 2024-10-05 ASSESSMENT — PAIN DESCRIPTION - ORIENTATION
ORIENTATION: UPPER;LOWER
ORIENTATION: MID

## 2024-10-05 NOTE — PLAN OF CARE
Problem: Discharge Planning  Goal: Discharge to home or other facility with appropriate resources  10/5/2024 1030 by Tigist Armando LPN  Outcome: Completed     Problem: Pain  Goal: Verbalizes/displays adequate comfort level or baseline comfort level  10/5/2024 1030 by Tigist Armando LPN  Outcome: Completed     Problem: Safety - Adult  Goal: Free from fall injury  10/5/2024 1030 by Tigist Armando LPN  Outcome: Completed     Problem: Skin/Tissue Integrity - Adult  Goal: Skin integrity remains intact  10/5/2024 1030 by Tigist Armando LPN  Outcome: Completed     Problem: Gastrointestinal - Adult  Goal: Maintains or returns to baseline bowel function  10/5/2024 1030 by Tigist Armando LPN  Outcome: Completed     Problem: Genitourinary - Adult  Goal: Absence of urinary retention  10/5/2024 1030 by Tigist Armando LPN  Outcome: Completed     Problem: Metabolic/Fluid and Electrolytes - Adult  Goal: Electrolytes maintained within normal limits  10/5/2024 1030 by Tigist Armando LPN  Outcome: Completed     Problem: Respiratory - Adult  Goal: Achieves optimal ventilation and oxygenation  10/5/2024 1030 by Tigist Armando LPN  Outcome: Completed     Problem: Musculoskeletal - Adult  Goal: Return mobility to safest level of function  10/5/2024 1030 by Tigist Armando LPN  Outcome: Completed   Care plan reviewed with patient.  Patient verbalized understanding of the plan of care and contribute to goal setting.

## 2024-10-05 NOTE — PROGRESS NOTES
Called [facility] and spoke with Mary and informed her patient would be leaving for home today. Per Mary she will notify the [facility] nurse that patient is leaving today. Faxed AVS to [facility] per Mary she will fax to [facility] office when she gets there fax number from them.

## 2024-10-05 NOTE — PLAN OF CARE
Problem: Skin/Tissue Integrity - Adult  Goal: Skin integrity remains intact  10/4/2024 2149 by Kacy Carbone RN  Outcome: Progressing  10/4/2024 1724 by Ewa Clinton LPN  Outcome: Progressing  Flowsheets  Taken 10/4/2024 1724 by Ewa Clinton LPN  Skin Integrity Remains Intact: Monitor for areas of redness and/or skin breakdown  Taken 10/4/2024 1338 by Darline Hayes RN  Skin Integrity Remains Intact: Monitor for areas of redness and/or skin breakdown     Problem: Pain  Goal: Verbalizes/displays adequate comfort level or baseline comfort level  10/4/2024 2149 by Kacy Carbone RN  Outcome: Progressing  10/4/2024 1724 by Ewa Clinton LPN  Outcome: Progressing  Flowsheets (Taken 10/4/2024 1724)  Verbalizes/displays adequate comfort level or baseline comfort level:   Encourage patient to monitor pain and request assistance   Assess pain using appropriate pain scale   Implement non-pharmacological measures as appropriate and evaluate response   Administer analgesics based on type and severity of pain and evaluate response     Problem: Safety - Adult  Goal: Free from fall injury  10/4/2024 2149 by Kacy Carbone, RN  Outcome: Progressing  10/4/2024 1724 by Ewa Clinton LPN  Outcome: Progressing  Flowsheets (Taken 10/4/2024 1724)  Free From Fall Injury: Instruct family/caregiver on patient safety  Note: Patient remained free from falls on this shift

## 2024-10-05 NOTE — PROGRESS NOTES
Department of Orthopedic Surgery  Spine Service  Progress Note        Subjective:   10/3/24  Nikki is resting in the chair. Surgical pain as expected. Improved leg radiculopathy. Reports decreased sensation to left foot, most likely nerve irritation, will monitor. Ready to work with therapy. Remove ramos today. Soft BP, monitor. Hold BP meds if SBP <110    10/4/24  Nikki is resting in the chair. Continued left foot loss of sensation with weakness. Ambulated well with therapy yesterday. Eager to continue. Will work on BM and continue working with therapy today. Added MOM to regimen.     10/5/24  Nikki is ambulating to the restroom with staff. Overall improving. Left foot symptoms tolerable with minimal improvement. +BM. Pain controlled. Planning home today with drains intact and HHC. Reports itching at dressing site. Tolerable but will provide Vistaril prn itching at discharge.     Vitals  VITALS:  /72   Pulse 86   Temp 98.2 °F (36.8 °C) (Oral)   Resp 18   Ht 1.6 m (5' 3\")   Wt 111.9 kg (246 lb 9.6 oz)   SpO2 98%   BMI 43.68 kg/m²   24HR INTAKE/OUTPUT:    Intake/Output Summary (Last 24 hours) at 10/5/2024 1000  Last data filed at 10/5/2024 0823  Gross per 24 hour   Intake 310 ml   Output 1940 ml   Net -1630 ml     URINARY CATHETER OUTPUT (Ramos):  [REMOVED] Urinary Catheter 10/02/24 Orawp-Bekxjpecsbk-Thkkas (mL): 250 mL  DRAIN/TUBE OUTPUT:  Closed/Suction Drain Left;Inferior Back Accordion-Output (ml): 0 ml  Closed/Suction Drain Inferior;Lateral;Right Back Accordion-Output (ml): 90 ml  VENT SETTINGS:     Additional Respiratory Assessments  Pulse: 86  Respirations: 18  SpO2: 98 %      PHYSICAL EXAM:    Orientation:  alert and oriented to person, place and time    Incision:  dressing in place, clean, dry, and intact    Lower Extremity Motor :  quadriceps, extensor hallucis longus, dorsiflexion, plantarflexion 5/5 bilaterally  Lower Extremity Sensory:  Abnormal:  decreased sensation to dorsal left foot compared  to right, otherwise intact to light touch    Flatus:  positive    ABNORMAL EXAM FINDINGS:  none    LABS:  Recent Labs     10/05/24  0735   HGB 9.6*   HCT 30.2*       ASSESSMENT AND PLAN:    Post operative day 3    1:  Monitor labs, vitals and drain output  2:  Activity Level:  OOB with therapy and staff  3:  Pain Control:  controlled, continue current regimen. Hold is SBP <90  4:  Discharge Planning:  Pending clinical course; potential discharge over weekend pending clinical course  5:  Removed ramos     Electronically signed by Paco Krishnamurthy PA-C on 10/5/2024 at 10:00 AM

## 2024-10-05 NOTE — DISCHARGE INSTRUCTIONS
Back Surgery    Activity    No lifting, pushing, or pulling    Up as tolerated at least 3-4 times per day.    Up walking-helps to decrease the risk of blood clots    Wear lory hose as directed per your physician     No driving until cleared by your physician    Back Brace or abdominal binder    If your physician prescribes a brace/abdominal binder, wear back brace at all times.     May remove when in bed or bathing    Follow all back precautions.    Incision Care    Keep back incision dry and intact. Apply clean dry dressing once a day.     May shower  if  no drainage from your incision-unless otherwise directed per your physician    No tub baths-no soaking of incision-no swimming     No heaving lifting of more than 5 lbs/or as directed per your physician       Diet    Increase Fluid/Water intake, eat foods high in fiber; fruits and vegetables to help to prevent constipation    Examples of foods high in fiber    Vegetables      All vegetables, especially asparagus, bean sprouts, broccoli, [place]  sprouts, cabbage, carrots, cauliflower, celery, corn, greens, green beans,  green  pepper, onions, peas, potatoes (with skin), snow peas, spinach,  squash,  sweet potatoes, tomatoes, zucchini      For maximum fiber intake, eat the peels of fruits and vegetables just be sure  to wash them well first.     Fruits    All fruits, especially apples, berries, grapefruits, mangoes, nectarines,  oranges, peaches, pears, dried fruits (figs, dates, prunes, raisins)      Choose raw fruits and vegetables over juice, cooked, or canned raw fruit  has  more fiber. Dried fruit is also a good source of fiber.       Some of the common symptoms of a surgical site infection are:    Symptoms in the area where the surgery took place:     Redness   Drainage   Pus   Pain   Swelling   Bad smell     Prevention of surgical site infection:    Make sure that your healthcare providers clean their hands before examining you - either with soap and water

## 2024-10-05 NOTE — DISCHARGE SUMMARY
Orthopedic Spine Discharge Summary      Patient Identification:   Nikki Wang   : 1970  MRN: [id number]   Account: [id number]      Patient's PCP: Leslie Storm APRN - CNP    Admit Date: 10/2/2024     Discharge Date: 10/5/2024    Admitting Physician: Jesse Tao MD     Discharge Provider: Paco Krishnamurthy PA-C , Dr. Jesse Tao    Discharge Diagnoses:      The patient was seen and examined on day of discharge and this discharge summary is in conjunction with any daily progress note from day of discharge.    Operation Performed:        Hospital Course:    The patient is 53 y.o. female, who presented to our office having symptoms of ***. Due to failed outpatient conservative therapies, *** elected to undergo operative management and underwent surgery at [facility] on *** after which patient was admitted to the 7k floor for continued monitoring and care.    On POD 1, ***. Throughout the hospital stay, strength was maintained 5/5 with resisted knee extension, dorsiflexion, plantar flexion, and great toe extension bilaterally.  Patient advanced bowel function. Ambulation improved with PT. Upon discharge, drains were ***.               Consults:     IP CONSULT TO SPIRITUAL SERVICES  IP CONSULT TO HOME CARE NEEDS    Disposition:  ***    Condition at Discharge: Stable        Discharge Medications:   1. Flexeril  2. Percocet        Discharge Instructions:  No heavy lifting, bending, twisting or vigorous activity. Patient is to take frequent walks to stimulate healing of the low back and strengthening of the legs. Discontinue the use of NSAIDS for 6 weeks after drain removal. No driving until seen back in the office. Patient has a follow up with us in the office in about two weeks. At that time, we will evaluate with lumbar spine x-rays and assess clinical recovery progress.        Electronically signed by Paco Krishnamurthy PA-C on 10/5/2024

## 2024-10-05 NOTE — PROGRESS NOTES
AVS reviewed with patient and daughter. No concerns voiced. Patient informed this nurse that [facility] nurse called her and setup the day she would be there to see her. Patient is aware her medications were called into her pharmacy. Patient escorted to main lobby via wheelchair with all of her belongings. Patients daughter transporting her home.

## 2024-10-07 ENCOUNTER — CARE COORDINATION (OUTPATIENT)
Dept: OTHER | Facility: CLINIC | Age: 54
End: 2024-10-07

## 2024-10-07 NOTE — CARE COORDINATION
Care Transitions Note    Initial Call - Call within 2 business days of discharge: Yes    Attempted to reach patient for transitions of care follow up. Unable to reach patient.    Outreach Attempts:   HIPAA compliant voicemail left for patient.   [employer] message sent.     Patient: Nikki Wang    Patient : 1970   MRN: [id number]    Reason for Admission: Spinal stenosis/ s/p lumbar fusion  Discharge Date: 10/5/24  RURS: Readmission Risk Score: 8.6    Last Discharge Facility       Date Complaint Diagnosis Description Type Department Provider    10/2/24  S/P lumbar fusion Admission (Discharged) [facility]K Jesse Tao MD     Was this an external facility discharge? No    Follow Up Appointment:   Patient does not have a follow up appointment scheduled at time of call.   Future Appointments         Provider Specialty Dept Phone    3/5/2025 9:00 AM Katherine Miller, APRN - CNP Urology [phone]            Plan for follow-up on next business day.      Mila Solomon RN

## 2024-10-08 ENCOUNTER — CARE COORDINATION (OUTPATIENT)
Dept: OTHER | Facility: CLINIC | Age: 54
End: 2024-10-08

## 2024-10-08 NOTE — CARE COORDINATION
Care Transitions Note    Initial Call - Call within 2 business days of discharge: Yes    Attempted to reach patient for transitions of care follow up. Unable to reach patient.    Outreach Attempts:   HIPAA compliant voicemail left for patient.     Patient: Nikki Wang    Patient : 1970   MRN: [id number]    Reason for Admission: S/P lumbar fusion  Discharge Date: 10/5/24  RURS: Readmission Risk Score: 8.6    Last Discharge Facility       Date Complaint Diagnosis Description Type Department Provider    10/2/24  S/P lumbar fusion Admission (Discharged) [facility] Jesse Tao MD     Was this an external facility discharge? No    Follow Up Appointment:   Patient does not have a follow up appointment scheduled at time of call.   Future Appointments         Provider Specialty Dept Phone    3/5/2025 9:00 AM Kathernie Miller, FREDRICK - CNP Urology [phone]            Plan for follow-up on next business day.      Mila Solomon RN

## 2024-10-08 NOTE — CARE COORDINATION
Care Transitions Note    Initial Call - Call within 2 business days of discharge: Yes    Patient Current Location:  Home: [address] [place] [id number]    Care Transition Nurse contacted the patient by telephone to perform post hospital discharge assessment, verified name and  as identifiers. Provided introduction to self, and explanation of the Care Transition Nurse role.     Patient: Nikki Wang    Patient : 1970   MRN: [id number]    Reason for Admission: Spinal Stenosis/ s/p lumbar fusion  Discharge Date: 10/5/24  RURS: Readmission Risk Score: 8.6      Last Discharge Facility       Date Complaint Diagnosis Description Type Department Provider    10/2/24  S/P lumbar fusion Admission (Discharged) [facility] Jesse Tao MD     Was this an external facility discharge? No    Additional needs identified to be addressed with provider   No needs identified             Method of communication with provider: chart routing.    Patients top risk factors for readmission: functional physical ability and medical condition-post op recovery period from lumbar fusion    Interventions to address risk factors:   Home Health: currently at patient's home to establish    Care Summary Note: Patient states she is currently with [facility] at the time of call and is requesitng a call back later today.     Advance Care Planning:   Does patient have an Advance Directive: deferred at this time, will discuss on future follow up. .    Medication Reconciliation:  Medication reconciliation was not performed during this call, Patient not able to discuss at time of call.     Assessments:  Care Transitions 24 Hour Call    Do you have a copy of your discharge instructions?: Yes  Do you have all of your prescriptions and are they filled?: Yes  Have you been contacted by a [facility] Pharmacist?: Yes  Have you scheduled your follow up appointment?: No  Do you feel like you have everything you need to keep you well at home?: Yes  Care

## 2024-10-10 ENCOUNTER — CARE COORDINATION (OUTPATIENT)
Dept: OTHER | Facility: CLINIC | Age: 54
End: 2024-10-10

## 2024-10-10 NOTE — CARE COORDINATION
Care Transitions Note    Follow Up Call     Attempted to reach patient for transitions of care follow up.  Unable to reach patient.      Outreach Attempts:   HIPAA compliant voicemail left for patient.   [employer] message sent.     Care Summary Note: Per chart review, no changes since last outreach.    Follow Up Appointment:   Future Appointments         Provider Specialty Dept Phone    3/5/2025 9:00 AM Katherine Miller, APRN - CNP Urology [phone]            Plan for follow-up call in 2-5 days based on severity of symptoms and risk factors. Plan for next call: symptom management-Assess for effectiveness of symptom management  Discuss plan of care and any issues or concners    Mila Solomon RN

## 2024-10-10 NOTE — CARE COORDINATION
Care Transitions Note    Follow Up Call     Patient Current Location:  Home: [address] [place] [address] [place] [address]    Care Transition Nurse contacted the patient by telephone. Verified name and  as identifiers.    Additional needs identified to be addressed with provider   No needs identified         Method of communication with provider: none.    Care Summary Note: Patient states she is doing okay, but not feeling great.  She states she feels her pain is worse with this surgery. She does have pain medication and taking as directed. She states it ended up being a much bigger surgery than anticipated. She has [facility] establish and they are seeing her daily.  She had drains and wound vac removed today.  She has a lot of fatigue and she is not sleeping well.  She does have a body pillow and other pillows she is using for positioning. he has not started PT yet.  She has he follow up appt scheduled for next week. She denies any nausea, vomiting or change in bowel or bladder habits. She  denies any immediate ACM needs and is agreeable to follow up contact.     Plan of care updates since last contact:  Review of patient management of conditions/medications: Patient is able to describe Red Flag symptoms to report, has medications filled and taking as directed, has follow up appt scheduled with reliable transportation.   Home Health: Pt is established with [facility] seeing her      Advance Care Planning:   Does patient have an Advance Directive: deferred at this time, will discuss on future follow up. .    Medication Review:  Medication reconciliation was performed with patient,1111F entered: N/A.    Remote Patient Monitoring:  Offered patient enrollment in the Remote Patient Monitoring (RPM) program for in-home monitoring: Patient is not eligible for RPM program because: insurance coverage.    Assessments:  Care Transitions Subsequent and Final Call    Schedule Follow Up Appointment with PCP: Completed  Subsequent and Final

## 2024-10-18 ENCOUNTER — CARE COORDINATION (OUTPATIENT)
Dept: OTHER | Facility: CLINIC | Age: 54
End: 2024-10-18

## 2024-10-18 NOTE — CARE COORDINATION
Care Transitions Note    Follow Up Call     Patient Current Location:  [place]    Care Transition Nurse contacted the patient by telephone. Verified name and  as identifiers.    Additional needs identified to be addressed with provider   No needs identified                 Method of communication with provider: none.    Care Summary Note: Patient states she is doing well.  She had her stitches removed. She I s to be using a walker or cane as she has continues paraesthesia in her foot.  She denies any back pain.  She states she is to return in 6 weeks.  Otherwise, she has no changes to her plan of care, no new issues or concerns, and no ongoing ACM needs at his time.     Plan of care updates since last contact:  Review of patient management of conditions/medications: Patient is able to describe Red Flag symptoms to report, has medications filled dn taking as directed, and has needed services in place.        Advance Care Planning:   Does patient have an Advance Directive: deferred at this time, will discuss on future follow up. .    Medication Review:  No changes since last call.     Assessments:  Care Transitions Subsequent and Final Call    Schedule Follow Up Appointment with PCP: Completed  Subsequent and Final Calls  Do you have any ongoing symptoms?: Yes  Onset of Patient-reported symptoms: Other  Patient-reported symptoms: Other  Interventions for patient-reported symptoms: Other  Have your medications changed?: No  Do you have any questions related to your medications?: No  Do you currently have any active services?: Yes  Are you currently active with any services?: Home Health  Do you have any needs or concerns that I can assist you with?: No  Identified Barriers: Impairment  Care Transitions Interventions    Physical Therapy: Completed Other Services: Completed (Comment: [facility])     Specialty Service Referral: Completed    Other Interventions:              Follow Up Appointment:   NITESH appointment attended as

## 2024-10-21 ENCOUNTER — OFFICE VISIT (OUTPATIENT)
Dept: FAMILY MEDICINE CLINIC | Age: 54
End: 2024-10-21
Payer: COMMERCIAL

## 2024-10-21 VITALS
BODY MASS INDEX: 43.84 KG/M2 | SYSTOLIC BLOOD PRESSURE: 128 MMHG | HEIGHT: 63 IN | WEIGHT: 247.4 LBS | RESPIRATION RATE: 16 BRPM | HEART RATE: 76 BPM | TEMPERATURE: 98.4 F | DIASTOLIC BLOOD PRESSURE: 76 MMHG

## 2024-10-21 DIAGNOSIS — I10 PRIMARY HYPERTENSION: ICD-10-CM

## 2024-10-21 DIAGNOSIS — E03.9 HYPOTHYROIDISM, UNSPECIFIED TYPE: ICD-10-CM

## 2024-10-21 DIAGNOSIS — R20.0 LEFT LEG NUMBNESS: ICD-10-CM

## 2024-10-21 DIAGNOSIS — Z98.1 S/P LUMBAR SPINAL FUSION: Primary | ICD-10-CM

## 2024-10-21 DIAGNOSIS — R29.898 TRANSIENT LEFT LEG WEAKNESS: ICD-10-CM

## 2024-10-21 PROCEDURE — 99214 OFFICE O/P EST MOD 30 MIN: CPT | Performed by: NURSE PRACTITIONER

## 2024-10-21 PROCEDURE — 3074F SYST BP LT 130 MM HG: CPT | Performed by: NURSE PRACTITIONER

## 2024-10-21 PROCEDURE — 3078F DIAST BP <80 MM HG: CPT | Performed by: NURSE PRACTITIONER

## 2024-10-21 NOTE — PROGRESS NOTES
[facility] PROFESSIONAL SERVS  [facility]  [address] [address]  Dept: [phone]  Dept Fax: [phone]  Loc: [phone]     10/21/2024     Nikki Wang (:  1970) is a 53 y.o. female, here for evaluation of the following medical concerns:    Chief Complaint   Patient presents with    Follow Up After Procedure     Patient had back surgery Admitted 10/02/2024 Discharged 10/05/2024 Jesse Tao MD    Extremity Weakness     Patient reports a fall a couple days ago, she reports tingling all the time, Dr Tao told her the feeling should come back but it could take awhile and she should follow up with PCP    Discuss Medications       Pt presents to the office today for follow up after lumbar spine surgery with Dr Tao on 10/2/24.  She was admitted for 3 days and discharged on 10/5/24.  She reports that she is Still having left leg weakness.  She denies pain like it was before, but has fallen twice since surgery from the weakness.  Pt reports that she was seen by Dr Tao and cleared from him and was told to follow up with the PCP for the leg weakness.  Pt is using a walker at home and wearing her back brace for support.        Treatment Adherence:   Medication compliance:  compliant all of the time  Diet compliance:  compliant most of the time  Weight trend: stable    Hypertension:  Home blood pressure monitoring: No. Patient denies chest pain, shortness of breath, headache, and lightheadedness.  Antihypertensive medication side effects: no medication side effects noted.  Use of agents associated with hypertension: none.                                        Sodium (meq/L)   Date Value   2024 139    BUN (mg/dL)   Date Value   2024 10    Glucose (mg/dL)   Date Value   2024 88   2023 114      Potassium (meq/L)   Date Value   2024 4.0     Potassium reflex Magnesium (meq/L)   Date Value   2024 3.9    Creatinine (mg/dL)

## 2024-10-22 ASSESSMENT — ENCOUNTER SYMPTOMS
COUGH: 0
COLOR CHANGE: 0
NAUSEA: 0
BACK PAIN: 1
SHORTNESS OF BREATH: 0
DIARRHEA: 0
VOMITING: 0
WHEEZING: 0
ABDOMINAL PAIN: 0

## 2024-10-25 ENCOUNTER — HOSPITAL ENCOUNTER (OUTPATIENT)
Dept: GENERAL RADIOLOGY | Age: 54
Discharge: HOME | End: 2024-10-25
Payer: COMMERCIAL

## 2024-10-25 ENCOUNTER — HOSPITAL ENCOUNTER (OUTPATIENT)
Age: 54
Discharge: HOME | End: 2024-10-25
Payer: COMMERCIAL

## 2024-10-25 DIAGNOSIS — R07.81 RIB PAIN ON RIGHT SIDE: ICD-10-CM

## 2024-10-25 PROCEDURE — 71101 X-RAY EXAM UNILAT RIBS/CHEST: CPT

## 2024-11-01 ENCOUNTER — HOSPITAL ENCOUNTER (OUTPATIENT)
Dept: PHYSICAL THERAPY | Age: 54
Setting detail: THERAPIES SERIES
Discharge: HOME | End: 2024-11-01
Payer: COMMERCIAL

## 2024-11-01 PROCEDURE — 97110 THERAPEUTIC EXERCISES: CPT

## 2024-11-01 PROCEDURE — 97162 PT EVAL MOD COMPLEX 30 MIN: CPT

## 2024-11-01 NOTE — PROGRESS NOTES
Physical Therapy    * PLEASE SIGN, DATE AND TIME CERTIFICATION BELOW AND RETURN TO [facility] OUTPATIENT REHABILITATION (FAX #: [phone]).  ATTEST/CO-SIGN IF ACCESSING VIA [facility].  THANK YOU.**    I certify that I have examined the patient below and determined that Physical Medicine and Rehabilitation service is necessary and that I approve the established plan of care for up to 90 days or as specifically noted.  Attestation, signature or co-signature of physician indicates approval of certification requirements.    ________________________ ____________  Physician Signature   Date   [facility]  PHYSICAL THERAPY  [x] EVALUATION  [] DAILY NOTE (LAND) [] DAILY NOTE (AQUATIC ) [] PROGRESS NOTE [] DISCHARGE NOTE    [] OUTPATIENT REHABILITATION CENTER [facility]   [x] [facility]    [] [facility]   [] [facility]    Date: 2024  Patient Name:  Nikki Wang  : 1970  MRN: [id number]  CSN: [id number]    Referring Practitioner Leslie Storm APRN - CNP [id number]      Diagnosis  Diagnoses       R29.898 (ICD-10-CM) - Other symptoms and signs involving the musculoskeletal system    Z98.1 (ICD-10-CM) - Arthrodesis status    R20.0 (ICD-10-CM) - Anesthesia of skin           Treatment Diagnosis M54.59, G89.29  Chronic Lower Back Pain  M25.361 Other instability, right knee  M25.375 Other instability, left foot  M25.372 Other instability, left ankle  M62.81 Generalized Muscle Weakness  R27.9 Unspecified lack of coordination  R26.2 Difficulty in walking  Z47.89 Encounter for orthopedic aftercare   Date of Evaluation 24   Additional Pertinent History Nikki Wang has a past medical history of Hepatitis, Hx of blood clots, Hypertension, Kidney stone, Nausea & vomiting, Obesity, Osteoarthritis, PONV (postoperative nausea and vomiting), Restless legs syndrome, and Thyroid disease.  she has a past surgical history that includes Hysterectomy ();

## 2024-11-06 ENCOUNTER — HOSPITAL ENCOUNTER (OUTPATIENT)
Dept: PHYSICAL THERAPY | Age: 54
Setting detail: THERAPIES SERIES
Discharge: HOME | End: 2024-11-06
Payer: COMMERCIAL

## 2024-11-06 ENCOUNTER — CARE COORDINATION (OUTPATIENT)
Dept: OTHER | Facility: CLINIC | Age: 54
End: 2024-11-06

## 2024-11-06 PROCEDURE — 97110 THERAPEUTIC EXERCISES: CPT

## 2024-11-06 NOTE — CARE COORDINATION
Care Transitions Note      Patient graduated from the Care Transitions program on 11/6/24.  Patient/family has the ability to self manage at this time..      Care Summary Note: Per chart review, patient has completed her follow u with Ortho and PCP.  She is established with and attending PT.     Assessments:  Care Transitions Subsequent and Final Call    Schedule Follow Up Appointment with PCP: Completed  Subsequent and Final Calls  Are you currently active with any services?: Home Health  Care Transitions Interventions    Physical Therapy: Completed Other Services: Completed (Comment: [place] [facility])     Specialty Service Referral: Completed    Other Interventions:              Upcoming Appointments:    Future Appointments         Provider Specialty Dept Phone    11/11/2024 2:30 PM Izabel Linares, REBECCA Physical Therapy [phone]    11/13/2024 1:00 PM Izabel Linares, PTA Physical Therapy [phone]    11/18/2024 1:00 PM Izabel Linares, PTA Physical Therapy [phone]    11/20/2024 1:00 PM Izabel Linares, PTA Physical Therapy [phone]    11/25/2024 1:00 PM Izabel Linares, PTA Physical Therapy [phone]    12/2/2024 1:00 PM Izabel Linares, PTA Physical Therapy [phone]    12/4/2024 1:00 PM Izabel Linares, PTA Physical Therapy [phone]    12/9/2024 1:00 PM Valencia Simons, PT Physical Therapy [phone]    3/5/2025 9:00 AM Katherine Miller, APRN - CNP Urology [phone]            Mila Solomon RN

## 2024-11-06 NOTE — PROGRESS NOTES
Physical Therapy  [facility]  PHYSICAL THERAPY  [] EVALUATION  [x] DAILY NOTE (LAND) [] DAILY NOTE (AQUATIC ) [] PROGRESS NOTE [] DISCHARGE NOTE    [] OUTPATIENT REHABILITATION CENTER - [place]   [x] [place] [facility] [place]    [] [facility]   [] [facility]    Date: 2024  Patient Name:  Nikki Wang  : 1970  MRN: [id number]  CSN: [id number]    Referring Practitioner Leslie Storm APRN - CNP [id number]      Diagnosis  Diagnoses       R29.898 (ICD-10-CM) - Other symptoms and signs involving the musculoskeletal system    Z98.1 (ICD-10-CM) - Arthrodesis status    R20.0 (ICD-10-CM) - Anesthesia of skin           Treatment Diagnosis M54.59, G89.29  Chronic Lower Back Pain  M25.361 Other instability, right knee  M25.375 Other instability, left foot  M25.372 Other instability, left ankle  M62.81 Generalized Muscle Weakness  R27.9 Unspecified lack of coordination  R26.2 Difficulty in walking  Z47.89 Encounter for orthopedic aftercare   Date of Evaluation 24   Additional Pertinent History Nikki Wang has a past medical history of Hepatitis, Hx of blood clots, Hypertension, Kidney stone, Nausea & vomiting, Obesity, Osteoarthritis, PONV (postoperative nausea and vomiting), Restless legs syndrome, and Thyroid disease.  she has a past surgical history that includes Hysterectomy ();  section;  section (); Foot surgery; Cholecystectomy (); Tonsillectomy; back surgery (???); Foot surgery (Left, 10/30/2014); Upper gastrointestinal endoscopy (N/A, 2021); Colonoscopy (Left, 2021); Colonoscopy (2021); EGD (); ERCP (N/A, 2021); Foot surgery (Right, ); and lumbar fusion (N/A, 10/2/2024).     Allergies Allergies   Allergen Reactions    Pcn [Penicillins] Rash      Medications   Current Outpatient Medications:     senna-docusate (SENOKOT S) 8.6-50 MG per tablet, Take 1 tablet by mouth daily as needed for

## 2024-11-11 ENCOUNTER — APPOINTMENT (OUTPATIENT)
Dept: PHYSICAL THERAPY | Age: 54
End: 2024-11-11
Payer: COMMERCIAL

## 2024-11-13 ENCOUNTER — HOSPITAL ENCOUNTER (OUTPATIENT)
Dept: PHYSICAL THERAPY | Age: 54
Setting detail: THERAPIES SERIES
Discharge: HOME | End: 2024-11-13
Payer: COMMERCIAL

## 2024-11-13 PROCEDURE — 97110 THERAPEUTIC EXERCISES: CPT

## 2024-11-13 PROCEDURE — 97116 GAIT TRAINING THERAPY: CPT

## 2024-11-13 NOTE — PROGRESS NOTES
Physical Therapy  [facility]  PHYSICAL THERAPY  [] EVALUATION  [x] DAILY NOTE (LAND) [] DAILY NOTE (AQUATIC ) [] PROGRESS NOTE [] DISCHARGE NOTE    [] OUTPATIENT REHABILITATION CENTER - [place]   [x] [place] [facility] [place]    [] [facility]   [] [facility]    Date: 2024  Patient Name:  Nikki Wang  : 1970  MRN: [id number]  CSN: [id number]    Referring Practitioner Leslie Storm APRN - CNP [id number]      Diagnosis  Diagnoses       R29.898 (ICD-10-CM) - Other symptoms and signs involving the musculoskeletal system    Z98.1 (ICD-10-CM) - Arthrodesis status    R20.0 (ICD-10-CM) - Anesthesia of skin           Treatment Diagnosis M54.59, G89.29  Chronic Lower Back Pain  M25.361 Other instability, right knee  M25.375 Other instability, left foot  M25.372 Other instability, left ankle  M62.81 Generalized Muscle Weakness  R27.9 Unspecified lack of coordination  R26.2 Difficulty in walking  Z47.89 Encounter for orthopedic aftercare   Date of Evaluation 24   Additional Pertinent History Nikki Wang has a past medical history of Hepatitis, Hx of blood clots, Hypertension, Kidney stone, Nausea & vomiting, Obesity, Osteoarthritis, PONV (postoperative nausea and vomiting), Restless legs syndrome, and Thyroid disease.  she has a past surgical history that includes Hysterectomy ();  section;  section (); Foot surgery; Cholecystectomy (); Tonsillectomy; back surgery (???); Foot surgery (Left, 10/30/2014); Upper gastrointestinal endoscopy (N/A, 2021); Colonoscopy (Left, 2021); Colonoscopy (2021); EGD (); ERCP (N/A, 2021); Foot surgery (Right, ); and lumbar fusion (N/A, 10/2/2024).     Allergies Allergies   Allergen Reactions    Pcn [Penicillins] Rash      Medications   Current Outpatient Medications:     senna-docusate (SENOKOT S) 8.6-50 MG per tablet, Take 1 tablet by mouth daily as needed for

## 2024-11-18 ENCOUNTER — APPOINTMENT (OUTPATIENT)
Dept: PHYSICAL THERAPY | Age: 54
End: 2024-11-18
Payer: COMMERCIAL

## 2024-11-19 ENCOUNTER — APPOINTMENT (OUTPATIENT)
Dept: PHYSICAL THERAPY | Age: 54
End: 2024-11-19
Payer: COMMERCIAL

## 2024-11-20 ENCOUNTER — APPOINTMENT (OUTPATIENT)
Dept: PHYSICAL THERAPY | Age: 54
End: 2024-11-20
Payer: COMMERCIAL

## 2024-11-22 ENCOUNTER — HOSPITAL ENCOUNTER (OUTPATIENT)
Dept: PHYSICAL THERAPY | Age: 54
Setting detail: THERAPIES SERIES
End: 2024-11-22
Payer: COMMERCIAL

## 2024-11-25 ENCOUNTER — APPOINTMENT (OUTPATIENT)
Dept: PHYSICAL THERAPY | Age: 54
End: 2024-11-25
Payer: COMMERCIAL

## 2024-12-10 ENCOUNTER — HOSPITAL ENCOUNTER (OUTPATIENT)
Dept: PHYSICAL THERAPY | Age: 54
Setting detail: THERAPIES SERIES
Discharge: HOME | End: 2024-12-10

## 2024-12-10 ENCOUNTER — PATIENT MESSAGE (OUTPATIENT)
Dept: FAMILY MEDICINE CLINIC | Age: 54
End: 2024-12-10

## 2024-12-11 NOTE — TELEPHONE ENCOUNTER
OK for an appt to discuss symptoms and order labs.  We can do a video visit if pt would prefer.  OK to add on at [phone] or [phone].  Thanks -WS

## 2024-12-12 ENCOUNTER — HOSPITAL ENCOUNTER (OUTPATIENT)
Age: 54
Discharge: HOME | End: 2024-12-12
Payer: COMMERCIAL

## 2024-12-12 ENCOUNTER — TELEMEDICINE (OUTPATIENT)
Dept: FAMILY MEDICINE CLINIC | Age: 54
End: 2024-12-12
Payer: COMMERCIAL

## 2024-12-12 DIAGNOSIS — R11.0 NAUSEA: ICD-10-CM

## 2024-12-12 DIAGNOSIS — R19.7 DIARRHEA, UNSPECIFIED TYPE: Primary | ICD-10-CM

## 2024-12-12 DIAGNOSIS — R19.7 DIARRHEA, UNSPECIFIED TYPE: ICD-10-CM

## 2024-12-12 LAB
ALBUMIN SERPL BCG-MCNC: 4.1 G/DL (ref 3.5–5.1)
ALP SERPL-CCNC: 142 U/L (ref 38–126)
ALT SERPL W/O P-5'-P-CCNC: 22 U/L (ref 11–66)
ANION GAP SERPL CALC-SCNC: 12 MEQ/L (ref 8–16)
AST SERPL-CCNC: 24 U/L (ref 5–40)
BASOPHILS ABSOLUTE: 0 THOU/MM3 (ref 0–0.1)
BASOPHILS NFR BLD AUTO: 0.5 %
BILIRUB SERPL-MCNC: 0.2 MG/DL (ref 0.3–1.2)
BUN SERPL-MCNC: 12 MG/DL (ref 7–22)
CALCIUM SERPL-MCNC: 9.5 MG/DL (ref 8.5–10.5)
CHLORIDE SERPL-SCNC: 102 MEQ/L (ref 98–111)
CO2 SERPL-SCNC: 28 MEQ/L (ref 23–33)
CREAT SERPL-MCNC: 0.7 MG/DL (ref 0.4–1.2)
DEPRECATED RDW RBC AUTO: 42.6 FL (ref 35–45)
EOSINOPHIL NFR BLD AUTO: 1.3 %
EOSINOPHILS ABSOLUTE: 0.1 THOU/MM3 (ref 0–0.4)
ERYTHROCYTE [DISTWIDTH] IN BLOOD BY AUTOMATED COUNT: 13.3 % (ref 11.5–14.5)
GFR SERPL CREATININE-BSD FRML MDRD: > 90 ML/MIN/1.73M2
GLUCOSE SERPL-MCNC: 108 MG/DL (ref 70–108)
HCT VFR BLD AUTO: 41.6 % (ref 37–47)
HGB BLD-MCNC: 12.8 GM/DL (ref 12–16)
IMM GRANULOCYTES # BLD AUTO: 0.02 THOU/MM3 (ref 0–0.07)
IMM GRANULOCYTES NFR BLD AUTO: 0.3 %
LYMPHOCYTES ABSOLUTE: 2.5 THOU/MM3 (ref 1–4.8)
LYMPHOCYTES NFR BLD AUTO: 31.5 %
MAGNESIUM SERPL-MCNC: 2.1 MG/DL (ref 1.6–2.4)
MCH RBC QN AUTO: 27.1 PG (ref 26–33)
MCHC RBC AUTO-ENTMCNC: 30.8 GM/DL (ref 32.2–35.5)
MCV RBC AUTO: 87.9 FL (ref 81–99)
MONOCYTES ABSOLUTE: 0.5 THOU/MM3 (ref 0.4–1.3)
MONOCYTES NFR BLD AUTO: 6.3 %
NEUTROPHILS ABSOLUTE: 4.7 THOU/MM3 (ref 1.8–7.7)
NEUTROPHILS NFR BLD AUTO: 60.1 %
NRBC BLD AUTO-RTO: 0 /100 WBC
PLATELET # BLD AUTO: 410 THOU/MM3 (ref 130–400)
PMV BLD AUTO: 11.2 FL (ref 9.4–12.4)
POTASSIUM SERPL-SCNC: 3.5 MEQ/L (ref 3.5–5.2)
PROT SERPL-MCNC: 7.3 G/DL (ref 6.1–8)
RBC # BLD AUTO: 4.73 MILL/MM3 (ref 4.2–5.4)
SODIUM SERPL-SCNC: 142 MEQ/L (ref 135–145)
WBC # BLD AUTO: 7.8 THOU/MM3 (ref 4.8–10.8)

## 2024-12-12 PROCEDURE — 36415 COLL VENOUS BLD VENIPUNCTURE: CPT

## 2024-12-12 PROCEDURE — 83735 ASSAY OF MAGNESIUM: CPT

## 2024-12-12 PROCEDURE — 80053 COMPREHEN METABOLIC PANEL: CPT

## 2024-12-12 PROCEDURE — 85025 COMPLETE CBC W/AUTO DIFF WBC: CPT

## 2024-12-12 PROCEDURE — 99214 OFFICE O/P EST MOD 30 MIN: CPT | Performed by: NURSE PRACTITIONER

## 2024-12-12 ASSESSMENT — ENCOUNTER SYMPTOMS
VOMITING: 0
ABDOMINAL PAIN: 0
BLOATING: 1
COUGH: 0
STRIDOR: 0
FLATUS: 1
NAUSEA: 1
DIARRHEA: 1
WHEEZING: 0
BLOOD IN STOOL: 0
SHORTNESS OF BREATH: 0

## 2024-12-12 NOTE — PROGRESS NOTES
Nikki Wang, was evaluated through a synchronous (real-time) audio-video encounter. The patient (or guardian if applicable) is aware that this is a billable service, which includes applicable co-pays. This Virtual Visit was conducted with patient's (and/or legal guardian's) consent. Patient identification was verified, and a caregiver was present when appropriate.   The patient was located at Home: [address] [address]  Provider was located at Facility (Appt Dept): [address] [place] [address]  Confirm you are appropriately licensed, registered, or certified to deliver care in the state where the patient is located as indicated above. If you are not or unsure, please re-schedule the visit: Yes, I confirm.     Nikki Wang (:  1970) is a Established patient, presenting virtually for evaluation of the following:      Below is the assessment and plan developed based on review of pertinent history, physical exam, labs, studies, and medications.     Assessment & Plan  Diarrhea, unspecified type   Acute condition, new, Supportive care with appropriate antipyretics and fluids.  Obtain labs per orders.  Educational material distributed and questions answered.    - Add Imodium ad directed.     Orders:    Fecal leukocytes; Future    O&P PANEL (TRAVEL [employer]OCIATED) #1; Future    Giardia antigen; Future    CBC with Auto Differential; Future    Comprehensive Metabolic Panel; Future    Magnesium; Future    Culture, Stool; Future    Clostridium Difficile Toxin/Antigen; Future    Nausea   Acute condition, new, Supportive care with appropriate antipyretics and fluids.  Obtain labs per orders.  Educational material distributed and questions answered.    Orders:    Comprehensive Metabolic Panel; Future    Magnesium; Future      Return if symptoms worsen or fail to improve.       Subjective   Pt presents to the office today via VV for nausea and diarrhea.  Losing weight from diarrhea.  NO pain but

## 2024-12-13 ENCOUNTER — TELEPHONE (OUTPATIENT)
Dept: FAMILY MEDICINE CLINIC | Age: 54
End: 2024-12-13

## 2024-12-13 ENCOUNTER — HOSPITAL ENCOUNTER (OUTPATIENT)
Age: 54
Discharge: HOME | End: 2024-12-13
Payer: COMMERCIAL

## 2024-12-13 DIAGNOSIS — R19.7 DIARRHEA, UNSPECIFIED TYPE: ICD-10-CM

## 2024-12-13 LAB
C DIFF GDH STL QL: NEGATIVE
WBC STL QL MICRO: NORMAL

## 2024-12-13 PROCEDURE — 87427 SHIGA-LIKE TOXIN AG IA: CPT

## 2024-12-13 PROCEDURE — 87045 FECES CULTURE AEROBIC BACT: CPT

## 2024-12-13 PROCEDURE — 87449 NOS EACH ORGANISM AG IA: CPT

## 2024-12-13 PROCEDURE — 87328 CRYPTOSPORIDIUM AG IA: CPT

## 2024-12-13 PROCEDURE — 87177 OVA AND PARASITES SMEARS: CPT

## 2024-12-13 PROCEDURE — 89055 LEUKOCYTE ASSESSMENT FECAL: CPT

## 2024-12-13 NOTE — TELEPHONE ENCOUNTER
Pt notified of results and verbalized understanding. Pt about to drop of stool studies at the lab at the time of the call.

## 2024-12-13 NOTE — TELEPHONE ENCOUNTER
----- Message from FREDRICK Aguilera - CNP sent at 12/13/2024  7:47 AM EST -----  Please let pt know that all her electrolytes, including magnesium are normal.  Her CBC is normal also.  Have stool studies completed as discussed.  ER for any acute changes.  -WS

## 2024-12-15 LAB — BACTERIA STL CULT: NORMAL

## 2024-12-16 ENCOUNTER — TELEPHONE (OUTPATIENT)
Dept: FAMILY MEDICINE CLINIC | Age: 54
End: 2024-12-16

## 2024-12-16 LAB
G LAMBLIA AG STL QL: NEGATIVE
O+P STL TRI STN: NORMAL
SPECIMEN SOURCE: NORMAL

## 2024-12-16 NOTE — TELEPHONE ENCOUNTER
----- Message from FREDRICK Aguilera CNP sent at 12/16/2024  3:56 PM EST -----  Please see note from earlier today also, but final 2 stool studies are negative.  I would recommend follow up with GI for continued diarrhea. -WS

## 2024-12-16 NOTE — TELEPHONE ENCOUNTER
----- Message from FREDRICK Aguilera CNP sent at 12/16/2024  7:39 AM EST -----  Please let pt know that her stool culture was normal and C-diff and fecal leukocytes are negative.  2 stool studies are still pending, but I would recommend follow up with GI for ongoing diarrhea.  OK for GI referral if pt is willing .-WS

## 2024-12-16 NOTE — TELEPHONE ENCOUNTER
Patient notified of WS response she stated she has seen a gastro Dr. Before she is going to call them and see if she can make an apt, if not she will call office for referral.     She stated it has to be through [facility] due to insurance

## 2025-03-03 ENCOUNTER — HOSPITAL ENCOUNTER (OUTPATIENT)
Age: 55
Discharge: HOME | End: 2025-03-03
Payer: COMMERCIAL

## 2025-03-03 ENCOUNTER — HOSPITAL ENCOUNTER (OUTPATIENT)
Dept: GENERAL RADIOLOGY | Age: 55
Discharge: HOME | End: 2025-03-03
Payer: COMMERCIAL

## 2025-03-03 DIAGNOSIS — N20.1 LEFT URETERAL CALCULUS: ICD-10-CM

## 2025-03-03 PROCEDURE — 74018 RADEX ABDOMEN 1 VIEW: CPT

## 2025-03-28 ENCOUNTER — OFFICE VISIT (OUTPATIENT)
Dept: FAMILY MEDICINE CLINIC | Age: 55
End: 2025-03-28

## 2025-03-28 VITALS
WEIGHT: 251.4 LBS | BODY MASS INDEX: 42.92 KG/M2 | HEART RATE: 68 BPM | TEMPERATURE: 98.3 F | RESPIRATION RATE: 16 BRPM | DIASTOLIC BLOOD PRESSURE: 68 MMHG | SYSTOLIC BLOOD PRESSURE: 124 MMHG | HEIGHT: 64 IN

## 2025-03-28 DIAGNOSIS — R79.82 ELEVATED C-REACTIVE PROTEIN (CRP): ICD-10-CM

## 2025-03-28 DIAGNOSIS — I10 ESSENTIAL HYPERTENSION: ICD-10-CM

## 2025-03-28 DIAGNOSIS — E03.9 HYPOTHYROIDISM, UNSPECIFIED TYPE: ICD-10-CM

## 2025-03-28 DIAGNOSIS — E88.819 INSULIN RESISTANCE: ICD-10-CM

## 2025-03-28 DIAGNOSIS — Z98.1 S/P LUMBAR SPINAL FUSION: ICD-10-CM

## 2025-03-28 DIAGNOSIS — E78.2 MODERATE MIXED HYPERLIPIDEMIA NOT REQUIRING STATIN THERAPY: ICD-10-CM

## 2025-03-28 DIAGNOSIS — E66.01 OBESITY, MORBID, BMI 40.0-49.9: ICD-10-CM

## 2025-03-28 DIAGNOSIS — K75.4 AUTOIMMUNE HEPATITIS (HCC): ICD-10-CM

## 2025-03-28 DIAGNOSIS — Z00.00 ANNUAL PHYSICAL EXAM: Primary | ICD-10-CM

## 2025-03-28 DIAGNOSIS — R73.01 IFG (IMPAIRED FASTING GLUCOSE): ICD-10-CM

## 2025-03-28 DIAGNOSIS — J30.1 SEASONAL ALLERGIC RHINITIS DUE TO POLLEN: ICD-10-CM

## 2025-03-28 PROBLEM — M51.369 DEGENERATION OF INTERVERTEBRAL DISC OF LUMBAR REGION: Status: ACTIVE | Noted: 2025-03-28

## 2025-03-28 RX ORDER — LEVOTHYROXINE SODIUM 150 UG/1
150 TABLET ORAL DAILY
Qty: 90 TABLET | Refills: 1 | Status: SHIPPED | OUTPATIENT
Start: 2025-03-28

## 2025-03-28 RX ORDER — LISINOPRIL 20 MG/1
20 TABLET ORAL DAILY
Qty: 90 TABLET | Refills: 1 | Status: SHIPPED | OUTPATIENT
Start: 2025-03-28

## 2025-03-28 RX ORDER — METFORMIN HYDROCHLORIDE 500 MG/1
500 TABLET, EXTENDED RELEASE ORAL
Qty: 90 TABLET | Refills: 1 | Status: SHIPPED | OUTPATIENT
Start: 2025-03-28

## 2025-03-28 RX ORDER — CETIRIZINE HYDROCHLORIDE 10 MG/1
10 TABLET ORAL DAILY
Qty: 90 TABLET | Refills: 1 | Status: SHIPPED | OUTPATIENT
Start: 2025-03-28

## 2025-03-28 RX ORDER — FAMOTIDINE 20 MG/1
20 TABLET, FILM COATED ORAL DAILY PRN
Qty: 90 TABLET | Refills: 1 | Status: SHIPPED | OUTPATIENT
Start: 2025-03-28

## 2025-03-28 RX ORDER — AMLODIPINE BESYLATE 10 MG/1
10 TABLET ORAL DAILY
Qty: 90 TABLET | Refills: 1 | Status: SHIPPED | OUTPATIENT
Start: 2025-03-28

## 2025-03-28 RX ORDER — METOPROLOL TARTRATE 25 MG/1
25 TABLET, FILM COATED ORAL 2 TIMES DAILY
Qty: 180 TABLET | Refills: 3 | Status: SHIPPED | OUTPATIENT
Start: 2025-03-28

## 2025-03-28 SDOH — ECONOMIC STABILITY: TRANSPORTATION INSECURITY
IN THE PAST 12 MONTHS, HAS THE LACK OF TRANSPORTATION KEPT YOU FROM MEDICAL APPOINTMENTS OR FROM GETTING MEDICATIONS?: NO

## 2025-03-28 SDOH — ECONOMIC STABILITY: INCOME INSECURITY: IN THE LAST 12 MONTHS, WAS THERE A TIME WHEN YOU WERE NOT ABLE TO PAY THE MORTGAGE OR RENT ON TIME?: NO

## 2025-03-28 SDOH — ECONOMIC STABILITY: FOOD INSECURITY: WITHIN THE PAST 12 MONTHS, YOU WORRIED THAT YOUR FOOD WOULD RUN OUT BEFORE YOU GOT MONEY TO BUY MORE.: OFTEN TRUE

## 2025-03-28 SDOH — ECONOMIC STABILITY: FOOD INSECURITY: WITHIN THE PAST 12 MONTHS, THE FOOD YOU BOUGHT JUST DIDN'T LAST AND YOU DIDN'T HAVE MONEY TO GET MORE.: OFTEN TRUE

## 2025-03-28 SDOH — ECONOMIC STABILITY: TRANSPORTATION INSECURITY
IN THE PAST 12 MONTHS, HAS LACK OF TRANSPORTATION KEPT YOU FROM MEETINGS, WORK, OR FROM GETTING THINGS NEEDED FOR DAILY LIVING?: NO

## 2025-03-28 ASSESSMENT — PATIENT HEALTH QUESTIONNAIRE - PHQ9
9. THOUGHTS THAT YOU WOULD BE BETTER OFF DEAD, OR OF HURTING YOURSELF: NOT AT ALL
8. MOVING OR SPEAKING SO SLOWLY THAT OTHER PEOPLE COULD HAVE NOTICED. OR THE OPPOSITE, BEING SO FIGETY OR RESTLESS THAT YOU HAVE BEEN MOVING AROUND A LOT MORE THAN USUAL: NOT AT ALL
3. TROUBLE FALLING OR STAYING ASLEEP: SEVERAL DAYS
4. FEELING TIRED OR HAVING LITTLE ENERGY: SEVERAL DAYS
SUM OF ALL RESPONSES TO PHQ QUESTIONS 1-9: 7
8. MOVING OR SPEAKING SO SLOWLY THAT OTHER PEOPLE COULD HAVE NOTICED. OR THE OPPOSITE - BEING SO FIDGETY OR RESTLESS THAT YOU HAVE BEEN MOVING AROUND A LOT MORE THAN USUAL: NOT AT ALL
5. POOR APPETITE OR OVEREATING: SEVERAL DAYS
4. FEELING TIRED OR HAVING LITTLE ENERGY: SEVERAL DAYS
6. FEELING BAD ABOUT YOURSELF - OR THAT YOU ARE A FAILURE OR HAVE LET YOURSELF OR YOUR FAMILY DOWN: SEVERAL DAYS
SUM OF ALL RESPONSES TO PHQ QUESTIONS 1-9: 7
6. FEELING BAD ABOUT YOURSELF - OR THAT YOU ARE A FAILURE OR HAVE LET YOURSELF OR YOUR FAMILY DOWN: SEVERAL DAYS
3. TROUBLE FALLING OR STAYING ASLEEP: SEVERAL DAYS
SUM OF ALL RESPONSES TO PHQ QUESTIONS 1-9: 7
9. THOUGHTS THAT YOU WOULD BE BETTER OFF DEAD, OR OF HURTING YOURSELF: NOT AT ALL
1. LITTLE INTEREST OR PLEASURE IN DOING THINGS: SEVERAL DAYS
SUM OF ALL RESPONSES TO PHQ QUESTIONS 1-9: 7
10. IF YOU CHECKED OFF ANY PROBLEMS, HOW DIFFICULT HAVE THESE PROBLEMS MADE IT FOR YOU TO DO YOUR WORK, TAKE CARE OF THINGS AT HOME, OR GET ALONG WITH OTHER PEOPLE: SOMEWHAT DIFFICULT
7. TROUBLE CONCENTRATING ON THINGS, SUCH AS READING THE NEWSPAPER OR WATCHING TELEVISION: SEVERAL DAYS
10. IF YOU CHECKED OFF ANY PROBLEMS, HOW DIFFICULT HAVE THESE PROBLEMS MADE IT FOR YOU TO DO YOUR WORK, TAKE CARE OF THINGS AT HOME, OR GET ALONG WITH OTHER PEOPLE: SOMEWHAT DIFFICULT
SUM OF ALL RESPONSES TO PHQ QUESTIONS 1-9: 7
1. LITTLE INTEREST OR PLEASURE IN DOING THINGS: SEVERAL DAYS
2. FEELING DOWN, DEPRESSED OR HOPELESS: SEVERAL DAYS
5. POOR APPETITE OR OVEREATING: SEVERAL DAYS
7. TROUBLE CONCENTRATING ON THINGS, SUCH AS READING THE NEWSPAPER OR WATCHING TELEVISION: SEVERAL DAYS
2. FEELING DOWN, DEPRESSED OR HOPELESS: SEVERAL DAYS

## 2025-03-28 ASSESSMENT — ENCOUNTER SYMPTOMS
VOMITING: 0
SORE THROAT: 0
SHORTNESS OF BREATH: 0
BACK PAIN: 0
COUGH: 0
ABDOMINAL PAIN: 0
TROUBLE SWALLOWING: 0
FACIAL SWELLING: 0
SINUS PAIN: 0
DIARRHEA: 0
EYE PAIN: 0
COLOR CHANGE: 0
NAUSEA: 0
WHEEZING: 0

## 2025-03-28 NOTE — PROGRESS NOTES
[facility] PROFESSIONAL [facility]  [facility]  [address] [address]  Dept: [phone]  Dept Fax: [phone]  Loc: [phone]     3/28/2025     Nikki Wang (:  1970) is a 54 y.o. female, here for evaluation of the following medical concerns:    Chief Complaint   Patient presents with    Medication Refill    Annual Exam     Waist circumference 47in        HPI  Pt presents to the office today for annual exam.  Doing well overall.  Going to [facility] for back follow up today, still having some left leg numbness and low back pain.      She did recently start with [facility] through work and is watching blood sugar, BP and weight at home and through an delroy.  They had extensive blood work completed also.  Showed elevated insulin.  She has been fatigued.    Treatment Adherence:   Medication compliance:  compliant all of the time  Diet compliance:  compliant most of the time  Weight trend: stable  Current exercise: no regular exercise  Barriers: impairment:  physical: left pain and back pain.      Hypertension:  Home blood pressure monitoring: Yes - 120/70's. Patient denies chest pain, shortness of breath, headache, and lightheadedness.  Antihypertensive medication side effects: no medication side effects noted.  Use of agents associated with hypertension: none.                                        Sodium (meq/L)   Date Value   2024 142    BUN (mg/dL)   Date Value   2024 12    Glucose (mg/dL)   Date Value   2024 108   2023 114      Potassium (meq/L)   Date Value   2024 3.5     Potassium reflex Magnesium (meq/L)   Date Value   2024 3.9    Creatinine (mg/dL)   Date Value   2024 0.7           Lab Results   Component Value Date    CHOL 173 2025    TRIG 207 2025    HDL 29 (A) 2025     Lab Results   Component Value Date    ALT 22 2024    AST 24 2024        Hypothyroidism: Recent symptoms: fatigue. She

## 2025-04-23 NOTE — DISCHARGE SUMMARY
[facility]  PHYSICAL THERAPY OUTPATIENT QUICK DISCHARGE NOTE  [facility]    Date: 2025  Patient Name:  Nikki Wang  : 1970  MRN: [id number]  CSN: [id number]    Referring Practitioner Leslie Storm, APRN - CNP [phone]      Diagnosis Other symptoms and signs involving the musculoskeletal system  Arthrodesis status  Anesthesia of skin     Patient is discharged from Physical Therapy services at this time.  See last note for details related to results of therapy and goal achievement.    Reason for discharge: Was on hold for physician appointment.  No additional therapy required at this time..      Asmita James, PT

## 2025-05-24 ENCOUNTER — HOSPITAL ENCOUNTER (OUTPATIENT)
Age: 55
Discharge: HOME | End: 2025-05-24
Payer: COMMERCIAL

## 2025-05-24 DIAGNOSIS — E88.819 INSULIN RESISTANCE: ICD-10-CM

## 2025-05-24 DIAGNOSIS — R73.01 IFG (IMPAIRED FASTING GLUCOSE): ICD-10-CM

## 2025-05-24 DIAGNOSIS — E78.2 MODERATE MIXED HYPERLIPIDEMIA NOT REQUIRING STATIN THERAPY: ICD-10-CM

## 2025-05-24 DIAGNOSIS — R79.82 ELEVATED C-REACTIVE PROTEIN (CRP): ICD-10-CM

## 2025-05-24 LAB
CHOLEST SERPL-MCNC: 179 MG/DL (ref 100–199)
CRP SERPL-MCNC: < 0.3 MG/DL (ref 0–0.5)
DEPRECATED MEAN GLUCOSE BLD GHB EST-ACNC: 111 MG/DL (ref 70–126)
HBA1C MFR BLD HPLC: 5.7 % (ref 4–6)
HDLC SERPL-MCNC: 47 MG/DL
LDLC SERPL CALC-MCNC: 115 MG/DL
TRIGL SERPL-MCNC: 85 MG/DL (ref 0–199)

## 2025-05-24 PROCEDURE — 80061 LIPID PANEL: CPT

## 2025-05-24 PROCEDURE — 36415 COLL VENOUS BLD VENIPUNCTURE: CPT

## 2025-05-24 PROCEDURE — 86140 C-REACTIVE PROTEIN: CPT

## 2025-05-24 PROCEDURE — 83525 ASSAY OF INSULIN: CPT

## 2025-05-24 PROCEDURE — 83036 HEMOGLOBIN GLYCOSYLATED A1C: CPT

## 2025-05-25 LAB — INSULIN SERPL-ACNC: 23.3 MU/L

## 2025-05-27 ENCOUNTER — HOSPITAL ENCOUNTER (OUTPATIENT)
Dept: INTERVENTIONAL RADIOLOGY/VASCULAR | Age: 55
Discharge: HOME | End: 2025-05-29
Attending: ORTHOPAEDIC SURGERY
Payer: COMMERCIAL

## 2025-05-27 ENCOUNTER — RESULTS FOLLOW-UP (OUTPATIENT)
Dept: FAMILY MEDICINE CLINIC | Age: 55
End: 2025-05-27

## 2025-05-27 DIAGNOSIS — M79.662 BILATERAL CALF PAIN: ICD-10-CM

## 2025-05-27 DIAGNOSIS — Z47.89 UNSPECIFIED ORTHOPEDIC AFTERCARE: ICD-10-CM

## 2025-05-27 DIAGNOSIS — M79.89 SWELLING OF LIMB: ICD-10-CM

## 2025-05-27 DIAGNOSIS — M79.661 BILATERAL CALF PAIN: ICD-10-CM

## 2025-05-27 PROCEDURE — 93971 EXTREMITY STUDY: CPT

## 2025-05-28 ENCOUNTER — TELEPHONE (OUTPATIENT)
Dept: FAMILY MEDICINE CLINIC | Age: 55
End: 2025-05-28

## 2025-05-28 DIAGNOSIS — Z87.898 H/O NAUSEA: Primary | ICD-10-CM

## 2025-05-28 RX ORDER — SCOPOLAMINE 1 MG/3D
1 PATCH, EXTENDED RELEASE TRANSDERMAL
Qty: 5 PATCH | Refills: 0 | Status: SHIPPED | OUTPATIENT
Start: 2025-05-28

## 2025-05-28 NOTE — TELEPHONE ENCOUNTER
Noted. RX sent. -WS    Orders Placed This Encounter   Medications    scopolamine (TRANSDERM-SCOP) transdermal patch     Sig: Place 1 patch onto the skin every 72 hours     Dispense:  5 patch     Refill:  0

## 2025-05-28 NOTE — TELEPHONE ENCOUNTER
Pt going on a cruise June 8th-June 13th. She is asking for an Rx for nausea medication and seasickness medication.      [facility] [place]

## 2025-06-05 ENCOUNTER — HOSPITAL ENCOUNTER (OUTPATIENT)
Age: 55
Discharge: HOME | End: 2025-06-05
Payer: COMMERCIAL

## 2025-06-05 LAB
BASOPHILS ABSOLUTE: 0 THOU/MM3 (ref 0–0.1)
BASOPHILS NFR BLD AUTO: 0.5 %
DEPRECATED RDW RBC AUTO: 52.4 FL (ref 35–45)
EOSINOPHIL NFR BLD AUTO: 1.5 %
EOSINOPHILS ABSOLUTE: 0.1 THOU/MM3 (ref 0–0.4)
ERYTHROCYTE [DISTWIDTH] IN BLOOD BY AUTOMATED COUNT: 16.3 % (ref 11.5–14.5)
HCT VFR BLD AUTO: 45.7 % (ref 37–47)
HGB BLD-MCNC: 14.5 GM/DL (ref 12–16)
IMM GRANULOCYTES # BLD AUTO: 0.01 THOU/MM3 (ref 0–0.07)
IMM GRANULOCYTES NFR BLD AUTO: 0.1 %
LYMPHOCYTES ABSOLUTE: 4.1 THOU/MM3 (ref 1–4.8)
LYMPHOCYTES NFR BLD AUTO: 53.1 %
MCH RBC QN AUTO: 28 PG (ref 26–33)
MCHC RBC AUTO-ENTMCNC: 31.7 GM/DL (ref 32.2–35.5)
MCV RBC AUTO: 88.4 FL (ref 81–99)
MONOCYTES ABSOLUTE: 0.5 THOU/MM3 (ref 0.4–1.3)
MONOCYTES NFR BLD AUTO: 6.2 %
NEUTROPHILS ABSOLUTE: 3 THOU/MM3 (ref 1.8–7.7)
NEUTROPHILS NFR BLD AUTO: 38.6 %
NRBC BLD AUTO-RTO: 0 /100 WBC
PLATELET # BLD AUTO: 276 THOU/MM3 (ref 130–400)
PMV BLD AUTO: 10.6 FL (ref 9.4–12.4)
RBC # BLD AUTO: 5.17 MILL/MM3 (ref 4.2–5.4)
WBC # BLD AUTO: 7.8 THOU/MM3 (ref 4.8–10.8)

## 2025-06-05 PROCEDURE — 36415 COLL VENOUS BLD VENIPUNCTURE: CPT

## 2025-06-05 PROCEDURE — 80053 COMPREHEN METABOLIC PANEL: CPT

## 2025-06-05 PROCEDURE — 82248 BILIRUBIN DIRECT: CPT

## 2025-06-05 PROCEDURE — 85025 COMPLETE CBC W/AUTO DIFF WBC: CPT

## 2025-06-06 LAB
ALBUMIN SERPL BCG-MCNC: 4.1 G/DL (ref 3.4–4.9)
ALP SERPL-CCNC: 100 U/L (ref 38–126)
ALT SERPL W/O P-5'-P-CCNC: 22 U/L (ref 10–35)
ANION GAP SERPL CALC-SCNC: 13 MEQ/L (ref 8–16)
AST SERPL-CCNC: 22 U/L (ref 10–35)
BILIRUB CONJ SERPL-MCNC: 0.2 MG/DL (ref 0–0.2)
BILIRUB SERPL-MCNC: 0.6 MG/DL (ref 0.3–1.2)
BUN SERPL-MCNC: 9 MG/DL (ref 8–23)
CALCIUM SERPL-MCNC: 9.9 MG/DL (ref 8.6–10)
CHLORIDE SERPL-SCNC: 103 MEQ/L (ref 98–111)
CO2 SERPL-SCNC: 24 MEQ/L (ref 22–29)
CREAT SERPL-MCNC: 0.7 MG/DL (ref 0.5–0.9)
GFR SERPL CREATININE-BSD FRML MDRD: > 90 ML/MIN/1.73M2
GLUCOSE SERPL-MCNC: 98 MG/DL (ref 74–109)
POTASSIUM SERPL-SCNC: 4 MEQ/L (ref 3.5–5.2)
PROT SERPL-MCNC: 7.2 G/DL (ref 6.4–8.3)
SODIUM SERPL-SCNC: 140 MEQ/L (ref 135–145)

## 2025-07-30 ENCOUNTER — CLINICAL DOCUMENTATION (OUTPATIENT)
Dept: PHARMACY | Facility: CLINIC | Age: 55
End: 2025-07-30

## 2025-07-30 NOTE — PROGRESS NOTES
**Received application via email from patient. Diagnosis of impaired fasting glucose' on chart but no official diagnosis of DM Type 1 or 2. Had pharmacist review and they stated patient would not qualify at this time based on lab results and no official diagnosis from provider.**      **Patient is [facility]**      Pharmacy Pop Care Documentation:  Patient is missing the following requirements: DX: DM Type One or Type Two;  Provider Documentation of DM Visit    Application Received: 7/30/25  Application scanned.    Emailed patient back the following to [email or web address]:     Good afternoon,     Thank you for that application but currently you do not qualify for the Be Well with Diabetes Program.     A review of your chart has showed you do not have an official diagnosis of Type 1 or Type 2 Diabetes. A diagnosis of Pre-Diabetes does not qualify for the program.     If you ever are officially diagnosed with Type 1 or Type 2 Diabetes, please reach out to us again or if you have documentation that shows this diagnosis, please send it to us. Sorry for any confusion or inconvenience this may have caused you.        [facility]  [address] [place] [address]  Phone: toll free [phone] option 3  Fax: [phone]      Eduardo Garcia CPhT  [facility]   Phone: [phone], Option #3       For Pharmacy Admin Tracking Only    Program: [facility]  CPA in place:  No  Gap Closed?: No   Time Spent (min): 10

## 2025-08-25 DIAGNOSIS — E03.9 HYPOTHYROIDISM, UNSPECIFIED TYPE: ICD-10-CM

## 2025-08-25 DIAGNOSIS — E88.819 INSULIN RESISTANCE: ICD-10-CM

## 2025-08-25 DIAGNOSIS — J30.1 SEASONAL ALLERGIC RHINITIS DUE TO POLLEN: ICD-10-CM

## 2025-08-25 DIAGNOSIS — I10 ESSENTIAL HYPERTENSION: ICD-10-CM

## 2025-08-25 RX ORDER — CETIRIZINE HYDROCHLORIDE 10 MG/1
10 TABLET ORAL DAILY
Qty: 90 TABLET | Refills: 1 | Status: SHIPPED | OUTPATIENT
Start: 2025-08-25

## 2025-08-25 RX ORDER — FAMOTIDINE 20 MG/1
TABLET, FILM COATED ORAL
Qty: 90 TABLET | Refills: 1 | Status: SHIPPED | OUTPATIENT
Start: 2025-08-25

## 2025-08-25 RX ORDER — LEVOTHYROXINE SODIUM 150 UG/1
150 TABLET ORAL DAILY
Qty: 90 TABLET | Refills: 1 | Status: SHIPPED | OUTPATIENT
Start: 2025-08-25

## 2025-08-25 RX ORDER — METFORMIN HYDROCHLORIDE 500 MG/1
TABLET, EXTENDED RELEASE ORAL
Qty: 90 TABLET | Refills: 1 | Status: SHIPPED | OUTPATIENT
Start: 2025-08-25

## 2025-08-25 RX ORDER — LISINOPRIL 20 MG/1
20 TABLET ORAL DAILY
Qty: 90 TABLET | Refills: 1 | Status: SHIPPED | OUTPATIENT
Start: 2025-08-25

## 2025-08-25 RX ORDER — AMLODIPINE BESYLATE 10 MG/1
10 TABLET ORAL DAILY
Qty: 90 TABLET | Refills: 1 | Status: SHIPPED | OUTPATIENT
Start: 2025-08-25

## (undated) DEVICE — SPK10281 JACKSON TABLE KIT: Brand: SPK10281 JACKSON TABLE KIT

## (undated) DEVICE — SUTURE VICRYL SZ 1 L36IN ABSRB UD CTX L48MM 1/2 CIR J977H

## (undated) DEVICE — BUR RND FLUT AGRSV 5MM

## (undated) DEVICE — SUTURE VICRYL ABSRB BRAID COAT UD CP NO 2 27IN  J195H

## (undated) DEVICE — DRESSING TRNSPAR W8XL12IN FLM SURESITE 123

## (undated) DEVICE — SUTURE N ABSRB MONOFILAMENT 2-0 FSLX 75 CM 36 MM ETHILON

## (undated) DEVICE — PREVENA PLUS INCISION MANAGEMENT SYSTEM: Brand: PREVENA PLUS™

## (undated) DEVICE — SET LNR RED GRN W/ BASE CLEANASCOPE

## (undated) DEVICE — CARTRIDGE: Brand: PREP PLUS

## (undated) DEVICE — SUTURE VICRYL + SZ 2 L27IN ABSRB UD L40MM CP 1/2 CIR REV CUT VCP195H

## (undated) DEVICE — GLOVE SURG SZ 65 THK91MIL LTX FREE SYN POLYISOPRENE

## (undated) DEVICE — BIPOLAR SEALER 23-112-1 AQM 6.0: Brand: AQUAMANTYS ®

## (undated) DEVICE — CATHETER IV 16GA L1.16IN OD1.7018-1.7780MM

## (undated) DEVICE — GAUZE,SPONGE,8"X4",12PLY,XRAY,STRL,LF: Brand: MEDLINE

## (undated) DEVICE — SUTURE VICRYL + SZ 1-0 L36IN ABSRB UD CTX 1/2 CIR TAPR PNT VCP977H

## (undated) DEVICE — SET AUTOTRNS C175ML BOWL BTM OUTLT RESERVOIRXTRA

## (undated) DEVICE — CONMED SCOPE SAVER BITE BLOCK, 20X27 MM: Brand: SCOPE SAVER

## (undated) DEVICE — 4-PORT MANIFOLD: Brand: NEPTUNE 2

## (undated) DEVICE — SPHINCTEROTOME: Brand: HYDRATOME RX 44

## (undated) DEVICE — SUTURE STRATAFIX SYMMETRIC SZ 1 L18IN ABSRB VLT CT1 L36CM SXPP1A404

## (undated) DEVICE — GOWN,SIRUS,NONRNF,SETINSLV,XL,20/CS: Brand: MEDLINE

## (undated) DEVICE — RESERVOIR BLD COLLCTN BTM OUTLETXTRAXRES

## (undated) DEVICE — SPONGE,NEURO,1"X1",XR,STRL,LF,10/PK: Brand: MEDLINE

## (undated) DEVICE — AGENT HEMOSTATIC SURGIFLOW MATRIX KIT W/THROMBIN

## (undated) DEVICE — LINE ASPIR 1/4 ANTICOAG

## (undated) DEVICE — BLADE ES L6IN ELASTOMERIC COAT EXT DURABLE BEND UPTO 90DEG

## (undated) DEVICE — MEDIUM BLADE: Brand: MILL PLUS

## (undated) DEVICE — 450 ML BOTTLE OF 0.05% CHLORHEXIDINE GLUCONATE IN 99.95% STERILE WATER FOR IRRIGATION, USP AND APPLICATOR.: Brand: IRRISEPT ANTIMICROBIAL WOUND LAVAGE

## (undated) DEVICE — PROBE STIM 3 MM FOR PEDCL SCREW DISP

## (undated) DEVICE — RETRIEVAL BALLOON CATHETER: Brand: EXTRACTOR™ PRO RX

## (undated) DEVICE — DRAIN SURG 10FR PVC TB W/ TRCR MID PERF NO RESVR HUBLESS

## (undated) DEVICE — GOWN,SIRUS,NON REINFRCD,LARGE,SET IN SL: Brand: MEDLINE

## (undated) DEVICE — PACK-MAJOR

## (undated) DEVICE — SOLUTION IV IRRIG WATER 1000ML POUR BRL 2F7114

## (undated) DEVICE — TRAP SPEC POLYP REM STRNR CLN DSGN MAGNIFYING WIND DISP

## (undated) DEVICE — GLOVE ORTHO 8   MSG9480

## (undated) DEVICE — SYRINGE MED 10ML LUERLOCK TIP W/O SFTY DISP

## (undated) DEVICE — SNARE POLYP SM W13MMXL240CM SHTH DIA2.4MM OVL FLX DISP

## (undated) DEVICE — SPONGE,PEANUT,XRAY,ST,SM,3/8",5/CARD: Brand: MEDLINE INDUSTRIES, INC.

## (undated) DEVICE — GLOVE SURG SZ 9 THK91MIL LTX FREE SYN POLYISOPRENE ANTI

## (undated) DEVICE — ELECTRODE PT RET AD L9FT HI MOIST COND ADH HYDRGEL CORDED

## (undated) DEVICE — SPONGE LAP W18XL18IN WHT COT 4 PLY FLD STRUNG RADPQ DISP ST 2 PER PACK

## (undated) DEVICE — KIT EVAC 400CC PVC RADPQ Y CONN

## (undated) DEVICE — KIT INF CTRL 2OZ LUB TBNG L12FT DBL END BRSH SYR OP4

## (undated) DEVICE — SOLUTION IV IRRIG WATER 500ML POUR BRL ST 2F7113

## (undated) DEVICE — SUTURE ETHILON SZ 3-0 L18IN NONABSORBABLE BLK L24MM FS-1 3/8 663G

## (undated) DEVICE — Device

## (undated) DEVICE — PAD OP RM W20XL72XH2IN PRECIS CUT FLAT EC BIOCLINIC

## (undated) DEVICE — FORCEP RAD JAW W/NEEDLE 160CM

## (undated) DEVICE — SUTURE VICRYL + SZ 2-0 L27IN ABSRB UD CP-1 1/2 CIR REV CUT VCP266H

## (undated) DEVICE — 1LYRTR 16FR10ML100%SILTMPS SNP: Brand: MEDLINE INDUSTRIES, INC.